# Patient Record
Sex: FEMALE | Race: WHITE | NOT HISPANIC OR LATINO | Employment: OTHER | URBAN - METROPOLITAN AREA
[De-identification: names, ages, dates, MRNs, and addresses within clinical notes are randomized per-mention and may not be internally consistent; named-entity substitution may affect disease eponyms.]

---

## 2017-02-06 ENCOUNTER — HOSPITAL ENCOUNTER (OUTPATIENT)
Dept: RADIOLOGY | Facility: MEDICAL CENTER | Age: 77
End: 2017-02-06
Attending: PHYSICIAN ASSISTANT
Payer: MEDICARE

## 2017-02-06 DIAGNOSIS — M41.85 OTHER FORM OF SCOLIOSIS OF THORACOLUMBAR SPINE: ICD-10-CM

## 2017-02-06 DIAGNOSIS — M41.9 SCOLIOSIS OF CERVICOTHORACIC SPINE, UNSPECIFIED SCOLIOSIS TYPE: ICD-10-CM

## 2017-02-06 PROCEDURE — 72081 X-RAY EXAM ENTIRE SPI 1 VW: CPT

## 2017-04-10 ENCOUNTER — HOSPITAL ENCOUNTER (EMERGENCY)
Facility: MEDICAL CENTER | Age: 77
End: 2017-04-10
Attending: EMERGENCY MEDICINE
Payer: MEDICARE

## 2017-04-10 ENCOUNTER — APPOINTMENT (OUTPATIENT)
Dept: RADIOLOGY | Facility: MEDICAL CENTER | Age: 77
End: 2017-04-10
Attending: EMERGENCY MEDICINE
Payer: MEDICARE

## 2017-04-10 VITALS
DIASTOLIC BLOOD PRESSURE: 56 MMHG | HEART RATE: 69 BPM | HEIGHT: 67 IN | SYSTOLIC BLOOD PRESSURE: 110 MMHG | RESPIRATION RATE: 16 BRPM | BODY MASS INDEX: 27 KG/M2 | WEIGHT: 172 LBS | OXYGEN SATURATION: 96 % | TEMPERATURE: 97.2 F

## 2017-04-10 DIAGNOSIS — M79.604 LEG PAIN, RIGHT: ICD-10-CM

## 2017-04-10 PROCEDURE — 96372 THER/PROPH/DIAG INJ SC/IM: CPT

## 2017-04-10 PROCEDURE — 99284 EMERGENCY DEPT VISIT MOD MDM: CPT

## 2017-04-10 PROCEDURE — 700111 HCHG RX REV CODE 636 W/ 250 OVERRIDE (IP): Performed by: EMERGENCY MEDICINE

## 2017-04-10 PROCEDURE — 73501 X-RAY EXAM HIP UNI 1 VIEW: CPT | Mod: RT

## 2017-04-10 RX ORDER — OXYCODONE HYDROCHLORIDE AND ACETAMINOPHEN 5; 325 MG/1; MG/1
1-2 TABLET ORAL EVERY 4 HOURS PRN
Status: ON HOLD | COMMUNITY
End: 2019-03-22

## 2017-04-10 RX ORDER — GABAPENTIN 100 MG/1
100 CAPSULE ORAL 3 TIMES DAILY
Status: ON HOLD | COMMUNITY
End: 2019-03-18

## 2017-04-10 RX ORDER — KETOROLAC TROMETHAMINE 30 MG/ML
30 INJECTION, SOLUTION INTRAMUSCULAR; INTRAVENOUS ONCE
Status: COMPLETED | OUTPATIENT
Start: 2017-04-10 | End: 2017-04-10

## 2017-04-10 RX ADMIN — HYDROMORPHONE HYDROCHLORIDE 1 MG: 1 INJECTION, SOLUTION INTRAMUSCULAR; INTRAVENOUS; SUBCUTANEOUS at 20:54

## 2017-04-10 RX ADMIN — KETOROLAC TROMETHAMINE 30 MG: 30 INJECTION, SOLUTION INTRAMUSCULAR; INTRAVENOUS at 20:54

## 2017-04-10 RX ADMIN — HYDROMORPHONE HYDROCHLORIDE 1 MG: 1 INJECTION, SOLUTION INTRAMUSCULAR; INTRAVENOUS; SUBCUTANEOUS at 21:52

## 2017-04-10 ASSESSMENT — PAIN SCALES - GENERAL: PAINLEVEL_OUTOF10: 9

## 2017-04-10 NOTE — ED AVS SNAPSHOT
4/10/2017          Lindsay Vargas  [address] [place] [address]    Dear Lindsay:    [facility] wants to ensure your discharge home is safe and you or your loved ones have had all your questions answered regarding your care after you leave the hospital.    You may receive a telephone call within two days of your discharge.  This call is to make certain you understand your discharge instructions as well as ensure we provided you with the best care possible during your stay with us.     The call will only last approximately 3-5 minutes and will be done by a nurse.    Once again, we want to ensure your discharge home is safe and that you have a clear understanding of any next steps in your care.  If you have any questions or concerns, please do not hesitate to contact us, we are here for you.  Thank you for choosing [facility] for your healthcare needs.    Sincerely,    Gilles Betancourt    [facility]

## 2017-04-10 NOTE — ED AVS SNAPSHOT
Home Care Instructions                                                                                                                Lindsay Vargas   MRN: [id number]    Department:  [facility], Emergency Dept   Date of Visit:  4/10/2017            [facility], Emergency Dept    [address]    [place] [address]    Phone:  [phone]      You were seen by     Franky Munroe M.D.      Your Diagnosis Was     Leg pain, right     M79.604       These are the medications you received during your hospitalization from 04/10/2017 1734 to 04/10/2017 2235     Date/Time Order Dose Route Action    04/10/2017 2054 ketorolac (TORADOL) injection 30 mg 30 mg Intramuscular Given    04/10/2017 2054 HYDROmorphone (DILAUDID) injection 1 mg 1 mg Intramuscular Given    04/10/2017 2152 HYDROmorphone (DILAUDID) injection 1 mg 1 mg Intravenous Given      Follow-up Information     1. Follow up with Lenny RENTERIA M.D..    Specialty:  Family Medicine    Contact information    [address] [address] [phone]          2. Follow up with [facility], Emergency Dept.    Specialty:  Emergency Medicine    Why:  If symptoms worsen    Contact information    [address] [address] [phone]      Medication Information     Review all of your home medications and newly ordered medications with your primary doctor and/or pharmacist as soon as possible. Follow medication instructions as directed by your doctor and/or pharmacist.     Please keep your complete medication list with you and share with your physician. Update the information when medications are discontinued, doses are changed, or new medications (including over-the-counter products) are added; and carry medication information at all times in the event of emergency situations.               Medication List      ASK your doctor about these medications         Instructions    Morning Afternoon Evening Bedtime    baclofen 10 MG Tabs   Commonly known as:  LIORESAL        Take 10 mg by mouth 3 times a day.   Dose:  10 mg                        duloxetine 60 MG Cpep delayed-release capsule   Commonly known as:  CYMBALTA        Take 60 mg by mouth every day.   Dose:  60 mg                        gabapentin 100 MG Caps   Commonly known as:  NEURONTIN        Take 100 mg by mouth 3 times a day.   Dose:  100 mg                        hydrochlorothiazide 25 MG Tabs   Commonly known as:  HYDRODIURIL        Take 25 mg by mouth every day.   Dose:  25 mg                        oxycodone-acetaminophen 5-325 MG Tabs   Commonly known as:  PERCOCET        Take 1-2 Tabs by mouth every four hours as needed.   Dose:  1-2 Tab                        potassium chloride 20 MEQ Pack   Commonly known as:  KLOR-CON        Take 20 mEq by mouth 2 times a day.   Dose:  20 mEq                        pramipexole 0.5 MG Tabs   Commonly known as:  MIRAPEX        Take 1 mg by mouth every bedtime.   Dose:  1 mg                        propranolol  MG Cp24 capsule   Commonly known as:  INDERAL LA        Take 160 mg by mouth.   Dose:  160 mg                        ropinirole 1 MG Tabs   Commonly known as:  REQUIP        Take 0.5 mg by mouth 2 Times a Day. 0.5 TO 1MG BID   Dose:  0.5 mg                                Procedures and tests performed during your visit     DX-HIP-UNILATERAL-WITH PELVIS-1 VIEW RIGHT        Discharge Instructions       Musculoskeletal Pain  Musculoskeletal pain is muscle and soha aches and pains. These pains can occur in any part of the body. Your caregiver may treat you without knowing the cause of the pain. They may treat you if blood or urine tests, X-rays, and other tests were normal.   CAUSES  There is often not a definite cause or reason for these pains. These pains may be caused by a type of germ (virus). The discomfort may also come from overuse. Overuse includes  working out too hard when your body is not fit. Yanira aches also come from weather changes. Bone is sensitive to atmospheric pressure changes.  HOME CARE INSTRUCTIONS   · Ask when your test results will be ready. Make sure you get your test results.  · Only take over-the-counter or prescription medicines for pain, discomfort, or fever as directed by your caregiver. If you were given medications for your condition, do not drive, operate machinery or power tools, or sign legal documents for 24 hours. Do not drink alcohol. Do not take sleeping pills or other medications that may interfere with treatment.  · Continue all activities unless the activities cause more pain. When the pain lessens, slowly resume normal activities. Gradually increase the intensity and duration of the activities or exercise.  · During periods of severe pain, bed rest may be helpful. Lay or sit in any position that is comfortable.  · Putting ice on the injured area.  ¨ Put ice in a bag.  ¨ Place a towel between your skin and the bag.  ¨ Leave the ice on for 15 to 20 minutes, 3 to 4 times a day.  · Follow up with your caregiver for continued problems and no reason can be found for the pain. If the pain becomes worse or does not go away, it may be necessary to repeat tests or do additional testing. Your caregiver may need to look further for a possible cause.  SEEK IMMEDIATE MEDICAL CARE IF:  · You have pain that is getting worse and is not relieved by medications.  · You develop chest pain that is associated with shortness or breath, sweating, feeling sick to your stomach (nauseous), or throw up (vomit).  · Your pain becomes localized to the abdomen.  · You develop any new symptoms that seem different or that concern you.  MAKE SURE YOU:   · Understand these instructions.  · Will watch your condition.  · Will get help right away if you are not doing well or get worse.     This information is not intended to replace advice given to you by your  health care provider. Make sure you discuss any questions you have with your health care provider.     Document Released: 12/18/2006 Document Revised: 03/11/2013 Document Reviewed: 08/22/2014  Elsevier Interactive Patient Education ©2016 Elsevier Inc.            Patient Information     Patient Information    Following emergency treatment: all patient requiring follow-up care must return either to a private physician or a clinic if your condition worsens before you are able to obtain further medical attention, please return to the emergency room.     Billing Information    At [facility], we work to make the billing process streamlined for our patients.  Our Representatives are here to answer any questions you may have regarding your hospital bill.  If you have insurance coverage and have supplied your insurance information to us, we will submit a claim to your insurer on your behalf.  Should you have any questions regarding your bill, we can be reached online or by phone as follows:  Online: You are able pay your bills online or live chat with our representatives about any billing questions you may have. We are here to help Monday - Friday from 8:00am to 7:30pm and 9:00am - 12:00pm on Saturdays.  Please visit [email or web address]  for more information.   Phone:  [phone] or [phone]    Please note that your emergency physician, surgeon, pathologist, radiologist, anesthesiologist, and other specialists are not employed by [facility] and will therefore bill separately for their services.  Please contact them directly for any questions concerning their bills at the numbers below:     Emergency Physician Services:  [phone]  [facility]:  [phone]  [facility]:  [phone]  [facility]:  [phone]    1. Your final bill may vary from the amount quoted upon discharge if all procedures are not complete at that time, or if  your doctor has additional procedures of which we are not aware. You will receive an additional bill if you return to the Emergency Department at [facility] for suture removal regardless of the facility of which the sutures were placed.     2. Please arrange for settlement of this account at the emergency registration.    3. All self-pay accounts are due in full at the time of treatment.  If you are unable to meet this obligation then payment is expected within 4-5 days.     4. If you have had radiology studies (CT, X-ray, Ultrasound, MRI), you have received a preliminary result during your emergency department visit. Please contact the radiology department [phone] to receive a copy of your final result. Please discuss the Final result with your primary physician or with the follow up physician provided.     Crisis Hotline:  [place] Crisis Hotline:  5-313-COKRFKA or 1-694.833.2267  [place] Crisis Hotline:    1-339.861.5189 or 286-281-8381         ED Discharge Follow Up Questions    1. In order to provide you with very good care, we would like to follow up with a phone call in the next few days.  May we have your permission to contact you?     YES /  NO    2. What is the best phone number to call you? (       )_____-__________    3. What is the best time to call you?      Morning  /  Afternoon  /  Evening                   Patient Signature:  ____________________________________________________________    Date:  ____________________________________________________________

## 2017-04-10 NOTE — ED AVS SNAPSHOT
appssavvy Access Code: Activation code not generated  Current appssavvy Status: Patient Declined    Your email address is not on file at [facility].  Email Addresses are required for you to sign up for appssavvy, please contact [phone] to verify your personal information and to provide your email address prior to attempting to register for appssavvy.    Lindsay Malik Sam  [address] [place] [address]    [employer]  A secure, online tool to manage your health information     [employer]’s appssavvy® is a secure, online tool that connects you to your personalized health information from the privacy of your home -- day or night - making it very easy for you to manage your healthcare. Once the activation process is completed, you can even access your medical information using the appssavvy alma, which is available for free in the Apple Alma store or Google Play store.     To learn more about appssavvy, visit [email or web address]    There are two levels of access available (as shown below):   My Chart Features  [facility] Primary Care Doctor [facility]  Specialists [facility]  Urgent  Care Non-[facility] Primary Care Doctor   Email your healthcare team securely and privately 24/7 X X X    Manage appointments: schedule your next appointment; view details of past/upcoming appointments X      Request prescription refills. X      View recent personal medical records, including lab and immunizations X X X X   View health record, including health history, allergies, medications X X X X   Read reports about your outpatient visits, procedures, consult and ER notes X X X X   See your discharge summary, which is a recap of your hospital and/or ER visit that includes your diagnosis, lab results, and care plan X X  X     How to register for [employer]:  Once your e-mail address has been verified, follow the following steps to sign up for [employer].     1. Go to  [email or web address]  2. Click on the Sign Up Now box, which takes you to the New  Member Sign Up page. You will need to provide the following information:  a. Enter your [employer] Access Code exactly as it appears at the top of this page. (You will not need to use this code after you’ve completed the sign-up process. If you do not sign up before the expiration date, you must request a new code.)   b. Enter your date of birth.   c. Enter your home email address.   d. Click Submit, and follow the next screen’s instructions.  3. Create a Mem[employer] System[employer] ID. This will be your [employer] login ID and cannot be changed, so think of one that is secure and easy to remember.  4. Create a [employer] password. You can change your password at any time.  5. Enter your Password Reset Question and Answer. This can be used at a later time if you forget your password.   6. Enter your e-mail address. This allows you to receive e-mail notifications when new information is available in [employer].  7. Click Sign Up. You can now view your health information.    For assistance activating your [employer] account, call [phone]

## 2017-04-11 NOTE — ED NOTES
Discharge instructions provided.  Pt verbalized the understanding of discharge instructions to follow up with PCP and to return to ER if condition worsens.  Pt ambulated out of ER without difficulty.  to drive home

## 2017-04-11 NOTE — DISCHARGE INSTRUCTIONS
Musculoskeletal Pain  Musculoskeletal pain is muscle and yanira aches and pains. These pains can occur in any part of the body. Your caregiver may treat you without knowing the cause of the pain. They may treat you if blood or urine tests, X-rays, and other tests were normal.   CAUSES  There is often not a definite cause or reason for these pains. These pains may be caused by a type of germ (virus). The discomfort may also come from overuse. Overuse includes working out too hard when your body is not fit. Yanira aches also come from weather changes. Bone is sensitive to atmospheric pressure changes.  HOME CARE INSTRUCTIONS   · Ask when your test results will be ready. Make sure you get your test results.  · Only take over-the-counter or prescription medicines for pain, discomfort, or fever as directed by your caregiver. If you were given medications for your condition, do not drive, operate machinery or power tools, or sign legal documents for 24 hours. Do not drink alcohol. Do not take sleeping pills or other medications that may interfere with treatment.  · Continue all activities unless the activities cause more pain. When the pain lessens, slowly resume normal activities. Gradually increase the intensity and duration of the activities or exercise.  · During periods of severe pain, bed rest may be helpful. Lay or sit in any position that is comfortable.  · Putting ice on the injured area.  ¨ Put ice in a bag.  ¨ Place a towel between your skin and the bag.  ¨ Leave the ice on for 15 to 20 minutes, 3 to 4 times a day.  · Follow up with your caregiver for continued problems and no reason can be found for the pain. If the pain becomes worse or does not go away, it may be necessary to repeat tests or do additional testing. Your caregiver may need to look further for a possible cause.  SEEK IMMEDIATE MEDICAL CARE IF:  · You have pain that is getting worse and is not relieved by medications.  · You develop chest pain  that is associated with shortness or breath, sweating, feeling sick to your stomach (nauseous), or throw up (vomit).  · Your pain becomes localized to the abdomen.  · You develop any new symptoms that seem different or that concern you.  MAKE SURE YOU:   · Understand these instructions.  · Will watch your condition.  · Will get help right away if you are not doing well or get worse.     This information is not intended to replace advice given to you by your health care provider. Make sure you discuss any questions you have with your health care provider.     Document Released: 12/18/2006 Document Revised: 03/11/2013 Document Reviewed: 08/22/2014  [employer] Interactive Patient Education ©2016 Elsevier Inc.

## 2017-04-11 NOTE — ED PROVIDER NOTES
"ED Provider Note    CHIEF COMPLAINT   Thigh pain  Calf pain.  Status post fall    HPI   Lindsay Vargas is a 76 y.o. female who presents a chief complaint of leg pain she points to her right thigh and I see mostly her right calf. It's been going on for 3 days there is no inciting event she did not break anything or fall down. She did fall a few weeks ago and hit the side of her head and her arm but she did not fall down recently. I radiates up and down she has a tingling sensation from her buttock down to both sides. No weakness. She has not fallen down and she is no urinary or bowel incontinence or retention.    Patient does have a long-standing history of lymphedema lungs any history of degenerative disease. The patient states that she has bad is in good days for the last 3 days have been very much the worst.    REVIEW OF SYSTEMS   Cardiovascular: No discoloration of her toes  Musculoskeletal:  See above. No back pain  Dermatologic: No lacerations or abrasions  Neurologic: No new numbness.    PAST MEDICAL HISTORY   Past Medical History   Diagnosis Date   • Migraine headache    • Lymphedema 2014   • Hypertension    • Anesthesia      confused/hard to wake up-15 years ago   • Arthritis    • Pain      back   • Hiatus hernia syndrome      surgically repaired   • Pneumonia      \"younger\"   • Cataract      Bilat IOL   • Chronic pain        SOCIAL HISTORY  Social History     Social History   • Marital Status:      Spouse Name: N/A   • Number of Children: N/A   • Years of Education: N/A     Social History Main Topics   • Smoking status: Never Smoker    • Smokeless tobacco: Never Used   • Alcohol Use: Yes      Comment: rarely   • Drug Use: No   • Sexual Activity: Not Asked     Other Topics Concern   • None     Social History Narrative       SURGICAL HISTORY  Past Surgical History   Procedure Laterality Date   • Hysterectomy laparoscopy     • Juani by laparoscopy     • Other abdominal surgery       LAP JUANI   • " Other abdominal surgery       APPENDECOMTY   • Other       HIATAL HERNIA REPAIR   • Other orthopedic surgery       LEFT KNEE SCOPE   • Cataract phaco with iol Left 2/4/2016     Procedure: CATARACT PHACO WITH IOL;  Surgeon: Rodrigo Smyth M.D.;  Location: [facility];  Service:    • Cataract phaco with iol Right 2/18/2016     Procedure: CATARACT PHACO WITH IOL;  Surgeon: Rodrigo Smyth M.D.;  Location: [facility];  Service:    • Pr inj lumbar/sacral,w/wo cntrst Right 9/29/2016     Procedure: INJ EPI NON NEUROLYTIC L/S - L5-S1;  Surgeon: Eduardo Mustafa M.D.;  Location: [facility];  Service: Pain Management   • Pr fluorscopic guidance spinal injection  9/29/2016     Procedure: FLUOROGUIDE FOR SPINAL INJ;  Surgeon: Eduardo Mustafa M.D.;  Location: [facility];  Service: Pain Management   • Pr inj lumbar/sacral,w/wo cntrst N/A 11/10/2016     Procedure: INJ EPI NON NEUROLYTIC L/S -MIDLINE L4-L5;  Surgeon: Eduardo Mustafa M.D.;  Location: [facility];  Service: Pain Management   • Pr fluorscopic guidance spinal injection  11/10/2016     Procedure: FLUOROGUIDE FOR SPINAL INJ;  Surgeon: Eduardo Mustafa M.D.;  Location: [facility];  Service: Pain Management       CURRENT MEDICATIONS   Home Medications     Reviewed by Ildefonso Carmen R.N. (Registered Nurse) on 04/10/17 at 1754  Med List Status: Complete    Medication Last Dose Status    baclofen (LIORESAL) 10 MG TABS 4/10/2017 Active    duloxetine (CYMBALTA) 60 MG Cap DR Particles delayed-release capsule 4/10/2017 Active    gabapentin (NEURONTIN) 100 MG Cap 4/10/2017 Active    hydrochlorothiazide (HYDRODIURIL) 25 MG TABS 4/10/2017 Active    oxycodone-acetaminophen (PERCOCET) 5-325 MG Tab 4/10/2017 Active    potassium chloride (KLOR-CON) 20 MEQ PACK 4/10/2017 Active    pramipexole (MIRAPEX) 0.5 MG Tab 4/10/2017 Active    propranolol CR (INDERAL LA) 160 MG CP24 capsule 4/10/2017  "Active    ropinirole (REQUIP) 1 MG TABS 4/10/2017 Active                ALLERGIES   No Known Allergies    PHYSICAL EXAM  VITAL SIGNS: /56 mmHg  Pulse 67  Temp(Src) 36.2 °C (97.2 °F)  Resp 16  Ht 1.702 m (5' 7\")  Wt 78.019 kg (172 lb)  BMI 26.93 kg/m2  SpO2 94%   Constitutional: Well developed, Well nourished, No acute distress, Non-toxic appearance.   Cardiovascular: Good pulses on the affected extremity. Good capillary refill.  Thorax & Lungs: No respiratory distress  Skin: No lacerations or abrasions  Musculoskeletal: Range her hip and knee without any difficulty or pain. She knows she has no lumbar tenderness. I can range both hips both knees both ankles or any deformity or pain. Patient does have some difficulty ambulating.  Neurologic: Good sensation to light touch on the distal affected extremity.    RADIOLOGY/PROCEDURES  DX-HIP-UNILATERAL-WITH PELVIS-1 VIEW RIGHT   Final Result      No radiographic evidence of acute traumatic injury.      Moderate left, mild right hip osteoarthritis            COURSE & MEDICAL DECISION MAKING  Pertinent Labs & Imaging studies reviewed. (See chart for details)  Very pleasant and talkative lady who appears to be uncomfortable secondary to pain in her leg unable to range her hip and palpates his middle tenderness on the greater trochanter but with good range of motion. At this point, call fractures consider but again with a history and with her symptoms very difficult for me to believe that the patient has significant fracture.    She received Dilaudid and Toradol with significant improvement. Upon repeat exam again she has good range of motion and no minimal to no tenderness over the trochanter.    This patient has a chronic condition has times where it improves in times that it doesn't aspect is an exacerbation of her chronic condition of asked her to call her pain management doctor and also rehab doctor pending these would benefit her tremendously.    I " encouraged her to continue follow-up with her doctor regarding MRI. In addition I did talk about possible small hairline fracture and need to return if the pain medicine is not improving. She'll continue with her oxycodone at home.    FINAL IMPRESSION  1. Leg pain  2.   3.      Electronically signed by: Franky Munroe, 4/10/2017 10:39 PM

## 2017-04-20 ENCOUNTER — HOSPITAL ENCOUNTER (OUTPATIENT)
Dept: RADIOLOGY | Facility: MEDICAL CENTER | Age: 77
End: 2017-04-20
Attending: PHYSICIAN ASSISTANT
Payer: MEDICARE

## 2017-04-20 VITALS
TEMPERATURE: 97.2 F | OXYGEN SATURATION: 96 % | HEIGHT: 68 IN | DIASTOLIC BLOOD PRESSURE: 58 MMHG | SYSTOLIC BLOOD PRESSURE: 113 MMHG | RESPIRATION RATE: 16 BRPM | BODY MASS INDEX: 26.37 KG/M2 | WEIGHT: 174 LBS | HEART RATE: 72 BPM

## 2017-04-20 DIAGNOSIS — M17.11 PRIMARY OSTEOARTHRITIS OF RIGHT KNEE: ICD-10-CM

## 2017-04-20 PROCEDURE — 73721 MRI JNT OF LWR EXTRE W/O DYE: CPT | Mod: RT

## 2017-04-20 PROCEDURE — 01922 ANES N-INVAS IMG/RADJ THER: CPT

## 2017-04-20 PROCEDURE — 700111 HCHG RX REV CODE 636 W/ 250 OVERRIDE (IP)

## 2017-04-20 NOTE — DISCHARGE INSTRUCTIONS
MRI ADULT DISCHARGE INSTRUCTIONS    You have been medicated today for your scan. Please follow the instructions below to ensure your safe recovery. If you have any questions or problems, feel free to call us at [phone] or [phone].     1.   Have someone stay with you to assist you as needed.    2.   Do not drive or operate any mechanical devices.    3.   Do not perform any activity that requires concentration. Make no major decisions over the next 24 hours.     4.   Be careful changing positions from laying down to sitting or standing, as you may become dizzy.     5.   Do not drink alcohol for 48 hours.    6.   There are no restrictions for eating your normal meals. Drink fluids.    7.   You may continue your usual medications for pain, tranquilizers, muscle relaxants or sedatives when awake.     8.   Tomorrow, you may continue your normal daily activities.     9.   Pressure dressing on 10 - 15 minutes. If swelling or bleeding occurs when removed, continue placing direct pressure on injection site for another 5 minutes, or until bleeding stops.     I have been informed of and understand the above discharge instructions.

## 2017-04-20 NOTE — IP AVS SNAPSHOT
" <p align=\"LEFT\"><IMG SRC=\"//[email or web address]$/[email or web address]\" alt=\"Image\" WIDTH=\"50%\" HEIGHT=\"200\" BORDER=\"\"></p>      `           Lindsay Vargas   MRN: [id number]    Department:  [facility] MRI [address]   Date of Visit:              `  Discharge Instructions       MRI ADULT DISCHARGE INSTRUCTIONS    You have been medicated today for your scan. Please follow the instructions below to ensure your safe recovery. If you have any questions or problems, feel free to call us at [phone] or [phone].     1.   Have someone stay with you to assist you as needed.    2.   Do not drive or operate any mechanical devices.    3.   Do not perform any activity that requires concentration. Make no major decisions over the next 24 hours.     4.   Be careful changing positions from laying down to sitting or standing, as you may become dizzy.     5.   Do not drink alcohol for 48 hours.    6.   There are no restrictions for eating your normal meals. Drink fluids.    7.   You may continue your usual medications for pain, tranquilizers, muscle relaxants or sedatives when awake.     8.   Tomorrow, you may continue your normal daily activities.     9.   Pressure dressing on 10 - 15 minutes. If swelling or bleeding occurs when removed, continue placing direct pressure on injection site for another 5 minutes, or until bleeding stops.     I have been informed of and understand the above discharge instructions.      `       Diet / Nutrition:    Follow any diet instructions given to you by your doctor or the dietician, including how much salt (sodium) you are allowed each day.    If you are overweight, talk to your doctor about a weight reduction plan.    Activity:    Remain physically active following your doctor's instructions about exercise and activity.    Rest often.     Any time you become even a little tired or short of breath, SIT DOWN and rest.    Worsening Symptoms:    Report any of the following signs and symptoms to the " doctor's office immediately:    *Pain of jaw, arm, or neck  *Chest pain not relieved by medication                               *Dizziness or loss of consciousness  *Difficulty breathing even when at rest   *More tired than usual                                       *Bleeding drainage or swelling of surgical site  *Swelling of feet, ankles, legs or stomach                 *Fever (>100ºF)  *Pink or blood tinged sputum  *Weight gain (3lbs/day or 5lbs /week)           *Shock from internal defibrillator (if applicable)  *Palpitations or irregular heartbeats                *Cool and/or numb extremities    Stroke Awareness    Common Risk Factors for Stroke include:    Age  Atrial Fibrillation  Carotid Artery Stenosis  Diabetes Mellitus  Excessive alcohol consumption  High blood pressure  Overweight   Physical inactivity  Smoking    Warning signs and symptoms of a stroke include:    *Sudden numbness or weakness of the face, arm or leg (especially on one side of the body).  *Sudden confusion, trouble speaking or understanding.  *Sudden trouble seeing in one or both eyes.  *Sudden trouble walking, dizziness, loss of balance or coordination.Sudden severe headache with no known cause.    It is very important to get treatment quickly when a stroke occurs. If you experience any of the above warning signs, call 911 immediately.                    `     Quit Smoking / Tobacco Use:    I understand the use of any tobacco products increases my chance of suffering from future heart disease or stroke and could cause other illnesses which may shorten my life. Quitting the use of tobacco products is the single most important thing I can do to improve my health. For further information on smoking / tobacco cessation call a Toll Free Quit Line at 1-522.247.1534 (*[employer] [place]) or 1-469.439.9242 (American [employer] Association) or you can access the web based program at www.lungusa.org.    [place] Tobacco Users Help Line:  (890)  878-8495       Toll Free: 4-713-135-4188  Quit Tobacco Program [facility] (204)844-0676    Crisis Hotline:    [place] Crisis Hotline:  3-460-DKIJFQD or 1-289.395.8795    [place] Crisis Hotline:    1-675.947.2977 or 251-439-7201    Discharge Survey:   Thank you for choosing [facility]. We hope we did everything we could to make your hospital stay a pleasant one. You may be receiving a phone survey and we would appreciate your time and participation in answering the questions. Your input is very valuable to us in our efforts to improve our service to our patients and their families.        My signature on this form indicates that:    1. I have reviewed and understand the above information.  2. My questions regarding this information have been answered to my satisfaction.  3. I have formulated a plan with my discharge nurse to obtain my prescribed medications for home.                   `           Patient or Caregiver Signature:  ____________________________________________________________    Date:  ____________________________________________________________       `

## 2017-06-08 ENCOUNTER — RX ONLY (OUTPATIENT)
Age: 77
Setting detail: RX ONLY
End: 2017-06-08

## 2017-06-28 ENCOUNTER — APPOINTMENT (OUTPATIENT)
Dept: RADIOLOGY | Facility: MEDICAL CENTER | Age: 77
End: 2017-06-28
Attending: EMERGENCY MEDICINE
Payer: MEDICARE

## 2017-06-28 ENCOUNTER — HOSPITAL ENCOUNTER (EMERGENCY)
Facility: MEDICAL CENTER | Age: 77
End: 2017-06-28
Attending: EMERGENCY MEDICINE
Payer: MEDICARE

## 2017-06-28 VITALS
HEART RATE: 70 BPM | SYSTOLIC BLOOD PRESSURE: 130 MMHG | WEIGHT: 174.38 LBS | HEIGHT: 68 IN | DIASTOLIC BLOOD PRESSURE: 60 MMHG | BODY MASS INDEX: 26.43 KG/M2 | RESPIRATION RATE: 16 BRPM | TEMPERATURE: 96.8 F
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
## 2017-06-28 DIAGNOSIS — H53.8 BLURRY VISION: ICD-10-CM

## 2017-06-28 DIAGNOSIS — S09.8XXA BLUNT HEAD TRAUMA, INITIAL ENCOUNTER: ICD-10-CM

## 2017-06-28 PROCEDURE — 99283 EMERGENCY DEPT VISIT LOW MDM: CPT

## 2017-06-28 PROCEDURE — 70450 CT HEAD/BRAIN W/O DYE: CPT

## 2017-06-28 ASSESSMENT — PAIN SCALES - WONG BAKER: WONGBAKER_NUMERICALRESPONSE: DOESN'T HURT AT ALL

## 2017-06-28 NOTE — ED AVS SNAPSHOT
6/28/2017    Lindsay Vargas  [address] [place] [address]    Dear Lindsay:    [facility] wants to ensure your discharge home is safe and you or your loved ones have had all of your questions answered regarding your care after you leave the hospital.    Below is a list of resources and contact information should you have any questions regarding your hospital stay, follow-up instructions, or active medical symptoms.    Questions or Concerns Regarding… Contact   Medical Questions Related to Your Discharge  (7 days a week, 8am-5pm) Contact a Nurse Care Coordinator   [phone]   Medical Questions Not Related to Your Discharge  (24 hours a day / 7 days a week)  Contact the Nurse Health Line   [phone]    Medications or Discharge Instructions Refer to your discharge packet   or contact your [facility] Primary Care Provider   [phone]   Follow-up Appointment(s) Schedule your appointment via Infraf[employer]   or contact Scheduling [phone]   Billing Review your statement via Infrafone  or contact Billing [phone]   Medical Records Review your records via Infrafone   or contact Medical Records [phone]     You may receive a telephone call within two days of discharge. This call is to make certain you understand your discharge instructions and have the opportunity to have any questions answered. You can also easily access your medical information, test results and upcoming appointments via the Infrafone free online health management tool. You can learn more and sign up at [email or web address]. For assistance setting up your Infrafone account, please call [phone].    Once again, we want to ensure your discharge home is safe and that you have a clear understanding of any next steps in your care. If you have any questions or concerns, please do not hesitate to contact us, we are here for you. Thank you for choosing [facility] for your healthcare needs.    Sincerely,    Your [facility] Healthcare Team

## 2017-06-28 NOTE — ED AVS SNAPSHOT
Home Care Instructions                                                                                                                Lindsay Vargas   MRN: [id number]    Department:  [facility], Emergency Dept   Date of Visit:  6/28/2017            [facility], Emergency Dept    [address]    [place] [address]    Phone:  [phone]      You were seen by     Aureliano Jaffe M.D.      Your Diagnosis Was     Fall, initial encounter     W19.XXXA       Follow-up Information     1. Follow up with Rodrigo Smyth M.D.. Call today.    Specialty:  Ophthalmology    Contact information    [address]  [place] [address]  [phone]          2. Follow up with [facility], Emergency Dept.    Specialty:  Emergency Medicine    Why:  If symptoms worsen    Contact information    [address] [place] [address] [phone]      Medication Information     Review all of your home medications and newly ordered medications with your primary doctor and/or pharmacist as soon as possible. Follow medication instructions as directed by your doctor and/or pharmacist.     Please keep your complete medication list with you and share with your physician. Update the information when medications are discontinued, doses are changed, or new medications (including over-the-counter products) are added; and carry medication information at all times in the event of emergency situations.               Medication List      ASK your doctor about these medications        Instructions    Morning Afternoon Evening Bedtime    baclofen 10 MG Tabs   Commonly known as:  LIORESAL        Take 10 mg by mouth 3 times a day.   Dose:  10 mg                        duloxetine 60 MG Cpep delayed-release capsule   Commonly known as:  CYMBALTA        Take 60 mg by mouth every day.   Dose:  60 mg                        gabapentin 100 MG Caps   Commonly known as:   NEURONTIN        Take 100 mg by mouth 3 times a day.   Dose:  100 mg                        hydrochlorothiazide 25 MG Tabs   Commonly known as:  HYDRODIURIL        Take 25 mg by mouth every day.   Dose:  25 mg                        oxycodone-acetaminophen 5-325 MG Tabs   Commonly known as:  PERCOCET        Take 1-2 Tabs by mouth every four hours as needed.   Dose:  1-2 Tab                        potassium chloride 20 MEQ Pack   Commonly known as:  KLOR-CON        Take 20 mEq by mouth 3 times a day.   Dose:  20 mEq                        pramipexole 0.5 MG Tabs   Commonly known as:  MIRAPEX        Take 0.5 mg by mouth every bedtime.   Dose:  0.5 mg                        propranolol  MG Cp24 capsule   Commonly known as:  INDERAL LA        Take 160 mg by mouth every day.   Dose:  160 mg                        ropinirole 1 MG Tabs   Commonly known as:  REQUIP        Take 0.5 mg by mouth 2 Times a Day. 0.5 TO 1MG BID   Dose:  0.5 mg                                Procedures and tests performed during your visit     CT-HEAD W/O        Discharge Instructions       Head Injury, Adult  You have a head injury. Headaches and throwing up (vomiting) are common after a head injury. It should be easy to wake up from sleeping. Sometimes you must stay in the hospital. Most problems happen within the first 24 hours. Side effects may occur up to 7-10 days after the injury.   WHAT ARE THE TYPES OF HEAD INJURIES?  Head injuries can be as minor as a bump. Some head injuries can be more severe. More severe head injuries include:  · A jarring injury to the brain (concussion).  · A bruise of the brain (contusion). This mean there is bleeding in the brain that can cause swelling.  · A cracked skull (skull fracture).  · Bleeding in the brain that collects, clots, and forms a bump (hematoma).  WHEN SHOULD I GET HELP RIGHT AWAY?   · You are confused or sleepy.  · You cannot be woken up.  · You feel sick to your stomach (nauseous) or  keep throwing up (vomiting).  · Your dizziness or unsteadiness is getting worse.  · You have very bad, lasting headaches that are not helped by medicine. Take medicines only as told by your doctor.  · You cannot use your arms or legs like normal.  · You cannot walk.  · You notice changes in the black spots in the center of the colored part of your eye (pupil).  · You have clear or bloody fluid coming from your nose or ears.  · You have trouble seeing.  During the next 24 hours after the injury, you must stay with someone who can watch you. This person should get help right away (call 911 in the [place] if you start to shake and are not able to control it (have seizures), you pass out, or you are unable to wake up.  HOW CAN I PREVENT A HEAD INJURY IN THE FUTURE?  · Wear seat belts.  · Wear a helmet while bike riding and playing sports like football.  · Stay away from dangerous activities around the house.  WHEN CAN I RETURN TO NORMAL ACTIVITIES AND ATHLETICS?  See your doctor before doing these activities. You should not do normal activities or play contact sports until 1 week after the following symptoms have stopped:  · Headache that does not go away.  · Dizziness.  · Poor attention.  · Confusion.  · Memory problems.  · Sickness to your stomach or throwing up.  · Tiredness.  · Fussiness.  · Bothered by bright lights or loud noises.  · Anxiousness or depression.  · Restless sleep.  MAKE SURE YOU:   · Understand these instructions.  · Will watch your condition.  · Will get help right away if you are not doing well or get worse.     This information is not intended to replace advice given to you by your health care provider. Make sure you discuss any questions you have with your health care provider.     Document Released: 11/30/2009 Document Revised: 01/08/2016 Document Reviewed: 08/25/2014  Elsevier Interactive Patient Education ©2016 Elsevier Inc.            Patient Information     Patient Information    Following  emergency treatment: all patient requiring follow-up care must return either to a private physician or a clinic if your condition worsens before you are able to obtain further medical attention, please return to the emergency room.     Billing Information    At [facility], we work to make the billing process streamlined for our patients.  Our Representatives are here to answer any questions you may have regarding your hospital bill.  If you have insurance coverage and have supplied your insurance information to us, we will submit a claim to your insurer on your behalf.  Should you have any questions regarding your bill, we can be reached online or by phone as follows:  Online: You are able pay your bills online or live chat with our representatives about any billing questions you may have. We are here to help Monday - Friday from 8:00am to 7:30pm and 9:00am - 12:00pm on Saturdays.  Please visit [email or web address]  for more information.   Phone:  [phone] or [phone]    Please note that your emergency physician, surgeon, pathologist, radiologist, anesthesiologist, and other specialists are not employed by [facility] and will therefore bill separately for their services.  Please contact them directly for any questions concerning their bills at the numbers below:     Emergency Physician Services:  [phone]  [facility]:  [phone]  [facility]:  [phone]  [facility]:  [phone]    1. Your final bill may vary from the amount quoted upon discharge if all procedures are not complete at that time, or if your doctor has additional procedures of which we are not aware. You will receive an additional bill if you return to the Emergency Department at [facility] for suture removal regardless of the facility of which the sutures were placed.     2. Please arrange for settlement of this account at the emergency  registration.    3. All self-pay accounts are due in full at the time of treatment.  If you are unable to meet this obligation then payment is expected within 4-5 days.     4. If you have had radiology studies (CT, X-ray, Ultrasound, MRI), you have received a preliminary result during your emergency department visit. Please contact the radiology department [phone] to receive a copy of your final result. Please discuss the Final result with your primary physician or with the follow up physician provided.     Crisis Hotline:  [place] Crisis Hotline:  2-189-KTEKINV or 1-891.172.6558  [place] Crisis Hotline:    1-237.304.2971 or 380-969-2629         ED Discharge Follow Up Questions    1. In order to provide you with very good care, we would like to follow up with a phone call in the next few days.  May we have your permission to contact you?     YES /  NO    2. What is the best phone number to call you? (       )_____-__________    3. What is the best time to call you?      Morning  /  Afternoon  /  Evening                   Patient Signature:  ____________________________________________________________    Date:  ____________________________________________________________

## 2017-06-28 NOTE — DISCHARGE INSTRUCTIONS
Head Injury, Adult  You have a head injury. Headaches and throwing up (vomiting) are common after a head injury. It should be easy to wake up from sleeping. Sometimes you must stay in the hospital. Most problems happen within the first 24 hours. Side effects may occur up to 7-10 days after the injury.   WHAT ARE THE TYPES OF HEAD INJURIES?  Head injuries can be as minor as a bump. Some head injuries can be more severe. More severe head injuries include:  · A jarring injury to the brain (concussion).  · A bruise of the brain (contusion). This mean there is bleeding in the brain that can cause swelling.  · A cracked skull (skull fracture).  · Bleeding in the brain that collects, clots, and forms a bump (hematoma).  WHEN SHOULD I GET HELP RIGHT AWAY?   · You are confused or sleepy.  · You cannot be woken up.  · You feel sick to your stomach (nauseous) or keep throwing up (vomiting).  · Your dizziness or unsteadiness is getting worse.  · You have very bad, lasting headaches that are not helped by medicine. Take medicines only as told by your doctor.  · You cannot use your arms or legs like normal.  · You cannot walk.  · You notice changes in the black spots in the center of the colored part of your eye (pupil).  · You have clear or bloody fluid coming from your nose or ears.  · You have trouble seeing.  During the next 24 hours after the injury, you must stay with someone who can watch you. This person should get help right away (call 911 in the [place] if you start to shake and are not able to control it (have seizures), you pass out, or you are unable to wake up.  HOW CAN I PREVENT A HEAD INJURY IN THE FUTURE?  · Wear seat belts.  · Wear a helmet while bike riding and playing sports like football.  · Stay away from dangerous activities around the house.  WHEN CAN I RETURN TO NORMAL ACTIVITIES AND ATHLETICS?  See your doctor before doing these activities. You should not do normal activities or play contact sports until 1  week after the following symptoms have stopped:  · Headache that does not go away.  · Dizziness.  · Poor attention.  · Confusion.  · Memory problems.  · Sickness to your stomach or throwing up.  · Tiredness.  · Fussiness.  · Bothered by bright lights or loud noises.  · Anxiousness or depression.  · Restless sleep.  MAKE SURE YOU:   · Understand these instructions.  · Will watch your condition.  · Will get help right away if you are not doing well or get worse.     This information is not intended to replace advice given to you by your health care provider. Make sure you discuss any questions you have with your health care provider.     Document Released: 11/30/2009 Document Revised: 01/08/2016 Document Reviewed: 08/25/2014  Else"[employer] Interactive Patient Education ©2016 Elsevier Inc.

## 2017-06-28 NOTE — ED NOTES
Pt amb to triage w spouse; pt c/o r/t t5000 GLF x4d ago. Pt states she tripped on her front-wheel walker onto her cupboard.pt c/o HA intermittentsly, and L eye diplopia

## 2017-06-28 NOTE — ED NOTES
"Med rec updated and complete  Allergies reviewed  Pts  states \"No antibiotics in the last 30 days\".      "

## 2017-06-28 NOTE — ED AVS SNAPSHOT
"[employer] Access Code: Activation code not generated  Current "[employer] Status: Patient Declined    Your email address is not on file at [facility].  Email Addresses are required for you to sign up for "[employer] please contact [phone] to verify your personal information and to provide your email address prior to attempting to register for "[employer].    Lindsay Malik Sam  [address] [place] [address]    [employer]  A secure, online tool to manage your health information     [facility]’s "[employer]® is a secure, online tool that connects you to your personalized health information from the privacy of your home -- day or night - making it very easy for you to manage your healthcare. Once the activation process is completed, you can even access your medical information using the "[employer] alma, which is available for free in the Apple Alma store or Google Play store.     To learn more about "[employer] visit [email or web address]/"[employer]    There are two levels of access available (as shown below):   My Chart Features  [facility] Primary Care Doctor [facility]  Specialists [facility]  Urgent  Care Non-[facility] Primary Care Doctor   Email your healthcare team securely and privately 24/7 X X X    Manage appointments: schedule your next appointment; view details of past/upcoming appointments X      Request prescription refills. X      View recent personal medical records, including lab and immunizations X X X X   View health record, including health history, allergies, medications X X X X   Read reports about your outpatient visits, procedures, consult and ER notes X X X X   See your discharge summary, which is a recap of your hospital and/or ER visit that includes your diagnosis, lab results, and care plan X X  X     How to register for [employer]:  Once your e-mail address has been verified, follow the following steps to sign up for [employer].     1. Go to  [email or web address]  2. Click on the Sign Up Now box, which takes you to the New  Member Sign Up page. You will need to provide the following information:  a. Enter your Mosaic Access Code exactly as it appears at the top of this page. (You will not need to use this code after you’ve completed the sign-up process. If you do not sign up before the expiration date, you must request a new code.)   b. Enter your date of birth.   c. Enter your home email address.   d. Click Submit, and follow the next screen’s instructions.  3. Create a Milyonit ID. This will be your Mosaic login ID and cannot be changed, so think of one that is secure and easy to remember.  4. Create a Mosaic password. You can change your password at any time.  5. Enter your Password Reset Question and Answer. This can be used at a later time if you forget your password.   6. Enter your e-mail address. This allows you to receive e-mail notifications when new information is available in Mosaic.  7. Click Sign Up. You can now view your health information.    For assistance activating your Mosaic account, call [phone]

## 2017-06-28 NOTE — ED PROVIDER NOTES
"ED Provider Note    CHIEF COMPLAINT  No chief complaint on file.      [facility]  Lindsay Vargas is a 76 y.o. female who presents to the emergency department with chief complaint of fall. The patient states that she falls frequently. She is currently in physical therapy to assist with her difficulties with ambulation. Last session fills proximal 4 days ago. She fell backwards and hit her head. She did not can knocked out. She's had some ongoing headache. She has some mild blurry vision. She has had some problems with blurry vision in the past also following falls. She has followed up with her ophthalmologist Dr. Smyth and reports to me that everything as \"checked out\". Today the patient went to the orthopedic surgeon. He heard of her history of fall and head trauma and the patient was therefore referred to the emergency department for further evaluation.    REVIEW OF SYSTEMS  See HPI for further details. All other systems are negative.     PAST MEDICAL HISTORY  Past Medical History   Diagnosis Date   • Migraine headache    • Lymphedema 2014   • Hypertension    • Anesthesia      confused/hard to wake up-15 years ago   • Arthritis    • Pain      back   • Hiatus hernia syndrome      surgically repaired   • Pneumonia      \"younger\"   • Cataract      Bilat IOL   • Chronic pain        FAMILY HISTORY  No family history on file.    SOCIAL HISTORY  Social History     Social History   • Marital Status:      Spouse Name: N/A   • Number of Children: N/A   • Years of Education: N/A     Social History Main Topics   • Smoking status: Never Smoker    • Smokeless tobacco: Never Used   • Alcohol Use: Yes      Comment: rarely   • Drug Use: No   • Sexual Activity: Not on file     Other Topics Concern   • Not on file     Social History Narrative       SURGICAL HISTORY  Past Surgical History   Procedure Laterality Date   • Hysterectomy laparoscopy     • Juani by laparoscopy     • Other abdominal surgery       LAP JUANI   • Other " abdominal surgery       APPENDECOMTY   • Other       HIATAL HERNIA REPAIR   • Other orthopedic surgery       LEFT KNEE SCOPE   • Cataract phaco with iol Left 2/4/2016     Procedure: CATARACT PHACO WITH IOL;  Surgeon: Rodrigo Smyth M.D.;  Location: [facility];  Service:    • Cataract phaco with iol Right 2/18/2016     Procedure: CATARACT PHACO WITH IOL;  Surgeon: Rodrigo Smyth M.D.;  Location: [facility];  Service:    • Pr inj lumbar/sacral,w/wo cntrst Right 9/29/2016     Procedure: INJ EPI NON NEUROLYTIC L/S - L5-S1;  Surgeon: Eduardo Mustafa M.D.;  Location: [facility];  Service: Pain Management   • Pr fluorscopic guidance spinal injection  9/29/2016     Procedure: FLUOROGUIDE FOR SPINAL INJ;  Surgeon: Eduardo Mustafa M.D.;  Location: [facility];  Service: Pain Management   • Pr inj lumbar/sacral,w/wo cntrst N/A 11/10/2016     Procedure: INJ EPI NON NEUROLYTIC L/S -MIDLINE L4-L5;  Surgeon: Eduardo Mustafa M.D.;  Location: [facility];  Service: Pain Management   • Pr fluorscopic guidance spinal injection  11/10/2016     Procedure: FLUOROGUIDE FOR SPINAL INJ;  Surgeon: Eduardo Mustafa M.D.;  Location: [facility];  Service: Pain Management       CURRENT MEDICATIONS  Home Medications     Reviewed by Roger Bell (Pharmacy Tech) on 06/28/17 at 0946  Med List Status: Complete    Medication Last Dose Status    baclofen (LIORESAL) 10 MG TABS 6/28/2017 Active    duloxetine (CYMBALTA) 60 MG Cap DR Particles delayed-release capsule 6/27/2017 Active    gabapentin (NEURONTIN) 100 MG Cap 6/28/2017 Active    hydrochlorothiazide (HYDRODIURIL) 25 MG TABS 6/28/2017 Active    oxycodone-acetaminophen (PERCOCET) 5-325 MG Tab 6/28/2017 Active    potassium chloride (KLOR-CON) 20 MEQ PACK 6/27/2017 Active    pramipexole (MIRAPEX) 0.5 MG Tab 6/27/2017 Active    propranolol CR (INDERAL LA) 160 MG CP24 capsule 6/27/2017 Active     "ropinirole (REQUIP) 1 MG TABS 6/27/2017 Active                ALLERGIES  No Known Allergies    PHYSICAL EXAM  VITAL SIGNS: /60 mmHg  Pulse 70  Temp(Src) 36 °C (96.8 °F)  Resp 16  Ht 1.727 m (5' 8\")  Wt 79.1 kg (174 lb 6.1 oz)  BMI 26.52 kg/m2  Constitutional:  Well developed, Well nourished, No acute distress, Non-toxic appearance.   HENT: Normocephalic, no external evidence of trauma, no cephalohematoma.   Eyes: PERRLA, EOMI, Conjunctiva normal, No discharge. Patient reports her visual acuity is at baseline with correction.  Neck: Normal range of motion, No tenderness, Supple, No stridor.   Lymphatic: No lymphadenopathy noted.   Cardiovascular: Normal heart rate, Normal rhythm, No murmurs, No rubs, No gallops.   Thorax & Lungs: Normal breath sounds, No respiratory distress, No wheezing, No chest tenderness.   Skin: Warm, Dry, No erythema, No rash.   Extremities: Intact distal pulses, No edema, No tenderness, No cyanosis, No clubbing.   Neurologic: Alert & oriented x 3, Normal motor function, Normal sensory function, No focal deficits noted.   Psychiatric: Affect normal, Judgment normal, Mood normal.     RADIOLOGY/PROCEDURES  CT-HEAD W/O   Final Result      1.  No evidence of acute intracranial process.      2.  Cerebral atrophy as well as periventricular chronic small vessel ischemic change.            COURSE & MEDICAL DECISION MAKING  Pertinent Labs & Imaging studies reviewed. (See chart for details)    The patient presents today after a mechanical ground-level fall. She struck her head. She did not lose consciousness. The patient is elderly and requires a head CT.    CT scan is negative for skull fracture or intracranial hemorrhage. Given the patient's visual complaints at contact the patient's ophthalmologist Dr. Love. He was in surgery but was able to relay information to the office. They will contact the patient for follow-up appointment. In the emergency department patient does not seem to have " any significant visual disturbance.    FINAL IMPRESSION  1. Fall, initial encounter    2. Blunt head trauma, initial encounter    3. Blurry vision            Electronically signed by: Aureliano Jaffe, 6/28/2017 12:18 PM

## 2017-08-24 ENCOUNTER — HOSPITAL ENCOUNTER (OUTPATIENT)
Dept: PAIN MANAGEMENT | Facility: REHABILITATION | Age: 77
End: 2017-08-24
Attending: PAIN MEDICINE
Payer: MEDICARE

## 2017-08-24 ENCOUNTER — HOSPITAL ENCOUNTER (OUTPATIENT)
Dept: RADIOLOGY | Facility: REHABILITATION | Age: 77
End: 2017-08-24
Attending: PAIN MEDICINE
Payer: MEDICARE

## 2017-08-24 VITALS
DIASTOLIC BLOOD PRESSURE: 73 MMHG | HEART RATE: 76 BPM | OXYGEN SATURATION: 96 % | RESPIRATION RATE: 16 BRPM | BODY MASS INDEX: 26.9 KG/M2 | TEMPERATURE: 97.6 F | SYSTOLIC BLOOD PRESSURE: 155 MMHG | HEIGHT: 68 IN | WEIGHT: 177.47 LBS

## 2017-08-24 PROCEDURE — 62323 NJX INTERLAMINAR LMBR/SAC: CPT

## 2017-08-24 PROCEDURE — 700117 HCHG RX CONTRAST REV CODE 255

## 2017-08-24 PROCEDURE — 700111 HCHG RX REV CODE 636 W/ 250 OVERRIDE (IP)

## 2017-08-24 RX ORDER — BETAMETHASONE SODIUM PHOSPHATE AND BETAMETHASONE ACETATE 3; 3 MG/ML; MG/ML
INJECTION, SUSPENSION INTRA-ARTICULAR; INTRALESIONAL; INTRAMUSCULAR; SOFT TISSUE
Status: COMPLETED
Start: 2017-08-24 | End: 2017-08-24

## 2017-08-24 RX ORDER — MIDAZOLAM HYDROCHLORIDE 1 MG/ML
INJECTION INTRAMUSCULAR; INTRAVENOUS
Status: COMPLETED
Start: 2017-08-24 | End: 2017-08-24

## 2017-08-24 RX ADMIN — IOHEXOL 5 ML: 240 INJECTION, SOLUTION INTRATHECAL; INTRAVASCULAR; INTRAVENOUS; ORAL at 08:15

## 2017-08-24 RX ADMIN — BETAMETHASONE SODIUM PHOSPHATE AND BETAMETHASONE ACETATE 6 MG: 3; 3 INJECTION, SUSPENSION INTRA-ARTICULAR; INTRALESIONAL; INTRAMUSCULAR at 08:15

## 2017-08-24 ASSESSMENT — PAIN SCALES - GENERAL
PAINLEVEL_OUTOF10: 6
PAINLEVEL_OUTOF10: 6

## 2017-08-24 NOTE — PROGRESS NOTES
Current medications reviewed with pt, see medications reconciliation form. Pt kei taking ASA or other blood thinners or anti-inflammatories.  Pt has a ride post-procedure( to drive).  Printed and verbal discharge instructions given to pt who verbalized understanding.

## 2017-12-02 ENCOUNTER — HOSPITAL ENCOUNTER (OUTPATIENT)
Dept: RADIOLOGY | Facility: MEDICAL CENTER | Age: 77
End: 2017-12-02
Attending: FAMILY MEDICINE
Payer: MEDICARE

## 2017-12-02 DIAGNOSIS — S06.9X9A HEAD INJURY, CLOSED, WITH LOC OF UNKNOWN DURATION (HCC): ICD-10-CM

## 2017-12-02 PROCEDURE — 70450 CT HEAD/BRAIN W/O DYE: CPT

## 2017-12-12 ENCOUNTER — HOSPITAL ENCOUNTER (OUTPATIENT)
Dept: RADIOLOGY | Facility: MEDICAL CENTER | Age: 77
End: 2017-12-12
Attending: FAMILY MEDICINE
Payer: MEDICARE

## 2017-12-12 DIAGNOSIS — M25.559 PAIN IN JOINT INVOLVING PELVIC REGION AND THIGH, UNSPECIFIED LATERALITY: ICD-10-CM

## 2017-12-12 PROCEDURE — 73502 X-RAY EXAM HIP UNI 2-3 VIEWS: CPT | Mod: LT

## 2018-01-04 ENCOUNTER — HOSPITAL ENCOUNTER (OUTPATIENT)
Dept: LAB | Facility: MEDICAL CENTER | Age: 78
End: 2018-01-04
Attending: FAMILY MEDICINE
Payer: MEDICARE

## 2018-01-04 LAB
ALBUMIN SERPL BCP-MCNC: 4.2 G/DL (ref 3.2–4.9)
ALBUMIN/GLOB SERPL: 1.4 G/DL
ALP SERPL-CCNC: 64 U/L (ref 30–99)
ALT SERPL-CCNC: 7 U/L (ref 2–50)
ANION GAP SERPL CALC-SCNC: 10 MMOL/L (ref 0–11.9)
AST SERPL-CCNC: 14 U/L (ref 12–45)
BASOPHILS # BLD AUTO: 0.5 % (ref 0–1.8)
BASOPHILS # BLD: 0.04 K/UL (ref 0–0.12)
BILIRUB SERPL-MCNC: 0.6 MG/DL (ref 0.1–1.5)
BUN SERPL-MCNC: 23 MG/DL (ref 8–22)
CALCIUM SERPL-MCNC: 9.4 MG/DL (ref 8.5–10.5)
CHLORIDE SERPL-SCNC: 105 MMOL/L (ref 96–112)
CHOLEST SERPL-MCNC: 197 MG/DL (ref 100–199)
CO2 SERPL-SCNC: 24 MMOL/L (ref 20–33)
CREAT SERPL-MCNC: 0.79 MG/DL (ref 0.5–1.4)
CRP SERPL HS-MCNC: 4.5 MG/L (ref 0–7.5)
EOSINOPHIL # BLD AUTO: 0.18 K/UL (ref 0–0.51)
EOSINOPHIL NFR BLD: 2.3 % (ref 0–6.9)
ERYTHROCYTE [DISTWIDTH] IN BLOOD BY AUTOMATED COUNT: 46.4 FL (ref 35.9–50)
FERRITIN SERPL-MCNC: 48 NG/ML (ref 10–291)
FOLATE SERPL-MCNC: 11.2 NG/ML
GFR SERPL CREATININE-BSD FRML MDRD: >60 ML/MIN/1.73 M 2
GLOBULIN SER CALC-MCNC: 3 G/DL (ref 1.9–3.5)
GLUCOSE SERPL-MCNC: 96 MG/DL (ref 65–99)
HCT VFR BLD AUTO: 37.6 % (ref 37–47)
HDLC SERPL-MCNC: 62 MG/DL
HGB BLD-MCNC: 12.4 G/DL (ref 12–16)
IMM GRANULOCYTES # BLD AUTO: 0.03 K/UL (ref 0–0.11)
IMM GRANULOCYTES NFR BLD AUTO: 0.4 % (ref 0–0.9)
LDLC SERPL CALC-MCNC: 109 MG/DL
LYMPHOCYTES # BLD AUTO: 1.98 K/UL (ref 1–4.8)
LYMPHOCYTES NFR BLD: 25.2 % (ref 22–41)
MCH RBC QN AUTO: 27.7 PG (ref 27–33)
MCHC RBC AUTO-ENTMCNC: 33 G/DL (ref 33.6–35)
MCV RBC AUTO: 83.9 FL (ref 81.4–97.8)
MONOCYTES # BLD AUTO: 0.46 K/UL (ref 0–0.85)
MONOCYTES NFR BLD AUTO: 5.9 % (ref 0–13.4)
NEUTROPHILS # BLD AUTO: 5.17 K/UL (ref 2–7.15)
NEUTROPHILS NFR BLD: 65.7 % (ref 44–72)
NRBC # BLD AUTO: 0 K/UL
NRBC BLD-RTO: 0 /100 WBC
PLATELET # BLD AUTO: 188 K/UL (ref 164–446)
PMV BLD AUTO: 10.4 FL (ref 9–12.9)
POTASSIUM SERPL-SCNC: 3.6 MMOL/L (ref 3.6–5.5)
PROT SERPL-MCNC: 7.2 G/DL (ref 6–8.2)
RBC # BLD AUTO: 4.48 M/UL (ref 4.2–5.4)
SODIUM SERPL-SCNC: 139 MMOL/L (ref 135–145)
TRIGL SERPL-MCNC: 128 MG/DL (ref 0–149)
VIT B12 SERPL-MCNC: 157 PG/ML (ref 211–911)
WBC # BLD AUTO: 7.9 K/UL (ref 4.8–10.8)

## 2018-01-04 PROCEDURE — 82607 VITAMIN B-12: CPT

## 2018-01-04 PROCEDURE — 80061 LIPID PANEL: CPT

## 2018-01-04 PROCEDURE — 80053 COMPREHEN METABOLIC PANEL: CPT

## 2018-01-04 PROCEDURE — 36415 COLL VENOUS BLD VENIPUNCTURE: CPT

## 2018-01-04 PROCEDURE — 82746 ASSAY OF FOLIC ACID SERUM: CPT

## 2018-01-04 PROCEDURE — 86141 C-REACTIVE PROTEIN HS: CPT

## 2018-01-04 PROCEDURE — 85025 COMPLETE CBC W/AUTO DIFF WBC: CPT

## 2018-01-04 PROCEDURE — 82728 ASSAY OF FERRITIN: CPT

## 2018-01-06 ENCOUNTER — HOSPITAL ENCOUNTER (OUTPATIENT)
Facility: MEDICAL CENTER | Age: 78
End: 2018-01-06
Attending: FAMILY MEDICINE
Payer: MEDICARE

## 2018-01-06 PROCEDURE — 82272 OCCULT BLD FECES 1-3 TESTS: CPT

## 2018-01-07 ENCOUNTER — HOSPITAL ENCOUNTER (OUTPATIENT)
Facility: MEDICAL CENTER | Age: 78
End: 2018-01-07
Attending: FAMILY MEDICINE
Payer: MEDICARE

## 2018-01-07 PROCEDURE — 82272 OCCULT BLD FECES 1-3 TESTS: CPT

## 2018-01-08 ENCOUNTER — HOSPITAL ENCOUNTER (OUTPATIENT)
Facility: MEDICAL CENTER | Age: 78
End: 2018-01-08
Attending: FAMILY MEDICINE
Payer: MEDICARE

## 2018-01-08 PROCEDURE — 82272 OCCULT BLD FECES 1-3 TESTS: CPT

## 2018-01-09 LAB
HEMOCCULT STL QL: NEGATIVE

## 2018-03-21 ENCOUNTER — HOSPITAL ENCOUNTER (OUTPATIENT)
Dept: LAB | Facility: MEDICAL CENTER | Age: 78
End: 2018-03-21
Attending: PAIN MEDICINE
Payer: MEDICARE

## 2018-03-21 ENCOUNTER — HOSPITAL ENCOUNTER (OUTPATIENT)
Dept: RADIOLOGY | Facility: MEDICAL CENTER | Age: 78
End: 2018-03-21
Attending: PAIN MEDICINE
Payer: MEDICARE

## 2018-03-21 ENCOUNTER — HOSPITAL ENCOUNTER (OUTPATIENT)
Dept: CARDIOLOGY | Facility: MEDICAL CENTER | Age: 78
End: 2018-03-21
Attending: PAIN MEDICINE
Payer: MEDICARE

## 2018-03-21 DIAGNOSIS — M50.30 DEGENERATION OF CERVICAL INTERVERTEBRAL DISC: ICD-10-CM

## 2018-03-21 LAB
ALBUMIN SERPL BCP-MCNC: 4.2 G/DL (ref 3.2–4.9)
ALBUMIN/GLOB SERPL: 1.4 G/DL
ALP SERPL-CCNC: 61 U/L (ref 30–99)
ALT SERPL-CCNC: 9 U/L (ref 2–50)
ANION GAP SERPL CALC-SCNC: 9 MMOL/L (ref 0–11.9)
AST SERPL-CCNC: 14 U/L (ref 12–45)
BASOPHILS # BLD AUTO: 0.9 % (ref 0–1.8)
BASOPHILS # BLD: 0.07 K/UL (ref 0–0.12)
BILIRUB SERPL-MCNC: 0.6 MG/DL (ref 0.1–1.5)
BUN SERPL-MCNC: 23 MG/DL (ref 8–22)
CALCIUM SERPL-MCNC: 9.6 MG/DL (ref 8.5–10.5)
CHLORIDE SERPL-SCNC: 102 MMOL/L (ref 96–112)
CO2 SERPL-SCNC: 26 MMOL/L (ref 20–33)
CREAT SERPL-MCNC: 0.82 MG/DL (ref 0.5–1.4)
EKG IMPRESSION: NORMAL
EOSINOPHIL # BLD AUTO: 0.18 K/UL (ref 0–0.51)
EOSINOPHIL NFR BLD: 2.3 % (ref 0–6.9)
ERYTHROCYTE [DISTWIDTH] IN BLOOD BY AUTOMATED COUNT: 47.8 FL (ref 35.9–50)
GLOBULIN SER CALC-MCNC: 2.9 G/DL (ref 1.9–3.5)
GLUCOSE SERPL-MCNC: 95 MG/DL (ref 65–99)
HCT VFR BLD AUTO: 39.3 % (ref 37–47)
HGB BLD-MCNC: 13 G/DL (ref 12–16)
IMM GRANULOCYTES # BLD AUTO: 0.02 K/UL (ref 0–0.11)
IMM GRANULOCYTES NFR BLD AUTO: 0.3 % (ref 0–0.9)
LYMPHOCYTES # BLD AUTO: 2.35 K/UL (ref 1–4.8)
LYMPHOCYTES NFR BLD: 30.6 % (ref 22–41)
MCH RBC QN AUTO: 28 PG (ref 27–33)
MCHC RBC AUTO-ENTMCNC: 33.1 G/DL (ref 33.6–35)
MCV RBC AUTO: 84.5 FL (ref 81.4–97.8)
MONOCYTES # BLD AUTO: 0.44 K/UL (ref 0–0.85)
MONOCYTES NFR BLD AUTO: 5.7 % (ref 0–13.4)
NEUTROPHILS # BLD AUTO: 4.63 K/UL (ref 2–7.15)
NEUTROPHILS NFR BLD: 60.2 % (ref 44–72)
NRBC # BLD AUTO: 0 K/UL
NRBC BLD-RTO: 0 /100 WBC
PLATELET # BLD AUTO: 192 K/UL (ref 164–446)
PMV BLD AUTO: 10.7 FL (ref 9–12.9)
POTASSIUM SERPL-SCNC: 4 MMOL/L (ref 3.6–5.5)
PROT SERPL-MCNC: 7.1 G/DL (ref 6–8.2)
RBC # BLD AUTO: 4.65 M/UL (ref 4.2–5.4)
SODIUM SERPL-SCNC: 137 MMOL/L (ref 135–145)
WBC # BLD AUTO: 7.7 K/UL (ref 4.8–10.8)

## 2018-03-21 PROCEDURE — 85025 COMPLETE CBC W/AUTO DIFF WBC: CPT

## 2018-03-21 PROCEDURE — 93005 ELECTROCARDIOGRAM TRACING: CPT | Performed by: PAIN MEDICINE

## 2018-03-21 PROCEDURE — 93010 ELECTROCARDIOGRAM REPORT: CPT | Performed by: INTERNAL MEDICINE

## 2018-03-21 PROCEDURE — 36415 COLL VENOUS BLD VENIPUNCTURE: CPT

## 2018-03-21 PROCEDURE — 72110 X-RAY EXAM L-2 SPINE 4/>VWS: CPT

## 2018-03-21 PROCEDURE — 80053 COMPREHEN METABOLIC PANEL: CPT

## 2018-03-26 ENCOUNTER — HOSPITAL ENCOUNTER (OUTPATIENT)
Dept: RADIOLOGY | Facility: MEDICAL CENTER | Age: 78
End: 2018-03-26
Attending: PAIN MEDICINE
Payer: MEDICARE

## 2018-03-26 VITALS
BODY MASS INDEX: 27.99 KG/M2 | HEART RATE: 68 BPM | RESPIRATION RATE: 18 BRPM | OXYGEN SATURATION: 96 % | SYSTOLIC BLOOD PRESSURE: 134 MMHG | DIASTOLIC BLOOD PRESSURE: 60 MMHG | HEIGHT: 65 IN | TEMPERATURE: 97.2 F | WEIGHT: 168 LBS

## 2018-03-26 DIAGNOSIS — M50.30 DEGENERATION OF CERVICAL INTERVERTEBRAL DISC: ICD-10-CM

## 2018-03-26 PROCEDURE — 70553 MRI BRAIN STEM W/O & W/DYE: CPT

## 2018-03-26 PROCEDURE — 01922 ANES N-INVAS IMG/RADJ THER: CPT

## 2018-03-26 PROCEDURE — A9579 GAD-BASE MR CONTRAST NOS,1ML: HCPCS | Performed by: PAIN MEDICINE

## 2018-03-26 PROCEDURE — 700111 HCHG RX REV CODE 636 W/ 250 OVERRIDE (IP)

## 2018-03-26 PROCEDURE — 700101 HCHG RX REV CODE 250

## 2018-03-26 PROCEDURE — 700117 HCHG RX CONTRAST REV CODE 255: Performed by: PAIN MEDICINE

## 2018-03-26 RX ADMIN — GADODIAMIDE 15 ML: 287 INJECTION INTRAVENOUS at 10:02

## 2018-03-26 ASSESSMENT — PAIN SCALES - GENERAL
PAINLEVEL_OUTOF10: 0

## 2018-03-26 NOTE — DISCHARGE INSTRUCTIONS
MRI ADULT DISCHARGE INSTRUCTIONS    You have been medicated today for your scan. Please follow the instructions below to ensure your safe recovery. If you have any questions or problems, feel free to call us at [phone] or [phone].     1.   Have someone stay with you to assist you as needed.    2.   Do not drive or operate any mechanical devices.    3.   Do not perform any activity that requires concentration. Make no major decisions over the next 24 hours.     4.   Be careful changing positions from laying down to sitting or standing, as you may become dizzy.     5.   Do not drink alcohol for 48 hours.    6.   There are no restrictions for eating your normal meals. Drink fluids.    7.   You may continue your usual medications for pain, tranquilizers, muscle relaxants or sedatives when awake.     8.   Tomorrow, you may continue your normal daily activities.     9.   Pressure dressing on 10 - 15 minutes. If swelling or bleeding occurs when removed, continue placing direct pressure on injection site for another 5 minutes, or until bleeding stops.     I have been informed of and understand the above discharge instructions.

## 2018-05-17 ENCOUNTER — HOSPITAL ENCOUNTER (OUTPATIENT)
Dept: RADIOLOGY | Facility: MEDICAL CENTER | Age: 78
End: 2018-05-17
Attending: FAMILY MEDICINE
Payer: MEDICARE

## 2018-05-17 DIAGNOSIS — M54.2 CERVICALGIA: ICD-10-CM

## 2018-05-17 PROCEDURE — 72040 X-RAY EXAM NECK SPINE 2-3 VW: CPT

## 2018-06-06 ENCOUNTER — HOSPITAL ENCOUNTER (OUTPATIENT)
Dept: RADIOLOGY | Facility: MEDICAL CENTER | Age: 78
End: 2018-06-06
Attending: FAMILY MEDICINE
Payer: MEDICARE

## 2018-06-06 DIAGNOSIS — R13.19 ESOPHAGEAL DYSPHAGIA: ICD-10-CM

## 2018-06-06 PROCEDURE — A9270 NON-COVERED ITEM OR SERVICE: HCPCS | Performed by: FAMILY MEDICINE

## 2018-06-06 PROCEDURE — 74247 DX-UPPER GI-AIR CONTRAST: CPT

## 2018-06-06 PROCEDURE — 700112 HCHG RX REV CODE 229: Performed by: FAMILY MEDICINE

## 2018-06-06 RX ADMIN — ANTACID/ANTIFLATULENT 1 PACKET: 380; 550; 10; 10 GRANULE, EFFERVESCENT ORAL at 09:00

## 2018-06-20 ENCOUNTER — HOSPITAL ENCOUNTER (OUTPATIENT)
Dept: RADIOLOGY | Facility: MEDICAL CENTER | Age: 78
End: 2018-06-20
Attending: FAMILY MEDICINE
Payer: MEDICARE

## 2018-06-20 DIAGNOSIS — R13.19 ESOPHAGEAL DYSPHAGIA: ICD-10-CM

## 2018-06-20 PROCEDURE — 74230 X-RAY XM SWLNG FUNCJ C+: CPT

## 2018-06-20 PROCEDURE — 92611 MOTION FLUOROSCOPY/SWALLOW: CPT

## 2018-06-20 NOTE — OP THERAPY EVALUATION
[facility]  Speech-Language Pathology Department  Modified Barium Swallow Study Summary    Patient: Lindsay Vargas     Date of Evaluation:  6/20/2018    Referring MD: Lenny Darby M.D.     Patient History/Reason for Referral:  Pt c/o severe heartburn which is constant.  Notes a particular choking or reflux episode while at a dinner party in which she appeared to reflux a large amount of material.  Pt notes she has been worried about going out to eat since.  She is on a regular diet.  Does have significant xerostomia.  Reports she does not take any meds for her GERD.      Procedure Results:  The examination was conducted with videofluoroscopy from a   Lateral and Anterior view.  The following textures were presented:   Applesauce, Diced Fruit, Cookie and Thin Liquid       The following observations were made:    Oral Phase Puree Thick Liquids Thin Liquids Mechanical Soft Solid   Anterior spillage        Residue in oral cavity after the swallow WFL N/A WFL  x   Decreased mastication        Loss of bolus control        Piecemeal deglutition    x    Slow oral transit time        Premature spillage into the valleculae        Premature spillage into the pyriform sinus           Other Observations:  Mild oral residue roof of mouth and palate.  Cues to not talk while chewing.          Pharyngeal Phase Puree Thick Liquids Thin Liquids Mechanical Soft Solid   Delayed triggering of the pharyngeal swallow  N/A  WFL     Absent initiation of swallow        Residue on tongue base x    x   Residue on posterior pharyngeal wall        Residue in valleculae x  x     Residue in pyriform sinus        Penetration        Aspiration          Other Observations:  Mild decreased BOT retraction resulting in BOT and vallecular residue.  Residue clears with double swallow or liquid wash.  Coughing noted on dry foods when airway is clear.  Difficulty initiating a swallow with dry foods.                               Esophageal Phase:  A-P view completed, no overt deficits in upper esophagus.                                  Impressions:  Mild oral-pharyngeal dysphagia characterized by oral residue of dry foods due to xerostomia, mild decreased base of tongue retraction resulting in base of tongue and vallecular residue (mild).  Able to eliminate residue with use of double swallow or liquid wash.          Recommendations: Diet:  Soft-moist foods       Liquid:  Thin                                        Medications:  As tolerated, may want to follow with food if    sticking in esophagus.         Compensatory Strategies:  1.  Small bites/sips  2.  No talking while chewing  3.  Double swallow  4.  Alternate bites/sips      Your patient may benefit from a referral for:  -GI due to c/o severe reflux      Medicare only:                    Thank you for the referral.    For further questions, please call [phone].        Mariah Mccrary MA, CCC-SLP   Speech-Language Pathologist

## 2018-11-06 ENCOUNTER — APPOINTMENT (OUTPATIENT)
Dept: RADIOLOGY | Facility: MEDICAL CENTER | Age: 78
End: 2018-11-06
Attending: FAMILY MEDICINE
Payer: MEDICARE

## 2018-11-09 ENCOUNTER — HOSPITAL ENCOUNTER (OUTPATIENT)
Dept: LAB | Facility: MEDICAL CENTER | Age: 78
End: 2018-11-09
Attending: FAMILY MEDICINE
Payer: MEDICARE

## 2018-11-09 LAB
ANION GAP SERPL CALC-SCNC: 10 MMOL/L (ref 0–11.9)
BUN SERPL-MCNC: 19 MG/DL (ref 8–22)
CALCIUM SERPL-MCNC: 9.9 MG/DL (ref 8.5–10.5)
CHLORIDE SERPL-SCNC: 102 MMOL/L (ref 96–112)
CO2 SERPL-SCNC: 27 MMOL/L (ref 20–33)
CREAT SERPL-MCNC: 0.86 MG/DL (ref 0.5–1.4)
GLUCOSE SERPL-MCNC: 112 MG/DL (ref 65–99)
POTASSIUM SERPL-SCNC: 2.8 MMOL/L (ref 3.6–5.5)
SODIUM SERPL-SCNC: 139 MMOL/L (ref 135–145)

## 2018-11-09 PROCEDURE — 36415 COLL VENOUS BLD VENIPUNCTURE: CPT

## 2018-11-09 PROCEDURE — 80048 BASIC METABOLIC PNL TOTAL CA: CPT

## 2018-11-13 ENCOUNTER — HOSPITAL ENCOUNTER (OUTPATIENT)
Dept: RADIOLOGY | Facility: MEDICAL CENTER | Age: 78
End: 2018-11-13
Attending: FAMILY MEDICINE
Payer: MEDICARE

## 2018-11-13 DIAGNOSIS — M54.2 CERVICALGIA: ICD-10-CM

## 2018-11-13 PROCEDURE — 72125 CT NECK SPINE W/O DYE: CPT

## 2018-12-26 ENCOUNTER — OFFICE VISIT (OUTPATIENT)
Dept: CARDIOLOGY | Facility: MEDICAL CENTER | Age: 78
End: 2018-12-26
Payer: MEDICARE

## 2018-12-26 VITALS
HEIGHT: 65 IN | BODY MASS INDEX: 26.99 KG/M2 | HEART RATE: 70 BPM | DIASTOLIC BLOOD PRESSURE: 70 MMHG | SYSTOLIC BLOOD PRESSURE: 128 MMHG | WEIGHT: 162 LBS | OXYGEN SATURATION: 96 %

## 2018-12-26 DIAGNOSIS — R60.0 LOCALIZED EDEMA: ICD-10-CM

## 2018-12-26 DIAGNOSIS — R00.2 PALPITATIONS: ICD-10-CM

## 2018-12-26 PROCEDURE — 99204 OFFICE O/P NEW MOD 45 MIN: CPT | Performed by: INTERNAL MEDICINE

## 2018-12-26 RX ORDER — OMEPRAZOLE 40 MG/1
CAPSULE, DELAYED RELEASE ORAL
Refills: 5 | Status: ON HOLD | COMMUNITY
Start: 2018-12-09 | End: 2019-04-11

## 2018-12-26 ASSESSMENT — ENCOUNTER SYMPTOMS
HEARTBURN: 1
FALLS: 1
DOUBLE VISION: 1
DEPRESSION: 1
NECK PAIN: 1
PALPITATIONS: 1
WEIGHT LOSS: 0
CONSTITUTIONAL NEGATIVE: 1
RESPIRATORY NEGATIVE: 1
SHORTNESS OF BREATH: 0
BRUISES/BLEEDS EASILY: 0
BACK PAIN: 1
NEUROLOGICAL NEGATIVE: 1

## 2018-12-26 NOTE — LETTER
"     [facility]   [address] [place] [address] [address]  [place] [address]  Phone: [phone]  Fax: [phone]              Lindsay aVrgas  1940    Encounter Date: 2018    Narendra Paredes M.D.          PROGRESS NOTE:  Chief Complaint   Patient presents with   • Tachycardia   • Chest Pain     When heart rate increases       Subjective:   Lindsay Vargas is a 78 y.o. female who is seen in consultation today at the request of HAYES Reinoso for evaluation of palpitations.  For about the past year the patient has had episodes of intermittent forceful and rapid heart beating.  These last anywhere from 10 minutes to 30 minutes and resolved with rest.  She generally has an episode at least once a week.  She was placed on metoprolol many many years ago by an internist (Dr. Mohamud Valencia) for unknown reasons.  She denies hypertension.   The patient denies any chest pain or exertional dyspnea.  She states she had edema about 2 years ago but this resolved with diuretic.  She is never had an echocardiogram.  Cardiac risk factor profile negative for diabetes, smoking, hypertension, or lipid abnormalities.    Family history notable for the fact that both parents had diabetes.  Mother  at age 87 and father  at 89.  No family history of premature coronary artery disease.  She has had multiple surgeries including cholecystectomy, eye hernia repair many years ago, bilateral cataracts, hysterectomy and basis bilateral salpingo-oophorectomy, and appendectomy at age 7.    Social history: .  She states she is physically active but uses a walker.      Past Medical History:   Diagnosis Date   • Anesthesia     confused/hard to wake up-15 years ago   • Arthritis    • Cataract     Bilat IOL   • Chronic pain    • Hiatus hernia syndrome     surgically repaired   • Hypertension    • Lymphedema    • Migraine headache    • Pain     back   • Pneumonia     \"younger\"     Past " Surgical History:   Procedure Laterality Date   • PB INJ LUMBAR/SACRAL,W/WO CNTRST N/A 11/10/2016    Procedure: INJ EPI NON NEUROLYTIC L/S -MIDLINE L4-L5;  Surgeon: Eduardo Mustafa M.D.;  Location: [facility];  Service: Pain Management   • PB FLUORSCOPIC GUIDANCE SPINAL INJECTION  11/10/2016    Procedure: FLUOROGUIDE FOR SPINAL INJ;  Surgeon: Eduardo Mustafa M.D.;  Location: [facility];  Service: Pain Management   • PB INJ LUMBAR/SACRAL,W/WO CNTRST Right 9/29/2016    Procedure: INJ EPI NON NEUROLYTIC L/S - L5-S1;  Surgeon: Eduardo Mustafa M.D.;  Location: [facility];  Service: Pain Management   • PB FLUORSCOPIC GUIDANCE SPINAL INJECTION  9/29/2016    Procedure: FLUOROGUIDE FOR SPINAL INJ;  Surgeon: Eduardo Mustafa M.D.;  Location: [facility];  Service: Pain Management   • CATARACT PHACO WITH IOL Right 2/18/2016    Procedure: CATARACT PHACO WITH IOL;  Surgeon: Rodrigo Smyth M.D.;  Location: [facility];  Service:    • CATARACT PHACO WITH IOL Left 2/4/2016    Procedure: CATARACT PHACO WITH IOL;  Surgeon: Rodrigo Smyth M.D.;  Location: [facility];  Service:    • JUANI BY LAPAROSCOPY     • HYSTERECTOMY LAPAROSCOPY     • OTHER      HIATAL HERNIA REPAIR   • OTHER ABDOMINAL SURGERY      LAP JUANI   • OTHER ABDOMINAL SURGERY      APPENDECOMTY   • OTHER ORTHOPEDIC SURGERY      LEFT KNEE SCOPE     No family history on file.  Social History     Social History   • Marital status:      Spouse name: N/A   • Number of children: N/A   • Years of education: N/A     Occupational History   • Not on file.     Social History Main Topics   • Smoking status: Never Smoker   • Smokeless tobacco: Never Used   • Alcohol use Yes      Comment: rarely   • Drug use: No   • Sexual activity: Not on file     Other Topics Concern   • Not on file     Social History Narrative   • No narrative on file     No Known Allergies  Outpatient Encounter  "Prescriptions as of 12/26/2018   Medication Sig Dispense Refill   • omeprazole (PRILOSEC) 40 MG delayed-release capsule TAKE 1 CAPSULE BY MOUTH ONCE A DAY 30 MINUTES BEFORE BREAKFAST MEAL  5   • duloxetine (CYMBALTA) 60 MG Cap DR Particles delayed-release capsule Take 60 mg by mouth every day.     • pramipexole (MIRAPEX) 0.5 MG Tab Take 0.5 mg by mouth every bedtime.     • hydrochlorothiazide (HYDRODIURIL) 25 MG TABS Take 25 mg by mouth every day.     • potassium chloride (KLOR-CON) 20 MEQ PACK Take 20 mEq by mouth 3 times a day.     • propranolol CR (INDERAL LA) 160 MG CP24 capsule Take 160 mg by mouth every day.     • oxycodone-acetaminophen (PERCOCET) 5-325 MG Tab Take 1-2 Tabs by mouth every four hours as needed.     • gabapentin (NEURONTIN) 100 MG Cap Take 100 mg by mouth 3 times a day.     • baclofen (LIORESAL) 10 MG TABS Take 10 mg by mouth 3 times a day.     • ropinirole (REQUIP) 1 MG TABS Take 0.5 mg by mouth 2 Times a Day. 0.5 TO 1MG BID       No facility-administered encounter medications on file as of 12/26/2018.      Review of Systems   Constitutional: Negative.  Negative for weight loss.   HENT: Positive for hearing loss.    Eyes: Positive for double vision.   Respiratory: Negative.  Negative for shortness of breath.    Cardiovascular: Positive for palpitations and leg swelling. Negative for chest pain.   Gastrointestinal: Positive for heartburn.   Musculoskeletal: Positive for back pain, falls, joint pain and neck pain.   Skin: Negative.    Neurological: Negative.    Endo/Heme/Allergies: Negative.  Does not bruise/bleed easily.   Psychiatric/Behavioral: Positive for depression.        Objective:   /70 (BP Location: Left arm, Patient Position: Sitting, BP Cuff Size: Adult)   Pulse 70   Ht 1.651 m (5' 5\")   Wt 73.5 kg (162 lb)   SpO2 96%   BMI 26.96 kg/m²      Physical Exam   Constitutional: She is oriented to person, place, and time. No distress.   Patient is using a walker   HENT:   Head: " Normocephalic and atraumatic.   Eyes: Pupils are equal, round, and reactive to light. No scleral icterus.   Neck: No JVD present.   Cardiovascular: Normal rate and regular rhythm.    No murmur heard.  Pulmonary/Chest: Breath sounds normal. No respiratory distress.   Abdominal: She exhibits no distension. There is no tenderness.   Musculoskeletal: She exhibits edema.   Trace edema   Neurological: She is alert and oriented to person, place, and time.   Skin: Skin is warm and dry.   Psychiatric: She has a normal mood and affect.       Assessment:     1. Palpitations  Holter Monitor / Event Recorder    EC-ECHOCARDIOGRAM COMPLETE W/O CONT   2. Localized edema  Holter Monitor / Event Recorder    EC-ECHOCARDIOGRAM COMPLETE W/O CONT       Medical Decision Making:  Today's Assessment / Status / Plan:   Palpitations: Patient has an interesting history of palpitations have been present on and off for about the past 1 year.  These occur suddenly and resolve with rest.  I suspect she is having episodes of PAF although SVT needs to be considered as well.  Interestingly she was started on metoprolol many years ago but forgot why.  An outpatient monitor with a  Zio patch will be performed.  There is no evidence of overt volume overload on exam.  Certainly sleep apnea is a consideration as well although  denies that she snores loudly.  Recommend an echo to evaluate her LV function and her right ventricular pressures.  For the moment, she will remain on metoprolol until the nature of her arrhythmia can be elucidated.  Return in about a month for reevaluation.      DYLAN ReinosoP.  [id number]  [place] [id number]  VIA Mail     Lenny RENTERIA M.D.  [address]  [place] [address]  VIA Facsimile: [phone]

## 2018-12-26 NOTE — PROGRESS NOTES
"Chief Complaint   Patient presents with   • Tachycardia   • Chest Pain     When heart rate increases       Subjective:   Lindsay Vargas is a 78 y.o. female who is seen in consultation today at the request of HAYES Reinoso for evaluation of palpitations.  For about the past year the patient has had episodes of intermittent forceful and rapid heart beating.  These last anywhere from 10 minutes to 30 minutes and resolved with rest.  She generally has an episode at least once a week.  She was placed on metoprolol many many years ago by an internist (Dr. Mohamud Valencia) for unknown reasons.  She denies hypertension.   The patient denies any chest pain or exertional dyspnea.  She states she had edema about 2 years ago but this resolved with diuretic.  She is never had an echocardiogram.  Cardiac risk factor profile negative for diabetes, smoking, hypertension, or lipid abnormalities.    Family history notable for the fact that both parents had diabetes.  Mother  at age 87 and father  at 89.  No family history of premature coronary artery disease.  She has had multiple surgeries including cholecystectomy, eye hernia repair many years ago, bilateral cataracts, hysterectomy and basis bilateral salpingo-oophorectomy, and appendectomy at age 7.    Social history: .  She states she is physically active but uses a walker.      Past Medical History:   Diagnosis Date   • Anesthesia     confused/hard to wake up-15 years ago   • Arthritis    • Cataract     Bilat IOL   • Chronic pain    • Hiatus hernia syndrome     surgically repaired   • Hypertension    • Lymphedema    • Migraine headache    • Pain     back   • Pneumonia     \"younger\"     Past Surgical History:   Procedure Laterality Date   • PB INJ LUMBAR/SACRAL,W/WO CNTRST N/A 11/10/2016    Procedure: INJ EPI NON NEUROLYTIC L/S -MIDLINE L4-L5;  Surgeon: Eduardo Mustafa M.D.;  Location: SURGERY SURGICAL [facility] ORS;  Service: Pain Management   • PB " FLUORSCOPIC GUIDANCE SPINAL INJECTION  11/10/2016    Procedure: FLUOROGUIDE FOR SPINAL INJ;  Surgeon: Eduardo Mustafa M.D.;  Location: SURGERY SURGICAL [facility] [place] ORS;  Service: Pain Management   • PB INJ LUMBAR/SACRAL,W/WO CNTRST Right 9/29/2016    Procedure: INJ EPI NON NEUROLYTIC L/S - L5-S1;  Surgeon: Eduardo Mustafa M.D.;  Location: SURGERY SURGICAL [facility] [place] OR[facility];  Service: Pain Management   • PB FLUORSCOPIC GUIDANCE SPINAL INJECTION  9/29/2016    Procedure: FLUOROGUIDE FOR SPINAL INJ;  Surgeon: Eduardo Mustafa M.D.;  Location: SURGERY [facility];  Service: Pain Management   • CATARACT PHACO WITH IOL Right 2/18/2016    Procedure: CATARACT PHACO WITH IOL;  Surgeon: Rodrigo Smyth M.D.;  Location: SURGERY [facility];  Service:    • CATARACT PHACO WITH IOL Left 2/4/2016    Procedure: CATARACT PHACO WITH IOL;  Surgeon: Rodrigo Smyth M.D.;  Location: SURGERY [facility];  Service:    • JUANI BY LAPAROSCOPY     • HYSTERECTOMY LAPAROSCOPY     • OTHER      HIATAL HERNIA REPAIR   • OTHER ABDOMINAL SURGERY      LAP JUANI   • OTHER ABDOMINAL SURGERY      APPENDECOMTY   • OTHER ORTHOPEDIC SURGERY      LEFT KNEE SCOPE     No family history on file.  Social History     Social History   • Marital status:      Spouse name: N/A   • Number of children: N/A   • Years of education: N/A     Occupational History   • Not on file.     Social History Main Topics   • Smoking status: Never Smoker   • Smokeless tobacco: Never Used   • Alcohol use Yes      Comment: rarely   • Drug use: No   • Sexual activity: Not on file     Other Topics Concern   • Not on file     Social History Narrative   • No narrative on file     No Known Allergies  Outpatient Encounter Prescriptions as of 12/26/2018   Medication Sig Dispense Refill   • omeprazole (PRILOSEC) 40 MG delayed-release capsule TAKE 1 CAPSULE BY MOUTH ONCE A DAY 30 MINUTES BEFORE BREAKFAST MEAL  5   • duloxetine (CYMBALTA) 60 MG Cap DR Particles delayed-release capsule  "Take 60 mg by mouth every day.     • pramipexole (MIRAPEX) 0.5 MG Tab Take 0.5 mg by mouth every bedtime.     • hydrochlorothiazide (HYDRODIURIL) 25 MG TABS Take 25 mg by mouth every day.     • potassium chloride (KLOR-CON) 20 MEQ PACK Take 20 mEq by mouth 3 times a day.     • propranolol CR (INDERAL LA) 160 MG CP24 capsule Take 160 mg by mouth every day.     • oxycodone-acetaminophen (PERCOCET) 5-325 MG Tab Take 1-2 Tabs by mouth every four hours as needed.     • gabapentin (NEURONTIN) 100 MG Cap Take 100 mg by mouth 3 times a day.     • baclofen (LIORESAL) 10 MG TABS Take 10 mg by mouth 3 times a day.     • ropinirole (REQUIP) 1 MG TABS Take 0.5 mg by mouth 2 Times a Day. 0.5 TO 1MG BID       No facility-administered encounter medications on file as of 12/26/2018.      Review of Systems   Constitutional: Negative.  Negative for weight loss.   HENT: Positive for hearing loss.    Eyes: Positive for double vision.   Respiratory: Negative.  Negative for shortness of breath.    Cardiovascular: Positive for palpitations and leg swelling. Negative for chest pain.   Gastrointestinal: Positive for heartburn.   Musculoskeletal: Positive for back pain, falls, joint pain and neck pain.   Skin: Negative.    Neurological: Negative.    Endo/Heme/Allergies: Negative.  Does not bruise/bleed easily.   Psychiatric/Behavioral: Positive for depression.        Objective:   /70 (BP Location: Left arm, Patient Position: Sitting, BP Cuff Size: Adult)   Pulse 70   Ht 1.651 m (5' 5\")   Wt 73.5 kg (162 lb)   SpO2 96%   BMI 26.96 kg/m²     Physical Exam   Constitutional: She is oriented to person, place, and time. No distress.   Patient is using a walker   HENT:   Head: Normocephalic and atraumatic.   Eyes: Pupils are equal, round, and reactive to light. No scleral icterus.   Neck: No JVD present.   Cardiovascular: Normal rate and regular rhythm.    No murmur heard.  Pulmonary/Chest: Breath sounds normal. No respiratory distress. "   Abdominal: She exhibits no distension. There is no tenderness.   Musculoskeletal: She exhibits edema.   Trace edema   Neurological: She is alert and oriented to person, place, and time.   Skin: Skin is warm and dry.   Psychiatric: She has a normal mood and affect.       Assessment:     1. Palpitations  Holter Monitor / Event Recorder    EC-ECHOCARDIOGRAM COMPLETE W/O CONT   2. Localized edema  Holter Monitor / Event Recorder    EC-ECHOCARDIOGRAM COMPLETE W/O CONT       Medical Decision Making:  Today's Assessment / Status / Plan:   Palpitations: Patient has an interesting history of palpitations have been present on and off for about the past 1 year.  These occur suddenly and resolve with rest.  I suspect she is having episodes of PAF although SVT needs to be considered as well.  Interestingly she was started on metoprolol many years ago but forgot why.  An outpatient monitor with a  Zio patch will be performed.  There is no evidence of overt volume overload on exam.  Certainly sleep apnea is a consideration as well although  denies that she snores loudly.  Recommend an echo to evaluate her LV function and her right ventricular pressures.  For the moment, she will remain on metoprolol until the nature of her arrhythmia can be elucidated.  Return in about a month for reevaluation.

## 2018-12-27 ENCOUNTER — HOSPITAL ENCOUNTER (OUTPATIENT)
Dept: CARDIOLOGY | Facility: MEDICAL CENTER | Age: 78
End: 2018-12-27
Attending: INTERNAL MEDICINE
Payer: MEDICARE

## 2018-12-27 DIAGNOSIS — R00.2 PALPITATIONS: ICD-10-CM

## 2018-12-27 DIAGNOSIS — R60.0 LOCALIZED EDEMA: ICD-10-CM

## 2018-12-27 PROCEDURE — 93306 TTE W/DOPPLER COMPLETE: CPT

## 2018-12-27 PROCEDURE — 93306 TTE W/DOPPLER COMPLETE: CPT | Mod: 26 | Performed by: INTERNAL MEDICINE

## 2018-12-28 LAB
LV EJECT FRACT  99904: 70
LV EJECT FRACT MOD 2C 99903: 66.43
LV EJECT FRACT MOD 4C 99902: 42.16
LV EJECT FRACT MOD BP 99901: 55.96

## 2019-01-17 ENCOUNTER — TELEPHONE (OUTPATIENT)
Dept: CARDIOLOGY | Facility: MEDICAL CENTER | Age: 79
End: 2019-01-17

## 2019-01-18 NOTE — TELEPHONE ENCOUNTER
----- Message from Narendra Paredes M.D. sent at 1/17/2019 12:38 PM PST -----  Echo looks good. Good LV function.

## 2019-02-19 ENCOUNTER — NON-PROVIDER VISIT (OUTPATIENT)
Dept: CARDIOLOGY | Facility: MEDICAL CENTER | Age: 79
End: 2019-02-19
Payer: MEDICARE

## 2019-02-19 ENCOUNTER — TELEPHONE (OUTPATIENT)
Dept: CARDIOLOGY | Facility: MEDICAL CENTER | Age: 79
End: 2019-02-19

## 2019-02-19 DIAGNOSIS — R00.2 PALPITATIONS: ICD-10-CM

## 2019-02-19 DIAGNOSIS — I47.29 NSVT (NONSUSTAINED VENTRICULAR TACHYCARDIA) (HCC): ICD-10-CM

## 2019-02-19 DIAGNOSIS — R60.0 LOCALIZED EDEMA: ICD-10-CM

## 2019-02-19 DIAGNOSIS — I49.1 PAC (PREMATURE ATRIAL CONTRACTION): ICD-10-CM

## 2019-02-19 DIAGNOSIS — I47.10 SVT (SUPRAVENTRICULAR TACHYCARDIA): ICD-10-CM

## 2019-03-13 ENCOUNTER — TELEPHONE (OUTPATIENT)
Dept: CARDIOLOGY | Facility: MEDICAL CENTER | Age: 79
End: 2019-03-13

## 2019-03-18 ENCOUNTER — APPOINTMENT (OUTPATIENT)
Dept: RADIOLOGY | Facility: MEDICAL CENTER | Age: 79
DRG: 640 | End: 2019-03-18
Attending: HOSPITALIST
Payer: MEDICARE

## 2019-03-18 ENCOUNTER — APPOINTMENT (OUTPATIENT)
Dept: CARDIOLOGY | Facility: MEDICAL CENTER | Age: 79
DRG: 640 | End: 2019-03-18
Attending: HOSPITALIST
Payer: MEDICARE

## 2019-03-18 ENCOUNTER — APPOINTMENT (OUTPATIENT)
Dept: RADIOLOGY | Facility: MEDICAL CENTER | Age: 79
DRG: 640 | End: 2019-03-18
Attending: EMERGENCY MEDICINE
Payer: MEDICARE

## 2019-03-18 ENCOUNTER — HOSPITAL ENCOUNTER (INPATIENT)
Facility: MEDICAL CENTER | Age: 79
LOS: 4 days | DRG: 640 | End: 2019-03-22
Attending: EMERGENCY MEDICINE | Admitting: HOSPITALIST
Payer: MEDICARE

## 2019-03-18 DIAGNOSIS — F41.9 ACUTE ANXIETY: ICD-10-CM

## 2019-03-18 DIAGNOSIS — G93.41 ACUTE METABOLIC ENCEPHALOPATHY: ICD-10-CM

## 2019-03-18 DIAGNOSIS — E87.6 ACUTE HYPOKALEMIA: ICD-10-CM

## 2019-03-18 DIAGNOSIS — R55 SYNCOPE, UNSPECIFIED SYNCOPE TYPE: ICD-10-CM

## 2019-03-18 DIAGNOSIS — R53.1 GENERALIZED WEAKNESS: ICD-10-CM

## 2019-03-18 DIAGNOSIS — I21.4 NSTEMI (NON-ST ELEVATED MYOCARDIAL INFARCTION) (HCC): ICD-10-CM

## 2019-03-18 DIAGNOSIS — R79.89 ELEVATED TROPONIN: ICD-10-CM

## 2019-03-18 DIAGNOSIS — E86.0 DEHYDRATION: ICD-10-CM

## 2019-03-18 PROBLEM — R41.82 ALTERED MENTAL STATUS: Status: ACTIVE | Noted: 2019-03-18

## 2019-03-18 PROBLEM — E87.1 HYPONATREMIA: Status: ACTIVE | Noted: 2019-03-18

## 2019-03-18 PROBLEM — R65.10 SIRS (SYSTEMIC INFLAMMATORY RESPONSE SYNDROME) (HCC): Status: ACTIVE | Noted: 2019-03-18

## 2019-03-18 LAB
ALBUMIN SERPL BCP-MCNC: 4.2 G/DL (ref 3.2–4.9)
ALBUMIN SERPL BCP-MCNC: 4.5 G/DL (ref 3.2–4.9)
ALBUMIN/GLOB SERPL: 1.6 G/DL
ALP SERPL-CCNC: 82 U/L (ref 30–99)
ALT SERPL-CCNC: 14 U/L (ref 2–50)
AMPHETAMINES UR QL SCN: NEGATIVE
ANION GAP SERPL CALC-SCNC: 15 MMOL/L (ref 0–11.9)
APPEARANCE UR: CLEAR
APTT PPP: 23.5 SEC (ref 24.7–36)
APTT PPP: 24.1 SEC (ref 24.7–36)
AST SERPL-CCNC: 40 U/L (ref 12–45)
BARBITURATES UR QL SCN: NEGATIVE
BASOPHILS # BLD AUTO: 0.2 % (ref 0–1.8)
BASOPHILS # BLD: 0.02 K/UL (ref 0–0.12)
BENZODIAZ UR QL SCN: POSITIVE
BILIRUB SERPL-MCNC: 1.6 MG/DL (ref 0.1–1.5)
BILIRUB UR QL STRIP.AUTO: NEGATIVE
BNP SERPL-MCNC: 95 PG/ML (ref 0–100)
BUN SERPL-MCNC: 17 MG/DL (ref 8–22)
BUN SERPL-MCNC: 19 MG/DL (ref 8–22)
CALCIUM SERPL-MCNC: 8.5 MG/DL (ref 8.4–10.2)
CALCIUM SERPL-MCNC: 9.3 MG/DL (ref 8.4–10.2)
CHLORIDE SERPL-SCNC: 96 MMOL/L (ref 96–112)
CHLORIDE SERPL-SCNC: 98 MMOL/L (ref 96–112)
CK SERPL-CCNC: 1457 U/L (ref 0–154)
CO2 SERPL-SCNC: 20 MMOL/L (ref 20–33)
CO2 SERPL-SCNC: 25 MMOL/L (ref 20–33)
COCAINE UR QL SCN: NEGATIVE
COLOR UR: YELLOW
CREAT SERPL-MCNC: 0.7 MG/DL (ref 0.5–1.4)
CREAT SERPL-MCNC: 0.77 MG/DL (ref 0.5–1.4)
EKG IMPRESSION: NORMAL
EKG IMPRESSION: NORMAL
EOSINOPHIL # BLD AUTO: 0.08 K/UL (ref 0–0.51)
EOSINOPHIL NFR BLD: 0.7 % (ref 0–6.9)
ERYTHROCYTE [DISTWIDTH] IN BLOOD BY AUTOMATED COUNT: 40.3 FL (ref 35.9–50)
ERYTHROCYTE [DISTWIDTH] IN BLOOD BY AUTOMATED COUNT: 42.3 FL (ref 35.9–50)
ETHANOL BLD-MCNC: 0.01 G/DL
FLUAV RNA SPEC QL NAA+PROBE: NEGATIVE
FLUBV RNA SPEC QL NAA+PROBE: NEGATIVE
GLOBULIN SER CALC-MCNC: 2.8 G/DL (ref 1.9–3.5)
GLUCOSE SERPL-MCNC: 119 MG/DL (ref 65–99)
GLUCOSE SERPL-MCNC: 138 MG/DL (ref 65–99)
GLUCOSE UR STRIP.AUTO-MCNC: NEGATIVE MG/DL
HCT VFR BLD AUTO: 33.8 % (ref 37–47)
HCT VFR BLD AUTO: 36.2 % (ref 37–47)
HGB BLD-MCNC: 11.8 G/DL (ref 12–16)
HGB BLD-MCNC: 12.9 G/DL (ref 12–16)
IMM GRANULOCYTES # BLD AUTO: 0.06 K/UL (ref 0–0.11)
IMM GRANULOCYTES NFR BLD AUTO: 0.5 % (ref 0–0.9)
INR PPP: 1.03 (ref 0.87–1.13)
INR PPP: 1.03 (ref 0.87–1.13)
KETONES UR STRIP.AUTO-MCNC: 15 MG/DL
LACTATE BLD-SCNC: 1.2 MMOL/L (ref 0.5–2)
LACTATE BLD-SCNC: 1.3 MMOL/L (ref 0.5–2)
LACTATE BLD-SCNC: 1.4 MMOL/L (ref 0.5–2)
LACTATE BLD-SCNC: 1.5 MMOL/L (ref 0.5–2)
LACTATE BLD-SCNC: 3.1 MMOL/L (ref 0.5–2)
LEUKOCYTE ESTERASE UR QL STRIP.AUTO: NEGATIVE
LIPASE SERPL-CCNC: 24 U/L (ref 7–58)
LV EJECT FRACT  99904: 70
LV EJECT FRACT MOD 2C 99903: 73.47
LV EJECT FRACT MOD 4C 99902: 65.31
LV EJECT FRACT MOD BP 99901: 69.32
LYMPHOCYTES # BLD AUTO: 1.13 K/UL (ref 1–4.8)
LYMPHOCYTES NFR BLD: 10.1 % (ref 22–41)
MAGNESIUM SERPL-MCNC: 1.2 MG/DL (ref 1.5–2.5)
MCH RBC QN AUTO: 27.9 PG (ref 27–33)
MCH RBC QN AUTO: 28 PG (ref 27–33)
MCHC RBC AUTO-ENTMCNC: 34.9 G/DL (ref 33.6–35)
MCHC RBC AUTO-ENTMCNC: 35.6 G/DL (ref 33.6–35)
MCV RBC AUTO: 78.4 FL (ref 81.4–97.8)
MCV RBC AUTO: 80.1 FL (ref 81.4–97.8)
MICRO URNS: ABNORMAL
MONOCYTES # BLD AUTO: 0.77 K/UL (ref 0–0.85)
MONOCYTES NFR BLD AUTO: 6.9 % (ref 0–13.4)
NEUTROPHILS # BLD AUTO: 9.08 K/UL (ref 2–7.15)
NEUTROPHILS NFR BLD: 81.6 % (ref 44–72)
NITRITE UR QL STRIP.AUTO: NEGATIVE
NRBC # BLD AUTO: 0 K/UL
NRBC BLD-RTO: 0 /100 WBC
OPIATES UR QL SCN: POSITIVE
PCP UR QL SCN: NEGATIVE
PH UR STRIP.AUTO: 6 [PH]
PHOSPHATE SERPL-MCNC: 3.2 MG/DL (ref 2.5–4.5)
PHOSPHATE SERPL-MCNC: 3.2 MG/DL (ref 2.5–4.5)
PLATELET # BLD AUTO: 120 K/UL (ref 164–446)
PLATELET # BLD AUTO: 147 K/UL (ref 164–446)
PMV BLD AUTO: 10.2 FL (ref 9–12.9)
PMV BLD AUTO: 10.8 FL (ref 9–12.9)
POTASSIUM SERPL-SCNC: 2.8 MMOL/L (ref 3.6–5.5)
POTASSIUM SERPL-SCNC: 3 MMOL/L (ref 3.6–5.5)
PROCALCITONIN SERPL-MCNC: 0.06 NG/ML
PROT SERPL-MCNC: 7.3 G/DL (ref 6–8.2)
PROT UR QL STRIP: NEGATIVE MG/DL
PROTHROMBIN TIME: 13.4 SEC (ref 12–14.6)
PROTHROMBIN TIME: 13.4 SEC (ref 12–14.6)
RBC # BLD AUTO: 4.22 M/UL (ref 4.2–5.4)
RBC # BLD AUTO: 4.62 M/UL (ref 4.2–5.4)
RBC UR QL AUTO: NEGATIVE
SODIUM SERPL-SCNC: 131 MMOL/L (ref 135–145)
SODIUM SERPL-SCNC: 131 MMOL/L (ref 135–145)
SP GR UR STRIP.AUTO: 1.02
THC UR QL SCN: NEGATIVE
TROPONIN I SERPL-MCNC: 0.18 NG/ML (ref 0–0.04)
TROPONIN I SERPL-MCNC: 0.27 NG/ML (ref 0–0.04)
TROPONIN I SERPL-MCNC: 0.35 NG/ML (ref 0–0.04)
TROPONIN I SERPL-MCNC: 0.38 NG/ML (ref 0–0.04)
TSH SERPL DL<=0.005 MIU/L-ACNC: 3.82 UIU/ML (ref 0.38–5.33)
WBC # BLD AUTO: 11.1 K/UL (ref 4.8–10.8)
WBC # BLD AUTO: 14.1 K/UL (ref 4.8–10.8)

## 2019-03-18 PROCEDURE — 84443 ASSAY THYROID STIM HORMONE: CPT

## 2019-03-18 PROCEDURE — 83880 ASSAY OF NATRIURETIC PEPTIDE: CPT

## 2019-03-18 PROCEDURE — 71275 CT ANGIOGRAPHY CHEST: CPT | Performed by: RADIOLOGY

## 2019-03-18 PROCEDURE — 72125 CT NECK SPINE W/O DYE: CPT | Performed by: RADIOLOGY

## 2019-03-18 PROCEDURE — 99233 SBSQ HOSP IP/OBS HIGH 50: CPT | Performed by: HOSPITALIST

## 2019-03-18 PROCEDURE — 80307 DRUG TEST PRSMV CHEM ANLYZR: CPT

## 2019-03-18 PROCEDURE — 96365 THER/PROPH/DIAG IV INF INIT: CPT

## 2019-03-18 PROCEDURE — 84145 PROCALCITONIN (PCT): CPT

## 2019-03-18 PROCEDURE — 700102 HCHG RX REV CODE 250 W/ 637 OVERRIDE(OP): Performed by: EMERGENCY MEDICINE

## 2019-03-18 PROCEDURE — 96375 TX/PRO/DX INJ NEW DRUG ADDON: CPT

## 2019-03-18 PROCEDURE — 83605 ASSAY OF LACTIC ACID: CPT | Mod: 91

## 2019-03-18 PROCEDURE — 80305 DRUG TEST PRSMV DIR OPT OBS: CPT

## 2019-03-18 PROCEDURE — 84100 ASSAY OF PHOSPHORUS: CPT

## 2019-03-18 PROCEDURE — 700102 HCHG RX REV CODE 250 W/ 637 OVERRIDE(OP): Performed by: HOSPITALIST

## 2019-03-18 PROCEDURE — 93306 TTE W/DOPPLER COMPLETE: CPT

## 2019-03-18 PROCEDURE — 99285 EMERGENCY DEPT VISIT HI MDM: CPT

## 2019-03-18 PROCEDURE — 70486 CT MAXILLOFACIAL W/O DYE: CPT | Performed by: RADIOLOGY

## 2019-03-18 PROCEDURE — 85730 THROMBOPLASTIN TIME PARTIAL: CPT | Mod: 91

## 2019-03-18 PROCEDURE — 72125 CT NECK SPINE W/O DYE: CPT

## 2019-03-18 PROCEDURE — 700105 HCHG RX REV CODE 258: Performed by: EMERGENCY MEDICINE

## 2019-03-18 PROCEDURE — 700111 HCHG RX REV CODE 636 W/ 250 OVERRIDE (IP): Performed by: HOSPITALIST

## 2019-03-18 PROCEDURE — 700101 HCHG RX REV CODE 250: Performed by: INTERNAL MEDICINE

## 2019-03-18 PROCEDURE — 96376 TX/PRO/DX INJ SAME DRUG ADON: CPT

## 2019-03-18 PROCEDURE — A9270 NON-COVERED ITEM OR SERVICE: HCPCS | Performed by: EMERGENCY MEDICINE

## 2019-03-18 PROCEDURE — 80069 RENAL FUNCTION PANEL: CPT

## 2019-03-18 PROCEDURE — 700117 HCHG RX CONTRAST REV CODE 255: Performed by: EMERGENCY MEDICINE

## 2019-03-18 PROCEDURE — 71045 X-RAY EXAM CHEST 1 VIEW: CPT | Performed by: RADIOLOGY

## 2019-03-18 PROCEDURE — 70486 CT MAXILLOFACIAL W/O DYE: CPT

## 2019-03-18 PROCEDURE — 84484 ASSAY OF TROPONIN QUANT: CPT | Mod: 91

## 2019-03-18 PROCEDURE — 304561 HCHG STAT O2

## 2019-03-18 PROCEDURE — 82550 ASSAY OF CK (CPK): CPT

## 2019-03-18 PROCEDURE — 71045 X-RAY EXAM CHEST 1 VIEW: CPT

## 2019-03-18 PROCEDURE — 81003 URINALYSIS AUTO W/O SCOPE: CPT

## 2019-03-18 PROCEDURE — 70450 CT HEAD/BRAIN W/O DYE: CPT

## 2019-03-18 PROCEDURE — 70551 MRI BRAIN STEM W/O DYE: CPT

## 2019-03-18 PROCEDURE — 71275 CT ANGIOGRAPHY CHEST: CPT

## 2019-03-18 PROCEDURE — 93005 ELECTROCARDIOGRAM TRACING: CPT | Performed by: EMERGENCY MEDICINE

## 2019-03-18 PROCEDURE — 93306 TTE W/DOPPLER COMPLETE: CPT | Mod: 26 | Performed by: INTERNAL MEDICINE

## 2019-03-18 PROCEDURE — 85025 COMPLETE CBC W/AUTO DIFF WBC: CPT

## 2019-03-18 PROCEDURE — A9270 NON-COVERED ITEM OR SERVICE: HCPCS | Performed by: HOSPITALIST

## 2019-03-18 PROCEDURE — 700105 HCHG RX REV CODE 258: Performed by: HOSPITALIST

## 2019-03-18 PROCEDURE — 87040 BLOOD CULTURE FOR BACTERIA: CPT | Mod: 91

## 2019-03-18 PROCEDURE — 83690 ASSAY OF LIPASE: CPT

## 2019-03-18 PROCEDURE — 770020 HCHG ROOM/CARE - TELE (206)

## 2019-03-18 PROCEDURE — 94760 N-INVAS EAR/PLS OXIMETRY 1: CPT

## 2019-03-18 PROCEDURE — 85610 PROTHROMBIN TIME: CPT

## 2019-03-18 PROCEDURE — 85027 COMPLETE CBC AUTOMATED: CPT

## 2019-03-18 PROCEDURE — 83735 ASSAY OF MAGNESIUM: CPT

## 2019-03-18 PROCEDURE — 700111 HCHG RX REV CODE 636 W/ 250 OVERRIDE (IP): Performed by: EMERGENCY MEDICINE

## 2019-03-18 PROCEDURE — 93010 ELECTROCARDIOGRAM REPORT: CPT | Performed by: INTERNAL MEDICINE

## 2019-03-18 PROCEDURE — 93880 EXTRACRANIAL BILAT STUDY: CPT

## 2019-03-18 PROCEDURE — 700111 HCHG RX REV CODE 636 W/ 250 OVERRIDE (IP): Performed by: INTERNAL MEDICINE

## 2019-03-18 PROCEDURE — 87502 INFLUENZA DNA AMP PROBE: CPT

## 2019-03-18 PROCEDURE — 80053 COMPREHEN METABOLIC PANEL: CPT

## 2019-03-18 RX ORDER — POTASSIUM CHLORIDE 7.45 MG/ML
10 INJECTION INTRAVENOUS ONCE
Status: COMPLETED | OUTPATIENT
Start: 2019-03-18 | End: 2019-03-18

## 2019-03-18 RX ORDER — NITROGLYCERIN 0.4 MG/1
0.4 TABLET SUBLINGUAL
Status: DISCONTINUED | OUTPATIENT
Start: 2019-03-18 | End: 2019-03-22 | Stop reason: HOSPADM

## 2019-03-18 RX ORDER — ONDANSETRON 2 MG/ML
4 INJECTION INTRAMUSCULAR; INTRAVENOUS ONCE
Status: COMPLETED | OUTPATIENT
Start: 2019-03-18 | End: 2019-03-18

## 2019-03-18 RX ORDER — MORPHINE SULFATE 4 MG/ML
4 INJECTION, SOLUTION INTRAMUSCULAR; INTRAVENOUS ONCE
Status: COMPLETED | OUTPATIENT
Start: 2019-03-18 | End: 2019-03-18

## 2019-03-18 RX ORDER — SODIUM CHLORIDE AND POTASSIUM CHLORIDE 300; 900 MG/100ML; MG/100ML
INJECTION, SOLUTION INTRAVENOUS CONTINUOUS
Status: DISCONTINUED | OUTPATIENT
Start: 2019-03-18 | End: 2019-03-19

## 2019-03-18 RX ORDER — DULOXETIN HYDROCHLORIDE 30 MG/1
60 CAPSULE, DELAYED RELEASE ORAL DAILY
Status: DISCONTINUED | OUTPATIENT
Start: 2019-03-18 | End: 2019-03-22 | Stop reason: HOSPADM

## 2019-03-18 RX ORDER — SODIUM CHLORIDE 9 MG/ML
1000 INJECTION, SOLUTION INTRAVENOUS
Status: DISCONTINUED | OUTPATIENT
Start: 2019-03-18 | End: 2019-03-19

## 2019-03-18 RX ORDER — POTASSIUM CHLORIDE 7.45 MG/ML
10 INJECTION INTRAVENOUS
Status: COMPLETED | OUTPATIENT
Start: 2019-03-18 | End: 2019-03-18

## 2019-03-18 RX ORDER — BISACODYL 10 MG
10 SUPPOSITORY, RECTAL RECTAL
Status: DISCONTINUED | OUTPATIENT
Start: 2019-03-18 | End: 2019-03-22 | Stop reason: HOSPADM

## 2019-03-18 RX ORDER — ONDANSETRON 2 MG/ML
4 INJECTION INTRAMUSCULAR; INTRAVENOUS EVERY 4 HOURS PRN
Status: DISCONTINUED | OUTPATIENT
Start: 2019-03-18 | End: 2019-03-22 | Stop reason: HOSPADM

## 2019-03-18 RX ORDER — OMEPRAZOLE 20 MG/1
20 CAPSULE, DELAYED RELEASE ORAL DAILY
Status: DISCONTINUED | OUTPATIENT
Start: 2019-03-18 | End: 2019-03-22 | Stop reason: HOSPADM

## 2019-03-18 RX ORDER — HYDRALAZINE HYDROCHLORIDE 20 MG/ML
10 INJECTION INTRAMUSCULAR; INTRAVENOUS EVERY 4 HOURS PRN
Status: DISCONTINUED | OUTPATIENT
Start: 2019-03-18 | End: 2019-03-22 | Stop reason: HOSPADM

## 2019-03-18 RX ORDER — HEPARIN SODIUM 5000 [USP'U]/ML
5000 INJECTION, SOLUTION INTRAVENOUS; SUBCUTANEOUS EVERY 8 HOURS
Status: DISCONTINUED | OUTPATIENT
Start: 2019-03-18 | End: 2019-03-22 | Stop reason: HOSPADM

## 2019-03-18 RX ORDER — BACLOFEN 10 MG/1
10 TABLET ORAL 3 TIMES DAILY
Status: DISCONTINUED | OUTPATIENT
Start: 2019-03-18 | End: 2019-03-18

## 2019-03-18 RX ORDER — MECLIZINE HYDROCHLORIDE 25 MG/1
25 TABLET ORAL ONCE
Status: COMPLETED | OUTPATIENT
Start: 2019-03-18 | End: 2019-03-18

## 2019-03-18 RX ORDER — POTASSIUM CHLORIDE 20 MEQ/1
20 TABLET, EXTENDED RELEASE ORAL 3 TIMES DAILY
Status: DISCONTINUED | OUTPATIENT
Start: 2019-03-18 | End: 2019-03-22 | Stop reason: HOSPADM

## 2019-03-18 RX ORDER — ACETAMINOPHEN 325 MG/1
650 TABLET ORAL EVERY 6 HOURS PRN
Status: DISCONTINUED | OUTPATIENT
Start: 2019-03-18 | End: 2019-03-22 | Stop reason: HOSPADM

## 2019-03-18 RX ORDER — SODIUM CHLORIDE 9 MG/ML
30 INJECTION, SOLUTION INTRAVENOUS
Status: DISCONTINUED | OUTPATIENT
Start: 2019-03-18 | End: 2019-03-19

## 2019-03-18 RX ORDER — GABAPENTIN 100 MG/1
100 CAPSULE ORAL 3 TIMES DAILY
Status: DISCONTINUED | OUTPATIENT
Start: 2019-03-18 | End: 2019-03-21

## 2019-03-18 RX ORDER — SODIUM CHLORIDE 9 MG/ML
500 INJECTION, SOLUTION INTRAVENOUS ONCE
Status: COMPLETED | OUTPATIENT
Start: 2019-03-18 | End: 2019-03-18

## 2019-03-18 RX ORDER — ASPIRIN 81 MG/1
324 TABLET, CHEWABLE ORAL ONCE
Status: COMPLETED | OUTPATIENT
Start: 2019-03-18 | End: 2019-03-18

## 2019-03-18 RX ORDER — LORAZEPAM 2 MG/ML
1 INJECTION INTRAMUSCULAR ONCE
Status: COMPLETED | OUTPATIENT
Start: 2019-03-18 | End: 2019-03-18

## 2019-03-18 RX ORDER — POLYETHYLENE GLYCOL 3350 17 G/17G
1 POWDER, FOR SOLUTION ORAL
Status: DISCONTINUED | OUTPATIENT
Start: 2019-03-18 | End: 2019-03-22 | Stop reason: HOSPADM

## 2019-03-18 RX ORDER — MAGNESIUM SULFATE HEPTAHYDRATE 40 MG/ML
2 INJECTION, SOLUTION INTRAVENOUS ONCE
Status: COMPLETED | OUTPATIENT
Start: 2019-03-18 | End: 2019-03-18

## 2019-03-18 RX ORDER — MORPHINE SULFATE 4 MG/ML
2 INJECTION, SOLUTION INTRAMUSCULAR; INTRAVENOUS
Status: DISCONTINUED | OUTPATIENT
Start: 2019-03-18 | End: 2019-03-19

## 2019-03-18 RX ORDER — BACLOFEN 10 MG/1
10 TABLET ORAL
Status: DISCONTINUED | OUTPATIENT
Start: 2019-03-18 | End: 2019-03-19

## 2019-03-18 RX ORDER — PRAMIPEXOLE DIHYDROCHLORIDE 0.5 MG/1
0.5 TABLET ORAL
Status: DISCONTINUED | OUTPATIENT
Start: 2019-03-18 | End: 2019-03-22 | Stop reason: HOSPADM

## 2019-03-18 RX ORDER — POTASSIUM CHLORIDE 20 MEQ/1
20 TABLET, EXTENDED RELEASE ORAL ONCE
Status: COMPLETED | OUTPATIENT
Start: 2019-03-18 | End: 2019-03-18

## 2019-03-18 RX ORDER — PROPRANOLOL HYDROCHLORIDE 80 MG/1
160 CAPSULE, EXTENDED RELEASE ORAL DAILY
Status: DISCONTINUED | OUTPATIENT
Start: 2019-03-18 | End: 2019-03-22 | Stop reason: HOSPADM

## 2019-03-18 RX ORDER — SODIUM CHLORIDE 9 MG/ML
INJECTION, SOLUTION INTRAVENOUS CONTINUOUS
Status: DISCONTINUED | OUTPATIENT
Start: 2019-03-18 | End: 2019-03-18

## 2019-03-18 RX ORDER — POTASSIUM CHLORIDE 1.5 G/1.58G
20 POWDER, FOR SOLUTION ORAL 3 TIMES DAILY
Status: DISCONTINUED | OUTPATIENT
Start: 2019-03-18 | End: 2019-03-18

## 2019-03-18 RX ORDER — AMOXICILLIN 250 MG
2 CAPSULE ORAL 2 TIMES DAILY
Status: DISCONTINUED | OUTPATIENT
Start: 2019-03-18 | End: 2019-03-22 | Stop reason: HOSPADM

## 2019-03-18 RX ADMIN — HEPARIN SODIUM 5000 UNITS: 5000 INJECTION, SOLUTION INTRAVENOUS; SUBCUTANEOUS at 08:36

## 2019-03-18 RX ADMIN — NITROGLYCERIN 0.4 MG: 0.4 TABLET SUBLINGUAL at 23:00

## 2019-03-18 RX ADMIN — VANCOMYCIN HYDROCHLORIDE 1300 MG: 10 INJECTION, POWDER, LYOPHILIZED, FOR SOLUTION INTRAVENOUS at 08:37

## 2019-03-18 RX ADMIN — POTASSIUM CHLORIDE 10 MEQ: 7.46 INJECTION, SOLUTION INTRAVENOUS at 15:26

## 2019-03-18 RX ADMIN — SODIUM CHLORIDE: 9 INJECTION, SOLUTION INTRAVENOUS at 08:37

## 2019-03-18 RX ADMIN — LORAZEPAM 1 MG: 2 INJECTION INTRAMUSCULAR; INTRAVENOUS at 05:35

## 2019-03-18 RX ADMIN — POTASSIUM CHLORIDE 10 MEQ: 7.46 INJECTION, SOLUTION INTRAVENOUS at 13:51

## 2019-03-18 RX ADMIN — POTASSIUM CHLORIDE 20 MEQ: 1500 TABLET, EXTENDED RELEASE ORAL at 06:48

## 2019-03-18 RX ADMIN — POTASSIUM CHLORIDE AND SODIUM CHLORIDE: 900; 300 INJECTION, SOLUTION INTRAVENOUS at 21:13

## 2019-03-18 RX ADMIN — POTASSIUM CHLORIDE 10 MEQ: 7.46 INJECTION, SOLUTION INTRAVENOUS at 10:25

## 2019-03-18 RX ADMIN — POTASSIUM CHLORIDE AND SODIUM CHLORIDE: 900; 300 INJECTION, SOLUTION INTRAVENOUS at 13:53

## 2019-03-18 RX ADMIN — NITROGLYCERIN 0.4 MG: 0.4 TABLET SUBLINGUAL at 22:45

## 2019-03-18 RX ADMIN — ONDANSETRON 4 MG: 2 INJECTION INTRAMUSCULAR; INTRAVENOUS at 22:35

## 2019-03-18 RX ADMIN — MORPHINE SULFATE 2 MG: 4 INJECTION INTRAVENOUS at 23:27

## 2019-03-18 RX ADMIN — IOHEXOL 59 ML: 350 INJECTION, SOLUTION INTRAVENOUS at 05:39

## 2019-03-18 RX ADMIN — HEPARIN SODIUM 5000 UNITS: 5000 INJECTION, SOLUTION INTRAVENOUS; SUBCUTANEOUS at 21:14

## 2019-03-18 RX ADMIN — ASPIRIN 81 MG CHEWABLE TABLET 324 MG: 81 TABLET CHEWABLE at 06:41

## 2019-03-18 RX ADMIN — MECLIZINE HYDROCHLORIDE 25 MG: 25 TABLET ORAL at 04:12

## 2019-03-18 RX ADMIN — POTASSIUM CHLORIDE 10 MEQ: 7.46 INJECTION, SOLUTION INTRAVENOUS at 05:41

## 2019-03-18 RX ADMIN — ONDANSETRON 4 MG: 2 INJECTION INTRAMUSCULAR; INTRAVENOUS at 04:12

## 2019-03-18 RX ADMIN — SODIUM CHLORIDE 500 ML: 9 INJECTION, SOLUTION INTRAVENOUS at 04:13

## 2019-03-18 RX ADMIN — LORAZEPAM 1 MG: 2 INJECTION INTRAMUSCULAR; INTRAVENOUS at 04:51

## 2019-03-18 RX ADMIN — MAGNESIUM SULFATE HEPTAHYDRATE 2 G: 40 INJECTION, SOLUTION INTRAVENOUS at 09:40

## 2019-03-18 RX ADMIN — HEPARIN SODIUM 5000 UNITS: 5000 INJECTION, SOLUTION INTRAVENOUS; SUBCUTANEOUS at 15:22

## 2019-03-18 RX ADMIN — MORPHINE SULFATE 4 MG: 4 INJECTION INTRAVENOUS at 04:32

## 2019-03-18 RX ADMIN — POTASSIUM CHLORIDE 10 MEQ: 7.46 INJECTION, SOLUTION INTRAVENOUS at 12:21

## 2019-03-18 ASSESSMENT — PATIENT HEALTH QUESTIONNAIRE - PHQ9
1. LITTLE INTEREST OR PLEASURE IN DOING THINGS: NOT AT ALL
2. FEELING DOWN, DEPRESSED, IRRITABLE, OR HOPELESS: NOT AT ALL
SUM OF ALL RESPONSES TO PHQ9 QUESTIONS 1 AND 2: 0
1. LITTLE INTEREST OR PLEASURE IN DOING THINGS: NOT AT ALL
2. FEELING DOWN, DEPRESSED, IRRITABLE, OR HOPELESS: NOT AT ALL
SUM OF ALL RESPONSES TO PHQ9 QUESTIONS 1 AND 2: 0

## 2019-03-18 ASSESSMENT — COGNITIVE AND FUNCTIONAL STATUS - GENERAL
EATING MEALS: A LITTLE
DRESSING REGULAR LOWER BODY CLOTHING: A LITTLE
CLIMB 3 TO 5 STEPS WITH RAILING: A LITTLE
SUGGESTED CMS G CODE MODIFIER DAILY ACTIVITY: CK
PERSONAL GROOMING: A LITTLE
HELP NEEDED FOR BATHING: A LITTLE
TOILETING: A LITTLE
DRESSING REGULAR UPPER BODY CLOTHING: A LITTLE
SUGGESTED CMS G CODE MODIFIER MOBILITY: CK
TURNING FROM BACK TO SIDE WHILE IN FLAT BAD: A LITTLE
MOBILITY SCORE: 18
WALKING IN HOSPITAL ROOM: A LITTLE
MOVING FROM LYING ON BACK TO SITTING ON SIDE OF FLAT BED: A LITTLE
STANDING UP FROM CHAIR USING ARMS: A LITTLE
DAILY ACTIVITIY SCORE: 18
MOVING TO AND FROM BED TO CHAIR: A LITTLE

## 2019-03-18 ASSESSMENT — LIFESTYLE VARIABLES
ALCOHOL_USE: NO
DO YOU DRINK ALCOHOL: NO
EVER_SMOKED: NEVER

## 2019-03-18 NOTE — ED PROVIDER NOTES
ED Provider Note    ED Provider Note    Scribed for Srinivasan Castellon MD by Srinivasan Castellon. 3/18/2019, 3:32 AM.    Primary care provider: Lenny RENTERIA M.D.  Means of arrival: EMS  History obtained from: Patient and   History limited by: Poor historian    CHIEF COMPLAINT  Chief Complaint   Patient presents with   • Dizziness     with falls x2 days; states she has vertigo   • Shortness of Breath     patient reports having dyspnea and cough over a month       HPI  Lindsay Vargas is a 78 y.o. female who presents to the Emergency Department for evaluation of multiple falls.  Patient describes what sounds like multiple syncopal episodes where his sensation of near syncope, unsteadiness, as well as a vertiginous sensation.  She relates this is been for the last 2 days, she relates that she fell today and was unable to stand after, noting she was on the ground for about an hour as she was crawling to the bed.  She notes generalized rather than focal weakness; though more so in the legs bilaterally.  She notes multiple head injuries over the last 2 days including her forehead, multiple episodes of nausea and vomiting for the last 3 weeks, as well as a productive cough for the same time period.  She notes pain after trauma to the right ear, chronic back pain, but no acute change.  She is very anxious and hyperventilating during the history.  Of note, she is status post appendectomy and cholecystectomy, she has a history of hypertension but is not anticoagulated.  She has had no chest pain, though she does endorse dyspnea with a productive cough, and she has had no acute peripheral edema but does relate cramping discomfort to plantar surface of both of her feet and the lower legs without known injury.      REVIEW OF SYSTEMS  Pertinent positives include syncope, dyspnea, productive cough, nausea, vomiting, anxiety, generalized weakness, lower extremity cramping, head/face/neck injury. Pertinent  "negatives include no fever, no chest pain, no anticoagulation.  All other systems reviewed and negative.    PAST MEDICAL HISTORY   has a past medical history of Anesthesia; Arthritis; Cataract; Chronic pain; Hiatus hernia syndrome; Hypertension; Lymphedema (2014); Migraine headache; Pain; and Pneumonia.    SURGICAL HISTORY   has a past surgical history that includes hysterectomy laparoscopy; chely by laparoscopy; other abdominal surgery; other abdominal surgery; other; other orthopedic surgery; cataract phaco with iol (Left, 2/4/2016); cataract phaco with iol (Right, 2/18/2016); inj lumbar/sacral,w/wo cntrst (Right, 9/29/2016); fluorscopic guidance spinal injection (9/29/2016); inj lumbar/sacral,w/wo cntrst (N/A, 11/10/2016); and fluorscopic guidance spinal injection (11/10/2016).    SOCIAL HISTORY  Social History   Substance Use Topics   • Smoking status: Never Smoker   • Smokeless tobacco: Never Used   • Alcohol use Yes      Comment: rarely      History   Drug Use No       FAMILY HISTORY  No family history on file.  Noncontributory given age    CURRENT MEDICATIONS  Home Medications    **Home medications have not yet been reviewed for this encounter**         ALLERGIES  No Known Allergies    PHYSICAL EXAM  VITAL SIGNS: /93   Pulse (!) 103   Temp 36.1 °C (97 °F) (Temporal)   Resp 18   Ht 1.702 m (5' 7\")   Wt 66 kg (145 lb 8.1 oz)   SpO2 98%   BMI 22.79 kg/m²     General: Alert, mild acute distress, quite anxious, appears uncomfortable  Skin: Warm, dry, normal for ethnicity  Head: Normocephalic, mild swelling with no point tenderness noted to the proximal third of the mandible on the right, no step-off, no malocclusion, no trismus, negative werner sign, negative raccoon eyes.  Neck: Trachea midline, no tenderness and no step-off to palpation of the midline of the cervical spine  Eye: PERRL, extraocular movements intact without nystagmus.  Normal conjunctiva  ENMT: Oral mucosa pink and dry, no pharyngeal " erythema or exudate  Cardiovascular: S1, S2, mildly tachycardic, otherwise regular rate and rhythm, No murmur, Normal peripheral perfusion; no peripheral edema.  Respiratory: Lungs CTA,/hyperventilating tachypnea, breath sounds are equal with no rales, no wheeze, no rhonchi  Gastrointestinal: Soft, nontender, non distended  Musculoskeletal: No swelling, no deformity.  No barry deformity nor point bony tenderness on exam of the bilateral feet/ankles/lower legs, 2+ DP and PT pulses, no skin ulcerations.  Neurological: Alert and oriented to person, place, time, and situation.  Cranial nerves II through XII are grossly intact, no pronator drift, upper and lower extremity strength and sensation are intact and symmetrical bilaterally  Lymphatics: No lymphadenopathy  Psychiatric: Cooperative, pressured speech and very anxious affect, mildly repetitive    DIAGNOSTIC STUDIES/PROCEDURES    LABS  Results for orders placed or performed during the hospital encounter of 03/18/19   CBC WITHOUT DIFFERENTIAL   Result Value Ref Range    WBC 14.1 (H) 4.8 - 10.8 K/uL    RBC 4.62 4.20 - 5.40 M/uL    Hemoglobin 12.9 12.0 - 16.0 g/dL    Hematocrit 36.2 (L) 37.0 - 47.0 %    MCV 78.4 (L) 81.4 - 97.8 fL    MCH 27.9 27.0 - 33.0 pg    MCHC 35.6 (H) 33.6 - 35.0 g/dL    RDW 40.3 35.9 - 50.0 fL    Platelet Count 147 (L) 164 - 446 K/uL    MPV 10.2 9.0 - 12.9 fL   PROTHROMBIN TIME   Result Value Ref Range    PT 13.4 12.0 - 14.6 sec    INR 1.03 0.87 - 1.13   APTT   Result Value Ref Range    APTT 23.5 (L) 24.7 - 36.0 sec   COMP METABOLIC PANEL   Result Value Ref Range    Sodium 131 (L) 135 - 145 mmol/L    Potassium 2.8 (L) 3.6 - 5.5 mmol/L    Chloride 96 96 - 112 mmol/L    Co2 20 20 - 33 mmol/L    Anion Gap 15.0 (H) 0.0 - 11.9    Glucose 138 (H) 65 - 99 mg/dL    Bun 19 8 - 22 mg/dL    Creatinine 0.77 0.50 - 1.40 mg/dL    Calcium 9.3 8.4 - 10.2 mg/dL    AST(SGOT) 40 12 - 45 U/L    ALT(SGPT) 14 2 - 50 U/L    Alkaline Phosphatase 82 30 - 99 U/L    Total  Bilirubin 1.6 (H) 0.1 - 1.5 mg/dL    Albumin 4.5 3.2 - 4.9 g/dL    Total Protein 7.3 6.0 - 8.2 g/dL    Globulin 2.8 1.9 - 3.5 g/dL    A-G Ratio 1.6 g/dL   DIAGNOSTIC ALCOHOL   Result Value Ref Range    Diagnostic Alcohol 0.01 (H) 0.00 g/dL   TROPONIN   Result Value Ref Range    Troponin I 0.18 (H) 0.00 - 0.04 ng/mL   Btype Natriuretic Peptide   Result Value Ref Range    B Natriuretic Peptide 95 0 - 100 pg/mL   Lipase   Result Value Ref Range    Lipase 24 7 - 58 U/L   LACTIC ACID   Result Value Ref Range    Lactic Acid 3.1 (H) 0.5 - 2.0 mmol/L   Urinalysis, culture if indicated   Result Value Ref Range    Color Yellow     Character Clear     Specific Gravity 1.020 <1.035    Ph 6.0 5.0 - 8.0    Glucose Negative Negative mg/dL    Ketones 15 (A) Negative mg/dL    Protein Negative Negative mg/dL    Bilirubin Negative Negative    Nitrite Negative Negative    Leukocyte Esterase Negative Negative    Occult Blood Negative Negative    Micro Urine Req see below    TSH (for screening thyroid dysfunction)   Result Value Ref Range    TSH 3.820 0.380 - 5.330 uIU/mL   Influenza By PCR, A/B   Result Value Ref Range    Influenza virus A RNA Negative Negative    Influenza virus B, PCR Negative Negative   UR DRUG SCREEN([facility] ONLY)   Result Value Ref Range    Phencyclidine -Pcp Negative Negative    Benzodiazepines Positive (A) Negative    Cocaine Metabolite Negative Negative    Amphetamines By Triage Negative Negative    Urine THC Negative Negative    Codeine-Morphine Positive (A) Negative    Barbiturates Negative Negative   ESTIMATED GFR   Result Value Ref Range    GFR If African American >60 >60 mL/min/1.73 m 2    GFR If Non African American >60 >60 mL/min/1.73 m 2   EKG   Result Value Ref Range    Report       [facility] Emergency Dept.    Test Date:  2019-03-18  Pt Name:    DWIGHT VILLA              Department: EDS  MRN:        [id number]                      Room:       -ROOM 1  Gender:     Female                        Technician: JULI  :        [id number]                   Requested By:ZEB HA  Order #:    [id number]                    Reading MD: ZEB HA MD    Measurements  Intervals                                Axis  Rate:       97                           P:          14  SC:         164                          QRS:        40  QRSD:       101                          T:          48  QT:         368  QTc:        468    Interpretive Statements  Sinus rhythm  LAE, consider biatrial enlargement  Low voltage, precordial leads  Artifact in lead(s) I,II,III,aVR,aVL,V1,V2,V3,V4,V5,V6 and baseline wander in  lead  (s) V1  Compared to ECG 2018 15:22:28  Low QRS voltage now present  Significant motion artifact throughout  Electron ically Signed On 3- 4:53:50 PDT by ZEB HA MD       All labs reviewed by me, concerning leukocytosis with elevated lactic acid.  Elevated troponin..    EKG  12 Lead EKG obtained at 0358 and interpreted by me to show:  Rhythm: Normal sinus rhythm   Rate: 97  Axis: Normal  Intervals: Normal  Q Waves: Normal  No diagnostic ST segment elevation  Impressive motion artifact  Clinical Impression: Normal EKG  Compared to: None immediately available    RADIOLOGY  CT-CTA CHEST PULMONARY ARTERY W/ RECONS   Final Result            1. No central or large pulmonary embolus. Evaluation is otherwise degraded by motion artifact.   2. Lungs are generally clear. No evidence of pneumonia or pulmonary edema.   3. Small-to-moderate hiatal hernia with mild fluid distention of the distal esophagus suggesting reflux.   4. Age-indeterminate, possibly subacute right anterolateral seventh and eighth rib fractures at the costochondral junction. Correlate with point tenderness.   5. Additional findings as detailed.            CT-MAXILLOFACIAL W/O PLUS RECONS   Final Result      No acute abnormality.      CT-CSPINE WITHOUT PLUS RECONS   Final Result         1.  No acute fracture or dislocation of cervical spine.   2. Degenerative/arthritic changes as detailed, similar in appearance to prior study.   3. Osteopenia.      CT-HEAD W/O   Final Result      1.  Chronic microvascular ischemic type changes.   2.  No acute intracranial abnormality.      DX-CHEST-PORTABLE (1 VIEW)   Final Result      No acute cardiopulmonary abnormality.        The radiologist's interpretation of all radiological studies have been reviewed by me.    COURSE & MEDICAL DECISION MAKING  Pertinent Labs & Imaging studies reviewed. (See chart for details)    3:32 AM - Patient seen and examined at bedside. Patient will be treated with IV fluid resuscitation, Zofran, meclizine. Ordered cardiac workup as well as CT imaging of the head face and neck to evaluate her symptoms. The differential diagnoses include but are not limited to: Dehydration, dysrhythmia, electrolyte abnormality, pneumonia, UTI, sepsis, pulmonary embolism, acute coronary syndrome    0400: There is lactic acidosis and leukocytosis, as such have ordered blood cultures, awaiting chest x-ray and urinalysis as well as other imaging at this time.    0421: Patient reassessed, she is still very anxious and uncomfortable, noting significant pain again to her lower legs and feet.  At this point, I have ordered an attenuated dose of morphine for her discomfort, I have updated her with concerning labs thus far.  Given leukocytosis and lactic acidosis have ordered blood cultures, awaiting imaging reports at this time.  Hypokalemia noted, have ordered both IV and oral repletion.    0443: Troponin is mildly elevated.  Given complaint of syncope and dyspnea I have ordered CT to evaluate for pulmonary embolism given EKG is nonischemic.  There is marketed artifact however on EKG, as such I have ordered a repeat to further evaluate.  Patient is currently at CT at this time.  Given troponin elevation I have ordered chewable aspirin.    0602: No significant  "trauma noted on imaging.  We have paged the hospitalist for admission.  I noted CBC without differential was mistakenly ordered, I have ordered CBC with differential at this point.  Urinalysis thankfully unremarkable, no evidence of UTI    0607: I have spoken with the hospitalist who concurs and accepts the patient to her service.  Thankfully CT is are unremarkable, no evidence of significant PE nor trauma.  Unclear etiology of the patient's syncopal episodes but given significant dehydration    Patient Vitals for the past 24 hrs:   BP Temp Temp src Pulse Resp SpO2 Height Weight   03/18/19 0600 - - - (!) 103 - 98 % - -   03/18/19 0530 - - - (!) 105 18 96 % - -   03/18/19 0400 - - - 100 (!) 22 98 % - -   03/18/19 0306 149/93 36.1 °C (97 °F) Temporal 98 (!) 24 99 % 1.702 m (5' 7\") 66 kg (145 lb 8.1 oz)       HYDRATION: Based on the patient's presentation of Acute Vomiting, Dehydration and Tachycardia the patient was given IV fluids. IV Hydration was used because oral hydration was not as rapid as required. Upon recheck following hydration, the patient was Feeling somewhat better, tachycardia improving.    Decision Making:  This is a 78 y.o. year old female who presents with multiple syncopal episodes as well as anxiety and dyspnea.  Patient also relates generalized weakness more so in her legs but has no sided neurologic deficit.  This to the extent that she is having difficulty walking noting she had a syncopal episode today, fell, and essentially had to crawl to the bed being \"down\" about 1 hour.  She has no chest pain but has had dyspnea and a productive cough.  She is tachycardic, very anxious.  Patient's EKG has significant artifact but no evidence of barry ST changes.  Troponin is however mildly elevated, 0.18 in the context of intact renal function.  As such, I have ordered aspirin for her here in the ED.  Additionally given her leukocytosis, lactic acidosis, tachycardia, there is a potential for septicemia and " blood cultures were drawn.  Chest x-ray is thankfully clear with no evidence of pneumonia.  There is clear criteria for admission, given no hypotension, no indication for ICU management.  Urine is also unremarkable.  Given no obvious infectious source, no indication for antibiotics as this would not be consistent with septicemia according to SIRS criteria.  Given however significant dehydration, multiple syncopal episodes, and elevated troponin patient will need to be admitted for further management and evaluation.    Patient admitted in improved but guarded condition under the care of Dr. Umanzor the hospitalist to medical telemetry.    FINAL IMPRESSION  1. Elevated troponin    2. Dehydration    3. Syncope, unspecified syncope type    4. Acute hypokalemia    5. Acute anxiety    6. Generalized weakness          Srinivasan JACOB (Scribe), am scribing for, and in the presence of, Sriniavsan Castellon MD.    Electronically signed by: Srinivasan Castellon (Scribe), 3/18/2019    ISrinivasan MD personally performed the services described in this documentation, as scribed by Srinivasan Castellon in my presence, and it is both accurate and complete    The note accurately reflects work and decisions made by me.  Srinivasan Castellon  3/18/2019  6:11 AM

## 2019-03-18 NOTE — ED TRIAGE NOTES
"Chief Complaint   Patient presents with   • Dizziness     with falls x2 days; states she has vertigo   • Shortness of Breath     patient reports having dyspnea and cough over a month     /93   Pulse 98   Temp 36.1 °C (97 °F) (Temporal)   Resp (!) 24   Ht 1.702 m (5' 7\")   Wt 66 kg (145 lb 8.1 oz)   SpO2 99%   BMI 22.79 kg/m²     ERP at bedside  "

## 2019-03-18 NOTE — ASSESSMENT & PLAN NOTE
-Patient with frequent fall with syncopal episode. Head CT negative for hemorrhage or ischemia  -Will do MRI of the brain, 2Decho, carotid US  -Not clear etiology given that patient was not able to provide hx.   -Cardiogenic vs secondary to dehydration

## 2019-03-18 NOTE — ED NOTES
Rounded on patient.  Awake and having difficulty following directions to chew and swallow Asprin.

## 2019-03-18 NOTE — PROGRESS NOTES
Telemetry Shift Summary    Rhythm ST/SR  HR Range   Ectopy Freq PVC  Measurements 0.12/0.06/0.34        Normal Values  Rhythm SR  HR Range    Measurements 0.12-0.20 / 0.06-0.10  / 0.30-0.52

## 2019-03-18 NOTE — ED NOTES
Rounded on patient.  Patient is extremely anxious, and has difficulty following directions.  Continues to fear falling down and very concerned with feet and legs causing her falls versus the vertigo.  Staff continues to encourage patient to take slower breaths through nose to prevent her from hyperventilating

## 2019-03-18 NOTE — PROGRESS NOTES
Med rec updated and complete  Allergies reviewed  Pt not able to answer my questions, called pts  (Abdulaziz) @ [phone] to verify all prescription medications.  Pts  reports no vitamins.   Pts  reports no antibiotics in the last 30 days.

## 2019-03-18 NOTE — ASSESSMENT & PLAN NOTE
-Metabolic encephalopathy likely from medications  Worse  Decrease but do not stop baclofen-  Stop neurontin  Opiates on hold

## 2019-03-18 NOTE — PROGRESS NOTES
Pt arrived to floor on [facility] and use sideboard to transport her to bed. Pt is Aox3, not to time. Pt is confused and not sure what brought on her condition but knows she is hasn't feel like herself in a couple of days. The MRI screening sheet was filled out by her .

## 2019-03-18 NOTE — ED NOTES
Report received from  antoine Bazan care at this time.  Pt calm at this time. Room assignment pending

## 2019-03-18 NOTE — ED NOTES
Administered PO K to patient.  Required a lot of coaching to get the patient to swallow the medication.

## 2019-03-18 NOTE — PROGRESS NOTES
Notified MD Myers about trop 0.38. EKG was ordered. Echo is in room currently and EKG will be done after.     No chest pain at this time.

## 2019-03-18 NOTE — H&P
Hospital Medicine History & Physical Note    Date of Service  3/18/2019    Primary Care Physician  Lenny RENTERIA M.D.    Consultants  none    Code Status  full    Chief Complaint  Frequent fall  weakness    History of Presenting Illness    Hx has been obtain from ED provider. Patient unable to provide hx due to AMS. No family member available at the moment of eval.    Subjective per ED:  Lindsay Vargas is a 78 y.o. female who presents to the Emergency Department for evaluation of multiple falls.  Patient describes what sounds like multiple syncopal episodes where his sensation of near syncope, unsteadiness, as well as a vertiginous sensation.  She relates this is been for the last 2 days, she relates that she fell today and was unable to stand after, noting she was on the ground for about an hour as she was crawling to the bed.  She notes generalized rather than focal weakness; though more so in the legs bilaterally.  She notes multiple head injuries over the last 2 days including her forehead, multiple episodes of nausea and vomiting for the last 3 weeks, as well as a productive cough for the same time period.  She notes pain after trauma to the right ear, chronic back pain, but no acute change.  She is very anxious and hyperventilating during the history.  Of note, she is status post appendectomy and cholecystectomy, she has a history of hypertension but is not anticoagulated.  She has had no chest pain, though she does endorse dyspnea with a productive cough, and she has had no acute peripheral edema but does relate cramping discomfort to plantar surface of both of her feet and the lower legs without known injury.  Review of Systems  Review of Systems   Unable to perform ROS: Mental status change       Past Medical History   has a past medical history of Anesthesia; Arthritis; Cataract; Chronic pain; Hiatus hernia syndrome; Hypertension; Lymphedema (2014); Migraine headache; Pain; and  Pneumonia.    Surgical History   has a past surgical history that includes hysterectomy laparoscopy; chely by laparoscopy; other abdominal surgery; other abdominal surgery; other; other orthopedic surgery; cataract phaco with iol (Left, 2/4/2016); cataract phaco with iol (Right, 2/18/2016); pr inj lumbar/sacral,w/wo cntrst (Right, 9/29/2016); pr fluorscopic guidance spinal injection (9/29/2016); pr inj lumbar/sacral,w/wo cntrst (N/A, 11/10/2016); and pr fluorscopic guidance spinal injection (11/10/2016).     Family History  family history is not on file.     Social History   reports that she has never smoked. She has never used smokeless tobacco. She reports that she drinks alcohol. She reports that she does not use drugs.    Allergies  No Known Allergies    Medications  Prior to Admission Medications   Prescriptions Last Dose Informant Patient Reported? Taking?   baclofen (LIORESAL) 10 MG TABS  Family Member Yes No   Sig: Take 10 mg by mouth 3 times a day.   duloxetine (CYMBALTA) 60 MG Cap DR Particles delayed-release capsule  Family Member Yes No   Sig: Take 60 mg by mouth every day.   gabapentin (NEURONTIN) 100 MG Cap  Family Member Yes No   Sig: Take 100 mg by mouth 3 times a day.   hydrochlorothiazide (HYDRODIURIL) 25 MG TABS  Family Member Yes No   Sig: Take 25 mg by mouth every day.   omeprazole (PRILOSEC) 40 MG delayed-release capsule   Yes No   Sig: TAKE 1 CAPSULE BY MOUTH ONCE A DAY 30 MINUTES BEFORE BREAKFAST MEAL   oxycodone-acetaminophen (PERCOCET) 5-325 MG Tab  Family Member Yes No   Sig: Take 1-2 Tabs by mouth every four hours as needed.   potassium chloride (KLOR-CON) 20 MEQ PACK  Family Member Yes No   Sig: Take 20 mEq by mouth 3 times a day.   pramipexole (MIRAPEX) 0.5 MG Tab  Family Member Yes No   Sig: Take 0.5 mg by mouth every bedtime.   propranolol CR (INDERAL LA) 160 MG CP24 capsule  Family Member Yes No   Sig: Take 160 mg by mouth every day.   ropinirole (REQUIP) 1 MG TABS  Family Member Yes  No   Sig: Take 0.5 mg by mouth 2 Times a Day. 0.5 TO 1MG BID      Facility-Administered Medications: None       Physical Exam  Temp:  [36.1 °C (97 °F)] 36.1 °C (97 °F)  Pulse:  [] 98  Resp:  [18-24] 18  BP: (149)/(93) 149/93  SpO2:  [96 %-99 %] 98 %    Physical Exam    Laboratory:  Recent Labs      03/18/19   0355  03/18/19   0628   WBC  14.1*  11.1*   RBC  4.62  4.22   HEMOGLOBIN  12.9  11.8*   HEMATOCRIT  36.2*  33.8*   MCV  78.4*  80.1*   MCH  27.9  28.0   MCHC  35.6*  34.9   RDW  40.3  42.3   PLATELETCT  147*  120*   MPV  10.2  10.8     Recent Labs      03/18/19   0355   SODIUM  131*   POTASSIUM  2.8*   CHLORIDE  96   CO2  20   GLUCOSE  138*   BUN  19   CREATININE  0.77   CALCIUM  9.3     Recent Labs      03/18/19   0355   ALTSGPT  14   ASTSGOT  40   ALKPHOSPHAT  82   TBILIRUBIN  1.6*   LIPASE  24   GLUCOSE  138*     Recent Labs      03/18/19   0355   APTT  23.5*   INR  1.03     Recent Labs      03/18/19   0355   BNPBTYPENAT  95         Recent Labs      03/18/19   0355   TROPONINI  0.18*       Urinalysis:    Recent Labs      03/18/19   0530   SPECGRAVITY  1.020   GLUCOSEUR  Negative   KETONES  15*   NITRITE  Negative   LEUKESTERAS  Negative        Imaging:  CT-CTA CHEST PULMONARY ARTERY W/ RECONS   Final Result            1. No central or large pulmonary embolus. Evaluation is otherwise degraded by motion artifact.   2. Lungs are generally clear. No evidence of pneumonia or pulmonary edema.   3. Small-to-moderate hiatal hernia with mild fluid distention of the distal esophagus suggesting reflux.   4. Age-indeterminate, possibly subacute right anterolateral seventh and eighth rib fractures at the costochondral junction. Correlate with point tenderness.   5. Additional findings as detailed.            CT-MAXILLOFACIAL W/O PLUS RECONS   Final Result      No acute abnormality.      CT-CSPINE WITHOUT PLUS RECONS   Final Result         1. No acute fracture or dislocation of cervical spine.   2.  Degenerative/arthritic changes as detailed, similar in appearance to prior study.   3. Osteopenia.      CT-HEAD W/O   Final Result      1.  Chronic microvascular ischemic type changes.   2.  No acute intracranial abnormality.      DX-CHEST-PORTABLE (1 VIEW)   Final Result      No acute cardiopulmonary abnormality.      MR-BRAIN-W/O    (Results Pending)         Assessment/Plan:  I anticipate this patient will require at least two midnights for appropriate medical management, necessitating inpatient admission.    Syncope   Assessment & Plan    -Patient with frequent fall with syncopal episode. Head CT negative for hemorrhage or ischemia  -Will do MRI of the brain, 2Decho, carotid US  -Not clear etiology given that patient was not able to provide hx.   -Cardiogenic vs secondary to dehydration     Elevated troponin   Assessment & Plan    -No chest pain upon arrival to ED.  -N EKG Changes  -Will trend trops for now  -Consider card eval if second set elevated     Altered mental status   Assessment & Plan    -Metabolic encephalopathy vs infection of unknown etiology  -Follow up BC  -Pending pro eliezer citonin  -Will cover with Cefepime and VAnc for now for possible AMS secondary to sepsis  -Pending MRI     SIRS (systemic inflammatory response syndrome) (HCC)   Assessment & Plan    -Unknown source of infection  -Will cover empirically with Vanc and cefepime  -Sespsis protocol     Dehydration   Assessment & Plan    -Will give 1l stat and continue eith 100ml/hr  -Hold HTZ  -Strict I/O's     Hyponatremia   Assessment & Plan    -Suspecting hypovolemic hyponatremia  -Urine lytes requested     Hypokalemia   Assessment & Plan    -Electrolyte replacement protocol  -Will give 20 meq IV  -Close monitor         VTE prophylaxis: hep q8

## 2019-03-18 NOTE — ASSESSMENT & PLAN NOTE
-Suspecting hypovolemic hyponatremia  IV fluids.   Off hctz  Worse today will trend and keep fluids for now.

## 2019-03-19 PROBLEM — I21.4 NSTEMI (NON-ST ELEVATED MYOCARDIAL INFARCTION) (HCC): Status: ACTIVE | Noted: 2019-03-18

## 2019-03-19 PROBLEM — G93.41 ACUTE METABOLIC ENCEPHALOPATHY: Status: ACTIVE | Noted: 2019-03-18

## 2019-03-19 LAB
ALBUMIN SERPL BCP-MCNC: 3.9 G/DL (ref 3.2–4.9)
ALBUMIN/GLOB SERPL: 1.5 G/DL
ALP SERPL-CCNC: 68 U/L (ref 30–99)
ALT SERPL-CCNC: 16 U/L (ref 2–50)
ANION GAP SERPL CALC-SCNC: 8 MMOL/L (ref 0–11.9)
AST SERPL-CCNC: 44 U/L (ref 12–45)
BASOPHILS # BLD AUTO: 0.3 % (ref 0–1.8)
BASOPHILS # BLD: 0.02 K/UL (ref 0–0.12)
BILIRUB SERPL-MCNC: 1 MG/DL (ref 0.1–1.5)
BUN SERPL-MCNC: 11 MG/DL (ref 8–22)
CALCIUM SERPL-MCNC: 8.3 MG/DL (ref 8.4–10.2)
CHLORIDE SERPL-SCNC: 103 MMOL/L (ref 96–112)
CO2 SERPL-SCNC: 22 MMOL/L (ref 20–33)
CREAT SERPL-MCNC: 0.61 MG/DL (ref 0.5–1.4)
EKG IMPRESSION: NORMAL
EOSINOPHIL # BLD AUTO: 0.05 K/UL (ref 0–0.51)
EOSINOPHIL NFR BLD: 0.7 % (ref 0–6.9)
ERYTHROCYTE [DISTWIDTH] IN BLOOD BY AUTOMATED COUNT: 45.4 FL (ref 35.9–50)
GLOBULIN SER CALC-MCNC: 2.6 G/DL (ref 1.9–3.5)
GLUCOSE SERPL-MCNC: 102 MG/DL (ref 65–99)
HCT VFR BLD AUTO: 35.8 % (ref 37–47)
HGB BLD-MCNC: 12.1 G/DL (ref 12–16)
IMM GRANULOCYTES # BLD AUTO: 0.02 K/UL (ref 0–0.11)
IMM GRANULOCYTES NFR BLD AUTO: 0.3 % (ref 0–0.9)
LYMPHOCYTES # BLD AUTO: 1.54 K/UL (ref 1–4.8)
LYMPHOCYTES NFR BLD: 22.4 % (ref 22–41)
MCH RBC QN AUTO: 27.8 PG (ref 27–33)
MCHC RBC AUTO-ENTMCNC: 33.8 G/DL (ref 33.6–35)
MCV RBC AUTO: 82.1 FL (ref 81.4–97.8)
MONOCYTES # BLD AUTO: 0.43 K/UL (ref 0–0.85)
MONOCYTES NFR BLD AUTO: 6.3 % (ref 0–13.4)
NEUTROPHILS # BLD AUTO: 4.81 K/UL (ref 2–7.15)
NEUTROPHILS NFR BLD: 70 % (ref 44–72)
NRBC # BLD AUTO: 0 K/UL
NRBC BLD-RTO: 0 /100 WBC
PLATELET # BLD AUTO: 117 K/UL (ref 164–446)
PMV BLD AUTO: 10.5 FL (ref 9–12.9)
POTASSIUM SERPL-SCNC: 3.8 MMOL/L (ref 3.6–5.5)
PROT SERPL-MCNC: 6.5 G/DL (ref 6–8.2)
RBC # BLD AUTO: 4.36 M/UL (ref 4.2–5.4)
SODIUM SERPL-SCNC: 133 MMOL/L (ref 135–145)
TROPONIN I SERPL-MCNC: 0.43 NG/ML (ref 0–0.04)
TROPONIN I SERPL-MCNC: 0.44 NG/ML (ref 0–0.04)
WBC # BLD AUTO: 6.9 K/UL (ref 4.8–10.8)

## 2019-03-19 PROCEDURE — A9270 NON-COVERED ITEM OR SERVICE: HCPCS | Performed by: HOSPITALIST

## 2019-03-19 PROCEDURE — 97162 PT EVAL MOD COMPLEX 30 MIN: CPT

## 2019-03-19 PROCEDURE — 700111 HCHG RX REV CODE 636 W/ 250 OVERRIDE (IP): Performed by: HOSPITALIST

## 2019-03-19 PROCEDURE — 85025 COMPLETE CBC W/AUTO DIFF WBC: CPT

## 2019-03-19 PROCEDURE — A9270 NON-COVERED ITEM OR SERVICE: HCPCS | Performed by: INTERNAL MEDICINE

## 2019-03-19 PROCEDURE — 92610 EVALUATE SWALLOWING FUNCTION: CPT

## 2019-03-19 PROCEDURE — 700102 HCHG RX REV CODE 250 W/ 637 OVERRIDE(OP): Performed by: HOSPITALIST

## 2019-03-19 PROCEDURE — 84484 ASSAY OF TROPONIN QUANT: CPT

## 2019-03-19 PROCEDURE — 80053 COMPREHEN METABOLIC PANEL: CPT

## 2019-03-19 PROCEDURE — 700101 HCHG RX REV CODE 250: Performed by: INTERNAL MEDICINE

## 2019-03-19 PROCEDURE — 700102 HCHG RX REV CODE 250 W/ 637 OVERRIDE(OP): Performed by: INTERNAL MEDICINE

## 2019-03-19 PROCEDURE — 770020 HCHG ROOM/CARE - TELE (206)

## 2019-03-19 PROCEDURE — 99232 SBSQ HOSP IP/OBS MODERATE 35: CPT | Performed by: HOSPITALIST

## 2019-03-19 PROCEDURE — 93010 ELECTROCARDIOGRAM REPORT: CPT | Performed by: INTERNAL MEDICINE

## 2019-03-19 RX ORDER — BACLOFEN 10 MG/1
10 TABLET ORAL 3 TIMES DAILY
Status: DISCONTINUED | OUTPATIENT
Start: 2019-03-19 | End: 2019-03-21

## 2019-03-19 RX ORDER — MAGNESIUM SULFATE HEPTAHYDRATE 40 MG/ML
4 INJECTION, SOLUTION INTRAVENOUS ONCE
Status: COMPLETED | OUTPATIENT
Start: 2019-03-19 | End: 2019-03-19

## 2019-03-19 RX ORDER — ATORVASTATIN CALCIUM 40 MG/1
80 TABLET, FILM COATED ORAL EVERY EVENING
Status: DISCONTINUED | OUTPATIENT
Start: 2019-03-19 | End: 2019-03-22 | Stop reason: HOSPADM

## 2019-03-19 RX ADMIN — GABAPENTIN 100 MG: 100 CAPSULE ORAL at 22:22

## 2019-03-19 RX ADMIN — MORPHINE SULFATE 2 MG: 4 INJECTION INTRAVENOUS at 02:32

## 2019-03-19 RX ADMIN — GABAPENTIN 100 MG: 100 CAPSULE ORAL at 15:02

## 2019-03-19 RX ADMIN — POTASSIUM CHLORIDE AND SODIUM CHLORIDE: 900; 300 INJECTION, SOLUTION INTRAVENOUS at 14:19

## 2019-03-19 RX ADMIN — HEPARIN SODIUM 5000 UNITS: 5000 INJECTION, SOLUTION INTRAVENOUS; SUBCUTANEOUS at 05:20

## 2019-03-19 RX ADMIN — MORPHINE SULFATE 2 MG: 4 INJECTION INTRAVENOUS at 05:20

## 2019-03-19 RX ADMIN — HEPARIN SODIUM 5000 UNITS: 5000 INJECTION, SOLUTION INTRAVENOUS; SUBCUTANEOUS at 15:03

## 2019-03-19 RX ADMIN — MAGNESIUM SULFATE IN WATER 4 G: 40 INJECTION, SOLUTION INTRAVENOUS at 10:52

## 2019-03-19 RX ADMIN — POTASSIUM CHLORIDE AND SODIUM CHLORIDE: 900; 300 INJECTION, SOLUTION INTRAVENOUS at 05:32

## 2019-03-19 RX ADMIN — BACLOFEN 10 MG: 10 TABLET ORAL at 17:53

## 2019-03-19 RX ADMIN — ASPIRIN 81 MG: 81 TABLET, COATED ORAL at 15:02

## 2019-03-19 RX ADMIN — POTASSIUM CHLORIDE 20 MEQ: 1500 TABLET, EXTENDED RELEASE ORAL at 17:53

## 2019-03-19 RX ADMIN — PRAMIPEXOLE DIHYDROCHLORIDE 0.5 MG: 0.5 TABLET ORAL at 22:22

## 2019-03-19 RX ADMIN — STANDARDIZED SENNA CONCENTRATE AND DOCUSATE SODIUM 2 TABLET: 8.6; 5 TABLET, FILM COATED ORAL at 17:53

## 2019-03-19 RX ADMIN — ATORVASTATIN CALCIUM 80 MG: 40 TABLET, FILM COATED ORAL at 17:53

## 2019-03-19 RX ADMIN — HEPARIN SODIUM 5000 UNITS: 5000 INJECTION, SOLUTION INTRAVENOUS; SUBCUTANEOUS at 22:22

## 2019-03-19 ASSESSMENT — GAIT ASSESSMENTS
ASSISTIVE DEVICE: FRONT WHEEL WALKER
DISTANCE (FEET): 2
DEVIATION: SHUFFLED GAIT
GAIT LEVEL OF ASSIST: CONTACT GUARD ASSIST

## 2019-03-19 NOTE — CARE PLAN
Problem: Safety  Goal: Will remain free from injury  Outcome: PROGRESSING AS EXPECTED  Remind patient to use call light and provide assistance. Bed in low position, bed locked, and appropriate alarms set. Patient wearing non-slip socks. Call light and personal belongings are within reach.     Problem: Knowledge Deficit  Goal: Knowledge of disease process/condition, treatment plan, diagnostic tests, and medications will improve  Outcome: PROGRESSING AS EXPECTED  Remind patient to use call light and provide assistance. Bed in low position, bed locked, and appropriate alarms set. Patient wearing non-slip socks. Call light and personal belongings are within reach.

## 2019-03-19 NOTE — PROGRESS NOTES
· 2 RN skin check complete with DEVANG Garcia.  · Devices in place none.  · Skin assessed under devices none.  · Confirmed pressure ulcers found on none.  · New potential pressure ulcers noted on none. Wound consult placed and wound reported.      Patient's feet are dry, both legs/ankle are dry, redness, and scabs. Large bruise on right side of mid back area. Abdomen scar on mid lower abdomen.

## 2019-03-19 NOTE — PROGRESS NOTES
Completed assessment and administered scheduled medications. Bed in low position, locked, and appropriate alarms set. Personal belongings and call light are within reach. Patient has no additional needs at this time.

## 2019-03-19 NOTE — PROGRESS NOTES
Received bedside report from DEVANG Banegas. Plan of care discussed. Safety precautions in place. Call light and personal belongings within reach. Patient is resting in bed comfortably with eyes closed.

## 2019-03-19 NOTE — DIETARY
Nutrition Services: Consult for cardiac diet education noted. Pt initially was NPO. Diet order placed today. RD attempted to visit patient to provide education. Pt was sleeping soundly. RD will follow up tomorrow for education.

## 2019-03-19 NOTE — PROGRESS NOTES
Gave bedside report to DEVANG Griffith. Plan of care discussed. Safety precautions in place. Personal belongings and call light are with in reach. Patient has no additional needs at this time.

## 2019-03-19 NOTE — THERAPY
"Speech Language Therapy Clinical Swallow Evaluation completed.    Functional Status: Pt was seen at bedside for CSE. Upon arrival to Pt's room, Pt was found reclined in bed asleep. Pt awoke easily with verbal cues. Pt was alert and oriented to self, place and month; however, disoriented to year \"\" and required multiple repetitions to produce month (first stating  then  anniversary.) Pt denied baseline dysphagia, recent hx of PNA and/or odynophagia. However, later during session Pt reported difficulties swallowing occasionally due to mucous/phlegm. Oral mech revealed reduced dentition (no top teeth, poor quality lowers), reduced labial strength, and reduced laryngeal elevation. Pt reported pain during circumlaryngeal palpation, with no numeric value. Pt was agreeable to sit upright for PO trials today.     Pt was administered ice chips, thins (tsps x3, single sips x3), NTL (single sips x5), and puree (small bites x4). Pt declined further solid PO trials due to feeling full. Pt demonstrated intermittent delayed swallow initiation per palpation; however, no overt clinical s/sx of aspiration/penetration (i.e. coughing, choking, voice quality) was appreciated. However, Pt is at elevated risk for aspiration secondary to AMS. Given Pt's decline of further PO trials, unable to test Pt's safety with advanced solid textures. With strict adherence to safe swallow precautions, Pt is recommended for PO diet of Dysphagia 1 solids, Nectar-thick liquids and Pills floated in puree. (Note: Suspect Pt will tolerate upgraded diet as she is able/willing to participate further and mental status improves.) Pt verbalized understanding. RN updated. SLP following. Thank you for the consult.    Recommendations - Diet: Dysphagia I, Nectar Thick Liquid                          Strategies: Monitor during meals, Assistance needed for meal tray set-up and Head of Bed at 90 Degrees                          Medication Administration: " "Float Whole with Puree    Plan of Care: Will benefit from Speech Therapy 3 times per week  Post-Acute Therapy: Recommend inpatient transitional care services for continued speech therapy services.      See \"Rehab Therapy-Acute\" Patient Summary Report for complete documentation.   "

## 2019-03-19 NOTE — PROGRESS NOTES
Telemetry Shift Summary    Rhythm SR/ST  HR Range 90s-100s  Ectopy r-oPVC  Measurements 0.18/0.08/0.38        Normal Values  Rhythm SR  HR Range    Measurements 0.12-0.20 / 0.06-0.10  / 0.30-0.52

## 2019-03-19 NOTE — PROGRESS NOTES
Hospital Medicine Daily Progress Note    Date of Service  3/19/2019    Chief Complaint  78 y.o. female admitted 3/18/2019 with frequent falls, possible syncope and weakness.     Hospital Course    She was admitted with confusion suspected to be secondary to polypharmacy, dehydration.       Interval Problem Update  Intermittently tearful and confused. Mostly orientated. She is unaware of meds she may have taken. I stopped her fluids and reviewed her imaging as described below. We discussed going to rehab/snf and she is amenable to this. I reviewed her medications and restarted her baclofen as this had been suddenly stopped on admission.           Echocardiography Laboratory  Normal left ventricular systolic function.  Left ventricular ejection fraction is visually estimated to be 70%.  Right heart pressures are consistent with mild pulmonary hypertension.  No significant valve abnormalities.     cxr  No acute cardiopulmonary abnormality.    MRI brain:  1. Age-related cerebral atrophy.  2. Mild to moderate periventricular white matter changes consistent with chronic microvascular ischemic gliosis.    Carotid oppler:   FINDINGS   Right carotid.    Flow velocities and Doppler waveforms are normal throughout the carotid    system without evidence of plaque.    Antegrade right vertebral flow.    Left carotid.    Flow velocities and Doppler waveforms are normal throughout the carotid    system without evidence of plaque.    Antegrade left vertebral flow.         Consultants/Specialty  none    Code Status  FULL    Disposition  SNF    Review of Systems  Review of Systems   Constitutional: Negative for chills and fever.   HENT: Negative for sore throat.    Eyes: Negative for blurred vision and pain.   Respiratory: Negative for cough and shortness of breath.    Cardiovascular: Negative for chest pain and palpitations.   Gastrointestinal: Negative for abdominal pain, nausea and vomiting.   Genitourinary: Negative for dysuria  and urgency.   Musculoskeletal: Negative for back pain and neck pain.   Skin: Negative for itching and rash.   Neurological: Negative for dizziness, tingling and headaches.   Psychiatric/Behavioral: Negative for depression. The patient is nervous/anxious. The patient does not have insomnia.    All other systems reviewed and are negative.       Physical Exam  Temp:  [36.2 °C (97.2 °F)-36.8 °C (98.3 °F)] 36.3 °C (97.3 °F)  Pulse:  [] 97  Resp:  [18-20] 18  BP: (132-165)/(50-97) 143/79  SpO2:  [90 %-99 %] 98 %    Physical Exam   Constitutional: She appears well-developed and well-nourished. No distress.   Patient seen and examined  Discussed plan with RN   HENT:   Right Ear: External ear normal.   Left Ear: External ear normal.   Nose: Nose normal.   Eyes: Conjunctivae are normal. Right eye exhibits no discharge. Left eye exhibits no discharge.   Neck: No JVD present.   Cardiovascular: Regular rhythm and normal heart sounds.    No murmur heard.  Cap refill 2sec  Pulses 2+ throughout     Pulmonary/Chest: Effort normal and breath sounds normal. No stridor. No respiratory distress. She has no wheezes. She has no rales.   Abdominal: Soft. Bowel sounds are normal. She exhibits no distension. There is no tenderness.   Musculoskeletal: She exhibits no edema or tenderness.   Neurological: She is alert.   Confused at times. Tearful.    Skin: Skin is warm and dry. She is not diaphoretic. No erythema.   Normal skin  Color.    Psychiatric: She has a normal mood and affect. Her behavior is normal.   Nursing note and vitals reviewed.      Fluids    Intake/Output Summary (Last 24 hours) at 03/19/19 0914  Last data filed at 03/19/19 0740   Gross per 24 hour   Intake             1500 ml   Output                0 ml   Net             1500 ml       Laboratory  Recent Labs      03/18/19   0355  03/18/19   0628  03/19/19   0306   WBC  14.1*  11.1*  6.9   RBC  4.62  4.22  4.36   HEMOGLOBIN  12.9  11.8*  12.1   HEMATOCRIT  36.2*  33.8*   35.8*   MCV  78.4*  80.1*  82.1   MCH  27.9  28.0  27.8   MCHC  35.6*  34.9  33.8   RDW  40.3  42.3  45.4   PLATELETCT  147*  120*  117*   MPV  10.2  10.8  10.5     Recent Labs      03/18/19   0355  03/18/19   1244  03/19/19   0306   SODIUM  131*  131*  133*   POTASSIUM  2.8*  3.0*  3.8   CHLORIDE  96  98  103   CO2  20  25  22   GLUCOSE  138*  119*  102*   BUN  19  17  11   CREATININE  0.77  0.70  0.61   CALCIUM  9.3  8.5  8.3*     Recent Labs      03/18/19   0355  03/18/19   0832   APTT  23.5*  24.1*   INR  1.03  1.03     Recent Labs      03/18/19   0355   BNPBTYPENAT  95           Imaging  EC-ECHOCARDIOGRAM COMPLETE W/O CONT   Final Result      US-CAROTID DOPPLER BILAT   Final Result      MR-BRAIN-W/O   Final Result         1. Age-related cerebral atrophy.      2. Mild to moderate periventricular white matter changes consistent with chronic microvascular ischemic gliosis.      CT-CTA CHEST PULMONARY ARTERY W/ RECONS   Final Result            1. No central or large pulmonary embolus. Evaluation is otherwise degraded by motion artifact.   2. Lungs are generally clear. No evidence of pneumonia or pulmonary edema.   3. Small-to-moderate hiatal hernia with mild fluid distention of the distal esophagus suggesting reflux.   4. Age-indeterminate, possibly subacute right anterolateral seventh and eighth rib fractures at the costochondral junction. Correlate with point tenderness.   5. Additional findings as detailed.            CT-MAXILLOFACIAL W/O PLUS RECONS   Final Result      No acute abnormality.      CT-CSPINE WITHOUT PLUS RECONS   Final Result         1. No acute fracture or dislocation of cervical spine.   2. Degenerative/arthritic changes as detailed, similar in appearance to prior study.   3. Osteopenia.      CT-HEAD W/O   Final Result      1.  Chronic microvascular ischemic type changes.   2.  No acute intracranial abnormality.      DX-CHEST-PORTABLE (1 VIEW)   Final Result      No acute cardiopulmonary  "abnormality.           Assessment/Plan  Syncope   Assessment & Plan    -Patient with frequent fall with syncopal episode. Head CT negative for hemorrhage or ischemia  -Will do MRI of the brain, 2Decho, carotid US  -Not clear etiology given that patient was not able to provide hx.   -Cardiogenic vs secondary to dehydration     NSTEMI (non-ST elevated myocardial infarction) ([facility])   Assessment & Plan    \"chest pain\" over night does not appear cardiac  This is consistent with a type 2 nstemi from demand ischemia related to dehydration.   Trend troponin.   Continue asa  Add statin  No ekg changes.   Check stress test.      Acute metabolic encephalopathy   Assessment & Plan    -Metabolic encephalopathy likely from medications  Restart baclofen- stopping this can lead to acute psychosis.   Opiates on hold  Consider decrease in neurontin       SIRS (systemic inflammatory response syndrome) ([facility])   Assessment & Plan    No evidence for infection.   Suspect from dehydration.      Dehydration   Assessment & Plan    -Hold HTZ  IV fluids     Hyponatremia   Assessment & Plan    -Suspecting hypovolemic hyponatremia  IV fluids.   Off hctz     Hypokalemia   Assessment & Plan    Trend and replace.           VTE prophylaxis: heparin      "

## 2019-03-19 NOTE — PROGRESS NOTES
2215 Patient is complaining of chest/epigasrtic pain 4/10. Patient is anxious and requesting that  is called. Oxygen applied and STAT EKG ordered.     2233 Dr. Hayes notified of chest pain and NPO status and Troponin 0.35. Dr. Hayes to input orders for Nirto, morphine, and Troponin.     2245 Patient given Nitro with some improved pain. Patient then requested to use commode. Patient given additional dose of nitro.     2327 Patient complaining of generalized pain along with chest pain. IV morphine given. Patient states improved pain. Education provided throughout the entire event. Patient has no additional needs.    Will notify Dr. Hayes if Troponin is increased.

## 2019-03-19 NOTE — THERAPY
"Physical Therapy Evaluation completed.   Bed Mobility:  Supine to Sit: Stand by Assist  Transfers: Sit to Stand: Contact Guard Assist  Gait: Level Of Assist: Contact Guard Assist with Front-Wheel Walker   X 2 steps    Plan of Care: Will benefit from Physical Therapy 3 times per week  Discharge Recommendations: Equipment: No Equipment Needed. Post-acute therapy Discharge to a transitional care facility for continued skilled therapy services.  78 year old female admitted with syncope and dehydration,Pt lives at home with  and is Idependent ambulating with a fww.Acute PT needed to improve transfers and ambulation so may be able to go home  See \"Rehab Therapy-Acute\" Patient Summary Report for complete documentation.     "

## 2019-03-20 ENCOUNTER — APPOINTMENT (OUTPATIENT)
Dept: RADIOLOGY | Facility: MEDICAL CENTER | Age: 79
DRG: 640 | End: 2019-03-20
Attending: HOSPITALIST
Payer: MEDICARE

## 2019-03-20 LAB
ALBUMIN SERPL BCP-MCNC: 4 G/DL (ref 3.2–4.9)
ALBUMIN/GLOB SERPL: 1.5 G/DL
ALP SERPL-CCNC: 64 U/L (ref 30–99)
ALT SERPL-CCNC: 16 U/L (ref 2–50)
ANION GAP SERPL CALC-SCNC: 11 MMOL/L (ref 0–11.9)
ANISOCYTOSIS BLD QL SMEAR: ABNORMAL
APPEARANCE UR: CLEAR
AST SERPL-CCNC: 28 U/L (ref 12–45)
BACTERIA #/AREA URNS HPF: ABNORMAL /HPF
BASOPHILS # BLD AUTO: 1 % (ref 0–1.8)
BASOPHILS # BLD: 0.07 K/UL (ref 0–0.12)
BILIRUB SERPL-MCNC: 1.2 MG/DL (ref 0.1–1.5)
BILIRUB UR QL STRIP.AUTO: NEGATIVE
BUN SERPL-MCNC: 8 MG/DL (ref 8–22)
CALCIUM SERPL-MCNC: 8.7 MG/DL (ref 8.4–10.2)
CHLORIDE SERPL-SCNC: 97 MMOL/L (ref 96–112)
CO2 SERPL-SCNC: 22 MMOL/L (ref 20–33)
COLOR UR: YELLOW
CREAT SERPL-MCNC: 0.55 MG/DL (ref 0.5–1.4)
EKG IMPRESSION: NORMAL
EOSINOPHIL # BLD AUTO: 0.07 K/UL (ref 0–0.51)
EOSINOPHIL NFR BLD: 1 % (ref 0–6.9)
EPI CELLS #/AREA URNS HPF: ABNORMAL /HPF
ERYTHROCYTE [DISTWIDTH] IN BLOOD BY AUTOMATED COUNT: 45.3 FL (ref 35.9–50)
GLOBULIN SER CALC-MCNC: 2.6 G/DL (ref 1.9–3.5)
GLUCOSE SERPL-MCNC: 107 MG/DL (ref 65–99)
GLUCOSE UR STRIP.AUTO-MCNC: NEGATIVE MG/DL
HCT VFR BLD AUTO: 35.4 % (ref 37–47)
HGB BLD-MCNC: 12 G/DL (ref 12–16)
HYALINE CASTS #/AREA URNS LPF: ABNORMAL /LPF
KETONES UR STRIP.AUTO-MCNC: 15 MG/DL
LEUKOCYTE ESTERASE UR QL STRIP.AUTO: ABNORMAL
LYMPHOCYTES # BLD AUTO: 1.82 K/UL (ref 1–4.8)
LYMPHOCYTES NFR BLD: 26 % (ref 22–41)
MAGNESIUM SERPL-MCNC: 2 MG/DL (ref 1.5–2.5)
MANUAL DIFF BLD: NORMAL
MCH RBC QN AUTO: 28 PG (ref 27–33)
MCHC RBC AUTO-ENTMCNC: 33.9 G/DL (ref 33.6–35)
MCV RBC AUTO: 82.5 FL (ref 81.4–97.8)
MICRO URNS: ABNORMAL
MONOCYTES # BLD AUTO: 0.56 K/UL (ref 0–0.85)
MONOCYTES NFR BLD AUTO: 8 % (ref 0–13.4)
MUCOUS THREADS #/AREA URNS HPF: ABNORMAL /HPF
NEUTROPHILS # BLD AUTO: 4.48 K/UL (ref 2–7.15)
NEUTROPHILS NFR BLD: 64 % (ref 44–72)
NITRITE UR QL STRIP.AUTO: NEGATIVE
NRBC # BLD AUTO: 0 K/UL
NRBC BLD-RTO: 0 /100 WBC
PH UR STRIP.AUTO: 5.5 [PH]
PLATELET # BLD AUTO: 128 K/UL (ref 164–446)
PLATELET BLD QL SMEAR: NORMAL
PMV BLD AUTO: 10.7 FL (ref 9–12.9)
POTASSIUM SERPL-SCNC: 3.6 MMOL/L (ref 3.6–5.5)
PROT SERPL-MCNC: 6.6 G/DL (ref 6–8.2)
PROT UR QL STRIP: NEGATIVE MG/DL
RBC # BLD AUTO: 4.29 M/UL (ref 4.2–5.4)
RBC # URNS HPF: ABNORMAL /HPF
RBC BLD AUTO: PRESENT
RBC UR QL AUTO: ABNORMAL
SODIUM SERPL-SCNC: 130 MMOL/L (ref 135–145)
SP GR UR STRIP.AUTO: 1.01
TROPONIN I SERPL-MCNC: 0.13 NG/ML (ref 0–0.04)
WBC # BLD AUTO: 7 K/UL (ref 4.8–10.8)
WBC #/AREA URNS HPF: ABNORMAL /HPF

## 2019-03-20 PROCEDURE — 78452 HT MUSCLE IMAGE SPECT MULT: CPT | Mod: 26 | Performed by: INTERNAL MEDICINE

## 2019-03-20 PROCEDURE — 700102 HCHG RX REV CODE 250 W/ 637 OVERRIDE(OP): Performed by: INTERNAL MEDICINE

## 2019-03-20 PROCEDURE — 700102 HCHG RX REV CODE 250 W/ 637 OVERRIDE(OP): Performed by: HOSPITALIST

## 2019-03-20 PROCEDURE — A9270 NON-COVERED ITEM OR SERVICE: HCPCS | Performed by: INTERNAL MEDICINE

## 2019-03-20 PROCEDURE — A9270 NON-COVERED ITEM OR SERVICE: HCPCS | Performed by: HOSPITALIST

## 2019-03-20 PROCEDURE — 99232 SBSQ HOSP IP/OBS MODERATE 35: CPT | Performed by: HOSPITALIST

## 2019-03-20 PROCEDURE — 80053 COMPREHEN METABOLIC PANEL: CPT

## 2019-03-20 PROCEDURE — 85007 BL SMEAR W/DIFF WBC COUNT: CPT

## 2019-03-20 PROCEDURE — 85027 COMPLETE CBC AUTOMATED: CPT

## 2019-03-20 PROCEDURE — 92526 ORAL FUNCTION THERAPY: CPT

## 2019-03-20 PROCEDURE — 93018 CV STRESS TEST I&R ONLY: CPT | Performed by: INTERNAL MEDICINE

## 2019-03-20 PROCEDURE — 93005 ELECTROCARDIOGRAM TRACING: CPT | Performed by: HOSPITALIST

## 2019-03-20 PROCEDURE — 83735 ASSAY OF MAGNESIUM: CPT

## 2019-03-20 PROCEDURE — 81001 URINALYSIS AUTO W/SCOPE: CPT

## 2019-03-20 PROCEDURE — A9502 TC99M TETROFOSMIN: HCPCS

## 2019-03-20 PROCEDURE — 93010 ELECTROCARDIOGRAM REPORT: CPT | Performed by: INTERNAL MEDICINE

## 2019-03-20 PROCEDURE — 700111 HCHG RX REV CODE 636 W/ 250 OVERRIDE (IP): Performed by: HOSPITALIST

## 2019-03-20 PROCEDURE — 700111 HCHG RX REV CODE 636 W/ 250 OVERRIDE (IP)

## 2019-03-20 PROCEDURE — 84484 ASSAY OF TROPONIN QUANT: CPT

## 2019-03-20 PROCEDURE — 770020 HCHG ROOM/CARE - TELE (206)

## 2019-03-20 RX ORDER — REGADENOSON 0.08 MG/ML
INJECTION, SOLUTION INTRAVENOUS
Status: COMPLETED
Start: 2019-03-20 | End: 2019-03-20

## 2019-03-20 RX ADMIN — HEPARIN SODIUM 5000 UNITS: 5000 INJECTION, SOLUTION INTRAVENOUS; SUBCUTANEOUS at 22:39

## 2019-03-20 RX ADMIN — HEPARIN SODIUM 5000 UNITS: 5000 INJECTION, SOLUTION INTRAVENOUS; SUBCUTANEOUS at 05:03

## 2019-03-20 RX ADMIN — POTASSIUM CHLORIDE 20 MEQ: 1500 TABLET, EXTENDED RELEASE ORAL at 13:27

## 2019-03-20 RX ADMIN — DULOXETINE 60 MG: 30 CAPSULE, DELAYED RELEASE ORAL at 05:03

## 2019-03-20 RX ADMIN — POTASSIUM CHLORIDE 20 MEQ: 1500 TABLET, EXTENDED RELEASE ORAL at 17:46

## 2019-03-20 RX ADMIN — ASPIRIN 81 MG: 81 TABLET, COATED ORAL at 05:03

## 2019-03-20 RX ADMIN — BACLOFEN 10 MG: 10 TABLET ORAL at 05:03

## 2019-03-20 RX ADMIN — GABAPENTIN 100 MG: 100 CAPSULE ORAL at 16:53

## 2019-03-20 RX ADMIN — HEPARIN SODIUM 5000 UNITS: 5000 INJECTION, SOLUTION INTRAVENOUS; SUBCUTANEOUS at 16:52

## 2019-03-20 RX ADMIN — GABAPENTIN 100 MG: 100 CAPSULE ORAL at 09:36

## 2019-03-20 RX ADMIN — BACLOFEN 10 MG: 10 TABLET ORAL at 13:27

## 2019-03-20 RX ADMIN — OMEPRAZOLE 20 MG: 20 CAPSULE, DELAYED RELEASE ORAL at 05:03

## 2019-03-20 RX ADMIN — CARBAMIDE PEROXIDE 6.5% OTIC SOLN 6 DROP: 6.5 SOLUTION at 18:29

## 2019-03-20 RX ADMIN — POTASSIUM CHLORIDE 20 MEQ: 1500 TABLET, EXTENDED RELEASE ORAL at 05:03

## 2019-03-20 RX ADMIN — PRAMIPEXOLE DIHYDROCHLORIDE 0.5 MG: 0.5 TABLET ORAL at 20:18

## 2019-03-20 RX ADMIN — ATORVASTATIN CALCIUM 80 MG: 40 TABLET, FILM COATED ORAL at 17:46

## 2019-03-20 RX ADMIN — REGADENOSON 0.4 MG: 0.08 INJECTION, SOLUTION INTRAVENOUS at 08:26

## 2019-03-20 RX ADMIN — ACETAMINOPHEN 650 MG: 325 TABLET, FILM COATED ORAL at 05:03

## 2019-03-20 RX ADMIN — BACLOFEN 10 MG: 10 TABLET ORAL at 20:18

## 2019-03-20 RX ADMIN — PROPRANOLOL HYDROCHLORIDE 160 MG: 80 CAPSULE, EXTENDED RELEASE ORAL at 09:36

## 2019-03-20 ASSESSMENT — ENCOUNTER SYMPTOMS
NERVOUS/ANXIOUS: 1
ABDOMINAL PAIN: 0
PALPITATIONS: 0
CHILLS: 0
BACK PAIN: 0
HEADACHES: 0
INSOMNIA: 0
EYE PAIN: 0
COUGH: 0
NAUSEA: 0
WEAKNESS: 1
DIZZINESS: 0
FEVER: 0
VOMITING: 0
BLURRED VISION: 0
DEPRESSION: 0
NECK PAIN: 0
SHORTNESS OF BREATH: 0
TINGLING: 0
SORE THROAT: 0

## 2019-03-20 NOTE — DISCHARGE PLANNING
Anticipated Discharge Disposition: Home when medically stable.     Action: Reviewed chart, discussed pt's case with IHD pablo Whitney who has met with pt and will follow pt when d/c. Pt lives locally in [place] with her  Abdulaziz Vargas #[phone]. Pt has a FWW at home she uses most of the time. Pt uses [facility], has [employer] insurance and  does the driving.     Discussed pt's case in MDT rounds, pt may need post acute placement (Rehab vs SNF). Per records, pt's  was recently at [facility]. PT/OT/SLP has been ordered.     Barriers to Discharge: N/A.     Plan: As above, await PT/OT/SLP evals and d/c recommendations. Possible rehab vs SNF.       Care Transition Team Assessment    Information Source  Orientation : Disoriented to Time  Information Given By: Other (Comments) (chart review, ARLETTE pablo Whitney)  Informant's Name: chart review, IHD pablo Whitney  Who is responsible for making decisions for patient? : Patient         Elopement Risk  Legal Hold: No  Ambulatory or Self Mobile in Wheelchair: No-Not an Elopement Risk  Disoriented: No  Psychiatric Symptoms: None  History of Wandering: No  Elopement this Admit: No  Vocalizing Wanting to Leave: No  Displays Behaviors, Body Language Wanting to Leave: No-Not at Risk for Elopement  Elopement Risk: Not at Risk for Elopement    Interdisciplinary Discharge Planning  Does Admitting Nurse Feel This Could be a Complex Discharge?: No  Primary Care Physician: Rogelio  Lives with - Patient's Self Care Capacity: Spouse  Patient or legal guardian wants to designate a caregiver (see row info): No  Support Systems: Friends / Neighbors, Family Member(s), Children  Housing / Facility: [address]  Do You Take your Prescribed Medications Regularly: Yes  Able to Return to Previous ADL's: Yes  Mobility Issues: Yes  Prior Services: None  Patient Expects to be Discharged to:: Home  Assistance Needed: Yes  Durable Medical Equipment: Walker    Discharge Preparedness  What  is your plan after discharge?: Home with help  What are your discharge supports?: Spouse  Prior Functional Level: Ambulatory, Independent with Activities of Daily Living, Independent with Medication Management, Needs Assist with Activities of Daily Living  Difficulity with ADLs: None (uses walker at home)  Difficulity with IADLs: Driving  Difficulity with IADL Comments:  does the driving    Functional Assesment  Prior Functional Level: Ambulatory, Independent with Activities of Daily Living, Independent with Medication Management, Needs Assist with Activities of Daily Living    Finances  Financial Barriers to Discharge: No  Prescription Coverage: Yes ([facility])    Vision / Hearing Impairment  Vision Impairment : No  Right Eye Vision: Wears Glasses  Left Eye Vision: Wears Glasses  Hearing Impairment : No              Domestic Abuse  Have you ever been the victim of abuse or violence?: No  Physical Abuse or Sexual Abuse: No  Verbal Abuse or Emotional Abuse: No  Possible Abuse Reported to:: Not Applicable         Discharge Risks or Barriers  Discharge risks or barriers?: Complex medical needs  Patient risk factors: Complex medical needs    Anticipated Discharge Information  Anticipated discharge disposition: Home  Discharge Address: [address] [place] [address] [place] [address]  Discharge Contact Phone Number: [phone]

## 2019-03-20 NOTE — DISCHARGE PLANNING
PMR order received from Dr. YVETTE Keane.  SCP is shown for her medical provider.  Presented with dizziness/SOB.  NSTEMI/Acute Metabolic Encephalopathy.  Would welcome an OT eval once appropriate.  This referral will not be forwarded to Physiatry @ this time.  TCC remains monitoring.  I do appreciate the referral.

## 2019-03-20 NOTE — PROGRESS NOTES
Patient returned from stress test at 0905. Tolerated well. Meal offered. Patient is A/O x4, slightly lethargic.

## 2019-03-20 NOTE — PROGRESS NOTES
Hospital Medicine Daily Progress Note    Date of Service  3/20/2019    Chief Complaint  78 y.o. female admitted 3/18/2019 with frequent falls, possible syncope and weakness.     Hospital Course    She was admitted with confusion suspected to be secondary to polypharmacy, dehydration.       Interval Problem Update  3/19- Intermittently tearful and confused. Mostly orientated. She is unaware of meds she may have taken. I stopped her fluids and reviewed her imaging as described below. We discussed going to rehab/snf and she is amenable to this. I reviewed her medications and restarted her baclofen as this had been suddenly stopped on admission.   3/20- went to stress test. I reviewed this and it is normal. Her family is here and we all discussed Speech recommending rehab.         Echocardiography Laboratory  Normal left ventricular systolic function.  Left ventricular ejection fraction is visually estimated to be 70%.  Right heart pressures are consistent with mild pulmonary hypertension.  No significant valve abnormalities.     cxr  No acute cardiopulmonary abnormality.    MRI brain:  1. Age-related cerebral atrophy.  2. Mild to moderate periventricular white matter changes consistent with chronic microvascular ischemic gliosis.    Carotid oppler:   FINDINGS   Right carotid.    Flow velocities and Doppler waveforms are normal throughout the carotid    system without evidence of plaque.    Antegrade right vertebral flow.    Left carotid.    Flow velocities and Doppler waveforms are normal throughout the carotid    system without evidence of plaque.    Antegrade left vertebral flow.         Consultants/Specialty  none    Code Status  FULL    Disposition  SNF- cleared to go.     Review of Systems  Review of Systems   Constitutional: Positive for malaise/fatigue. Negative for chills and fever.   HENT: Negative for sore throat.    Eyes: Negative for blurred vision and pain.   Respiratory: Negative for cough and shortness  of breath.    Cardiovascular: Negative for chest pain and palpitations.   Gastrointestinal: Negative for abdominal pain, nausea and vomiting.   Genitourinary: Negative for dysuria and urgency.   Musculoskeletal: Negative for back pain and neck pain.   Skin: Negative for itching and rash.   Neurological: Positive for weakness. Negative for dizziness, tingling and headaches.   Psychiatric/Behavioral: Negative for depression. The patient does not have insomnia.    All other systems reviewed and are negative.       Physical Exam  Temp:  [36.6 °C (97.8 °F)-37.1 °C (98.8 °F)] 36.6 °C (97.8 °F)  Pulse:  [90-99] 90  Resp:  [18] 18  BP: (145-171)/(55-79) 166/74  SpO2:  [90 %-98 %] 98 %    Physical Exam   Constitutional: She is oriented to person, place, and time. She appears well-developed and well-nourished. No distress.   Patient seen and examined  Discussed plan with RN   HENT:   Right Ear: External ear normal.   Left Ear: External ear normal.   Nose: Nose normal.   Eyes: Conjunctivae are normal. Right eye exhibits no discharge. Left eye exhibits no discharge.   Neck: No JVD present.   Cardiovascular: Regular rhythm and normal heart sounds.    No murmur heard.  Cap refill 2sec  Pulses 2+ throughout     Pulmonary/Chest: Effort normal and breath sounds normal. No stridor. No respiratory distress. She has no wheezes. She has no rales.   Abdominal: Soft. Bowel sounds are normal. She exhibits no distension. There is no tenderness.   Musculoskeletal: She exhibits no edema or tenderness.   Neurological: She is alert and oriented to person, place, and time.   Skin: Skin is warm and dry. She is not diaphoretic. No erythema. There is pallor.   Normal skin  Color.    Psychiatric: She has a normal mood and affect. Her behavior is normal.   Nursing note and vitals reviewed.      Fluids    Intake/Output Summary (Last 24 hours) at 03/20/19 0916  Last data filed at 03/20/19 0420   Gross per 24 hour   Intake          1370.42 ml   Output               300 ml   Net          1070.42 ml       Laboratory  Recent Labs      03/18/19   0628  03/19/19   0306  03/20/19   0439   WBC  11.1*  6.9  7.0   RBC  4.22  4.36  4.29   HEMOGLOBIN  11.8*  12.1  12.0   HEMATOCRIT  33.8*  35.8*  35.4*   MCV  80.1*  82.1  82.5   MCH  28.0  27.8  28.0   MCHC  34.9  33.8  33.9   RDW  42.3  45.4  45.3   PLATELETCT  120*  117*  128*   MPV  10.8  10.5  10.7     Recent Labs      03/18/19   1244  03/19/19   0306  03/20/19   0439   SODIUM  131*  133*  130*   POTASSIUM  3.0*  3.8  3.6   CHLORIDE  98  103  97   CO2  25  22  22   GLUCOSE  119*  102*  107*   BUN  17  11  8   CREATININE  0.70  0.61  0.55   CALCIUM  8.5  8.3*  8.7     Recent Labs      03/18/19   0355  03/18/19   0832   APTT  23.5*  24.1*   INR  1.03  1.03     Recent Labs      03/18/19   0355   BNPBTYPENAT  95           Imaging  EC-ECHOCARDIOGRAM COMPLETE W/O CONT   Final Result      US-CAROTID DOPPLER BILAT   Final Result      MR-BRAIN-W/O   Final Result         1. Age-related cerebral atrophy.      2. Mild to moderate periventricular white matter changes consistent with chronic microvascular ischemic gliosis.      CT-CTA CHEST PULMONARY ARTERY W/ RECONS   Final Result            1. No central or large pulmonary embolus. Evaluation is otherwise degraded by motion artifact.   2. Lungs are generally clear. No evidence of pneumonia or pulmonary edema.   3. Small-to-moderate hiatal hernia with mild fluid distention of the distal esophagus suggesting reflux.   4. Age-indeterminate, possibly subacute right anterolateral seventh and eighth rib fractures at the costochondral junction. Correlate with point tenderness.   5. Additional findings as detailed.            CT-MAXILLOFACIAL W/O PLUS RECONS   Final Result      No acute abnormality.      CT-CSPINE WITHOUT PLUS RECONS   Final Result         1. No acute fracture or dislocation of cervical spine.   2. Degenerative/arthritic changes as detailed, similar in appearance to prior  "study.   3. Osteopenia.      CT-HEAD W/O   Final Result      1.  Chronic microvascular ischemic type changes.   2.  No acute intracranial abnormality.      DX-CHEST-PORTABLE (1 VIEW)   Final Result      No acute cardiopulmonary abnormality.      NM-CARDIAC STRESS TEST    (Results Pending)        Assessment/Plan  Syncope   Assessment & Plan    -Patient with frequent fall with syncopal episode. Head CT negative for hemorrhage or ischemia  -Will do MRI of the brain, 2Decho, carotid US  -Not clear etiology given that patient was not able to provide hx.   -Cardiogenic vs secondary to dehydration     NSTEMI (non-ST elevated myocardial infarction) ([facility])   Assessment & Plan    \"chest pain\" over night does not appear cardiac  This is consistent with a type 2 nstemi from demand ischemia related to dehydration.   Trend troponin.   Continue asa  Add statin  No ekg changes.   Check stress test.      Acute metabolic encephalopathy   Assessment & Plan    -Metabolic encephalopathy likely from medications  Restart baclofen- stopping this can lead to acute psychosis.   Opiates on hold  Consider decrease in neurontin       SIRS (systemic inflammatory response syndrome) ([facility])   Assessment & Plan    No evidence for infection.   Suspect from dehydration.      Dehydration   Assessment & Plan    -Hold HTZ  IV fluids     Hyponatremia   Assessment & Plan    -Suspecting hypovolemic hyponatremia  IV fluids.   Off hctz     Hypokalemia   Assessment & Plan    Trend and replace.           VTE prophylaxis: heparin      "

## 2019-03-20 NOTE — PROGRESS NOTES
Patient presents to [facility] suite for pharmacological cardiac stress test with MPI. Nursing goals identified: knowledge deficit, potential for anxiety r/t stress test, potential for compromised cardiac output. Care plan includes educating patient, reassurance and access to ACLS cart/team. Allergies, history, labs and ECG reviewed. No caffeine and NPO confirmed. Resting images attained. Patient states she has pain in right shoulder due to injury and pain in her back due to degenerative disease. Patient prepped for pharmacological cardiac stress study. Lexiscan infused over 12 seconds. Patient reported these symptoms: SOB, heartburn. Caffeinated beverage provided. Symptoms resolved. Patient c/o generalized pain during procedure.

## 2019-03-20 NOTE — PROGRESS NOTES
Tele strip at 1858 shows ST w/ HR of 110     IL 0.16  QRS 0.08  QT 0.36     Telemetry Summary    Rhythm NSR, Asymptomatic ST  Rate 80 - low 100s  Ectopy  Frequent PVCs, Rare PACs, per CCT Anai    Telemetry monitoring strips faxed placed in patient's chart.

## 2019-03-20 NOTE — CARE PLAN
Problem: Safety  Goal: Will remain free from falls  Outcome: PROGRESSING AS EXPECTED  Reinforced fall risk precautions. Bed alarm on, Non-skid socks on feet, call light in reach. 1 person assist to the BSC with walker.      Problem: Bowel/Gastric:  Goal: Normal bowel function is maintained or improved  Outcome: PROGRESSING AS EXPECTED  BM x2 today    Problem: Pain Management  Goal: Pain level will decrease to patient's comfort goal  Outcome: PROGRESSING AS EXPECTED  No c/o pain today.     Problem: Psychosocial Needs:  Goal: Level of anxiety will decrease  Outcome: PROGRESSING AS EXPECTED  A/O x4, calm, cooperative.

## 2019-03-20 NOTE — THERAPY
"Speech Language Therapy dysphagia treatment completed.     Functional Status: Pt was seen at bedside for f/u dysphagia tx session. Per RN, Pt has been dissatisfied with current diet of D1/NTL; however, has been tolerating it without swallowing difficulties. Upon arrival to Pt's room, Pt was found conversing with her niece and her . Pt was verbose and required multiple repetitions to stay on task and chew/swallow prior to talking with family. Pt was eager for potential diet upgrade and agreeable to all therapy objectives.     Pt was administered PO trials of thin liquids (single sips, serial sips), soft/mixed solids (fruit cup) and dry/regular solids (ruddy crackers.) Pt demonstrated no subtle nor overt clinical s/sx of aspiration/penetration with single and/or serial sips of thins via cup sip. Pt consumed peaches and pears independently and had no coughing, choking and/or other clinical s/sx of aspiration/penetration; however, had difficulties masticating pineapples and ruddy crackers secondary to reduced dentition (no top teeth, some bottom teeth.) Pt's  reported that he attempted to bring her dentures to the hospital; however, could not find them. Given continued adherence to safe swallow precautions, Pt is recommended for PO diet upgrade to Dysphagia 2 solids, Thin liquids and Pills per tolerance/preference. Pt verbalized understanding. RN updated and aware. SLP following.    Recommendations: Given continued adherence to safe swallow precautions, Pt is recommended for PO diet upgrade to Dysphagia 2 solids, Thin liquids and Pills per tolerance/preference     Plan of Care: Will benefit from Speech Therapy 3 times per week  Post-Acute Therapy: Recommend outpatient or home health transitional care services for continued speech therapy services    See \"Rehab Therapy-Acute\" Patient Summary Report for complete documentation.     "

## 2019-03-20 NOTE — DISCHARGE PLANNING
Anticipated Discharge Disposition: Potential Rehab vs SNF    Action: Discussed Rehab consult, MD in agreement and placed physiatry consult. PT/SLP has evaluated, awaiting OT eval. LSW called rehab liaison and updated on order for rehab consult.     Barriers to Discharge: N/A.     Plan: As above, await rehab consult.

## 2019-03-20 NOTE — PROGRESS NOTES
Bedside report received from DEVANG Zacarias. Patient sitting up in bed. Denies pain/discomfort.  Plan of care discussed and questions answered. No needs verbalized at this time.  Falls precautions in place and bed alarm on. Call light within reach. Spouse at bedside.

## 2019-03-20 NOTE — DIETARY
"Nutrition services: Day 2 of admit.  Lindsay Vargas is a 78 y.o. female with admitting DX of dehydration. Pt with NSTEMI, acute metabolic encephalopathy, SIRS, hyponatremia and hypokalemia.  Consult received for poor PO PTA and education on heart healthy diet      Assessment:  Height: 170.2 cm (5' 7\")  Weight: 66 kg (145 lb 8.1 oz)  Body mass index is 22.79 kg/m².  Diet/Intake: Regular, dysphagia 1 with NTL/No recorded PO intake    Evaluation:   1. Pt with order for NPO until 3/19 at 1208. She was also NPO last night into this morning for a stress test.    2. Upon RD visit, pt reports being very tired after her stress test. She does not want to receive education on a heart healthy diet at this time. She agreed to the handouts and will have her  read them to her later. She agreed to RD visit tomorrow to follow up on diet education.   3. Pt reports that at one time she was able to walk 3 miles a day. She was looking forward to walking around the block with her  prior to this recent illness. Recent illness is responsible for diminished PO intake. Pt's BMI indicates she is at a healthy weight for her age.       Malnutrition Risk: Pt does not appear to be malnourished.      Recommendations/Plan:  1. Provide diet as ordered   2. Encourage intake of >50%  3. Document intake of all meals  as % taken in ADL's to provide interdisciplinary communication across all shifts.   4. Monitor weight.  5. Nutrition rep will continue to see patient for ongoing meal and snack preferences.   6. RD will follow up for diet education tomorrow afternoon.   7. RD will monitor.             "

## 2019-03-20 NOTE — CARE PLAN
Problem: Safety  Goal: Will remain free from injury  Outcome: PROGRESSING AS EXPECTED  No falls this shift. OOB to BSC with 2PA. Falls precautions in place: bed in lowest and locked position, side rails   raised x 2, room near nurses station, call light within reach, bed alarm on, nonslip socks on, purposeful hourly rounding.       Problem: Psychosocial Needs:  Goal: Level of anxiety will decrease  Outcome: PROGRESSING AS EXPECTED  Patient able to rest calm and comfortably in between care, however when getting OOB to BSC, becomes very anxious and tearful.   Emotional support provided as needed. Allowed time for patient to verbalize feelings.    Problem: Hemodynamic Status  Goal: Stable Vital Signs and Fluid Balance  Outcome: PROGRESSING AS EXPECTED  Hemodynamically stable overnight. HR NSR to low ST. Denies any chest pain, pressure or palpitations. Troponin continues to   trend up with next draw this AM. Has been NPO since midnight for chemical stress test this AM.      3/18/2019 20:20 3/19/2019 03:06 3/19/2019 06:12   Troponin I 0.27 (H) 0.43 (H) 0.44 (H)

## 2019-03-20 NOTE — PROGRESS NOTES
Report received from DEVANG Rain. Patient resting comfortably in bed. Declines any needs at this time. Call light in reach. Bed alarm on.

## 2019-03-21 ENCOUNTER — HOSPITAL ENCOUNTER (INPATIENT)
Facility: REHABILITATION | Age: 79
End: 2019-03-21
Attending: PHYSICAL MEDICINE & REHABILITATION | Admitting: PHYSICAL MEDICINE & REHABILITATION
Payer: MEDICARE

## 2019-03-21 LAB
ANION GAP SERPL CALC-SCNC: 12 MMOL/L (ref 0–11.9)
BUN SERPL-MCNC: 12 MG/DL (ref 8–22)
CALCIUM SERPL-MCNC: 8.9 MG/DL (ref 8.4–10.2)
CHLORIDE SERPL-SCNC: 97 MMOL/L (ref 96–112)
CO2 SERPL-SCNC: 19 MMOL/L (ref 20–33)
CREAT SERPL-MCNC: 0.6 MG/DL (ref 0.5–1.4)
GLUCOSE SERPL-MCNC: 113 MG/DL (ref 65–99)
POTASSIUM SERPL-SCNC: 3.6 MMOL/L (ref 3.6–5.5)
SODIUM SERPL-SCNC: 128 MMOL/L (ref 135–145)

## 2019-03-21 PROCEDURE — 700102 HCHG RX REV CODE 250 W/ 637 OVERRIDE(OP): Performed by: HOSPITALIST

## 2019-03-21 PROCEDURE — A9270 NON-COVERED ITEM OR SERVICE: HCPCS | Performed by: HOSPITALIST

## 2019-03-21 PROCEDURE — 770020 HCHG ROOM/CARE - TELE (206)

## 2019-03-21 PROCEDURE — 700105 HCHG RX REV CODE 258: Performed by: HOSPITALIST

## 2019-03-21 PROCEDURE — 97166 OT EVAL MOD COMPLEX 45 MIN: CPT

## 2019-03-21 PROCEDURE — 80048 BASIC METABOLIC PNL TOTAL CA: CPT

## 2019-03-21 PROCEDURE — 700102 HCHG RX REV CODE 250 W/ 637 OVERRIDE(OP): Performed by: INTERNAL MEDICINE

## 2019-03-21 PROCEDURE — 99232 SBSQ HOSP IP/OBS MODERATE 35: CPT | Performed by: HOSPITALIST

## 2019-03-21 PROCEDURE — A9270 NON-COVERED ITEM OR SERVICE: HCPCS | Performed by: INTERNAL MEDICINE

## 2019-03-21 PROCEDURE — 700111 HCHG RX REV CODE 636 W/ 250 OVERRIDE (IP): Performed by: HOSPITALIST

## 2019-03-21 RX ORDER — SODIUM CHLORIDE 9 MG/ML
INJECTION, SOLUTION INTRAVENOUS CONTINUOUS
Status: DISCONTINUED | OUTPATIENT
Start: 2019-03-21 | End: 2019-03-22 | Stop reason: HOSPADM

## 2019-03-21 RX ORDER — BACLOFEN 10 MG/1
5 TABLET ORAL 3 TIMES DAILY
Status: DISCONTINUED | OUTPATIENT
Start: 2019-03-21 | End: 2019-03-21

## 2019-03-21 RX ADMIN — HEPARIN SODIUM 5000 UNITS: 5000 INJECTION, SOLUTION INTRAVENOUS; SUBCUTANEOUS at 15:37

## 2019-03-21 RX ADMIN — HEPARIN SODIUM 5000 UNITS: 5000 INJECTION, SOLUTION INTRAVENOUS; SUBCUTANEOUS at 07:27

## 2019-03-21 RX ADMIN — PRAMIPEXOLE DIHYDROCHLORIDE 0.5 MG: 0.5 TABLET ORAL at 21:27

## 2019-03-21 RX ADMIN — PROPRANOLOL HYDROCHLORIDE 160 MG: 80 CAPSULE, EXTENDED RELEASE ORAL at 07:29

## 2019-03-21 RX ADMIN — DULOXETINE 60 MG: 30 CAPSULE, DELAYED RELEASE ORAL at 07:27

## 2019-03-21 RX ADMIN — SODIUM CHLORIDE: 9 INJECTION, SOLUTION INTRAVENOUS at 14:00

## 2019-03-21 RX ADMIN — CARBAMIDE PEROXIDE 6.5% OTIC SOLN 6 DROP: 6.5 SOLUTION at 19:26

## 2019-03-21 RX ADMIN — POTASSIUM CHLORIDE 20 MEQ: 1500 TABLET, EXTENDED RELEASE ORAL at 07:27

## 2019-03-21 RX ADMIN — HEPARIN SODIUM 5000 UNITS: 5000 INJECTION, SOLUTION INTRAVENOUS; SUBCUTANEOUS at 21:27

## 2019-03-21 RX ADMIN — GABAPENTIN 100 MG: 100 CAPSULE ORAL at 07:27

## 2019-03-21 RX ADMIN — OMEPRAZOLE 20 MG: 20 CAPSULE, DELAYED RELEASE ORAL at 07:27

## 2019-03-21 RX ADMIN — ASPIRIN 81 MG: 81 TABLET, COATED ORAL at 07:27

## 2019-03-21 RX ADMIN — POTASSIUM CHLORIDE 20 MEQ: 1500 TABLET, EXTENDED RELEASE ORAL at 17:42

## 2019-03-21 RX ADMIN — BACLOFEN 10 MG: 10 TABLET ORAL at 07:27

## 2019-03-21 RX ADMIN — ATORVASTATIN CALCIUM 80 MG: 40 TABLET, FILM COATED ORAL at 17:42

## 2019-03-21 RX ADMIN — ONDANSETRON 4 MG: 2 INJECTION INTRAMUSCULAR; INTRAVENOUS at 11:58

## 2019-03-21 RX ADMIN — ACETAMINOPHEN 650 MG: 325 TABLET, FILM COATED ORAL at 21:31

## 2019-03-21 RX ADMIN — CARBAMIDE PEROXIDE 6.5% OTIC SOLN 6 DROP: 6.5 SOLUTION at 07:28

## 2019-03-21 ASSESSMENT — COGNITIVE AND FUNCTIONAL STATUS - GENERAL
TOILETING: A LITTLE
HELP NEEDED FOR BATHING: A LITTLE
DRESSING REGULAR LOWER BODY CLOTHING: A LITTLE
SUGGESTED CMS G CODE MODIFIER DAILY ACTIVITY: CK
PERSONAL GROOMING: A LITTLE
EATING MEALS: A LITTLE
DRESSING REGULAR UPPER BODY CLOTHING: A LITTLE
DAILY ACTIVITIY SCORE: 18

## 2019-03-21 ASSESSMENT — ENCOUNTER SYMPTOMS
PALPITATIONS: 0
DIZZINESS: 0
NAUSEA: 0
WEAKNESS: 1
SHORTNESS OF BREATH: 0
DEPRESSION: 0
SORE THROAT: 0
BACK PAIN: 0
ABDOMINAL PAIN: 0
INSOMNIA: 0
EYE PAIN: 0
COUGH: 0
BLURRED VISION: 0
NECK PAIN: 0
TINGLING: 0

## 2019-03-21 ASSESSMENT — ACTIVITIES OF DAILY LIVING (ADL): TOILETING: INDEPENDENT

## 2019-03-21 NOTE — THERAPY
"Occupational Therapy Evaluation completed.   Functional Status:  Pt is an 79 y/o female, admit after a fall. Pt lives with her  , who is limited in his ability to care for Pt. PLOF- I with basic self care, required assist with showers, medication, transportation, and IADLS. Pt presents with lethargy, confusion, disorientation, decreased activity tolerance and generalized strength. All of these factors are limiting Pt's I with ADLS and functional mobility, as well as safety. Pt requires Min A for ADLS and functional transfers with the use of a FWW. Pt is not safe to go home, will benefit from further services.   Plan of Care: Will benefit from Occupational Therapy 3 times per week  Discharge Recommendations:  Equipment: Will Continue to Assess for Equipment Needs. Post-acute therapy Discharge to a transitional care facility for continued skilled therapy services.    See \"Rehab Therapy-Acute\" Patient Summary Report for complete documentation.    "

## 2019-03-21 NOTE — PROGRESS NOTES
Bedside report received from DEVANG Presley. Care of patient assumed.  Patient currently resting in bed, eyes closed, respirations even and unlabored.  Falls precautions in place and bed alarm on. Call light within reach.

## 2019-03-21 NOTE — DISCHARGE PLANNING
Dr. Vidal recommendations: Once medically stable patient most likely more appropriate for skilled nursing level of rehabilitation vs home with home health.  ANN Garcia [id number] and Dr. YVETTE Keane are aware.

## 2019-03-21 NOTE — CARE PLAN
Problem: Safety  Goal: Will remain free from falls  Outcome: PROGRESSING AS EXPECTED  Reinforced fall risk precautions. Bed alarm on, Non-skid socks on feet, call light in reach.  1 person assist to the BSC with walker.      Problem: Pain Management  Goal: Pain level will decrease to patient's comfort goal  Outcome: PROGRESSING AS EXPECTED  No pain reported    Problem: Mobility  Goal: Risk for activity intolerance will decrease  Outcome: PROGRESSING AS EXPECTED  Able to ambulate in room with one assist and hand held or walker. Increase lethargy today.

## 2019-03-21 NOTE — PROGRESS NOTES
Tele strip at 2001 shows SR w/ HR of 78    KS 0.18  QRS 0.08  QT 0.38        Telemetry Summary    Rhythm NSR  Rate 60s - 80s  Ectopy Occasional PVCs and PACs, per CCT Valentina       Telemetry monitoring strips placed in patient's chart

## 2019-03-21 NOTE — PROGRESS NOTES
Patient much more confused and lethargic this AM. Falling asleep while on BSC. Appears uncomfortable with occasional groans while laying in bed. Difficulty staying awake during conversation. MD notified.

## 2019-03-21 NOTE — PROGRESS NOTES
Telemetry Shift Summary    Rhythm SR  HR Range 60s-80s  Ectopy Freq PVCs, Occ PACs  Measurements .18/.08/.38        Normal Values  Rhythm SR  HR Range    Measurements 0.12-0.20 / 0.06-0.10  / 0.30-0.52

## 2019-03-21 NOTE — PROGRESS NOTES
Hospital Medicine Daily Progress Note    Date of Service  3/21/2019    Chief Complaint  78 y.o. female admitted 3/18/2019 with frequent falls, possible syncope and weakness.     Hospital Course    She was admitted with confusion suspected to be secondary to polypharmacy, dehydration.       Interval Problem Update  3/19- Intermittently tearful and confused. Mostly orientated. She is unaware of meds she may have taken. I stopped her fluids and reviewed her imaging as described below. We discussed going to rehab/snf and she is amenable to this. I reviewed her medications and restarted her baclofen as this had been suddenly stopped on admission.   3/20- went to stress test. I reviewed this and it is normal. Her family is here and we all discussed Speech recommending rehab.   3/21- more confused and lethargic this AM. Falling asleep while on BSC  I stopped her neurontin and decreased her baclofen  Hypertensive. Later in the day RN queried  and learned that she was NOT taking baclofec. She called the pharmacy and confirmed this. I stopped it.         Echocardiography Laboratory  Normal left ventricular systolic function.  Left ventricular ejection fraction is visually estimated to be 70%.  Right heart pressures are consistent with mild pulmonary hypertension.  No significant valve abnormalities.     cxr  No acute cardiopulmonary abnormality.    MRI brain:  1. Age-related cerebral atrophy.  2. Mild to moderate periventricular white matter changes consistent with chronic microvascular ischemic gliosis.    Carotid oppler:   FINDINGS   Right carotid.    Flow velocities and Doppler waveforms are normal throughout the carotid    system without evidence of plaque.    Antegrade right vertebral flow.    Left carotid.    Flow velocities and Doppler waveforms are normal throughout the carotid    system without evidence of plaque.    Antegrade left vertebral flow.         Consultants/Specialty  none    Code  Status  FULL    Disposition  SNF- cleared to go.     Review of Systems  Review of Systems   Constitutional: Positive for malaise/fatigue.   HENT: Negative for sore throat.    Eyes: Negative for blurred vision and pain.   Respiratory: Negative for cough and shortness of breath.    Cardiovascular: Negative for chest pain and palpitations.   Gastrointestinal: Negative for abdominal pain and nausea.   Genitourinary: Negative for dysuria and urgency.   Musculoskeletal: Negative for back pain and neck pain.   Skin: Negative for itching and rash.   Neurological: Positive for weakness. Negative for dizziness and tingling.   Psychiatric/Behavioral: Negative for depression. The patient does not have insomnia.    All other systems reviewed and are negative.       Physical Exam  Temp:  [36.3 °C (97.4 °F)-36.8 °C (98.3 °F)] 36.3 °C (97.4 °F)  Pulse:  [71-82] 72  Resp:  [18] 18  BP: (132-185)/(59-87) 185/63  SpO2:  [95 %-99 %] 96 %    Physical Exam   Constitutional: She appears well-developed and well-nourished. She appears lethargic. No distress.   Patient seen and examined  Discussed plan with RN   JOVITAT:   Right Ear: External ear normal.   Left Ear: External ear normal.   Mouth/Throat: Oropharynx is clear and moist.   Eyes: Conjunctivae are normal. Right eye exhibits no discharge. Left eye exhibits no discharge.   Neck: No JVD present.   Cardiovascular: Regular rhythm and normal heart sounds.    No murmur heard.       Pulmonary/Chest: Effort normal and breath sounds normal. No stridor. No respiratory distress. She has no rales.   Abdominal: Soft. Bowel sounds are normal. She exhibits no distension. There is no tenderness.   Musculoskeletal: She exhibits no edema or tenderness.   Neurological: She appears lethargic.   Skin: Skin is warm and dry. She is not diaphoretic. No erythema. There is pallor.   Normal skin  Color.    Psychiatric: She has a normal mood and affect. Her behavior is normal.   Nursing note and vitals  reviewed.      Fluids    Intake/Output Summary (Last 24 hours) at 03/21/19 0947  Last data filed at 03/21/19 0720   Gross per 24 hour   Intake               60 ml   Output              300 ml   Net             -240 ml       Laboratory  Recent Labs      03/19/19   0306  03/20/19   0439   WBC  6.9  7.0   RBC  4.36  4.29   HEMOGLOBIN  12.1  12.0   HEMATOCRIT  35.8*  35.4*   MCV  82.1  82.5   MCH  27.8  28.0   MCHC  33.8  33.9   RDW  45.4  45.3   PLATELETCT  117*  128*   MPV  10.5  10.7     Recent Labs      03/18/19   1244  03/19/19   0306  03/20/19   0439   SODIUM  131*  133*  130*   POTASSIUM  3.0*  3.8  3.6   CHLORIDE  98  103  97   CO2  25  22  22   GLUCOSE  119*  102*  107*   BUN  17  11  8   CREATININE  0.70  0.61  0.55   CALCIUM  8.5  8.3*  8.7                   Imaging  NM-CARDIAC STRESS TEST   Final Result      EC-ECHOCARDIOGRAM COMPLETE W/O CONT   Final Result      US-CAROTID DOPPLER BILAT   Final Result      MR-BRAIN-W/O   Final Result         1. Age-related cerebral atrophy.      2. Mild to moderate periventricular white matter changes consistent with chronic microvascular ischemic gliosis.      CT-CTA CHEST PULMONARY ARTERY W/ RECONS   Final Result            1. No central or large pulmonary embolus. Evaluation is otherwise degraded by motion artifact.   2. Lungs are generally clear. No evidence of pneumonia or pulmonary edema.   3. Small-to-moderate hiatal hernia with mild fluid distention of the distal esophagus suggesting reflux.   4. Age-indeterminate, possibly subacute right anterolateral seventh and eighth rib fractures at the costochondral junction. Correlate with point tenderness.   5. Additional findings as detailed.            CT-MAXILLOFACIAL W/O PLUS RECONS   Final Result      No acute abnormality.      CT-CSPINE WITHOUT PLUS RECONS   Final Result         1. No acute fracture or dislocation of cervical spine.   2. Degenerative/arthritic changes as detailed, similar in appearance to prior study.    3. Osteopenia.      CT-HEAD W/O   Final Result      1.  Chronic microvascular ischemic type changes.   2.  No acute intracranial abnormality.      DX-CHEST-PORTABLE (1 VIEW)   Final Result      No acute cardiopulmonary abnormality.           Assessment/Plan  Syncope   Assessment & Plan    -Patient with frequent fall with syncopal episode. Head CT negative for hemorrhage or ischemia  -Will do MRI of the brain, 2Decho, carotid US  -Not clear etiology given that patient was not able to provide hx.   -Cardiogenic vs secondary to dehydration     NSTEMI (non-ST elevated myocardial infarction) ([facility])   Assessment & Plan    Resolving type 2 MI from drhydration   asa  statin  Negative stress test.      Acute metabolic encephalopathy   Assessment & Plan    -Metabolic encephalopathy likely from medications  Worse  Decrease but do not stop baclofen-  Stop neurontin  Opiates on hold         SIRS (systemic inflammatory response syndrome) ([facility])   Assessment & Plan    No evidence for infection.   Suspect from dehydration.      Dehydration   Assessment & Plan    -Hold HTZ  IV fluids     Hyponatremia   Assessment & Plan    -Suspecting hypovolemic hyponatremia  IV fluids.   Off hctz  Worse today will trend and keep fluids for now.      Hypokalemia   Assessment & Plan    Trend and replace.           VTE prophylaxis: heparin

## 2019-03-21 NOTE — CONSULTS
"Medical chart review completed.     Patient is a 78 y.o. year-old female with a past medical history significant for HTN, depression, and chronic lumbargo admitted to [facility] on 3/18/2019  3:01 AM with AMS and frequent falls. Per report patient has had multiple recent falls with near syncopal symptoms and unsteadiness.  During the falls patient has had multiple head strike. In ED CT and MRI were negative. Patient found to be hyponatremic and hypokalemic, thought to be secondary to multiple recent bouts of nausea with emesis.  On work-up the patient was thought to have both dehydration and polypharmacy. Patient developed chest pain with elevated troponins which was thought to be due to demand ischemia in setting of dehydration.  Cardiac stress on 3/20/19 showed no evidence of MI.  Per primary they have restarted her home Baclofen as concern for withdrawals, unclear indication but history of chronic lumbar pain.  Patient's gabapentin has been discontinued. Per report patient more lethargic this AM and falling asleep on BSC.  Continues to have hyponatremia with most recent Na at 128.      Patient previously independent with FWW living in a 1 story home with spouse. Patient last evaluated by PT on 3/19/19 and was CGA for mobility with FWW.  Per nursing note this AM patient able to ambulate with hand hold assist to the BSC. Patient last evaluated by SLP on 3/20/19 and was upgraded to Dysphagia 2 with thin liquids most likely due to lack of dentition.      PMH:  Past Medical History:   Diagnosis Date   • Anesthesia     confused/hard to wake up-15 years ago   • Arthritis    • Cataract     Bilat IOL   • Chronic pain    • Hiatus hernia syndrome     surgically repaired   • Hypertension    • Lymphedema 2014   • Migraine headache    • Pain     back   • Pneumonia     \"younger\"       PSH:  Past Surgical History:   Procedure Laterality Date   • PB INJ LUMBAR/SACRAL,W/WO CNTRST N/A 11/10/2016    Procedure: INJ EPI NON " NEUROLYTIC L/S -MIDLINE L4-L5;  Surgeon: Eduardo Mustafa M.D.;  Location: SURGERY [facility] ORS;  Service: Pain Management   • PB FLUORSCOPIC GUIDANCE SPINAL INJECTION  11/10/2016    Procedure: FLUOROGUIDE FOR SPINAL INJ;  Surgeon: Eduardo Mustafa M.D.;  Location: [facility] ORS;  Service: Pain Management   • PB INJ LUMBAR/SACRAL,W/WO CNTRST Right 9/29/2016    Procedure: INJ EPI NON NEUROLYTIC L/S - L5-S1;  Surgeon: Eduardo Mustafa M.D.;  Location: SURGERY [facility];  Service: Pain Management   • PB FLUORSCOPIC GUIDANCE SPINAL INJECTION  9/29/2016    Procedure: FLUOROGUIDE FOR SPINAL INJ;  Surgeon: Eduardo Mustafa M.D.;  Location: [facility];  Service: Pain Management   • CATARACT PHACO WITH IOL Right 2/18/2016    Procedure: CATARACT PHACO WITH IOL;  Surgeon: Rodrigo Smyth M.D.;  Location: [facility];  Service:    • CATARACT PHACO WITH IOL Left 2/4/2016    Procedure: CATARACT PHACO WITH IOL;  Surgeon: Rodrigo Smyth M.D.;  Location: [facility];  Service:    • JUANI BY LAPAROSCOPY     • HYSTERECTOMY LAPAROSCOPY     • OTHER      HIATAL HERNIA REPAIR   • OTHER ABDOMINAL SURGERY      LAP JUANI   • OTHER ABDOMINAL SURGERY      APPENDECOMTY   • OTHER ORTHOPEDIC SURGERY      LEFT KNEE SCOPE       FAMILY HISTORY:  History reviewed. No pertinent family history.    MEDICATIONS:  Current Facility-Administered Medications   Medication Dose   • baclofen (LIORESAL) tablet 5 mg  5 mg   • carbamide peroxide (DEBROX) 6.5 % otic solution 6 Drop  6 Drop   • atorvastatin (LIPITOR) tablet 80 mg  80 mg   • DULoxetine (CYMBALTA) capsule 60 mg  60 mg   • omeprazole (PRILOSEC) capsule 20 mg  20 mg   • propranolol CR (INDERAL LA) capsule 160 mg  160 mg   • pramipexole (MIRAPEX) tablet 0.5 mg  0.5 mg   • senna-docusate (PERICOLACE or SENOKOT S) 8.6-50 MG per tablet 2 Tab  2 Tab    And   • polyethylene glycol/lytes (MIRALAX) PACKET 1 Packet  1 Packet    And   • magnesium  hydroxide (MILK OF MAGNESIA) suspension 30 mL  30 mL    And   • bisacodyl (DULCOLAX) suppository 10 mg  10 mg   • heparin injection 5,000 Units  5,000 Units   • acetaminophen (TYLENOL) tablet 650 mg  650 mg   • hydrALAZINE (APRESOLINE) injection 10 mg  10 mg   • ondansetron (ZOFRAN) syringe/vial injection 4 mg  4 mg   • aspirin EC (ECOTRIN) tablet 81 mg  81 mg   • potassium chloride SA (Kdur) tablet 20 mEq  20 mEq   • nitroglycerin (NITROSTAT) tablet 0.4 mg  0.4 mg       ALLERGIES:  Patient has no known allergies.    PSYCHOSOCIAL HISTORY:  Living Site:  Home  Living With:  spouse  Caregiver's availability:  Not Applicable  Number of stairs:  0  Substance use history:  Denies      The patient presents  with cognitive deficits and functional deficits in mobility/self-cares/swallowing, and Minimal  de-conditioning. Pre-morbidly, this patient lived in a single level home with None steps to enter,with spouse  The patient was evaluated by acute care Physical Therapy and Speech Language Pathology; currently requiring contact-guard assistance for mobility and to be determined assistance for ADLs, also with ongoing cognitive and swallowing deficits. The patient's current diet is Dysphagia II with Thin liquids.     Patient with AMS 2/2 dehydration and polypharmacy. Per nursing report patient more confused and lethargic this AM.  Patient recently restarted on Baclofen, at this time can discontinue Baclofen if concern for polypharmacy.  Withdrawal symptoms from discontinuation of Baclofen typically seen with > 30 mg daily and more often closer to maximum dose of 120 mg total daily. Patient not assessed by OT but good progression with ambulation/mobility. Once medically stable patient most likely more appropriate for skilled nursing level of rehabilitation vs home with home health.     Thank you for allowing us to participate in this patient's care. Please call with any questions regarding this recommendation.    Jersey Prieto  KOREY Vidla.

## 2019-03-21 NOTE — PROGRESS NOTES
Telemetry Shift Summary    Rhythm SR  HR Range 60s-90s  Ectopy Rare PACs, Occ PVCs  Measurements .18/.08/.36        Normal Values  Rhythm SR  HR Range    Measurements 0.12-0.20 / 0.06-0.10  / 0.30-0.52

## 2019-03-21 NOTE — PROGRESS NOTES
Report received from DEVANG Rain. Patient sitting on side of bed. Requesting to use BSC. Assisted patient with this and back to bed. Patient much more lethargic and confused this AM. Call light in reach. Bed alarm on.

## 2019-03-21 NOTE — CARE PLAN
Problem: Safety  Goal: Will remain free from injury  Outcome: PROGRESSING AS EXPECTED  No new injuries or falls at this time. Patient continues to occasionally exit bed without notifying nursing. Bed alarm remains activated.     Problem: Infection  Goal: Will remain free from infection  Outcome: PROGRESSING AS EXPECTED  No new signs or symptoms of infection at this time.     Problem: Pain Management  Goal: Pain level will decrease to patient's comfort goal  Outcome: PROGRESSING AS EXPECTED  Patient denies pain.

## 2019-03-22 VITALS
SYSTOLIC BLOOD PRESSURE: 145 MMHG | TEMPERATURE: 97.7 F | BODY MASS INDEX: 23.36 KG/M2 | OXYGEN SATURATION: 96 % | DIASTOLIC BLOOD PRESSURE: 55 MMHG | WEIGHT: 148.81 LBS | HEIGHT: 67 IN | HEART RATE: 70 BPM | RESPIRATION RATE: 17 BRPM

## 2019-03-22 LAB
ALBUMIN SERPL BCP-MCNC: 3.6 G/DL (ref 3.2–4.9)
ALBUMIN/GLOB SERPL: 1.4 G/DL
ALP SERPL-CCNC: 72 U/L (ref 30–99)
ALT SERPL-CCNC: 16 U/L (ref 2–50)
ANION GAP SERPL CALC-SCNC: 13 MMOL/L (ref 0–11.9)
AST SERPL-CCNC: 23 U/L (ref 12–45)
BASOPHILS # BLD AUTO: 0.4 % (ref 0–1.8)
BASOPHILS # BLD: 0.03 K/UL (ref 0–0.12)
BILIRUB SERPL-MCNC: 1.3 MG/DL (ref 0.1–1.5)
BUN SERPL-MCNC: 14 MG/DL (ref 8–22)
CALCIUM SERPL-MCNC: 8.9 MG/DL (ref 8.4–10.2)
CHLORIDE SERPL-SCNC: 99 MMOL/L (ref 96–112)
CO2 SERPL-SCNC: 20 MMOL/L (ref 20–33)
CREAT SERPL-MCNC: 0.69 MG/DL (ref 0.5–1.4)
EOSINOPHIL # BLD AUTO: 0.12 K/UL (ref 0–0.51)
EOSINOPHIL NFR BLD: 1.5 % (ref 0–6.9)
ERYTHROCYTE [DISTWIDTH] IN BLOOD BY AUTOMATED COUNT: 43.5 FL (ref 35.9–50)
GLOBULIN SER CALC-MCNC: 2.6 G/DL (ref 1.9–3.5)
GLUCOSE SERPL-MCNC: 107 MG/DL (ref 65–99)
HCT VFR BLD AUTO: 36.8 % (ref 37–47)
HGB BLD-MCNC: 12.8 G/DL (ref 12–16)
IMM GRANULOCYTES # BLD AUTO: 0.04 K/UL (ref 0–0.11)
IMM GRANULOCYTES NFR BLD AUTO: 0.5 % (ref 0–0.9)
LYMPHOCYTES # BLD AUTO: 1.68 K/UL (ref 1–4.8)
LYMPHOCYTES NFR BLD: 21.1 % (ref 22–41)
MCH RBC QN AUTO: 28.3 PG (ref 27–33)
MCHC RBC AUTO-ENTMCNC: 34.8 G/DL (ref 33.6–35)
MCV RBC AUTO: 81.4 FL (ref 81.4–97.8)
MONOCYTES # BLD AUTO: 0.56 K/UL (ref 0–0.85)
MONOCYTES NFR BLD AUTO: 7 % (ref 0–13.4)
NEUTROPHILS # BLD AUTO: 5.55 K/UL (ref 2–7.15)
NEUTROPHILS NFR BLD: 69.5 % (ref 44–72)
NRBC # BLD AUTO: 0 K/UL
NRBC BLD-RTO: 0 /100 WBC
PLATELET # BLD AUTO: 168 K/UL (ref 164–446)
PMV BLD AUTO: 10.7 FL (ref 9–12.9)
POTASSIUM SERPL-SCNC: 3.6 MMOL/L (ref 3.6–5.5)
PROT SERPL-MCNC: 6.2 G/DL (ref 6–8.2)
RBC # BLD AUTO: 4.52 M/UL (ref 4.2–5.4)
SODIUM SERPL-SCNC: 132 MMOL/L (ref 135–145)
WBC # BLD AUTO: 8 K/UL (ref 4.8–10.8)

## 2019-03-22 PROCEDURE — 700111 HCHG RX REV CODE 636 W/ 250 OVERRIDE (IP): Performed by: HOSPITALIST

## 2019-03-22 PROCEDURE — A9270 NON-COVERED ITEM OR SERVICE: HCPCS | Performed by: HOSPITALIST

## 2019-03-22 PROCEDURE — 80053 COMPREHEN METABOLIC PANEL: CPT

## 2019-03-22 PROCEDURE — 85025 COMPLETE CBC W/AUTO DIFF WBC: CPT

## 2019-03-22 PROCEDURE — 700102 HCHG RX REV CODE 250 W/ 637 OVERRIDE(OP): Performed by: INTERNAL MEDICINE

## 2019-03-22 PROCEDURE — A9270 NON-COVERED ITEM OR SERVICE: HCPCS | Performed by: INTERNAL MEDICINE

## 2019-03-22 PROCEDURE — 99239 HOSP IP/OBS DSCHRG MGMT >30: CPT | Performed by: HOSPITALIST

## 2019-03-22 PROCEDURE — 700102 HCHG RX REV CODE 250 W/ 637 OVERRIDE(OP): Performed by: HOSPITALIST

## 2019-03-22 RX ADMIN — POTASSIUM CHLORIDE 20 MEQ: 1500 TABLET, EXTENDED RELEASE ORAL at 05:41

## 2019-03-22 RX ADMIN — ACETAMINOPHEN 650 MG: 325 TABLET, FILM COATED ORAL at 05:40

## 2019-03-22 RX ADMIN — ASPIRIN 81 MG: 81 TABLET, COATED ORAL at 05:41

## 2019-03-22 RX ADMIN — HEPARIN SODIUM 5000 UNITS: 5000 INJECTION, SOLUTION INTRAVENOUS; SUBCUTANEOUS at 13:12

## 2019-03-22 RX ADMIN — POTASSIUM CHLORIDE 20 MEQ: 1500 TABLET, EXTENDED RELEASE ORAL at 13:12

## 2019-03-22 RX ADMIN — OMEPRAZOLE 20 MG: 20 CAPSULE, DELAYED RELEASE ORAL at 05:41

## 2019-03-22 RX ADMIN — DULOXETINE 60 MG: 30 CAPSULE, DELAYED RELEASE ORAL at 05:40

## 2019-03-22 RX ADMIN — CARBAMIDE PEROXIDE 6.5% OTIC SOLN 6 DROP: 6.5 SOLUTION at 05:50

## 2019-03-22 RX ADMIN — PROPRANOLOL HYDROCHLORIDE 160 MG: 80 CAPSULE, EXTENDED RELEASE ORAL at 05:50

## 2019-03-22 NOTE — DISCHARGE PLANNING
Received Choice form at 9736  Agency/Facility Name: [facility]  Referral sent per Choice form @ [id number]

## 2019-03-22 NOTE — CARE PLAN
Problem: Communication  Goal: The ability to communicate needs accurately and effectively will improve  Outcome: PROGRESSING AS EXPECTED  The ability to communicate needs accurately and effectively will improve    Problem: Knowledge Deficit  Goal: Knowledge of disease process/condition, treatment plan, diagnostic tests, and medications will improve  Outcome: PROGRESSING AS EXPECTED  Encourage patient and family to ask questions and be involved in plan of care. Provide education on treatment plan, diagnostic testing, and medications; have patient and family verbalize understanding.

## 2019-03-22 NOTE — PROGRESS NOTES
Bedside report received from DEVANG Garcia. Patient resting comfortably in bed.   Disoriented x 2. Plan of care discussed, questions answered and needs addressed.  Falls precautions in place and bed alarm on. Call light within reach.

## 2019-03-22 NOTE — PROGRESS NOTES
"Patient restless in bed, complaining of pain. Assisted OOB to chair with 1 PA and FWW.  Alert and oriented to place and person (although disoriented to birth year). Very tangential with her responses.   Unable to comprehend using 0-10 pain rating scale. States back pain \"Just aches and hurts\".  PRN acetaminophen given. Patient reoriented. Currently calm and cooperative up in chair.   "

## 2019-03-22 NOTE — DISCHARGE PLANNING
Anticipated Discharge Disposition: SNF    Action: Per RN Gaurav, pt is confused.  SW spoke to pt's spouse, Abdulaziz regarding SNF choice.  Abdulaziz would like referrals sent out to [facility] and [facility].  ANN faxed choice to [facility].     Barriers to Discharge: Acceptance and medical clearance    Plan: F/U on referrals.

## 2019-03-22 NOTE — DIETARY
Nutrition Services: Pt with poor PO intake with refusal x 2 noted and another meal at 25-50%. Per documentation, pt more lethargic and confused recently. Upon RD visit, pt told RD that she read part of the handout provided on the heart healthy diet. She was going to finish reading the rest later. RD discussed poor PO intake with pt. She said that the food has not tasted good. She thinks this may be related to a medication she is taking. She agreed to addition of Boost Plus supplement with all meals for additional kcals, protein and nutrition. RD will continue to monitor.

## 2019-03-22 NOTE — THERAPY
SPEECH THERAPY CONTACT NOTE:    Attempted to see Pt at bedside for therapeutic feeding session with lunch tray of D2/Thins; however, Pt reported feeling nauseous and declined to participate. Pt requested to keep apple juice and chicken noodle soup; however, requested to 'throw away' everything else. Lunch tray was removed from bedside. RN notified. SLP will attempt to see Pt for f/u dysphagia tx session at a later time as able/appropriate. Thank you.

## 2019-03-22 NOTE — DISCHARGE PLANNING
Anticipated Discharge Disposition: [facility]     Action: Pt and spouse are agreeable for pt to transfer to [facility].  Pt will be picked up at 1600.     PASRR [id number]    Barriers to Discharge: None    Plan: As stated above.

## 2019-03-22 NOTE — PROGRESS NOTES
Tele strip at 1903 shows SR w/ HR of 71    MS 0.18  QRS 0.08  QT 0.40       Telemetry Summary    Rhythm NSR  Rate 70s  Ectopy Rare PVCs, PACs, and couplets, per CCT [place]       Telemetry monitoring strips placed in patient's chart.

## 2019-03-22 NOTE — PROGRESS NOTES
Discussed discharge instructions. Tele and IV D/C. Belongings gathered and given to pt upon leaving the unit.[facility].  is at bedside.

## 2019-03-22 NOTE — DISCHARGE SUMMARY
Discharge Summary    CHIEF COMPLAINT ON ADMISSION  Chief Complaint   Patient presents with   • Dizziness     with falls x2 days; states she has vertigo   • Shortness of Breath     patient reports having dyspnea and cough over a month       Reason for Admission  near syncope     Admission Date  3/18/2019    CODE STATUS  Full Code    HPI & HOSPITAL COURSE  This is a 78 y.o. female here with confusion.      She was admitted with confusion suspected to be secondary to polypharmacy, dehydration.  She had been on and off baclofen and neurontin in the past but had not been taking either of these consistently. These were stopped as was her hydrochlorothiazide. An MRi was negative for any acute cerebral process. She improved markedly through her hospital course and will be transferred to a SNF for ongoing rehab and therapy. If her blood pressure rises, Norvasc may want to be considered.     Therefore, she is discharged in good and stable condition to home with close outpatient follow-up.    The patient met 2-midnight criteria for an inpatient stay at the time of discharge.    Discharge Date  none    FOLLOW UP ITEMS POST DISCHARGE  none    DISCHARGE DIAGNOSES  Active Problems:    Hypokalemia POA: Unknown    Hyponatremia POA: Unknown    Dehydration POA: Unknown    SIRS (systemic inflammatory response syndrome) (HCC) POA: Unknown    Acute metabolic encephalopathy POA: Unknown    NSTEMI (non-ST elevated myocardial infarction) (HCC) POA: Unknown    Syncope POA: Unknown  Resolved Problems:    * No resolved hospital problems. *      FOLLOW UP  Future Appointments  Date Time Provider Department Center   3/27/2019 9:00 AM Narendra Paredes M.D. [facility] None     No follow-up provider specified.    MEDICATIONS ON DISCHARGE     Medication List      CONTINUE taking these medications      Instructions   DULoxetine 60 MG Cpep delayed-release capsule  Commonly known as:  CYMBALTA   Take 60 mg by mouth every day.  Dose:  60 mg     omeprazole 40  MG delayed-release capsule  Commonly known as:  PRILOSEC   TAKE 1 CAPSULE BY MOUTH ONCE A DAY 30 MINUTES BEFORE BREAKFAST MEAL     pramipexole 0.5 MG Tabs  Commonly known as:  MIRAPEX   Take 0.5 mg by mouth every bedtime.  Dose:  0.5 mg     propranolol  MG Cp24 capsule  Commonly known as:  INDERAL LA   Take 160 mg by mouth every day.  Dose:  160 mg        STOP taking these medications    baclofen 10 MG Tabs  Commonly known as:  LIORESAL     hydroCHLOROthiazide 25 MG Tabs  Commonly known as:  HYDRODIURIL     oxyCODONE-acetaminophen 5-325 MG Tabs  Commonly known as:  PERCOCET     potassium chloride 20 MEQ Pack  Commonly known as:  KLOR-CON            Allergies  No Known Allergies    DIET  Orders Placed This Encounter   Procedures   • Diet Order Regular     Standing Status:   Standing     Number of Occurrences:   1     Order Specific Question:   Diet:     Answer:   Regular [1]     Order Specific Question:   Texture/Fiber modifications:     Answer:   Dysphagia 2(Pureed/Chopped)specify fluid consistency(question 6) [2]     Order Specific Question:   Consistency/Fluid modifications:     Answer:   Thin Liquids [3]       ACTIVITY  As tolerated.  Weight bearing as tolerated    CONSULTATIONS  none    PROCEDURES  none    LABORATORY  Lab Results   Component Value Date    SODIUM 132 (L) 03/22/2019    POTASSIUM 3.6 03/22/2019    CHLORIDE 99 03/22/2019    CO2 20 03/22/2019    GLUCOSE 107 (H) 03/22/2019    BUN 14 03/22/2019    CREATININE 0.69 03/22/2019    CREATININE 0.9 12/29/2008        Lab Results   Component Value Date    WBC 8.0 03/22/2019    HEMOGLOBIN 12.8 03/22/2019    HEMATOCRIT 36.8 (L) 03/22/2019    PLATELETCT 168 03/22/2019        Total time of the discharge process exceeds 36 minutes.

## 2019-03-22 NOTE — CARE PLAN
Problem: Safety  Goal: Will remain free from falls  Outcome: PROGRESSING AS EXPECTED  No falls this shift. Ambulated with 1PA, gait steady. Refusing FWW but allowing hand held assist. Falls precautions in place:   bed in lowest and locked position, side rails raised x 2, room near nurses station, call light within reach, bed/chair alarms on,   nonslip socks on, purposeful hourly rounding.       Problem: Psychosocial Needs:  Goal: Level of anxiety will decrease  Outcome: PROGRESSING AS EXPECTED  Less restless and anxious when compared with previous night shift, however patient remains confused. Oriented to self and place.   Very tangential in conversation with occasional nonsensical speech. Remains impulsive at times, but reports related to feeling uncomfortable   in hospital bed. Assisted patient to cardiac chair with chair alarm and patient able to rest comfortably in between care for remainder of shift.    Problem: Hemodynamic Status  Goal: Stable Vital Signs and Fluid Balance  Outcome: PROGRESSING AS EXPECTED  Hemodynamically stable overnight. Denies any chest pain/pressure or palpitations. HR NSR. SpO2 WNL on RA. Afebrile.

## 2019-03-22 NOTE — DISCHARGE INSTRUCTIONS
Discharge Instructions    Discharged to other by medical transportation with escort. Discharged via wheelchair, hospital escort: Yes.  Special equipment needed: None    Be sure to schedule a follow-up appointment with your primary care doctor or any specialists as instructed.     Discharge Plan:   Diet Plan: Discussed  Activity Level: Discussed  Confirmed Follow up Appointment: Appointment Scheduled  Confirmed Symptoms Management: Discussed  Medication Reconciliation Updated: Yes  Influenza Vaccine Indication: Not indicated: Previously immunized this influenza season and > 8 years of age    I understand that a diet low in cholesterol, fat, and sodium is recommended for good health. Unless I have been given specific instructions below for another diet, I accept this instruction as my diet prescription.   Other diet: Regular Diet Dysphagia II with thin liquids    Special Instructions: None    · Is patient discharged on Warfarin / Coumadin?   No     Depression / Suicide Risk    As you are discharged from this [facility] facility, it is important to learn how to keep safe from harming yourself.    Recognize the warning signs:  · Abrupt changes in personality, positive or negative- including increase in energy   · Giving away possessions  · Change in eating patterns- significant weight changes-  positive or negative  · Change in sleeping patterns- unable to sleep or sleeping all the time   · Unwillingness or inability to communicate  · Depression  · Unusual sadness, discouragement and loneliness  · Talk of wanting to die  · Neglect of personal appearance   · Rebelliousness- reckless behavior  · Withdrawal from people/activities they love  · Confusion- inability to concentrate     If you or a loved one observes any of these behaviors or has concerns about self-harm, here's what you can do:  · Talk about it- your feelings and reasons for harming yourself  · Remove any means that you might use to hurt yourself (examples:  pills, rope, extension cords, firearm)  · Get professional help from the community (Mental Health, Substance Abuse, psychological counseling)  · Do not be alone:Call your Safe Contact- someone whom you trust who will be there for you.  · Call your local CRISIS HOTLINE 762-5325 or 790-680-8981  · Call your local Children's Mobile Crisis Response Team [place] (966) 020-7934 or www.Meedor  · Call the toll free National Suicide Prevention Hotlines   · National Suicide Prevention Lifeline 806-976-YOMW (7406)  · [employer] Hope Line Network 800-SUICIDE (658-1206)    Nausea, Adult  Introduction  Feeling sick to your stomach (nausea) means that your stomach is upset or you feel like you have to throw up (vomit). Feeling sick to your stomach is usually not serious, but it may be an early sign of a more serious medical problem. As you feel sicker to your stomach, it can lead to throwing up (vomiting). If you throw up, or if you are not able to drink enough fluids, there is a risk of dehydration. Dehydration can make you feel tired and thirsty, have a dry mouth, and pee (urinate) less often. Older adults and people who have other diseases or a weak defense (immune) system have a higher risk of dehydration.  The main goal of treating this condition is to:  Limit how often you feel sick to your stomach.  Prevent throwing up and dehydration.  Follow these instructions at home:  Follow instructions from your doctor about how to care for yourself at home.  Eating and drinking  Follow these recommendations as told by your doctor:  Take an oral rehydration solution (ORS). This is a drink that is sold at pharmacies and stores.  Drink clear fluids in small amounts as you are able, such as:  Water.  Ice chips.  Fruit juice that has water added (diluted fruit juice).  Low-calorie sports drinks.  Eat bland, easy to digest foods in small amounts as you are able, such as:  Bananas.  Applesauce.  Rice.  Lean  meats.  Toast.  Crackers.  Avoid drinking fluids that contain a lot of sugar or caffeine.  Avoid alcohol.  Avoid spicy or fatty foods.  General instructions  Drink enough fluid to keep your pee (urine) clear or pale yellow.  Wash your hands often. If you cannot use soap and water, use hand .  Make sure that all people in your household wash their hands well and often.  Rest at home while you get better.  Take over-the-counter and prescription medicines only as told by your doctor.  Breathe slowly and deeply when you feel sick to your stomach.  Watch your condition for any changes.  Keep all follow-up visits as told by your doctor. This is important.  Contact a doctor if:  You have a headache.  You have new symptoms.  You feel sicker to your stomach.  You have a fever.  You feel light-headed or dizzy.  You throw up.  You are not able to keep fluids down.  Get help right away if:  You have pain in your chest, neck, arm, or jaw.  You feel very weak or you pass out (faint).  You have throw up that is bright red or looks like coffee grounds.  You have bloody or black poop (stools), or poop that looks like tar.  You have a very bad headache, a stiff neck, or both.  You have very bad pain, cramping, or bloating in your belly.  You have a rash.  You have trouble breathing or you are breathing very quickly.  Your heart is beating very quickly.  Your skin feels cold and clammy.  You feel confused.  You have pain while peeing.  You have signs of dehydration, such as:  Dark pee, or very little or no pee.  Cracked lips.  Dry mouth.  Sunken eyes.  Sleepiness.  Weakness.  These symptoms may be an emergency. Do not wait to see if the symptoms will go away. Get medical help right away. Call your local emergency services (911 in the [place] Do not drive yourself to the hospital.   This information is not intended to replace advice given to you by your health care provider. Make sure you discuss any questions you have with  your health care provider.  Document Released: 12/06/2012 Document Revised: 05/25/2017 Document Reviewed: 08/23/2016  © 2017 Elsevier    Dehydration, Adult  Dehydration is when there is not enough fluid or water in your body. This happens when you lose more fluids than you take in. Dehydration can range from mild to very bad. It should be treated right away to keep it from getting very bad.  Symptoms of mild dehydration may include:  · Thirst.  · Dry lips.  · Slightly dry mouth.  · Dry, warm skin.  · Dizziness.  Symptoms of moderate dehydration may include:  · Very dry mouth.  · Muscle cramps.  · Dark pee (urine). Pee may be the color of tea.  · Your body making less pee.  · Your eyes making fewer tears.  · Heartbeat that is uneven or faster than normal (palpitations).  · Headache.  · Light-headedness, especially when you stand up from sitting.  · Fainting (syncope).  Symptoms of very bad dehydration may include:  · Changes in skin, such as:  ¨ Cold and clammy skin.  ¨ Blotchy (mottled) or pale skin.  ¨ Skin that does not quickly return to normal after being lightly pinched and let go (poor skin turgor).  · Changes in body fluids, such as:  ¨ Feeling very thirsty.  ¨ Your eyes making fewer tears.  ¨ Not sweating when body temperature is high, such as in hot weather.  ¨ Your body making very little pee.  · Changes in vital signs, such as:  ¨ Weak pulse.  ¨ Pulse that is more than 100 beats a minute when you are sitting still.  ¨ Fast breathing.  ¨ Low blood pressure.  · Other changes, such as:  ¨ Sunken eyes.  ¨ Cold hands and feet.  ¨ Confusion.  ¨ Lack of energy (lethargy).  ¨ Trouble waking up from sleep.  ¨ Short-term weight loss.  ¨ Unconsciousness.  Follow these instructions at home:  · If told by your doctor, drink an ORS:  ¨ Make an ORS by using instructions on the package.  ¨ Start by drinking small amounts, about ½ cup (120 mL) every 5-10 minutes.  ¨ Slowly drink more until you have had the amount that your  doctor said to have.  · Drink enough clear fluid to keep your pee clear or pale yellow. If you were told to drink an ORS, finish the ORS first, then start slowly drinking clear fluids. Drink fluids such as:  ¨ Water. Do not drink only water by itself. Doing that can make the salt (sodium) level in your body get too low (hyponatremia).  ¨ Ice chips.  ¨ Fruit juice that you have added water to (diluted).  ¨ Low-calorie sports drinks.  · Avoid:  ¨ Alcohol.  ¨ Drinks that have a lot of sugar. These include high-calorie sports drinks, fruit juice that does not have water added, and soda.  ¨ Caffeine.  ¨ Foods that are greasy or have a lot of fat or sugar.  · Take over-the-counter and prescription medicines only as told by your doctor.  · Do not take salt tablets. Doing that can make the salt level in your body get too high (hypernatremia).  · Eat foods that have minerals (electrolytes). Examples include bananas, oranges, potatoes, tomatoes, and spinach.  · Keep all follow-up visits as told by your doctor. This is important.  Contact a doctor if:  · You have belly (abdominal) pain that:  ¨ Gets worse.  ¨ Stays in one area (localizes).  · You have a rash.  · You have a stiff neck.  · You get angry or annoyed more easily than normal (irritability).  · You are more sleepy than normal.  · You have a harder time waking up than normal.  · You feel:  ¨ Weak.  ¨ Dizzy.  ¨ Very thirsty.  · You have peed (urinated) only a small amount of very dark pee during 6-8 hours.  Get help right away if:  · You have symptoms of very bad dehydration.  · You cannot drink fluids without throwing up (vomiting).  · Your symptoms get worse with treatment.  · You have a fever.  · You have a very bad headache.  · You are throwing up or having watery poop (diarrhea) and it:  ¨ Gets worse.  ¨ Does not go away.  · You have blood or something green (bile) in your throw-up.  · You have blood in your poop (stool). This may cause poop to look black and  german.  · You have not peed in 6-8 hours.  · You pass out (faint).  · Your heart rate when you are sitting still is more than 100 beats a minute.  · You have trouble breathing.  This information is not intended to replace advice given to you by your health care provider. Make sure you discuss any questions you have with your health care provider.  Document Released: 10/14/2010 Document Revised: 07/07/2017 Document Reviewed: 02/10/2017  Elsevier Interactive Patient Education © 2017 Elsevier Inc[employer]

## 2019-03-23 LAB
BACTERIA BLD CULT: NORMAL
BACTERIA BLD CULT: NORMAL
SIGNIFICANT IND 70042: NORMAL
SIGNIFICANT IND 70042: NORMAL
SITE SITE: NORMAL
SITE SITE: NORMAL
SOURCE SOURCE: NORMAL
SOURCE SOURCE: NORMAL

## 2019-03-25 ENCOUNTER — TELEPHONE (OUTPATIENT)
Dept: CARDIOLOGY | Facility: MEDICAL CENTER | Age: 79
End: 2019-03-25

## 2019-03-25 ENCOUNTER — HOSPITAL ENCOUNTER (EMERGENCY)
Facility: MEDICAL CENTER | Age: 79
End: 2019-03-25
Payer: MEDICARE

## 2019-03-25 ENCOUNTER — PATIENT OUTREACH (OUTPATIENT)
Dept: HEALTH INFORMATION MANAGEMENT | Facility: OTHER | Age: 79
End: 2019-03-25

## 2019-03-25 VITALS
RESPIRATION RATE: 18 BRPM | SYSTOLIC BLOOD PRESSURE: 130 MMHG | HEART RATE: 80 BPM | DIASTOLIC BLOOD PRESSURE: 61 MMHG | HEIGHT: 68 IN | OXYGEN SATURATION: 96 % | BODY MASS INDEX: 22.96 KG/M2 | TEMPERATURE: 97.7 F

## 2019-03-25 PROCEDURE — 0298T PR EXT ECG > 48HR TO 21 DAY REVIEW AND INTERPRETATN: CPT | Performed by: INTERNAL MEDICINE

## 2019-03-25 PROCEDURE — 302449 STATCHG TRIAGE ONLY (STATISTIC)

## 2019-03-25 PROCEDURE — 0296T PR EXT ECG > 48HR TO 21 DAY RCRD W/CONECT INTL RCRD: CPT | Performed by: INTERNAL MEDICINE

## 2019-03-25 ASSESSMENT — PAIN SCALES - WONG BAKER: WONGBAKER_NUMERICALRESPONSE: HURTS EVEN MORE

## 2019-03-25 NOTE — TELEPHONE ENCOUNTER
----- Message from Narendra Paredes M.D. sent at 3/25/2019 12:46 PM PDT -----  Brief episodes of symptomatic SVT on ZO Patch.  Does this patient have an  Appointment?

## 2019-03-25 NOTE — ED TRIAGE NOTES
"Pt BIB   Pt \"left\" care facility yesterday  \"wanting to go home\"  However pt has been having multilpe falls and is unable to care for self  Per  they attempted to go back [facility] to be re-admitted however that could not take her back   C/o pain to elbows and legs and back     "

## 2019-03-26 ENCOUNTER — APPOINTMENT (OUTPATIENT)
Dept: RADIOLOGY | Facility: MEDICAL CENTER | Age: 79
End: 2019-03-26
Attending: EMERGENCY MEDICINE
Payer: MEDICARE

## 2019-03-26 ENCOUNTER — HOSPITAL ENCOUNTER (OUTPATIENT)
Facility: MEDICAL CENTER | Age: 79
End: 2019-03-29
Attending: EMERGENCY MEDICINE | Admitting: HOSPITALIST
Payer: MEDICARE

## 2019-03-26 DIAGNOSIS — S30.1XXA TRAUMATIC ECCHYMOSIS OF FLANK, INITIAL ENCOUNTER: ICD-10-CM

## 2019-03-26 DIAGNOSIS — R51.9 NONINTRACTABLE HEADACHE, UNSPECIFIED CHRONICITY PATTERN, UNSPECIFIED HEADACHE TYPE: ICD-10-CM

## 2019-03-26 DIAGNOSIS — R53.1 WEAKNESS: ICD-10-CM

## 2019-03-26 DIAGNOSIS — E86.0 DEHYDRATION: ICD-10-CM

## 2019-03-26 PROBLEM — K21.9 GERD (GASTROESOPHAGEAL REFLUX DISEASE): Status: ACTIVE | Noted: 2019-03-26
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
## 2019-03-26 PROBLEM — F39 MOOD DISORDER (HCC): Status: ACTIVE | Noted: 2019-03-26

## 2019-03-26 PROBLEM — R29.6 FALLS: Status: ACTIVE | Noted: 2019-03-26

## 2019-03-26 LAB
ALBUMIN SERPL BCP-MCNC: 4.1 G/DL (ref 3.2–4.9)
ALBUMIN/GLOB SERPL: 1.6 G/DL
ALP SERPL-CCNC: 78 U/L (ref 30–99)
ALT SERPL-CCNC: 20 U/L (ref 2–50)
ANION GAP SERPL CALC-SCNC: 13 MMOL/L (ref 0–11.9)
AST SERPL-CCNC: 25 U/L (ref 12–45)
BASOPHILS # BLD AUTO: 0.8 % (ref 0–1.8)
BASOPHILS # BLD: 0.08 K/UL (ref 0–0.12)
BILIRUB SERPL-MCNC: 0.9 MG/DL (ref 0.1–1.5)
BUN SERPL-MCNC: 14 MG/DL (ref 8–22)
CALCIUM SERPL-MCNC: 9.5 MG/DL (ref 8.4–10.2)
CHLORIDE SERPL-SCNC: 102 MMOL/L (ref 96–112)
CO2 SERPL-SCNC: 20 MMOL/L (ref 20–33)
CREAT SERPL-MCNC: 0.8 MG/DL (ref 0.5–1.4)
EOSINOPHIL # BLD AUTO: 0.09 K/UL (ref 0–0.51)
EOSINOPHIL NFR BLD: 0.9 % (ref 0–6.9)
ERYTHROCYTE [DISTWIDTH] IN BLOOD BY AUTOMATED COUNT: 46.5 FL (ref 35.9–50)
ETHANOL BLD-MCNC: 0 G/DL
GLOBULIN SER CALC-MCNC: 2.5 G/DL (ref 1.9–3.5)
GLUCOSE SERPL-MCNC: 107 MG/DL (ref 65–99)
HCT VFR BLD AUTO: 43.7 % (ref 37–47)
HGB BLD-MCNC: 14.6 G/DL (ref 12–16)
IMM GRANULOCYTES # BLD AUTO: 0.05 K/UL (ref 0–0.11)
IMM GRANULOCYTES NFR BLD AUTO: 0.5 % (ref 0–0.9)
LYMPHOCYTES # BLD AUTO: 2.78 K/UL (ref 1–4.8)
LYMPHOCYTES NFR BLD: 26.5 % (ref 22–41)
MCH RBC QN AUTO: 28.2 PG (ref 27–33)
MCHC RBC AUTO-ENTMCNC: 33.4 G/DL (ref 33.6–35)
MCV RBC AUTO: 84.4 FL (ref 81.4–97.8)
MONOCYTES # BLD AUTO: 0.85 K/UL (ref 0–0.85)
MONOCYTES NFR BLD AUTO: 8.1 % (ref 0–13.4)
NEUTROPHILS # BLD AUTO: 6.66 K/UL (ref 2–7.15)
NEUTROPHILS NFR BLD: 63.2 % (ref 44–72)
NRBC # BLD AUTO: 0 K/UL
NRBC BLD-RTO: 0 /100 WBC
PLATELET # BLD AUTO: 230 K/UL (ref 164–446)
PMV BLD AUTO: 10.2 FL (ref 9–12.9)
POTASSIUM SERPL-SCNC: 3.3 MMOL/L (ref 3.6–5.5)
PROT SERPL-MCNC: 6.6 G/DL (ref 6–8.2)
RBC # BLD AUTO: 5.18 M/UL (ref 4.2–5.4)
SODIUM SERPL-SCNC: 135 MMOL/L (ref 135–145)
WBC # BLD AUTO: 10.5 K/UL (ref 4.8–10.8)

## 2019-03-26 PROCEDURE — 700102 HCHG RX REV CODE 250 W/ 637 OVERRIDE(OP): Performed by: HOSPITALIST

## 2019-03-26 PROCEDURE — G0378 HOSPITAL OBSERVATION PER HR: HCPCS

## 2019-03-26 PROCEDURE — 99285 EMERGENCY DEPT VISIT HI MDM: CPT

## 2019-03-26 PROCEDURE — 80053 COMPREHEN METABOLIC PANEL: CPT

## 2019-03-26 PROCEDURE — A9270 NON-COVERED ITEM OR SERVICE: HCPCS | Performed by: HOSPITALIST

## 2019-03-26 PROCEDURE — 71045 X-RAY EXAM CHEST 1 VIEW: CPT

## 2019-03-26 PROCEDURE — 99226 PR SUBSEQUENT OBSERVATION CARE,LEVEL III: CPT | Performed by: HOSPITALIST

## 2019-03-26 PROCEDURE — 700105 HCHG RX REV CODE 258: Performed by: EMERGENCY MEDICINE

## 2019-03-26 PROCEDURE — 74177 CT ABD & PELVIS W/CONTRAST: CPT

## 2019-03-26 PROCEDURE — 73560 X-RAY EXAM OF KNEE 1 OR 2: CPT | Mod: RT

## 2019-03-26 PROCEDURE — 85025 COMPLETE CBC W/AUTO DIFF WBC: CPT

## 2019-03-26 PROCEDURE — 70450 CT HEAD/BRAIN W/O DYE: CPT

## 2019-03-26 PROCEDURE — 80307 DRUG TEST PRSMV CHEM ANLYZR: CPT

## 2019-03-26 PROCEDURE — 700117 HCHG RX CONTRAST REV CODE 255: Performed by: EMERGENCY MEDICINE

## 2019-03-26 RX ORDER — PROPRANOLOL HYDROCHLORIDE 160 MG/1
160 CAPSULE, EXTENDED RELEASE ORAL DAILY
Status: DISCONTINUED | OUTPATIENT
Start: 2019-03-27 | End: 2019-03-27

## 2019-03-26 RX ORDER — ACETAMINOPHEN 325 MG/1
650 TABLET ORAL EVERY 6 HOURS PRN
Status: DISCONTINUED | OUTPATIENT
Start: 2019-03-26 | End: 2019-03-29 | Stop reason: HOSPADM

## 2019-03-26 RX ORDER — OMEPRAZOLE 20 MG/1
40 CAPSULE, DELAYED RELEASE ORAL DAILY
Status: DISCONTINUED | OUTPATIENT
Start: 2019-03-27 | End: 2019-03-29 | Stop reason: HOSPADM

## 2019-03-26 RX ORDER — POTASSIUM CHLORIDE 20 MEQ/1
20 TABLET, EXTENDED RELEASE ORAL DAILY
Status: ON HOLD | COMMUNITY
End: 2019-04-11

## 2019-03-26 RX ORDER — DULOXETIN HYDROCHLORIDE 30 MG/1
60 CAPSULE, DELAYED RELEASE ORAL DAILY
Status: DISCONTINUED | OUTPATIENT
Start: 2019-03-27 | End: 2019-03-29 | Stop reason: HOSPADM

## 2019-03-26 RX ORDER — ACETAMINOPHEN 500 MG
1000 TABLET ORAL EVERY 6 HOURS PRN
Status: ON HOLD | COMMUNITY
End: 2019-04-11

## 2019-03-26 RX ORDER — CALCIUM CARBONATE 500 MG/1
1000 TABLET, CHEWABLE ORAL ONCE
Status: COMPLETED | OUTPATIENT
Start: 2019-03-26 | End: 2019-03-27

## 2019-03-26 RX ORDER — POTASSIUM CHLORIDE 20 MEQ/1
40 TABLET, EXTENDED RELEASE ORAL ONCE
Status: COMPLETED | OUTPATIENT
Start: 2019-03-26 | End: 2019-03-26

## 2019-03-26 RX ORDER — BISACODYL 10 MG
10 SUPPOSITORY, RECTAL RECTAL
Status: DISCONTINUED | OUTPATIENT
Start: 2019-03-26 | End: 2019-03-29 | Stop reason: HOSPADM

## 2019-03-26 RX ORDER — SODIUM CHLORIDE 9 MG/ML
1000 INJECTION, SOLUTION INTRAVENOUS ONCE
Status: COMPLETED | OUTPATIENT
Start: 2019-03-26 | End: 2019-03-26

## 2019-03-26 RX ORDER — AMOXICILLIN 250 MG
2 CAPSULE ORAL 2 TIMES DAILY
Status: DISCONTINUED | OUTPATIENT
Start: 2019-03-26 | End: 2019-03-29 | Stop reason: HOSPADM

## 2019-03-26 RX ORDER — PRAMIPEXOLE DIHYDROCHLORIDE 0.5 MG/1
0.5 TABLET ORAL
Status: DISCONTINUED | OUTPATIENT
Start: 2019-03-26 | End: 2019-03-29 | Stop reason: HOSPADM

## 2019-03-26 RX ORDER — POLYETHYLENE GLYCOL 3350 17 G/17G
1 POWDER, FOR SOLUTION ORAL
Status: DISCONTINUED | OUTPATIENT
Start: 2019-03-26 | End: 2019-03-29 | Stop reason: HOSPADM

## 2019-03-26 RX ADMIN — ACETAMINOPHEN 650 MG: 325 TABLET, FILM COATED ORAL at 20:50

## 2019-03-26 RX ADMIN — IOHEXOL 100 ML: 350 INJECTION, SOLUTION INTRAVENOUS at 18:20

## 2019-03-26 RX ADMIN — SODIUM CHLORIDE 1000 ML: 9 INJECTION, SOLUTION INTRAVENOUS at 17:55

## 2019-03-26 RX ADMIN — PRAMIPEXOLE DIHYDROCHLORIDE 0.5 MG: 0.5 TABLET ORAL at 20:50

## 2019-03-26 RX ADMIN — POTASSIUM CHLORIDE 40 MEQ: 1500 TABLET, EXTENDED RELEASE ORAL at 20:50

## 2019-03-26 ASSESSMENT — COGNITIVE AND FUNCTIONAL STATUS - GENERAL
DAILY ACTIVITIY SCORE: 17
SUGGESTED CMS G CODE MODIFIER DAILY ACTIVITY: CK
MOVING TO AND FROM BED TO CHAIR: A LOT
MOVING FROM LYING ON BACK TO SITTING ON SIDE OF FLAT BED: UNABLE
STANDING UP FROM CHAIR USING ARMS: A LOT
TURNING FROM BACK TO SIDE WHILE IN FLAT BAD: A LITTLE
SUGGESTED CMS G CODE MODIFIER MOBILITY: CL
HELP NEEDED FOR BATHING: A LOT
CLIMB 3 TO 5 STEPS WITH RAILING: A LOT
DRESSING REGULAR LOWER BODY CLOTHING: A LOT
MOBILITY SCORE: 12
WALKING IN HOSPITAL ROOM: A LOT
TOILETING: A LOT
DRESSING REGULAR UPPER BODY CLOTHING: A LITTLE

## 2019-03-26 ASSESSMENT — PAIN SCALES - WONG BAKER: WONGBAKER_NUMERICALRESPONSE: DOESN'T HURT AT ALL

## 2019-03-26 ASSESSMENT — LIFESTYLE VARIABLES: ALCOHOL_USE: NO

## 2019-03-26 NOTE — ED TRIAGE NOTES
Chief Complaint   Patient presents with   • Weakness     20-30 days, pt unable to support own weight standing past 2 days   • Dizziness     denies falls   Pt came to ED yesterday for same complaint, left before being seen.

## 2019-03-27 LAB
ALBUMIN SERPL BCP-MCNC: 3.6 G/DL (ref 3.2–4.9)
ALBUMIN/GLOB SERPL: 1.4 G/DL
ALP SERPL-CCNC: 70 U/L (ref 30–99)
ALT SERPL-CCNC: 15 U/L (ref 2–50)
ANION GAP SERPL CALC-SCNC: 10 MMOL/L (ref 0–11.9)
AST SERPL-CCNC: 16 U/L (ref 12–45)
BASOPHILS # BLD AUTO: 0.6 % (ref 0–1.8)
BASOPHILS # BLD: 0.04 K/UL (ref 0–0.12)
BILIRUB SERPL-MCNC: 1.1 MG/DL (ref 0.1–1.5)
BUN SERPL-MCNC: 9 MG/DL (ref 8–22)
CALCIUM SERPL-MCNC: 9 MG/DL (ref 8.4–10.2)
CHLORIDE SERPL-SCNC: 100 MMOL/L (ref 96–112)
CO2 SERPL-SCNC: 23 MMOL/L (ref 20–33)
CREAT SERPL-MCNC: 0.67 MG/DL (ref 0.5–1.4)
EOSINOPHIL # BLD AUTO: 0.13 K/UL (ref 0–0.51)
EOSINOPHIL NFR BLD: 1.8 % (ref 0–6.9)
ERYTHROCYTE [DISTWIDTH] IN BLOOD BY AUTOMATED COUNT: 44.9 FL (ref 35.9–50)
GLOBULIN SER CALC-MCNC: 2.6 G/DL (ref 1.9–3.5)
GLUCOSE SERPL-MCNC: 110 MG/DL (ref 65–99)
HCT VFR BLD AUTO: 38 % (ref 37–47)
HGB BLD-MCNC: 12.8 G/DL (ref 12–16)
IMM GRANULOCYTES # BLD AUTO: 0.03 K/UL (ref 0–0.11)
IMM GRANULOCYTES NFR BLD AUTO: 0.4 % (ref 0–0.9)
LYMPHOCYTES # BLD AUTO: 2.4 K/UL (ref 1–4.8)
LYMPHOCYTES NFR BLD: 33.7 % (ref 22–41)
MCH RBC QN AUTO: 27.6 PG (ref 27–33)
MCHC RBC AUTO-ENTMCNC: 33.7 G/DL (ref 33.6–35)
MCV RBC AUTO: 81.9 FL (ref 81.4–97.8)
MONOCYTES # BLD AUTO: 0.67 K/UL (ref 0–0.85)
MONOCYTES NFR BLD AUTO: 9.4 % (ref 0–13.4)
NEUTROPHILS # BLD AUTO: 3.86 K/UL (ref 2–7.15)
NEUTROPHILS NFR BLD: 54.1 % (ref 44–72)
NRBC # BLD AUTO: 0 K/UL
NRBC BLD-RTO: 0 /100 WBC
PLATELET # BLD AUTO: 201 K/UL (ref 164–446)
PMV BLD AUTO: 10.2 FL (ref 9–12.9)
POTASSIUM SERPL-SCNC: 3.2 MMOL/L (ref 3.6–5.5)
PROT SERPL-MCNC: 6.2 G/DL (ref 6–8.2)
RBC # BLD AUTO: 4.64 M/UL (ref 4.2–5.4)
SODIUM SERPL-SCNC: 133 MMOL/L (ref 135–145)
WBC # BLD AUTO: 7.1 K/UL (ref 4.8–10.8)

## 2019-03-27 PROCEDURE — 99225 PR SUBSEQUENT OBSERVATION CARE,LEVEL II: CPT | Performed by: HOSPITALIST

## 2019-03-27 PROCEDURE — 85025 COMPLETE CBC W/AUTO DIFF WBC: CPT

## 2019-03-27 PROCEDURE — 700102 HCHG RX REV CODE 250 W/ 637 OVERRIDE(OP): Performed by: HOSPITALIST

## 2019-03-27 PROCEDURE — A9270 NON-COVERED ITEM OR SERVICE: HCPCS | Performed by: HOSPITALIST

## 2019-03-27 PROCEDURE — 700102 HCHG RX REV CODE 250 W/ 637 OVERRIDE(OP): Performed by: FAMILY MEDICINE

## 2019-03-27 PROCEDURE — G0378 HOSPITAL OBSERVATION PER HR: HCPCS

## 2019-03-27 PROCEDURE — 97165 OT EVAL LOW COMPLEX 30 MIN: CPT

## 2019-03-27 PROCEDURE — 97161 PT EVAL LOW COMPLEX 20 MIN: CPT

## 2019-03-27 PROCEDURE — A9270 NON-COVERED ITEM OR SERVICE: HCPCS | Performed by: FAMILY MEDICINE

## 2019-03-27 PROCEDURE — 97535 SELF CARE MNGMENT TRAINING: CPT

## 2019-03-27 PROCEDURE — 36415 COLL VENOUS BLD VENIPUNCTURE: CPT

## 2019-03-27 PROCEDURE — 80053 COMPREHEN METABOLIC PANEL: CPT

## 2019-03-27 RX ORDER — PROPRANOLOL HYDROCHLORIDE 80 MG/1
160 CAPSULE, EXTENDED RELEASE ORAL
Status: DISCONTINUED | OUTPATIENT
Start: 2019-03-27 | End: 2019-03-29 | Stop reason: HOSPADM

## 2019-03-27 RX ORDER — ONDANSETRON 2 MG/ML
4 INJECTION INTRAMUSCULAR; INTRAVENOUS EVERY 4 HOURS PRN
Status: DISCONTINUED | OUTPATIENT
Start: 2019-03-27 | End: 2019-03-29 | Stop reason: HOSPADM

## 2019-03-27 RX ORDER — POTASSIUM CHLORIDE 20 MEQ/1
40 TABLET, EXTENDED RELEASE ORAL ONCE
Status: COMPLETED | OUTPATIENT
Start: 2019-03-27 | End: 2019-03-27

## 2019-03-27 RX ADMIN — PRAMIPEXOLE DIHYDROCHLORIDE 0.5 MG: 0.5 TABLET ORAL at 20:31

## 2019-03-27 RX ADMIN — POTASSIUM CHLORIDE 40 MEQ: 1500 TABLET, EXTENDED RELEASE ORAL at 12:26

## 2019-03-27 RX ADMIN — OMEPRAZOLE 40 MG: 20 CAPSULE, DELAYED RELEASE ORAL at 05:48

## 2019-03-27 RX ADMIN — STANDARDIZED SENNA CONCENTRATE AND DOCUSATE SODIUM 2 TABLET: 8.6; 5 TABLET, FILM COATED ORAL at 05:48

## 2019-03-27 RX ADMIN — CALCIUM CARBONATE (ANTACID) CHEW TAB 500 MG 1000 MG: 500 CHEW TAB at 03:01

## 2019-03-27 RX ADMIN — DULOXETINE HYDROCHLORIDE 60 MG: 30 CAPSULE, DELAYED RELEASE ORAL at 05:47

## 2019-03-27 ASSESSMENT — ENCOUNTER SYMPTOMS
HEARTBURN: 0
NAUSEA: 0
TINGLING: 0
STRIDOR: 0
BLURRED VISION: 0
SHORTNESS OF BREATH: 0
VOMITING: 0
BLOOD IN STOOL: 0
ORTHOPNEA: 0
BACK PAIN: 1
SPEECH CHANGE: 0
SORE THROAT: 0
HEMOPTYSIS: 0
PND: 0
COUGH: 0
DEPRESSION: 0
CONSTIPATION: 0
MYALGIAS: 1
FEVER: 0
CHILLS: 0
PALPITATIONS: 0
DIZZINESS: 0
SPUTUM PRODUCTION: 0
DOUBLE VISION: 0
MEMORY LOSS: 0
EYE PAIN: 0
FALLS: 1
SENSORY CHANGE: 0
HEADACHES: 0
TREMORS: 0
NERVOUS/ANXIOUS: 0
PHOTOPHOBIA: 0
WEAKNESS: 1
CLAUDICATION: 0
NECK PAIN: 0

## 2019-03-27 ASSESSMENT — ACTIVITIES OF DAILY LIVING (ADL): TOILETING: REQUIRES ASSIST

## 2019-03-27 ASSESSMENT — GAIT ASSESSMENTS
DISTANCE (FEET): 40
DEVIATION: SHUFFLED GAIT;BRADYKINETIC
ASSISTIVE DEVICE: FRONT WHEEL WALKER
GAIT LEVEL OF ASSIST: MINIMAL ASSIST

## 2019-03-27 ASSESSMENT — PATIENT HEALTH QUESTIONNAIRE - PHQ9
1. LITTLE INTEREST OR PLEASURE IN DOING THINGS: NOT AT ALL
SUM OF ALL RESPONSES TO PHQ9 QUESTIONS 1 AND 2: 0
2. FEELING DOWN, DEPRESSED, IRRITABLE, OR HOPELESS: NOT AT ALL

## 2019-03-27 NOTE — PROGRESS NOTES
2 RN skin assessment complete, skin not WDL. Small scabs to L elbow, R forearm, L knee, toes of R foot. Toes of bilateral feet heavily callused. Mottled appearance to bilateral breasts. Dusky appearance to bilateral shins R>L. Mild redness and small scab to anterior aspect of head. Significant bruising to bilateral rib areas, pt reports this is from falls. Scattered bruises to all extremities which pt also reports are from falls.

## 2019-03-27 NOTE — PROGRESS NOTES
2203- Pt had 1 small episode of emesis. Reporting 'indigestion'. Pt also disclosed that she has difficulty swallowing certain foods. Will notify hospitalist.     2207- Hospitalist aware and orders received.

## 2019-03-27 NOTE — ASSESSMENT & PLAN NOTE
Potassium supplementation ordered  Expect increase of 0.1 mEq/L per each 10 mEq given  Will recheck after supplementation  Lab Results   Component Value Date/Time    POTASSIUM 3.3 (L) 03/26/2019 02:50 PM     Recheck bmp in the am for changes

## 2019-03-27 NOTE — ED PROVIDER NOTES
"CHIEF COMPLAINT  Chief Complaint   Patient presents with   • Weakness     20-30 days, pt unable to support own weight standing past 2 days   • Dizziness     denies falls       HPI  Lindsay Vargas is a 78 y.o. female who presents with weakness.  The patient's been suffering from weakness for probably 30 days.  She was admitted in the hospital seen by Dr. Chacon.  She ended up going to a care facility.  She did not feel they were taking care of her and left 2 days ago.  She has had falls at home since then on both days.  She injured her right flank her right knee.  She also has a headache.  She is unable to walk unable to get up now.  She has mild shortness of breath has felt feverish feels cold.  She has no chest pain.  She has some right knee pain and some aching in the extremities.  No diarrhea or vomiting in the all other systems are negative    REVIEW OF SYSTEMS  See HPI for further details    PAST MEDICAL HISTORY  Past Medical History:   Diagnosis Date   • Anesthesia     confused/hard to wake up-15 years ago   • Arthritis    • Cataract     Bilat IOL   • Chronic pain    • Hiatus hernia syndrome     surgically repaired   • Hypertension    • Lymphedema 2014   • Migraine headache    • Pain     back   • Pneumonia     \"younger\"       FAMILY HISTORY  History reviewed. No pertinent family history.    SOCIAL HISTORY  Social History     Social History   • Marital status:      Spouse name: N/A   • Number of children: N/A   • Years of education: N/A     Social History Main Topics   • Smoking status: Never Smoker   • Smokeless tobacco: Never Used   • Alcohol use No      Comment: rarely   • Drug use: No   • Sexual activity: Not on file     Other Topics Concern   • Not on file     Social History Narrative   • No narrative on file       SURGICAL HISTORY  Past Surgical History:   Procedure Laterality Date   • PB INJ LUMBAR/SACRAL,W/WO CNTRST N/A 11/10/2016    Procedure: INJ EPI NON NEUROLYTIC L/S -MIDLINE L4-L5;  " Surgeon: Eduardo Mustafa M.D.;  Location: SURGERY [facility];  Service: Pain Management   • PB FLUORSCOPIC GUIDANCE SPINAL INJECTION  11/10/2016    Procedure: FLUOROGUIDE FOR SPINAL INJ;  Surgeon: Eduardo Mustafa M.D.;  Location: [facility];  Service: Pain Management   • PB INJ LUMBAR/SACRAL,W/WO CNTRST Right 9/29/2016    Procedure: INJ EPI NON NEUROLYTIC L/S - L5-S1;  Surgeon: Eduardo Mustafa M.D.;  Location: [facility];  Service: Pain Management   • PB FLUORSCOPIC GUIDANCE SPINAL INJECTION  9/29/2016    Procedure: FLUOROGUIDE FOR SPINAL INJ;  Surgeon: Eduardo Mustafa M.D.;  Location: [facility];  Service: Pain Management   • CATARACT PHACO WITH IOL Right 2/18/2016    Procedure: CATARACT PHACO WITH IOL;  Surgeon: Rodrigo Smyth M.D.;  Location: [facility];  Service:    • CATARACT PHACO WITH IOL Left 2/4/2016    Procedure: CATARACT PHACO WITH IOL;  Surgeon: Rodrigo Smyth M.D.;  Location: [facility];  Service:    • JUANI BY LAPAROSCOPY     • HYSTERECTOMY LAPAROSCOPY     • OTHER      HIATAL HERNIA REPAIR   • OTHER ABDOMINAL SURGERY      LAP JUANI   • OTHER ABDOMINAL SURGERY      APPENDECOMTY   • OTHER ORTHOPEDIC SURGERY      LEFT KNEE SCOPE       CURRENT MEDICATIONS  Home Medications     Reviewed by Roger Bentley (Pharmacy Tech) on 03/26/19 at 1702  Med List Status: Complete   Medication Last Dose Status   acetaminophen (TYLENOL) 500 MG Tab > 1 week Active   duloxetine (CYMBALTA) 60 MG Cap DR Particles delayed-release capsule > 2 days Active   omeprazole (PRILOSEC) 40 MG delayed-release capsule > 2 days Active   potassium chloride SA (KDUR) 20 MEQ Tab CR > 2 days Active   pramipexole (MIRAPEX) 0.5 MG Tab > 2 night Active   propranolol CR (INDERAL LA) 160 MG CP24 capsule > 2 days Active                ALLERGIES  No Known Allergies    PHYSICAL EXAM  VITAL SIGNS: /59   Pulse 89   Temp 36.1 °C (97 °F) (Temporal)   Resp  "18   Ht 1.702 m (5' 7\")   Wt 70.1 kg (154 lb 8.7 oz)   SpO2 97%   BMI 24.20 kg/m²     Constitutional: Patient is alert and oriented x3 in mild distress   HENT: Dry mucous membranes  Eyes:   No conjunctivitis or icterus  Neck: Trach is midline cervical spine nontender  Lymphatic: No anterior cervical lymphadenopathy  Cardiovascular: Normal heart rate    Thorax & Lungs: Clear to auscultation  Back:    Abdomen: Left flank ecchymosis 2 days old abdomen is nontender  Neurologic: Normal motor sensation  Extremities: She has a small infected abrasion laceration over the right knee the knee is stable  Psychiatric: Affect normal, Judgment normal, Mood normal.     Results for orders placed or performed during the hospital encounter of 03/26/19   CBC WITH DIFFERENTIAL   Result Value Ref Range    WBC 10.5 4.8 - 10.8 K/uL    RBC 5.18 4.20 - 5.40 M/uL    Hemoglobin 14.6 12.0 - 16.0 g/dL    Hematocrit 43.7 37.0 - 47.0 %    MCV 84.4 81.4 - 97.8 fL    MCH 28.2 27.0 - 33.0 pg    MCHC 33.4 (L) 33.6 - 35.0 g/dL    RDW 46.5 35.9 - 50.0 fL    Platelet Count 230 164 - 446 K/uL    MPV 10.2 9.0 - 12.9 fL    Neutrophils-Polys 63.20 44.00 - 72.00 %    Lymphocytes 26.50 22.00 - 41.00 %    Monocytes 8.10 0.00 - 13.40 %    Eosinophils 0.90 0.00 - 6.90 %    Basophils 0.80 0.00 - 1.80 %    Immature Granulocytes 0.50 0.00 - 0.90 %    Nucleated RBC 0.00 /100 WBC    Neutrophils (Absolute) 6.66 2.00 - 7.15 K/uL    Lymphs (Absolute) 2.78 1.00 - 4.80 K/uL    Monos (Absolute) 0.85 0.00 - 0.85 K/uL    Eos (Absolute) 0.09 0.00 - 0.51 K/uL    Baso (Absolute) 0.08 0.00 - 0.12 K/uL    Immature Granulocytes (abs) 0.05 0.00 - 0.11 K/uL    NRBC (Absolute) 0.00 K/uL   COMP METABOLIC PANEL   Result Value Ref Range    Sodium 135 135 - 145 mmol/L    Potassium 3.3 (L) 3.6 - 5.5 mmol/L    Chloride 102 96 - 112 mmol/L    Co2 20 20 - 33 mmol/L    Anion Gap 13.0 (H) 0.0 - 11.9    Glucose 107 (H) 65 - 99 mg/dL    Bun 14 8 - 22 mg/dL    Creatinine 0.80 0.50 - 1.40 " mg/dL    Calcium 9.5 8.4 - 10.2 mg/dL    AST(SGOT) 25 12 - 45 U/L    ALT(SGPT) 20 2 - 50 U/L    Alkaline Phosphatase 78 30 - 99 U/L    Total Bilirubin 0.9 0.1 - 1.5 mg/dL    Albumin 4.1 3.2 - 4.9 g/dL    Total Protein 6.6 6.0 - 8.2 g/dL    Globulin 2.5 1.9 - 3.5 g/dL    A-G Ratio 1.6 g/dL   DIAGNOSTIC ALCOHOL   Result Value Ref Range    Diagnostic Alcohol 0.00 0.00 g/dL   ESTIMATED GFR   Result Value Ref Range    GFR If African American >60 >60 mL/min/1.73 m 2    GFR If Non African American >60 >60 mL/min/1.73 m 2      CT-ABDOMEN-PELVIS WITH   Final Result      1.  No acute abnormality or solid organ injury is appreciated on CT of abdomen and pelvis.      2.  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      3.  Diverticulosis again noted.         CT-HEAD W/O   Final Result         NO ACUTE ABNORMALITIES ARE NOTED ON CT SCAN OF THE HEAD.      Decreased attenuation in the cerebral white matter likely indicates microvascular ischemic disease.      DX-KNEE 2- RIGHT   Final Result      No acute fracture. Degenerative changes.      DX-CHEST-PORTABLE (1 VIEW)   Final Result      No evidence of acute cardiopulmonary process.              COURSE & MEDICAL DECISION MAKING  Pertinent Labs & Imaging studies reviewed. (See chart for details)  Patient has weakness falls and is unable to care for herself she has obvious signs of trauma CTs are being obtained.    Patient's labs unremarkable her CTs do not show any traumatic injury.    The patient is unable to care for self is falling she will be admitted for further evaluation    FINAL IMPRESSION  1.   1. Weakness    2. Traumatic ecchymosis of flank, initial encounter    3. Nonintractable headache, unspecified chronicity pattern, unspecified headache type    4. Dehydration        2.   3.         Electronically signed by: Justo Clemons, 3/26/2019 5:19 PM

## 2019-03-27 NOTE — H&P
"  Hospital Medicine History & Physical Note    Date of Service  3/26/2019    Primary Care Physician  Lenny RENTERIA M.D.    Consultants  None    Code Status  Full Code    Chief Complaint  Chief Complaint   Patient presents with   • Weakness     20-30 days, pt unable to support own weight standing past 2 days   • Dizziness     denies falls       History of Presenting Illness  Worobey is a very pleasant 78 y.o. female with a past medical history of HTN,GERD, Depression,  presented to the emergency room on 3/26/2019 for evaluation of falls and weakness over the past 2 days. Patient was just discharged from [facility] 3 days ago.  Apparently patient has been unable to get around her house had several falls today, did not sustain any head trauma, denied any loss of consciousness.  As result of her fall she did injure her right side of her flank area on her right knee.  Pain currently only in her back, she feels spasm-like sensations, 6 out of 10 in severity, exacerbated by certain movement.  Patient also complained of having mild shortness of breath but denies have any chest pain, fevers chills or nausea vomiting.  Patient and patient's  requested that she be referred to [facility] rehab as a  had a great success over there.    Review of Systems  ROS    Past Medical History  Past Medical History:   Diagnosis Date   • Lymphedema 2014   • Anesthesia     confused/hard to wake up-15 years ago   • Arthritis    • Cataract     Bilat IOL   • Chronic pain    • Hiatus hernia syndrome     surgically repaired   • Hypertension    • Migraine headache    • Pain     back   • Pneumonia     \"younger\"       Surgical History   has a past surgical history that includes hysterectomy laparoscopy; chely by laparoscopy; other abdominal surgery; other abdominal surgery; other; other orthopedic surgery; cataract phaco with iol (Left, 2/4/2016); cataract phaco with iol (Right, 2/18/2016); pr inj lumbar/sacral,w/wo cntrst (Right, " 9/29/2016); pr fluorscopic guidance spinal injection (9/29/2016); pr inj lumbar/sacral,w/wo cntrst (N/A, 11/10/2016); and pr fluorscopic guidance spinal injection (11/10/2016).    Family History  Denies having significant family history.    Social History   reports that she has never smoked. She has never used smokeless tobacco. She reports that she does not drink alcohol or use drugs.    Allergies  No Known Allergies    Medications  Prior to Admission medications    Medication Sig Start Date End Date Taking? Authorizing Provider   potassium chloride SA (KDUR) 20 MEQ Tab CR Take 20 mEq by mouth every day.   Yes Physician Outpatient   acetaminophen (TYLENOL) 500 MG Tab Take 1,000 mg by mouth every 6 hours as needed for Mild Pain.   Yes Physician Outpatient   omeprazole (PRILOSEC) 40 MG delayed-release capsule TAKE 1 CAPSULE BY MOUTH ONCE A DAY 30 MINUTES BEFORE BREAKFAST MEAL 12/9/18   Physician Outpatient   duloxetine (CYMBALTA) 60 MG Cap DR Particles delayed-release capsule Take 60 mg by mouth every day.    Physician Outpatient   pramipexole (MIRAPEX) 0.5 MG Tab Take 0.5 mg by mouth every bedtime.    Physician Outpatient   propranolol CR (INDERAL LA) 160 MG CP24 capsule Take 160 mg by mouth every day.    Physician Outpatient       Physical Exam  Temp:  [36 °C (96.8 °F)-36.1 °C (97 °F)] 36.1 °C (97 °F)  Pulse:  [73-89] 73  Resp:  [14-20] 18  BP: (134)/(48-59) 134/59  SpO2:  [94 %-100 %] 100 %  Physical Exam    Laboratory:  Recent Labs      03/26/19   1450   WBC  10.5   RBC  5.18   HEMOGLOBIN  14.6   HEMATOCRIT  43.7   MCV  84.4   MCH  28.2   MCHC  33.4*   RDW  46.5   PLATELETCT  230   MPV  10.2     Recent Labs      03/26/19   1450   SODIUM  135   POTASSIUM  3.3*   CHLORIDE  102   CO2  20   GLUCOSE  107*   BUN  14   CREATININE  0.80   CALCIUM  9.5     Recent Labs      03/26/19   1450   ALTSGPT  20   ASTSGOT  25   ALKPHOSPHAT  78   TBILIRUBIN  0.9   GLUCOSE  107*               Urinalysis:           Imaging:  CT-ABDOMEN-PELVIS WITH   Final Result      1.  No acute abnormality or solid organ injury is appreciated on CT of abdomen and pelvis.      2.  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      3.  Diverticulosis again noted.         CT-HEAD W/O   Final Result         NO ACUTE ABNORMALITIES ARE NOTED ON CT SCAN OF THE HEAD.      Decreased attenuation in the cerebral white matter likely indicates microvascular ischemic disease.      DX-KNEE 2- RIGHT   Final Result      No acute fracture. Degenerative changes.      DX-CHEST-PORTABLE (1 VIEW)   Final Result      No evidence of acute cardiopulmonary process.          Assessment/Plan:  I anticipate this patient is appropriate for observation status at this time.    * Falls   Assessment & Plan    PT/OT ordered  Rehab consult placed      Mood disorder (HCC)   Assessment & Plan    Resume home dose of cymbalta      GERD (gastroesophageal reflux disease)   Assessment & Plan    Resume home dose of omeprazole     Hypokalemia- (present on admission)   Assessment & Plan    Potassium supplementation ordered  Expect increase of 0.1 mEq/L per each 10 mEq given  Will recheck after supplementation  Lab Results   Component Value Date/Time    POTASSIUM 3.3 (L) 03/26/2019 02:50 PM     Recheck bmp in the am for changes            VTE prophylaxis: Prophylaxis: SCDs

## 2019-03-27 NOTE — THERAPY
"Occupational Therapy Evaluation completed.   Functional Status: Admitted following fall off toilet; weakness. A&Ox2. [place]. Mild confusion noted. Performs sup to sit with SBA. ADL transfer with NIMA GagnonW. Toileting at [facility] with ModA for hygiene. Unsteady when up; tolerates ~4\" standing/walking. Limited R shld AROM; old RTC injury per pt. Seated grooming with SBA. Tolerates UIC post session.     Plan of Care: Will benefit from Occupational Therapy 3 times per week  Discharge Recommendations:  Equipment: Will Continue to Assess for Equipment Needs.  Discharge to a transitional care facility for continued skilled therapy services. Pt had been home only for a few days after being at [facility] x2 days. Fall risk.   See \"Rehab Therapy-Acute\" Patient Summary Report for complete documentation.    "

## 2019-03-27 NOTE — PROGRESS NOTES
[facility] Medicine Daily Progress Note    Date of Service  3/27/2019    Chief Complaint  78 y.o. female admitted 3/26/2019 for ground level fall and weakness     Hospital Course    Sam mcwilliams a very pleasant 78 y.o. female with a past medical history of HTN,GERD, Depression,  presented to the emergency room on 3/26/2019 for evaluation of falls and weakness over the past 2 days. Patient was just discharged from [facility] 3 days ago.  Apparently patient has been unable to get around her house had several falls today, did not sustain any head trauma, denied any loss of consciousness.  As result of her fall she did injure her right side of her flank area on her right knee.  Pain currently only in her back, she feels spasm-like sensations, 6 out of 10 in severity, exacerbated by certain movement.  Patient also complained of having mild shortness of breath but denies have any chest pain, fevers chills or nausea vomiting.         Interval Problem Update  No acute issues overnight, patient still having myalgias and back pain, 4/10 in severity, worse when she moves. Otherwise in good spirits. Patient denies fevers/chills, chest pain, shortness of breath or nausea/vommiting.   Awaiting for rehab consultation.  Pain control  PT/OT    Consultants/Specialty  Physiatry    Code Status  Full Code    Disposition  TBD    Review of Systems  Review of Systems   Constitutional: Positive for malaise/fatigue. Negative for chills and fever.   HENT: Negative for congestion, hearing loss, sore throat and tinnitus.    Eyes: Negative for blurred vision, double vision, photophobia and pain.   Respiratory: Negative for cough, hemoptysis, sputum production, shortness of breath and stridor.    Cardiovascular: Negative for chest pain, palpitations, orthopnea, claudication and PND.   Gastrointestinal: Negative for blood in stool, constipation, heartburn, melena, nausea and vomiting.   Genitourinary: Negative for dysuria, frequency and urgency.    Musculoskeletal: Positive for back pain, falls, joint pain and myalgias. Negative for neck pain.   Neurological: Positive for weakness. Negative for dizziness, tingling, tremors, sensory change, speech change and headaches.   Psychiatric/Behavioral: Negative for depression, memory loss and suicidal ideas. The patient is not nervous/anxious.         Physical Exam  Temp:  [36.1 °C (97 °F)-37.1 °C (98.8 °F)] 36.6 °C (97.9 °F)  Pulse:  [73-94] 83  Resp:  [14-20] 18  BP: (133-157)/(57-84) 133/60  SpO2:  [94 %-100 %] 95 %    Physical Exam   Constitutional: She is oriented to person, place, and time. She appears well-developed and well-nourished. No distress.   HENT:   Head: Normocephalic and atraumatic.   Mouth/Throat: No oropharyngeal exudate.   Eyes: Pupils are equal, round, and reactive to light. Conjunctivae are normal. Right eye exhibits no discharge. No scleral icterus.   Neck: Neck supple. No JVD present. No thyromegaly present.   Cardiovascular: Normal rate and intact distal pulses.    No murmur heard.  Pulmonary/Chest: Effort normal and breath sounds normal. No stridor. No respiratory distress. She has no wheezes. She has no rales.   Abdominal: Soft. Bowel sounds are normal. She exhibits no distension. There is no tenderness. There is no rebound.   Musculoskeletal: Normal range of motion. She exhibits no edema.   Neurological: She is alert and oriented to person, place, and time. No cranial nerve deficit.   Skin: Skin is warm. She is not diaphoretic. No erythema.   Psychiatric: She has a normal mood and affect. Her behavior is normal. Thought content normal.   Nursing note and vitals reviewed.      Fluids    Intake/Output Summary (Last 24 hours) at 03/27/19 1331  Last data filed at 03/27/19 1254   Gross per 24 hour   Intake             1460 ml   Output              600 ml   Net              860 ml       Laboratory  Recent Labs      03/26/19   1450  03/27/19   0459   WBC  10.5  7.1   RBC  5.18  4.64   HEMOGLOBIN   14.6  12.8   HEMATOCRIT  43.7  38.0   MCV  84.4  81.9   MCH  28.2  27.6   MCHC  33.4*  33.7   RDW  46.5  44.9   PLATELETCT  230  201   MPV  10.2  10.2     Recent Labs      03/26/19   1450  03/27/19   0459   SODIUM  135  133*   POTASSIUM  3.3*  3.2*   CHLORIDE  102  100   CO2  20  23   GLUCOSE  107*  110*   BUN  14  9   CREATININE  0.80  0.67   CALCIUM  9.5  9.0                   Imaging  CT-ABDOMEN-PELVIS WITH   Final Result      1.  No acute abnormality or solid organ injury is appreciated on CT of abdomen and pelvis.      2.  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      3.  Diverticulosis again noted.         CT-HEAD W/O   Final Result         NO ACUTE ABNORMALITIES ARE NOTED ON CT SCAN OF THE HEAD.      Decreased attenuation in the cerebral white matter likely indicates microvascular ischemic disease.      DX-KNEE 2- RIGHT   Final Result      No acute fracture. Degenerative changes.      DX-CHEST-PORTABLE (1 VIEW)   Final Result      No evidence of acute cardiopulmonary process.           Assessment/Plan  * Falls   Assessment & Plan    Pending rehab consulted.  PT/OT recommended placement to SNF, but  wants rehab.  Fall precautions in place      Mood disorder (HCC)   Assessment & Plan    Resume home dose of cymbalta      GERD (gastroesophageal reflux disease)   Assessment & Plan    Resume home dose of omeprazole     Hypokalemia- (present on admission)   Assessment & Plan    Potassium supplementation ordered  Expect increase of 0.1 mEq/L per each 10 mEq given  40meq KDUR ordered   Will recheck after supplementation  Lab Results   Component Value Date/Time    POTASSIUM 3.2 (L) 03/27/2019 04:59 AM     Recheck bmp in the am for changes          Hyponatremia (Not present on admission)  encourage PO intake   Recheck bmp in the am    VTE prophylaxis: SCDs

## 2019-03-27 NOTE — ED NOTES
Med rec updated and complete  Allergies reviewed  Interviewed pt with  at bedside with permission from pt.  Pt reports that she left [facility] 3 days ago, she was only there for 5 days.  Pt reports no antibiotics in the last 30 days.

## 2019-03-27 NOTE — DISCHARGE PLANNING
PMR order received from Dr. Fournier.  SCP is shown for her medical provider.  Presented with weakness and dizziness.  Dr. Fontaine has reviewed this case and is agreeable with an [facility] admission.  Written consult to follow.  Submitted to insurance with Meredith [phone].   I do appreciate the referral.

## 2019-03-27 NOTE — THERAPY
"Physical Therapy Evaluation completed.   Bed Mobility:  Supine to Sit: Contact Guard Assist  Transfers: Sit to Stand: Minimal Assist  Gait: Level Of Assist: Minimal Assist with Front-Wheel Walker x 40 ft, leans to the left, usnteady      Plan of Care: Will benefit from Physical Therapy 3 times per week  Discharge Recommendations: Equipment: Will Continue to Assess for Equipment Needs. Post-acute therapy Discharge to a transitional care facility for continued skilled therapy services.    Pt is a 77 yo female s/p fall at home presenting with weakness and deconditoning. Pt is not at PLOF and therefore would benefit from continued acute PT, recommend f/u PT at transitional care facility prior to DC home. Pts spouse is unable to care for pt at current level of function.     See \"Rehab Therapy-Acute\" Patient Summary Report for complete documentation.     "

## 2019-03-27 NOTE — PROGRESS NOTES
"Pt arrived to room on [facility]. A[place] (5 feet). Pt then taken to commode and back to bed. Pt reports she takes pills \"cut up in something, but I can take some whole.\" Pt requested tylenol and attempted to give whole per pt request, but ended up requiring this to be crushed. Given with pudding. Pt is alert and oriented, but very hard of hearing. Unable to answer questions appropriately d/t inability to hear questions. Will attempt admission profile once  present. Pt denies further needs at this time.   "

## 2019-03-27 NOTE — CARE PLAN
Problem: Communication  Goal: The ability to communicate needs accurately and effectively will improve  Outcome: PROGRESSING AS EXPECTED  Calls appropriately, makes needs known    Problem: Safety  Goal: Will remain free from injury  Outcome: PROGRESSING AS EXPECTED  Remains free from new injury this admit    Goal: Will remain free from falls  Outcome: PROGRESSING AS EXPECTED  Remains free from falls this admit

## 2019-03-28 PROCEDURE — G0378 HOSPITAL OBSERVATION PER HR: HCPCS

## 2019-03-28 PROCEDURE — 99225 PR SUBSEQUENT OBSERVATION CARE,LEVEL II: CPT | Performed by: HOSPITALIST

## 2019-03-28 PROCEDURE — 700102 HCHG RX REV CODE 250 W/ 637 OVERRIDE(OP): Performed by: HOSPITALIST

## 2019-03-28 PROCEDURE — A9270 NON-COVERED ITEM OR SERVICE: HCPCS | Performed by: HOSPITALIST

## 2019-03-28 RX ADMIN — OMEPRAZOLE 40 MG: 20 CAPSULE, DELAYED RELEASE ORAL at 05:38

## 2019-03-28 RX ADMIN — PRAMIPEXOLE DIHYDROCHLORIDE 0.5 MG: 0.5 TABLET ORAL at 22:07

## 2019-03-28 RX ADMIN — DULOXETINE HYDROCHLORIDE 60 MG: 30 CAPSULE, DELAYED RELEASE ORAL at 05:38

## 2019-03-28 RX ADMIN — PROPRANOLOL HYDROCHLORIDE 160 MG: 80 CAPSULE, EXTENDED RELEASE ORAL at 05:39

## 2019-03-28 ASSESSMENT — ENCOUNTER SYMPTOMS
SENSORY CHANGE: 0
HEADACHES: 0
CLAUDICATION: 0
DOUBLE VISION: 0
PND: 0
ORTHOPNEA: 0
SPUTUM PRODUCTION: 0
STRIDOR: 0
TINGLING: 0
EYE PAIN: 0
BLOOD IN STOOL: 0
TREMORS: 0
PALPITATIONS: 0
HEARTBURN: 0
SPEECH CHANGE: 0
PHOTOPHOBIA: 0
WEAKNESS: 0
BLURRED VISION: 0
DIZZINESS: 0
NECK PAIN: 0
CHILLS: 0
SHORTNESS OF BREATH: 0
MEMORY LOSS: 0
FALLS: 1
CONSTIPATION: 0
DEPRESSION: 0
VOMITING: 0
MYALGIAS: 1
HEMOPTYSIS: 0
NAUSEA: 0
FEVER: 0
SORE THROAT: 0
NERVOUS/ANXIOUS: 0
COUGH: 0
BACK PAIN: 1

## 2019-03-28 ASSESSMENT — PATIENT HEALTH QUESTIONNAIRE - PHQ9
2. FEELING DOWN, DEPRESSED, IRRITABLE, OR HOPELESS: NOT AT ALL
SUM OF ALL RESPONSES TO PHQ9 QUESTIONS 1 AND 2: 0
1. LITTLE INTEREST OR PLEASURE IN DOING THINGS: NOT AT ALL

## 2019-03-28 NOTE — PREADMISSION SCREENING NOTE
"  Pre-Admission Screening Form    Patient Information:   Name: Lindsay Vargas     MRN: [id number]       : 1940      Age: 78 y.o.   Gender: female      Race: White [7]       Marital Status:  [2]  Family Contact: Abdulaziz Vargas Michelle        Relationship: Spouse [17]  Daughter [2]  Home Phone: [phone]           Cell Phone: [phone]    Advanced Directives: None  Code Status:  FULL  Current Attending Provider: Lidia Fournier M.D.  Referring Physician: Dr. Fournier   Physiatrist Consult: Dr. Fontaine    Referral Date: 19  Primary Payor Source:  [employer]  Secondary Payor Source:      Medical Information:   Date of Admission to Acute Care Setting:3/26/2019  Room Number: 2221/01  Rehabilitation Diagnosis: 16 Debility (Non Cardiac, Non Pulmonary)  Immunization History   Administered Date(s) Administered   • Influenza (IM) Preservative Free 2013, 11/10/2014   • Influenza Seasonal Injectable 2010   • Influenza Vaccine Adult HD 2015, 2016, 11/15/2017, 10/11/2018   • Influenza Vaccine Pediatric Split 10/12/2009   • Pneumococcal Conjugate Vaccine (Prevnar/PCV-13) 11/15/2017   • Pneumococcal polysaccharide vaccine (PPSV-23) 2011     No Known Allergies  Past Medical History:   Diagnosis Date   • Anesthesia     confused/hard to wake up-15 years ago   • Arthritis    • Cataract     Bilat IOL   • Chronic pain    • Hiatus hernia syndrome     surgically repaired   • Hypertension    • Lymphedema    • Migraine headache    • Pain     back   • Pneumonia     \"younger\"     Past Surgical History:   Procedure Laterality Date   • PB INJ LUMBAR/SACRAL,W/WO CNTRST N/A 11/10/2016    Procedure: INJ EPI NON NEUROLYTIC L/S -MIDLINE L4-L5;  Surgeon: Eduardo Mustafa M.D.;  Location: [facility];  Service: Pain Management   • PB FLUORSCOPIC GUIDANCE SPINAL INJECTION  11/10/2016    Procedure: FLUOROGUIDE FOR SPINAL INJ;  Surgeon: Eduardo WRIGHT" CANDICE Mustafa;  Location: [facility];  Service: Pain Management   • PB INJ LUMBAR/SACRAL,W/WO CNTRST Right 9/29/2016    Procedure: INJ EPI NON NEUROLYTIC L/S - L5-S1;  Surgeon: Eduardo Mustafa M.D.;  Location: [facility];  Service: Pain Management   • PB FLUORSCOPIC GUIDANCE SPINAL INJECTION  9/29/2016    Procedure: FLUOROGUIDE FOR SPINAL INJ;  Surgeon: Eduardo Mustafa M.D.;  Location: [facility];  Service: Pain Management   • CATARACT PHACO WITH IOL Right 2/18/2016    Procedure: CATARACT PHACO WITH IOL;  Surgeon: Rodrigo mSyth M.D.;  Location: [facility];  Service:    • CATARACT PHACO WITH IOL Left 2/4/2016    Procedure: CATARACT PHACO WITH IOL;  Surgeon: Rodrigo Smyth M.D.;  Location: [facility];  Service:    • JUANI BY LAPAROSCOPY     • HYSTERECTOMY LAPAROSCOPY     • OTHER      HIATAL HERNIA REPAIR   • OTHER ABDOMINAL SURGERY      LAP JUANI   • OTHER ABDOMINAL SURGERY      APPENDECOMTY   • OTHER ORTHOPEDIC SURGERY      LEFT KNEE SCOPE       History Leading to Admission, Conditions that Caused the Need for Rehab (CMS):     Lidia Fournier M.D. Physician Signed Hospital Medicine  H&P Date of Service: 3/26/2019  7:32 PM         []Hide copied text  []Bharti for attribution information                          [facility] Medicine History & Physical Note     Date of Service  3/26/2019     Primary Care Physician  Lenny RENTERIA M.D.     Consultants  None     Code Status  Full Code     Chief Complaint       Chief Complaint   Patient presents with   • Weakness       20-30 days, pt unable to support own weight standing past 2 days   • Dizziness       denies falls         History of Presenting Illness  Worobey is a very pleasant 78 y.o. female with a past medical history of HTN,GERD, Depression,  presented to the emergency room on 3/26/2019 for evaluation of falls and weakness over the past 2 days. Patient was just discharged from [facility] 3  "days ago.  Apparently patient has been unable to get around her house had several falls today, did not sustain any head trauma, denied any loss of consciousness.  As result of her fall she did injure her right side of her flank area on her right knee.  Pain currently only in her back, she feels spasm-like sensations, 6 out of 10 in severity, exacerbated by certain movement.  Patient also complained of having mild shortness of breath but denies have any chest pain, fevers chills or nausea vomiting.  Patient and patient's  [facility]  had a great success over there.     Review of Systems  ROS     Past Medical History       Past Medical History:   Diagnosis Date   • Lymphedema 2014   • Anesthesia       confused/hard to wake up-15 years ago   • Arthritis     • Cataract       Bilat IOL   • Chronic pain     • Hiatus hernia syndrome       surgically repaired   • Hypertension     • Migraine headache     • Pain       back   • Pneumonia       \"younger\"         Surgical History   has a past surgical history that includes hysterectomy laparoscopy; chely by laparoscopy; other abdominal surgery; other abdominal surgery; other; other orthopedic surgery; cataract phaco with iol (Left, 2/4/2016); cataract phaco with iol (Right, 2/18/2016); pr inj lumbar/sacral,w/wo cntrst (Right, 9/29/2016); pr fluorscopic guidance spinal injection (9/29/2016); pr inj lumbar/sacral,w/wo cntrst (N/A, 11/10/2016); and pr fluorscopic guidance spinal injection (11/10/2016).     Family History  Denies having significant family history.     Social History   reports that she has never smoked. She has never used smokeless tobacco. She reports that she does not drink alcohol or use drugs.     Allergies  No Known Allergies     Medications          Prior to Admission medications    Medication Sig Start Date End Date Taking? Authorizing Provider   potassium chloride SA (KDUR) 20 MEQ Tab CR Take 20 mEq by " mouth every day.     Yes Physician Outpatient   acetaminophen (TYLENOL) 500 MG Tab Take 1,000 mg by mouth every 6 hours as needed for Mild Pain.     Yes Physician Outpatient   omeprazole (PRILOSEC) 40 MG delayed-release capsule TAKE 1 CAPSULE BY MOUTH ONCE A DAY 30 MINUTES BEFORE BREAKFAST MEAL 12/9/18     Physician Outpatient   duloxetine (CYMBALTA) 60 MG Cap DR Particles delayed-release capsule Take 60 mg by mouth every day.       Physician Outpatient   pramipexole (MIRAPEX) 0.5 MG Tab Take 0.5 mg by mouth every bedtime.       Physician Outpatient   propranolol CR (INDERAL LA) 160 MG CP24 capsule Take 160 mg by mouth every day.       Physician Outpatient         Physical Exam  Temp:  [36 °C (96.8 °F)-36.1 °C (97 °F)] 36.1 °C (97 °F)  Pulse:  [73-89] 73  Resp:  [14-20] 18  BP: (134)/(48-59) 134/59  SpO2:  [94 %-100 %] 100 %  Physical Exam     Laboratory:      Recent Labs      03/26/19   1450   WBC  10.5   RBC  5.18   HEMOGLOBIN  14.6   HEMATOCRIT  43.7   MCV  84.4   MCH  28.2   MCHC  33.4*   RDW  46.5   PLATELETCT  230   MPV  10.2          Recent Labs      03/26/19   1450   SODIUM  135   POTASSIUM  3.3*   CHLORIDE  102   CO2  20   GLUCOSE  107*   BUN  14   CREATININE  0.80   CALCIUM  9.5          Recent Labs      03/26/19   1450   ALTSGPT  20   ASTSGOT  25   ALKPHOSPHAT  78   TBILIRUBIN  0.9   GLUCOSE  107*                 Urinalysis:           Imaging:  CT-ABDOMEN-PELVIS WITH   Final Result       1.  No acute abnormality or solid organ injury is appreciated on CT of abdomen and pelvis.       2.  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       3.  Diverticulosis again noted.           CT-HEAD W/O   Final Result           NO ACUTE ABNORMALITIES ARE NOTED ON CT SCAN OF THE HEAD.       Decreased attenuation in the cerebral white matter likely indicates microvascular ischemic disease.       DX-KNEE 2- RIGHT   Final Result       No acute fracture. Degenerative changes.       DX-CHEST-PORTABLE (1  VIEW)   Final Result       No evidence of acute cardiopulmonary process.             Assessment/Plan:  I anticipate this patient is appropriate for observation status at this time.         * Falls   Assessment & Plan     PT/OT ordered  Rehab consult placed       Mood disorder (HCC)   Assessment & Plan     Resume home dose of cymbalta       GERD (gastroesophageal reflux disease)   Assessment & Plan     Resume home dose of omeprazole      Hypokalemia- (present on admission)   Assessment & Plan     Potassium supplementation ordered  Expect increase of 0.1 mEq/L per each 10 mEq given  Will recheck after supplementation          Lab Results   Component Value Date/Time     POTASSIUM 3.3 (L) 03/26/2019 02:50 PM      Recheck bmp in the am for changes               VTE prophylaxis: Prophylaxis: May Fontaine M.D. Physician Signed Physical Medicine & Rehab  Consults Date of Service: 3/27/2019  5:34 PM   Consult Orders:   IP CONSULT FOR PHYSIATRY [[id number]] ordered by Lidia Fournier M.D. at 03/27/19 1451         []Hide copied text  []Hover for attribution information  Medical chart review completed.      Patient is a 78 y.o. female  with a past medical history of hypertension, GERD, depression, admitted to [facility] on 3/26/2019, with complaints of weakness and falls. She had a recent admission for altered mental status , dizziness, was thought to be secondary to dehydration, hyponatremia, and polypharmacy. She was discharged to SNF for rehab, but only stayed there for 3 days as she was not satisfied with the care there. She then returned to the ED with complaints of continued weakness and falls, with trauma to her right knee and left flank. She had negative Head CT, x-rays negative for fracture. She is having issues with back pain and spasms, with a history of being on baclofen prior, which has been discontinued due to her recent confusion and polypharmacy.     Patient with multiple  co-morbidities(including but not limited to hypokalemia, GERD, history of depression, back pain); with cognitive deficits and functional deficits in mobility/self-cares, and Severe de-conditioning.      Pre-morbidly, this patient lived in a single level home with One steps to enter, with spouse. The patient was evaluated by acute care Physical Therapy and Occupational Therapy; currently requiring minimal assistance for mobility and minimal assistance for ADLs, also with ongoing cognitive deficits.      6 clicks score 12 mobility, 17 ADLs     The patient is a very good candidate for an acute inpatient rehabilitation program with a coordinated program of care at an intensity and frequency not available at a lower level of care.      She was not successful at SNF or at home, is at risk to fall and cause greater injuries. She would benefit from intensive therapies and for further evaluation of her back pain/spasms.     Note: if the patient continues to progress while waiting for medical clearance, and no longer requires 2 out of 3 therapy services (PT/OT/SLP), then the patient would no longer meet the criteria for acute inpatient rehabilitation.     This recommendation is substantiated by the patient's current medical condition with intervention and assessment of medical issues requiring an acute level of care for patient's safety and maximum outcome. A coordinated program of care will be provided by an interdisciplinary team including physical therapy, occupational therapy, speech language pathology, physiatry, rehab nursing and rehab psychology. Rehab goals include improved cognition, mobility, self-care management, strength and conditioning/endurance, pain management, bowel and bladder management, mood and affect, and safety with independent home management including caregiver training. Estimated length of stay is approximately 7-10 days. Rehab potential: Excellent. Disposition: to pre-morbid independent living  setting with supportive care of patient's spouse. We will continue to follow with you in anticipation of discharge to acute inpatient rehabilitation when medically stable to do so at the discretion of her attending physician.      Thank you for allowing us to participate in her care.     Marleen Fontaine M.D.  Physical Medicine and Rehabilitation                      Lidia Fournier M.D. Physician Signed Hospital Medicine  Progress Notes Date of Service: 3/27/2019  1:31 PM         []Hide copied text  Hospital Medicine Daily Progress Note     Date of Service  3/27/2019     Chief Complaint  78 y.o. female admitted 3/26/2019 for ground level fall and weakness      Hospital Course    Sma is a very pleasant 78 y.o. female with a past medical history of HTN,GERD, Depression,  presented to the emergency room on 3/26/2019 for evaluation of falls and weakness over the past 2 days. Patient was just discharged from [facility] days ago.  Apparently patient has been unable to get around her house had several falls today, did not sustain any head trauma, denied any loss of consciousness.  As result of her fall she did injure her right side of her flank area on her right knee.  Pain currently only in her back, she feels spasm-like sensations, 6 out of 10 in severity, exacerbated by certain movement.  Patient also complained of having mild shortness of breath but denies have any chest pain, fevers chills or nausea vomiting.          Interval Problem Update  No acute issues overnight, patient still having myalgias and back pain, 4/10 in severity, worse when she moves. Otherwise in good spirits. Patient denies fevers/chills, chest pain, shortness of breath or nausea/vommiting.   Awaiting for rehab consultation.  Pain control  PT/OT     Consultants/Specialty  Physiatry     Code Status  Full Code     Disposition  TBD     Review of Systems  Review of Systems   Constitutional: Positive for malaise/fatigue. Negative for chills  and fever.   HENT: Negative for congestion, hearing loss, sore throat and tinnitus.    Eyes: Negative for blurred vision, double vision, photophobia and pain.   Respiratory: Negative for cough, hemoptysis, sputum production, shortness of breath and stridor.    Cardiovascular: Negative for chest pain, palpitations, orthopnea, claudication and PND.   Gastrointestinal: Negative for blood in stool, constipation, heartburn, melena, nausea and vomiting.   Genitourinary: Negative for dysuria, frequency and urgency.   Musculoskeletal: Positive for back pain, falls, joint pain and myalgias. Negative for neck pain.   Neurological: Positive for weakness. Negative for dizziness, tingling, tremors, sensory change, speech change and headaches.   Psychiatric/Behavioral: Negative for depression, memory loss and suicidal ideas. The patient is not nervous/anxious.          Physical Exam  Temp:  [36.1 °C (97 °F)-37.1 °C (98.8 °F)] 36.6 °C (97.9 °F)  Pulse:  [73-94] 83  Resp:  [14-20] 18  BP: (133-157)/(57-84) 133/60  SpO2:  [94 %-100 %] 95 %     Physical Exam   Constitutional: She is oriented to person, place, and time. She appears well-developed and well-nourished. No distress.   HENT:   Head: Normocephalic and atraumatic.   Mouth/Throat: No oropharyngeal exudate.   Eyes: Pupils are equal, round, and reactive to light. Conjunctivae are normal. Right eye exhibits no discharge. No scleral icterus.   Neck: Neck supple. No JVD present. No thyromegaly present.   Cardiovascular: Normal rate and intact distal pulses.    No murmur heard.  Pulmonary/Chest: Effort normal and breath sounds normal. No stridor. No respiratory distress. She has no wheezes. She has no rales.   Abdominal: Soft. Bowel sounds are normal. She exhibits no distension. There is no tenderness. There is no rebound.   Musculoskeletal: Normal range of motion. She exhibits no edema.   Neurological: She is alert and oriented to person, place, and time. No cranial nerve deficit.    Skin: Skin is warm. She is not diaphoretic. No erythema.   Psychiatric: She has a normal mood and affect. Her behavior is normal. Thought content normal.   Nursing note and vitals reviewed.        Fluids     Intake/Output Summary (Last 24 hours) at 03/27/19 1331  Last data filed at 03/27/19 1254    Gross per 24 hour   Intake             1460 ml   Output              600 ml   Net              860 ml         Laboratory       Recent Labs      03/26/19   1450  03/27/19   0459   WBC  10.5  7.1   RBC  5.18  4.64   HEMOGLOBIN  14.6  12.8   HEMATOCRIT  43.7  38.0   MCV  84.4  81.9   MCH  28.2  27.6   MCHC  33.4*  33.7   RDW  46.5  44.9   PLATELETCT  230  201   MPV  10.2  10.2           Recent Labs      03/26/19   1450  03/27/19   0459   SODIUM  135  133*   POTASSIUM  3.3*  3.2*   CHLORIDE  102  100   CO2  20  23   GLUCOSE  107*  110*   BUN  14  9   CREATININE  0.80  0.67   CALCIUM  9.5  9.0                     Imaging  CT-ABDOMEN-PELVIS WITH   Final Result       1.  No acute abnormality or solid organ injury is appreciated on CT of abdomen and pelvis.       2.  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       3.  Diverticulosis again noted.           CT-HEAD W/O   Final Result           NO ACUTE ABNORMALITIES ARE NOTED ON CT SCAN OF THE HEAD.       Decreased attenuation in the cerebral white matter likely indicates microvascular ischemic disease.       DX-KNEE 2- RIGHT   Final Result       No acute fracture. Degenerative changes.       DX-CHEST-PORTABLE (1 VIEW)   Final Result       No evidence of acute cardiopulmonary process.             Assessment/Plan      * Falls   Assessment & Plan     Pending rehab consulted.  PT/OT recommended placement to SNF, but  wants rehab.  Fall precautions in place       Mood disorder (HCC)   Assessment & Plan     Resume home dose of cymbalta       GERD (gastroesophageal reflux disease)   Assessment & Plan     Resume home dose of omeprazole      Hypokalemia-  "(present on admission)   Assessment & Plan     Potassium supplementation ordered  Expect increase of 0.1 mEq/L per each 10 mEq given  40meq KDUR ordered   Will recheck after supplementation          Lab Results   Component Value Date/Time     POTASSIUM 3.2 (L) 03/27/2019 04:59 AM      Recheck bmp in the am for changes            Hyponatremia (Not present on admission)  encourage PO intake   Recheck bmp in the am     VTE prophylaxis: SCDs              Co-morbidities: See PMH  Potential Risk - Complications: Aphasia, Cognitive Impairment, Contractures, Deep Vein Thrombosis, Dysphagia, Incontinence, Malnutrition, Pain, Perceptual Impairment, Pneumonia, Pressure Ulcer and Urinary Tract Infection  Level of Risk: High    Ongoing Medical Management Needed (Medical/Nursing Needs):   Patient Active Problem List    Diagnosis Date Noted   • Falls 03/26/2019     Priority: High   • GERD (gastroesophageal reflux disease) 03/26/2019   • Mood disorder ([facility]) 03/26/2019   • Hypokalemia 03/18/2019   • Hyponatremia 03/18/2019   • Dehydration 03/18/2019   • SIRS (systemic inflammatory response syndrome) ([facility]) 03/18/2019   • Acute metabolic encephalopathy 03/18/2019   • NSTEMI (non-ST elevated myocardial infarction) (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 02/18/2016   • Combined form of senile cataract of left eye 02/04/2016     A & O with periods of confusion    Current Vital Signs:   Temperature: 36.7 °C (98 °F) Pulse: 65 Respiration: 18 Blood Pressure : 136/57  Weight: 70.1 kg (154 lb 8.7 oz) Height: 170.2 cm (5' 7\")  Pulse Oximetry: 98 % O2 (LPM): 0      Completed Laboratory Reports:  Recent Labs      03/26/19   1450  03/27/19   0459   WBC  10.5  7.1   HEMOGLOBIN  14.6  12.8   HEMATOCRIT  43.7  38.0   PLATELETCT  230  201   SODIUM  135  133*   POTASSIUM  3.3*  3.2*   BUN  14  9   CREATININE  " 0.80  0.67   ALBUMIN  4.1  3.6   GLUCOSE  107*  110*     Additional Labs: Not Applicable    Prior Living Situation:   Housing / Facility: 1 Story House  Steps Into Home: 1  Steps In Home: 0  Lives with - Patient's Self Care Capacity: Spouse  Equipment Owned: Front-Wheel Walker, Single Point Cane, Wheelchair, Tub / Shower Seat, Grab Bar(s) By Toilet, Hand Held Shower    Prior Level of Function / Living Situation:   Physical Therapy: Prior Services: Continuous (24 Hour) Care Giving Family  Housing / Facility: 1 Story House  Steps Into Home: 1  Steps In Home: 0  Bathroom Set up: Walk In Shower, Bathtub / Shower Combination, Shower Chair  Equipment Owned: Front-Wheel Walker, Single Point Cane, Wheelchair, Tub / Shower Seat, Grab Bar(s) By Toilet, Hand Held Shower  Lives with - Patient's Self Care Capacity: Spouse  Bed Mobility: Required Assist  Transfer Status: Required Assist  Ambulation: Required Assist  Assistive Devices Used: Front-Wheel Walker  Current Level of Function:   Level Of Assist: Minimal Assist  Assistive Device: Front Wheel Walker  Distance (Feet): 40  Deviation: Shuffled Gait, Bradykinetic (leans to the left)  Supine to Sit: Stand by Assist  Scooting: Stand by Assist  Sit to Stand: Minimal Assist  Bed, Chair, Wheelchair Transfer: Minimal Assist  Toilet Transfers: Minimal Assist (bsc)  Transfer Method: Stand Pivot  Sitting in Chair: >30  Sitting Edge of Bed: 5  Standin  Occupational Therapy:   Self Feeding: Independent  Grooming / Hygiene: Independent  Bathing: Requires Assist  Dressing: Requires Assist  Toileting: Requires Assist  Medication Management: Requires Assist  Laundry: Requires Assist  Kitchen Mobility: Requires Assist  Finances: Requires Assist  Home Management: Requires Assist  Shopping: Requires Assist  Prior Level Of Mobility: Supervision With Device in Home  Driving / Transportation: Relatives / Others Provide Transportation  Prior Services: Continuous (24 Hour) Care Giving  Family  Housing / Facility: 1 Story House  Occupation (Pre-Hospital Vocational): Not Employed  Leisure Interests: Unable To Determine At This Time  Current Level of Function:   Eating: Independent  Bathing: Minimal Assist (UB sponge bath)  Upper Body Dressing: Minimal Assist  Lower Body Dressing: Not Tested  Toileting: Moderate Assist  Speech Language Pathology:      Rehabilitation Prognosis/Potential: Good  Estimated Length of Stay: 7-10 days    Nursing:   Orientation : Oriented x 4  Incontinent    Scope/Intensity of Services Recommended:  Physical Therapy: 1.5 hr / day  5 days / week. Therapeutic Interventions Required: Maximize Endurance, Mobility, Strength and Safety  Occupational Therapy: 1.5 hr / day 5 days / week. Therapeutic Interventions Required: Maximize Self Care, ADLs, IADLs and Energy Conservation  Speech & Language Pathology: Consult cognition 5 days / week. Therapeutic Interventions Required: Maximize Cognition and Safety  Rehabilitation Nursin/7. Therapeutic Interventions Required: Monitor Pain, Skin, Vital Signs, Intake and Output, Labs, Safety, Aspiration Risk and Family Training  Rehabilitation Physician: 3 - 5 days / week. Therapeutic Interventions Required: Medical Management    Rehabilitation Goals and Plan (Expected frequency & duration of treatment in the IRF):   Return to the Community, Modified Independent Level of Care and Family Able to Provide  Assistance  Anticipated Date of Rehabilitation Admission: 19  Patient/Family oriented IRF level of care/facility/plan: Yes  Patient/Family willing to participate in IRF care/facility/plan: Yes  Patient able to tolerate IRF level of care proposed: Yes  Patient has potential to benefit IRF level of care proposed: Yes  Comments: Not Applicable    Special Needs or Precautions - Medical Necessity:  Safety Concerns/Precautions:  Fall Risk / High Risk for Falls, Balance, Cognition and Bed / Chair Alarm  Pain Management  IV Site:  Peripheral  Current Medications:    Current Facility-Administered Medications Ordered in Epic   Medication Dose Route Frequency Provider Last Rate Last Dose   • propranolol CR (INDERAL LA) capsule 160 mg  160 mg Oral Q DAY Lidia Fournier M.D.   160 mg at 03/28/19 0539   • ondansetron (ZOFRAN) syringe/vial injection 4 mg  4 mg Intravenous Q4HRS PRN Lidia Fournier M.D.       • DULoxetine (CYMBALTA) capsule 60 mg  60 mg Oral DAILY Lidia Fournier M.D.   60 mg at 03/28/19 0538   • omeprazole (PRILOSEC) capsule 40 mg  40 mg Oral DAILY Lidia Fournier M.D.   40 mg at 03/28/19 0538   • pramipexole (MIRAPEX) tablet 0.5 mg  0.5 mg Oral QHS Lidia Fournier M.D.   0.5 mg at 03/27/19 2031   • senna-docusate (PERICOLACE or SENOKOT S) 8.6-50 MG per tablet 2 Tab  2 Tab Oral BID Lidia Fournier M.D.   2 Tab at 03/27/19 0548    And   • polyethylene glycol/lytes (MIRALAX) PACKET 1 Packet  1 Packet Oral QDAY PRN Lidia Fournier M.D.        And   • magnesium hydroxide (MILK OF MAGNESIA) suspension 30 mL  30 mL Oral QDAY PRN Lidia Fournier M.D.        And   • bisacodyl (DULCOLAX) suppository 10 mg  10 mg Rectal QDAY PRN Lidia Fournier M.D.       • acetaminophen (TYLENOL) tablet 650 mg  650 mg Oral Q6HRS PRN Lidia Fournier M.D.   650 mg at 03/26/19 2050     No current [facility]-ordered outpatient prescriptions on file.     Diet:   DIET ORDERS (Through next 24h)    Start     Ordered    03/26/19 1903  Diet Order Regular  ALL MEALS     Question:  Diet:  Answer:  Regular    03/26/19 1903          Anticipated Discharge Destination / Patient/Family Goal:  Destination: Home with Assistance Support System: Spouse  Anticipated home health services: OT and PT  Previously used HH service/ provider: Not Applicable  Anticipated DME Needs: Walker, Wheelchair and [employer]  Outpatient Services: OT and PT  Alternative resources to address additional identified needs:     Pre-Screen Completed: 3/28/2019 3:35 PM Lowell HERNANDEZ  PAUL Amador.

## 2019-03-28 NOTE — CONSULTS
Medical chart review completed.     Patient is a 78 y.o. female  with a past medical history of hypertension, GERD, depression, admitted to [facility] on 3/26/2019, with complaints of weakness and falls. She had a recent admission for altered mental status , dizziness, was thought to be secondary to dehydration, hyponatremia, and polypharmacy. She was discharged to SNF for rehab, but only stayed there for 3 days as she was not satisfied with the care there. She then returned to the ED with complaints of continued weakness and falls, with trauma to her right knee and left flank. She had negative Head CT, x-rays negative for fracture. She is having issues with back pain and spasms, with a history of being on baclofen prior, which has been discontinued due to her recent confusion and polypharmacy.    Patient with multiple co-morbidities(including but not limited to hypokalemia, GERD, history of depression, back pain); with cognitive deficits and functional deficits in mobility/self-cares, and Severe de-conditioning.     Pre-morbidly, this patient lived in a single level home with One steps to enter, with spouse. The patient was evaluated by acute care Physical Therapy and Occupational Therapy; currently requiring minimal assistance for mobility and minimal assistance for ADLs, also with ongoing cognitive deficits.     6 clicks score 12 mobility, 17 ADLs    The patient is a very good candidate for an acute inpatient rehabilitation program with a coordinated program of care at an intensity and frequency not available at a lower level of care.     She was not successful at SNF or at home, is at risk to fall and cause greater injuries. She would benefit from intensive therapies and for further evaluation of her back pain/spasms.    Note: if the patient continues to progress while waiting for medical clearance, and no longer requires 2 out of 3 therapy services (PT/OT/SLP), then the patient would no longer meet  the criteria for acute inpatient rehabilitation.    This recommendation is substantiated by the patient's current medical condition with intervention and assessment of medical issues requiring an acute level of care for patient's safety and maximum outcome. A coordinated program of care will be provided by an interdisciplinary team including physical therapy, occupational therapy, speech language pathology, physiatry, rehab nursing and rehab psychology. Rehab goals include improved cognition, mobility, self-care management, strength and conditioning/endurance, pain management, bowel and bladder management, mood and affect, and safety with independent home management including caregiver training. Estimated length of stay is approximately 7-10 days. Rehab potential: Excellent. Disposition: to pre-morbid independent living setting with supportive care of patient's spouse. We will continue to follow with you in anticipation of discharge to acute inpatient rehabilitation when medically stable to do so at the discretion of her attending physician.     Thank you for allowing us to participate in her care.    Marleen Fontaine M.D.  Physical Medicine and Rehabilitation

## 2019-03-28 NOTE — PROGRESS NOTES
Pt sitting up in bed. Assessment completed. POC discussed. Pt is A&Ox4. Pt denies pain and SOB at this time. No dizziness. Pt has some nausea. No s/s of distress. Fall precautions in place.

## 2019-03-29 ENCOUNTER — HOSPITAL ENCOUNTER (INPATIENT)
Facility: REHABILITATION | Age: 79
LOS: 13 days | DRG: 948 | End: 2019-04-11
Attending: PHYSICAL MEDICINE & REHABILITATION | Admitting: PHYSICAL MEDICINE & REHABILITATION
Payer: MEDICARE

## 2019-03-29 VITALS
RESPIRATION RATE: 18 BRPM | HEART RATE: 68 BPM | BODY MASS INDEX: 24.26 KG/M2 | SYSTOLIC BLOOD PRESSURE: 143 MMHG | WEIGHT: 154.54 LBS | TEMPERATURE: 97.8 F | HEIGHT: 67 IN | DIASTOLIC BLOOD PRESSURE: 66 MMHG | OXYGEN SATURATION: 99 %

## 2019-03-29 PROBLEM — M54.9 CHRONIC BACK PAIN: Status: ACTIVE | Noted: 2019-03-29

## 2019-03-29 PROBLEM — G25.81 RESTLESS LEGS SYNDROME: Status: ACTIVE | Noted: 2019-03-29

## 2019-03-29 PROBLEM — G89.29 CHRONIC BACK PAIN: Status: ACTIVE | Noted: 2019-03-29

## 2019-03-29 PROBLEM — R53.81 DEBILITY: Status: ACTIVE | Noted: 2019-03-29

## 2019-03-29 PROCEDURE — 700102 HCHG RX REV CODE 250 W/ 637 OVERRIDE(OP): Performed by: HOSPITALIST

## 2019-03-29 PROCEDURE — A9270 NON-COVERED ITEM OR SERVICE: HCPCS | Performed by: PHYSICAL MEDICINE & REHABILITATION

## 2019-03-29 PROCEDURE — 700111 HCHG RX REV CODE 636 W/ 250 OVERRIDE (IP): Performed by: PHYSICAL MEDICINE & REHABILITATION

## 2019-03-29 PROCEDURE — 700102 HCHG RX REV CODE 250 W/ 637 OVERRIDE(OP): Performed by: PHYSICAL MEDICINE & REHABILITATION

## 2019-03-29 PROCEDURE — 99217 PR OBSERVATION CARE DISCHARGE: CPT | Performed by: HOSPITALIST

## 2019-03-29 PROCEDURE — 99223 1ST HOSP IP/OBS HIGH 75: CPT | Mod: AI | Performed by: PHYSICAL MEDICINE & REHABILITATION

## 2019-03-29 PROCEDURE — G0378 HOSPITAL OBSERVATION PER HR: HCPCS

## 2019-03-29 PROCEDURE — 770010 HCHG ROOM/CARE - REHAB SEMI PRIVAT*

## 2019-03-29 PROCEDURE — A9270 NON-COVERED ITEM OR SERVICE: HCPCS | Performed by: HOSPITALIST

## 2019-03-29 PROCEDURE — 94760 N-INVAS EAR/PLS OXIMETRY 1: CPT

## 2019-03-29 RX ORDER — ECHINACEA PURPUREA EXTRACT 125 MG
2 TABLET ORAL PRN
Status: CANCELLED | OUTPATIENT
Start: 2019-03-29

## 2019-03-29 RX ORDER — POLYVINYL ALCOHOL 14 MG/ML
1 SOLUTION/ DROPS OPHTHALMIC PRN
Status: CANCELLED | OUTPATIENT
Start: 2019-03-29

## 2019-03-29 RX ORDER — POLYVINYL ALCOHOL 14 MG/ML
1 SOLUTION/ DROPS OPHTHALMIC PRN
Status: DISCONTINUED | OUTPATIENT
Start: 2019-03-29 | End: 2019-04-11 | Stop reason: HOSPADM

## 2019-03-29 RX ORDER — TRAZODONE HYDROCHLORIDE 50 MG/1
25 TABLET ORAL
Status: CANCELLED | OUTPATIENT
Start: 2019-03-29

## 2019-03-29 RX ORDER — LANOLIN ALCOHOL/MO/W.PET/CERES
3 CREAM (GRAM) TOPICAL NIGHTLY PRN
Status: DISCONTINUED | OUTPATIENT
Start: 2019-03-29 | End: 2019-04-11 | Stop reason: HOSPADM

## 2019-03-29 RX ORDER — TRAZODONE HYDROCHLORIDE 50 MG/1
25 TABLET ORAL
Status: DISCONTINUED | OUTPATIENT
Start: 2019-03-29 | End: 2019-04-11 | Stop reason: HOSPADM

## 2019-03-29 RX ORDER — PROPRANOLOL HYDROCHLORIDE 80 MG/1
160 CAPSULE, EXTENDED RELEASE ORAL
Status: DISCONTINUED | OUTPATIENT
Start: 2019-03-30 | End: 2019-04-04

## 2019-03-29 RX ORDER — ONDANSETRON 4 MG/1
4 TABLET, ORALLY DISINTEGRATING ORAL 4 TIMES DAILY PRN
Status: CANCELLED | OUTPATIENT
Start: 2019-03-29

## 2019-03-29 RX ORDER — LANOLIN ALCOHOL/MO/W.PET/CERES
3 CREAM (GRAM) TOPICAL NIGHTLY PRN
Status: CANCELLED | OUTPATIENT
Start: 2019-03-29

## 2019-03-29 RX ORDER — ACETAMINOPHEN 325 MG/1
650 TABLET ORAL EVERY 6 HOURS PRN
Status: CANCELLED | OUTPATIENT
Start: 2019-03-29

## 2019-03-29 RX ORDER — BISACODYL 10 MG
10 SUPPOSITORY, RECTAL RECTAL
Status: DISCONTINUED | OUTPATIENT
Start: 2019-03-29 | End: 2019-04-11 | Stop reason: HOSPADM

## 2019-03-29 RX ORDER — AMOXICILLIN 250 MG
2 CAPSULE ORAL 2 TIMES DAILY
Status: DISCONTINUED | OUTPATIENT
Start: 2019-03-29 | End: 2019-04-11 | Stop reason: HOSPADM

## 2019-03-29 RX ORDER — ONDANSETRON 4 MG/1
4 TABLET, ORALLY DISINTEGRATING ORAL 4 TIMES DAILY PRN
Status: DISCONTINUED | OUTPATIENT
Start: 2019-03-29 | End: 2019-04-11 | Stop reason: HOSPADM

## 2019-03-29 RX ORDER — PROPRANOLOL HYDROCHLORIDE 160 MG/1
160 CAPSULE, EXTENDED RELEASE ORAL
Status: CANCELLED | OUTPATIENT
Start: 2019-03-30

## 2019-03-29 RX ORDER — ONDANSETRON 2 MG/ML
4 INJECTION INTRAMUSCULAR; INTRAVENOUS 4 TIMES DAILY PRN
Status: DISCONTINUED | OUTPATIENT
Start: 2019-03-29 | End: 2019-04-11 | Stop reason: HOSPADM

## 2019-03-29 RX ORDER — DULOXETIN HYDROCHLORIDE 30 MG/1
60 CAPSULE, DELAYED RELEASE ORAL DAILY
Status: CANCELLED | OUTPATIENT
Start: 2019-03-30

## 2019-03-29 RX ORDER — AMOXICILLIN 250 MG
2 CAPSULE ORAL 2 TIMES DAILY
Status: CANCELLED | OUTPATIENT
Start: 2019-03-29

## 2019-03-29 RX ORDER — OMEPRAZOLE 20 MG/1
40 CAPSULE, DELAYED RELEASE ORAL DAILY
Status: CANCELLED | OUTPATIENT
Start: 2019-03-30

## 2019-03-29 RX ORDER — BISACODYL 10 MG
10 SUPPOSITORY, RECTAL RECTAL
Status: CANCELLED | OUTPATIENT
Start: 2019-03-29

## 2019-03-29 RX ORDER — PRAMIPEXOLE DIHYDROCHLORIDE 0.5 MG/1
0.5 TABLET ORAL
Status: CANCELLED | OUTPATIENT
Start: 2019-03-29

## 2019-03-29 RX ORDER — ALUMINA, MAGNESIA, AND SIMETHICONE 2400; 2400; 240 MG/30ML; MG/30ML; MG/30ML
20 SUSPENSION ORAL
Status: DISCONTINUED | OUTPATIENT
Start: 2019-03-29 | End: 2019-04-11 | Stop reason: HOSPADM

## 2019-03-29 RX ORDER — ALUMINA, MAGNESIA, AND SIMETHICONE 2400; 2400; 240 MG/30ML; MG/30ML; MG/30ML
20 SUSPENSION ORAL
Status: CANCELLED | OUTPATIENT
Start: 2019-03-29

## 2019-03-29 RX ORDER — ACETAMINOPHEN 325 MG/1
650 TABLET ORAL EVERY 6 HOURS PRN
Status: DISCONTINUED | OUTPATIENT
Start: 2019-03-29 | End: 2019-04-11 | Stop reason: HOSPADM

## 2019-03-29 RX ORDER — POLYETHYLENE GLYCOL 3350 17 G/17G
1 POWDER, FOR SOLUTION ORAL
Status: DISCONTINUED | OUTPATIENT
Start: 2019-03-29 | End: 2019-04-11 | Stop reason: HOSPADM

## 2019-03-29 RX ORDER — ONDANSETRON 2 MG/ML
4 INJECTION INTRAMUSCULAR; INTRAVENOUS 4 TIMES DAILY PRN
Status: CANCELLED | OUTPATIENT
Start: 2019-03-29

## 2019-03-29 RX ORDER — DULOXETIN HYDROCHLORIDE 30 MG/1
60 CAPSULE, DELAYED RELEASE ORAL DAILY
Status: DISCONTINUED | OUTPATIENT
Start: 2019-03-30 | End: 2019-04-11 | Stop reason: HOSPADM

## 2019-03-29 RX ORDER — OMEPRAZOLE 20 MG/1
40 CAPSULE, DELAYED RELEASE ORAL DAILY
Status: DISCONTINUED | OUTPATIENT
Start: 2019-03-30 | End: 2019-04-11 | Stop reason: HOSPADM

## 2019-03-29 RX ORDER — PRAMIPEXOLE DIHYDROCHLORIDE 0.25 MG/1
0.5 TABLET ORAL
Status: DISCONTINUED | OUTPATIENT
Start: 2019-03-29 | End: 2019-04-11 | Stop reason: HOSPADM

## 2019-03-29 RX ORDER — POLYETHYLENE GLYCOL 3350 17 G/17G
1 POWDER, FOR SOLUTION ORAL
Status: CANCELLED | OUTPATIENT
Start: 2019-03-29

## 2019-03-29 RX ORDER — ECHINACEA PURPUREA EXTRACT 125 MG
2 TABLET ORAL PRN
Status: DISCONTINUED | OUTPATIENT
Start: 2019-03-29 | End: 2019-04-11 | Stop reason: HOSPADM

## 2019-03-29 RX ADMIN — ENOXAPARIN SODIUM 40 MG: 100 INJECTION SUBCUTANEOUS at 20:05

## 2019-03-29 RX ADMIN — PROPRANOLOL HYDROCHLORIDE 160 MG: 80 CAPSULE, EXTENDED RELEASE ORAL at 04:36

## 2019-03-29 RX ADMIN — SENNOSIDES AND DOCUSATE SODIUM 2 TABLET: 8.6; 5 TABLET ORAL at 20:05

## 2019-03-29 RX ADMIN — DULOXETINE HYDROCHLORIDE 60 MG: 30 CAPSULE, DELAYED RELEASE ORAL at 04:36

## 2019-03-29 RX ADMIN — PRAMIPEXOLE DIHYDROCHLORIDE 0.5 MG: 0.25 TABLET ORAL at 20:05

## 2019-03-29 RX ADMIN — OMEPRAZOLE 40 MG: 20 CAPSULE, DELAYED RELEASE ORAL at 04:36

## 2019-03-29 ASSESSMENT — LIFESTYLE VARIABLES
ALCOHOL_USE: NO
EVER_SMOKED: NEVER

## 2019-03-29 NOTE — PROGRESS NOTES
[facility] Medicine Daily Progress Note    Date of Service  3/28/2019    Chief Complaint  78 y.o. female admitted 3/26/2019 for ground level fall and weakness     Hospital Course    Sam mcwilliams a very pleasant 78 y.o. female with a past medical history of HTN,GERD, Depression,  presented to the emergency room on 3/26/2019 for evaluation of falls and weakness over the past 2 days. [facility] was just [facility].  Apparently patient has been unable to get around her house had several falls today, did not sustain any head trauma, denied any loss of consciousness.  As result of her fall she did injure her right side of her flank area on her right knee.  Pain currently only in her back, she feels spasm-like sensations, 6 out of 10 in severity, exacerbated by certain movement.  Patient also complained of having mild shortness of breath but denies have any chest pain, fevers chills or nausea vomiting.         Interval Problem Update  No acute issues overnight, patient still having myalgias and back pain, 4/10 in severity, worse when she moves. Otherwise in good spirits. Patient denies fevers/chills, chest pain, shortness of breath or nausea/vommiting.   Awaiting for rehab consultation.  Pain control  PT/OT    3/28  No acute issue overnight, patient doing well, overall her myalgias in her limbs improving.  Great news was that rehab has agreed to take her tomorrow 10 AM.  We will continue with PT in house, and control    Consultants/Specialty  Physiatry    Code Status  Full Code    Disposition  TBD    Review of Systems  Review of Systems   Constitutional: Positive for malaise/fatigue. Negative for chills and fever.   HENT: Negative for congestion, hearing loss, sore throat and tinnitus.    Eyes: Negative for blurred vision, double vision, photophobia and pain.   Respiratory: Negative for cough, hemoptysis, sputum production, shortness of breath and stridor.    Cardiovascular: Negative for chest pain,  palpitations, orthopnea, claudication and PND.   Gastrointestinal: Negative for blood in stool, constipation, heartburn, melena, nausea and vomiting.   Genitourinary: Negative for dysuria, frequency and urgency.   Musculoskeletal: Positive for back pain, falls and myalgias. Negative for joint pain and neck pain.   Neurological: Negative for dizziness, tingling, tremors, sensory change, speech change, weakness and headaches.   Psychiatric/Behavioral: Negative for depression, memory loss and suicidal ideas. The patient is not nervous/anxious.         Physical Exam  Temp:  [36.2 °C (97.1 °F)-36.7 °C (98 °F)] 36.7 °C (98 °F)  Pulse:  [65-80] 65  Resp:  [18] 18  BP: (136-144)/(55-61) 136/57  SpO2:  [98 %-100 %] 98 %    Physical Exam   Constitutional: She is oriented to person, place, and time. She appears well-developed and well-nourished. No distress.   HENT:   Head: Normocephalic and atraumatic.   Mouth/Throat: No oropharyngeal exudate.   Eyes: Pupils are equal, round, and reactive to light. Conjunctivae are normal. Right eye exhibits no discharge. No scleral icterus.   Neck: Neck supple. No JVD present. No thyromegaly present.   Cardiovascular: Normal rate and intact distal pulses.    No murmur heard.  Pulmonary/Chest: Effort normal and breath sounds normal. No stridor. No respiratory distress. She has no wheezes. She has no rales.   Abdominal: Soft. Bowel sounds are normal. She exhibits no distension. There is no tenderness. There is no rebound.   Musculoskeletal: Normal range of motion. She exhibits no edema or tenderness.   Neurological: She is alert and oriented to person, place, and time. No cranial nerve deficit. Coordination normal.   Skin: Skin is warm. She is not diaphoretic. No erythema.   Psychiatric: She has a normal mood and affect. Her behavior is normal. Thought content normal.   Nursing note and vitals reviewed.      Fluids    Intake/Output Summary (Last 24 hours) at 03/28/19 8393  Last data filed at  03/28/19 0100   Gross per 24 hour   Intake              480 ml   Output              300 ml   Net              180 ml       Laboratory  Recent Labs      03/26/19   1450  03/27/19   0459   WBC  10.5  7.1   RBC  5.18  4.64   HEMOGLOBIN  14.6  12.8   HEMATOCRIT  43.7  38.0   MCV  84.4  81.9   MCH  28.2  27.6   MCHC  33.4*  33.7   RDW  46.5  44.9   PLATELETCT  230  201   MPV  10.2  10.2     Recent Labs      03/26/19   1450  03/27/19   0459   SODIUM  135  133*   POTASSIUM  3.3*  3.2*   CHLORIDE  102  100   CO2  20  23   GLUCOSE  107*  110*   BUN  14  9   CREATININE  0.80  0.67   CALCIUM  9.5  9.0                   Imaging  CT-ABDOMEN-PELVIS WITH   Final Result      1.  No acute abnormality or solid organ injury is appreciated on CT of abdomen and pelvis.      2.  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      3.  Diverticulosis again noted.         CT-HEAD W/O   Final Result         NO ACUTE ABNORMALITIES ARE NOTED ON CT SCAN OF THE HEAD.      Decreased attenuation in the cerebral white matter likely indicates microvascular ischemic disease.      DX-KNEE 2- RIGHT   Final Result      No acute fracture. Degenerative changes.      DX-CHEST-PORTABLE (1 VIEW)   Final Result      No evidence of acute cardiopulmonary process.           Assessment/Plan  * Falls   Assessment & Plan    Accepted to rehab, pending transport in the am.     Mood disorder (HCC)   Assessment & Plan    Resume home dose of cymbalta      GERD (gastroesophageal reflux disease)   Assessment & Plan    Resume home dose of omeprazole     Hypokalemia- (present on admission)   Assessment & Plan    Potassium supplementation provided  CTM     Hyponatremia (Not present on admission)  Continue with PO intake. Hydration,   Recheck bmp in the am    VTE prophylaxis: SCDs

## 2019-03-29 NOTE — DISCHARGE INSTRUCTIONS
Discharge Instructions    Discharged to home by car with relative. Discharged via wheelchair, hospital escort: Yes.  Special equipment needed: Not Applicable    Be sure to schedule a follow-up appointment with your primary care doctor or any specialists as instructed.     Discharge Plan:   Diet Plan: Discussed  Activity Level: Discussed  Confirmed Follow up Appointment: Patient to Call and Schedule Appointment  Confirmed Symptoms Management: Discussed  Medication Reconciliation Updated: Yes  Influenza Vaccine Indication: Not indicated: Previously immunized this influenza season and > 8 years of age    I understand that a diet low in cholesterol, fat, and sodium is recommended for good health. Unless I have been given specific instructions below for another diet, I accept this instruction as my diet prescription.   Other diet: Regular    Special Instructions: None    · Is patient discharged on Warfarin / Coumadin?   No     Depression / Suicide Risk    As you are discharged from this [facility] facility, it is important to learn how to keep safe from harming yourself.    Recognize the warning signs:  · Abrupt changes in personality, positive or negative- including increase in energy   · Giving away possessions  · Change in eating patterns- significant weight changes-  positive or negative  · Change in sleeping patterns- unable to sleep or sleeping all the time   · Unwillingness or inability to communicate  · Depression  · Unusual sadness, discouragement and loneliness  · Talk of wanting to die  · Neglect of personal appearance   · Rebelliousness- reckless behavior  · Withdrawal from people/activities they love  · Confusion- inability to concentrate     If you or a loved one observes any of these behaviors or has concerns about self-harm, here's what you can do:  · Talk about it- your feelings and reasons for harming yourself  · Remove any means that you might use to hurt yourself (examples: pills, rope, extension  cords, firearm)  · Get professional help from the community (Mental Health, Substance Abuse, psychological counseling)  · Do not be alone:Call your Safe Contact- someone whom you trust who will be there for you.  · Call your local CRISIS HOTLINE 861-6194 or 188-650-5407  · Call your local Children's Mobile Crisis Response Team [place] (486) 829-1424 or www.Spredfast  · Call the toll free National Suicide Prevention Hotlines   · National Suicide Prevention Lifeline 636-696-JTNX (5306)  · National Hope Line Network 800-SUICIDE (421-9956)

## 2019-03-29 NOTE — FLOWSHEET NOTE
03/29/19 1525   Type of Assessment   Assessment Yes   Patient History   Pulmonary Diagnosis None   Surgical Procedures None   Home O2 No   Home Treatments/Frequency No   COPD Risk Screening   Do you have a history of COPD? No   Smoking History   Have you ever smoked Never   Level Of Consciousness   Level of Consciousness Alert   Respiratory WDL   Respiratory (WDL) X   Chest Exam   Respiration 16   Pulse 60   Oximetry   #Pulse Oximetry (Single Determination) Yes   Oxygen   Home O2 Use Prior To Admission? No   Pulse Oximetry 97 %   O2 Daily Delivery Respiratory  Room Air with O2 Available

## 2019-03-29 NOTE — PROGRESS NOTES
Patient admitted to facility at 1030 via wheelchair; accompanied by hospital transport.  Patient assisted to room and positioned in bed for comfort and safety; call light within reach.  Patient assisted with stowing belongings and oriented to room and facility.  Admission assessment performed and documented in computer.  Admission paperwork completed; signed copies placed in chart. 2 RN skin check by this RN and Alona ENRIQUEZ RN. Will continue to monitor.

## 2019-03-29 NOTE — CARE PLAN
Problem: Safety  Goal: Will remain free from injury  Pt encouraged to use call light when in need of assistance, call light and personal belongings within reach.

## 2019-03-29 NOTE — CARE PLAN
Problem: Communication  Goal: The ability to communicate needs accurately and effectively will improve    Intervention: Reorient patient to environment as needed  Discussed POC, pt communicates questions and poc well. Pt and family understand poc.      Problem: Pain Management  Goal: Pain level will decrease to patient's comfort goal  Pt states pain management plan working for them, pain controlled

## 2019-03-29 NOTE — H&P
REHABILITATION HISTORY AND PHYSICAL/POST ADMISSION EVALUATION    3/29/2019  2:47 PM  Lindsay Vargas  [id number]  Admission  3/29/2019 10:23 AM  [facility] Code/Reason for admission: 16 Debility (Non Cardiac, Non Pulmonary)   Etiologic diagnosis/problem: Debility  Chief Complaint: back pain    HPI:  Patient is a 78 y.o. female  with a past medical history of hypertension, GERD, depression, admitted to [facility] [place] on 3/26/2019, with complaints of weakness and falls. She had a recent admission for altered mental status , dizziness, was thought to be secondary to dehydration, hyponatremia, and polypharmacy. She was discharged to SNF for rehab, but only stayed there for 3 days as she was not satisfied with the care there. She then returned to the ED with complaints of continued weakness and falls, with trauma to her right knee and left flank. She had negative Head CT, x-rays negative for fracture. She is having issues with back pain and spasms, with a history of being on baclofen prior, which has been discontinued due to her recent confusion and polypharmacy.    Patient current reports mild back pain, midline. Per review of records, had a MRI in 2016 that showed chronic L4 compression fracture. She also reports numbness in her feet, for many years. No history of diabetes or thyroid disease. She is very hard of hearing, no aides. Wears reading glasses. Denies any issues with her bowels or bladder.    Patient was evaluated by Rehab Medicine physician and Physical Therapy and Occupational Therapy and determined to be appropriate for acute inpatient rehab and was transferred to [facility] on 3/29/2019.     With this acute therapeutic intervention, this patient hopes to improve her functional status, and return to independent living with the supportive care of spouse.    REVIEW OF SYSTEMS:     A complete review of systems was performed and was negative in detail with the exception of items mentioned  "elsewhere in this document.    PMH:  Past Medical History:   Diagnosis Date   • Anesthesia     confused/hard to wake up-15 years ago   • Arthritis    • Cataract     Bilat IOL   • Chronic pain    • Hiatus hernia syndrome     surgically repaired   • Hypertension    • Lymphedema 2014   • Migraine headache    • Pain     back   • Pneumonia     \"younger\"       PSH:  Past Surgical History:   Procedure Laterality Date   • PB INJ LUMBAR/SACRAL,W/WO CNTRST N/A 11/10/2016    Procedure: INJ EPI NON NEUROLYTIC L/S -MIDLINE L4-L5;  Surgeon: Eduardo Mustafa M.D.;  Location: [facility];  Service: Pain Management   • PB FLUORSCOPIC GUIDANCE SPINAL INJECTION  11/10/2016    Procedure: FLUOROGUIDE FOR SPINAL INJ;  Surgeon: Eduardo Mustafa M.D.;  Location: [facility];  Service: Pain Management   • PB INJ LUMBAR/SACRAL,W/WO CNTRST Right 9/29/2016    Procedure: INJ EPI NON NEUROLYTIC L/S - L5-S1;  Surgeon: Eduardo Mustafa M.D.;  Location: [facility];  Service: Pain Management   • PB FLUORSCOPIC GUIDANCE SPINAL INJECTION  9/29/2016    Procedure: FLUOROGUIDE FOR SPINAL INJ;  Surgeon: Eduardo Mustafa M.D.;  Location: [facility];  Service: Pain Management   • CATARACT PHACO WITH IOL Right 2/18/2016    Procedure: CATARACT PHACO WITH IOL;  Surgeon: Rodrigo Smyth M.D.;  Location: [facility];  Service:    • CATARACT PHACO WITH IOL Left 2/4/2016    Procedure: CATARACT PHACO WITH IOL;  Surgeon: Rodrigo Smyth M.D.;  Location: [facility];  Service:    • JUANI BY LAPAROSCOPY     • HYSTERECTOMY LAPAROSCOPY     • OTHER      HIATAL HERNIA REPAIR   • OTHER ABDOMINAL SURGERY      LAP JUANI   • OTHER ABDOMINAL SURGERY      APPENDECOMTY   • OTHER ORTHOPEDIC SURGERY      LEFT KNEE SCOPE       Family History   Problem Relation Age of Onset   • Heart Disease Mother    • Heart Disease Father         MEDICATIONS:  Current Facility-Administered Medications   Medication " Dose   • artificial tears 1.4 % ophthalmic solution 1 Drop  1 Drop   • benzocaine-menthol (CEPACOL) lozenge 1 Lozenge  1 Lozenge   • enoxaparin (LOVENOX) inj 40 mg  40 mg   • mag hydrox-al hydrox-simeth (MAALOX PLUS ES or MYLANTA DS) suspension 20 mL  20 mL   • ondansetron (ZOFRAN ODT) dispertab 4 mg  4 mg    Or   • ondansetron (ZOFRAN) syringe/vial injection 4 mg  4 mg   • sodium chloride (OCEAN) 0.65 % nasal spray 2 Spray  2 Spray   • traZODone (DESYREL) tablet 25 mg  25 mg   • Pharmacy Consult Request ...Pain Management Review 1 Each  1 Each   • Respiratory Care per Protocol     • melatonin tablet 3 mg  3 mg   • senna-docusate (PERICOLACE or SENOKOT S) 8.6-50 MG per tablet 2 Tab  2 Tab    And   • polyethylene glycol/lytes (MIRALAX) PACKET 1 Packet  1 Packet    And   • magnesium hydroxide (MILK OF MAGNESIA) suspension 30 mL  30 mL    And   • bisacodyl (DULCOLAX) suppository 10 mg  10 mg   • acetaminophen (TYLENOL) tablet 650 mg  650 mg   • [START ON 3/30/2019] DULoxetine (CYMBALTA) capsule 60 mg  60 mg   • [START ON 3/30/2019] omeprazole (PRILOSEC) capsule 40 mg  40 mg   • pramipexole (MIRAPEX) tablet 0.5 mg  0.5 mg   • [START ON 3/30/2019] propranolol CR (INDERAL LA) capsule 160 mg  160 mg       ALLERGIES:  Patient has no known allergies.    PSYCHOSOCIAL HISTORY:  Pre-mobidly, the patient lived in a single level home with one step to enter in [place] with spouse. She has been  for 58 years. Their only daughter  6.5 years ago. She used to work as a hairdressor. Denies alcohol, tobacco, or drugs.     LEVEL OF FUNCTION PRIOR TO DISABILTY:  Modified independent, used assistive devices for ambulation, did not drive,  did medication and finance management    LEVEL OF FUNCTION PRIOR TO ADMISSION to [facility]:    The patient was evaluated by acute care Physical Therapy and Occupational Therapy; currently requiring minimal assistance for mobility and minimal assistance for ADLs, also  "with ongoing cognitive deficits.      6 clicks score 12 mobility, 17 ADLs     CURRENT LEVEL OF FUNCTION:   Same as level of function prior to admission to [facility]    PHYSICAL EXAM:     VITAL SIGNS:   height is 1.702 m (5' 7\") and weight is 68.3 kg (150 lb 9.2 oz). Her oral temperature is 36.6 °C (97.8 °F). Her blood pressure is 121/81 and her pulse is 63. Her respiration is 18.     GENERAL: No apparent distress  HEENT: Normocephalic/atraumatic and EOMI, dry mucous membranes  CARDIAC: Regular rate and rhythm, normal S1, S2, no murmurs, no peripheral edema  LUNGS: Clear to auscultation, normal respiratory effort, on room air   ABDOMINAL: bowel sounds present, soft, nontender and nondistended, bruising on abdomen  EXTREMITIES: no spasticity, no edema or no calf tenderness bilaterally  MSK: abrasion on right knee    NEURO:    Mental status: alert  Speech: fluent, no aphasia or dysarthria    CRANIAL NERVES:  8: very had of hearing    Motor:   Shoulder flexors:  Right -  5/5, Left -  5/5  Elbow flexors:  Right -  5/5, Left -  5/5  Elbow extensors:  Right -  5/5, Left -  5/5  Symmetrical   Hip flexors:  Right -  5/5, Left -  5/5  Knee ext:  Right -  5/5, Left -  5/5  Dorsiflexors:  Right -  5/5, Left -  5/5  EHL:  Right -  5/5, Left -  5/5  Plantar flexors:  Right -  5/5, Left -  5/5       Sensory:   decreased to light touch in bilateral feet  decreased to vibration at bilateral great toes    DTRs: 1+ in bilateral biceps, triceps, brachioradialis,0 in bilateral patellar and achilles tendons  No clonus at bilateral ankles  Negative babinski b/l  Negative Torres b/l     RADIOLOGY:          Results for orders placed during the hospital encounter of 03/26/18   MR-BRAIN-WITH & W/O    Impression 1.  No evidence of acute territorial infarct, intracranial hemorrhage or mass lesion.  2.  Moderate diffuse cerebral substance loss.  3.  Moderate to advanced microangiopathic ischemic change versus " demyelination or gliosis.        Results for orders placed during the hospital encounter of 03/18/19   MR-BRAIN-W/O    Impression 1. Age-related cerebral atrophy.    2. Mild to moderate periventricular white matter changes consistent with chronic microvascular ischemic gliosis.                                Results for orders placed during the hospital encounter of 03/26/19   CT-ABDOMEN-PELVIS WITH    Impression 1.  No acute abnormality or solid organ injury is appreciated on CT of abdomen and pelvis.    2.  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    3.  Diverticulosis again noted.                   LABS:  Recent Labs      03/26/19   1450  03/27/19   0459   SODIUM  135  133*   POTASSIUM  3.3*  3.2*   CHLORIDE  102  100   CO2  20  23   GLUCOSE  107*  110*   BUN  14  9   CREATININE  0.80  0.67   CALCIUM  9.5  9.0     Recent Labs      03/26/19   1450  03/27/19   0459   WBC  10.5  7.1   RBC  5.18  4.64   HEMOGLOBIN  14.6  12.8   HEMATOCRIT  43.7  38.0   MCV  84.4  81.9   MCH  28.2  27.6   MCHC  33.4*  33.7   RDW  46.5  44.9   PLATELETCT  230  201   MPV  10.2  10.2         PRIMARY REHAB DIAGNOSIS:    This patient is a 78 y.o. female admitted for acute inpatient rehabilitation with Debility.      IMPAIRMENTS:   Cognitive  ADLs/IADLs  Mobility    SECONDARY DIAGNOSIS/MEDICAL CO-MORBIDITIES AFFECTING FUNCTION:    Chronic back pain  Frequent falls  Peripheral neuropathy  Hypertension  History of depression  GERD  Restless legs syndrome      RELEVANT CHANGES SINCE PREADMISSION EVALUATION:    Status unchanged    The patient's rehabilitation potential is Very Good  The patient's medical prognosis is good    PLAN:   Discussion and Recommendations, discussed with the patient and/or family:   1. The patient requires an acute inpatient rehabilitation program with a coordinated program of care at an intensity and frequency not available at a lower level of care. This recommendation is substantiated by the  patient's medical physicians who recommend that the patient's intervention and assessment of medical issues needs to be done at an acute level of care for patient's safety and maximum outcome.     2. A coordinated program of care will be supplied by an interdisciplinary team of physical therapy, occupational therapy, rehab physician, rehab nursing, and, if needed, speech therapy and rehab psychology. Rehab team presents a patient-specific rehabilitation and education program concentrating on prevention of future problems related to accessibility, mobility, skin, bowel, bladder, sexuality, and psychosocial and medical/surgical problems.     3. Need for Rehabilitation Physician: The rehab physician will be evaluating the patient on a multi-weekly basis to help coordinate the program of care. The rehab physician communicates between medical physicians, therapists, and nurses to maximize the patient's potential outcome. Specific areas in which the rehab physician will be providing daily assessment include the following:   A. Assessing the patient's heart rate and blood pressure response (vitals monitoring) to activity and making adjustments in medications or conservative measures as needed.   B. The rehab physician will be assessing the frequency at which the program can be increased to allow the patient to reach optimal functional outcome.   C. The rehab physician will also provide assessments in daily skin care, especially in light of patient's impairments in mobility.   D. The rehab physician will provide special expertise in understanding how to work with functional impairment and recommend appropriate interventions, compensatory techniques, and education that will facilitate the patient's outcome.     4. Rehab R.N.   The rehab RN will be working with patient to carry over in room mobility and activities of daily living when the patient is not in 3 hours of skilled therapy. Rehab nursing will be working in  conjunction with rehab physician to address all the medical issues above and continue to assess laboratory work and discuss abnormalities with the treating physicians, assess vitals, and response to activity, and discuss and report abnormalities with the rehab physician. Rehab RN will also continue daily skin care, supervise bladder/bowel program, instruct in medication administration, and ensure patient safety.     5. Therapies to treat at intensity and frequency of (may change after completion of evaluation by all therapeutic disciplines):       PT:  Physical therapy to address mobility, transfer, gait training and evaluation for adaptive equipment needs 1hour/day at least 5 days/week for the duration of the ELOS (see below)       OT:  Occupational therapy to address ADLs, self-care, home management training, functional mobility/transfers and assistive device evaluation, and community re-integration 1hour/day at least 5 days/week for the duration of the ELOS (see below).        ST/Dysphagia:  Speech therapy to address speech, language, and cognitive deficits as well as swallowing difficulties with retraining/dysphagia management and community re-integration with comprehension, expression, cognitive training 1hour/day at least 5 days/week for the duration of the ELOS (see below).     6. Medical management / Rehabilitation Issues/Adverse Potential affecting function as part of rehabilitation plan.    Chronic back pain  PRN tylenol  Previously on baclofen which caused altered mental status    Frequent falls  Therapy evaluation for LRD    Peripheral neuropathy  Continue Cymbalta    Hypertension  Continue propranolol    History of depression  Continue Cymbalta    GERD  Continue Prilosec    Restless legs syndrome  Continue Requip    I performed a complete drug regimen review and did not identify any potential clinically significant medication issues.    The patient's CODE STATUS was confirmed as FULL CODE on admission,  with the patient and/or family at bedside.    REHABILITATION ISSUES/ADVERSE POTENTIAL:  1.  Debility. Patient demonstrates functional deficits in strength, balance, coordination, and ADL's. Patient is admitted to [facility] for comprehensive rehabilitation therapy as described below.   Rehabilitation nursing monitors bowel and bladder control, educates on medication administration, co-morbidities and monitors patient safety.    2.  Neurostimulants: None at this time but continue to assess daily for need to initiate should status change.    3.  DVT prophylaxis:  Patient is on Lovenox for anticoagulation upon transfer. Encourage OOB. Monitor daily for signs and symptoms of DVT including but not limited to swelling and pain to prevent the development of DVT that may interfere with therapies.    4.  Pain: No issues with pain currently / Controlled with as needed oral analgesics.    5.  Nutrition/Dysphagia: Dietician monitors nutrient intake, recommend supplements prn and provide nutrition education to pt/family to promote optimal nutrition for wound healing/recovery.     6.  Bladder/bowel:  Start bowel and bladder program as described below, to prevent constipation, urinary retention (which may lead to UTI), and urinary incontinence (which will impact upon pt's functional independence).   - TV Q3h while awake with post void bladder scans, I&O cath for PVRs >400  - up to commode after meal     7.  Skin/dermal ulcer prophylaxis: Monitor for new skin conditions with q.2 h. turns as required to prevent the development of skin breakdown.     8.  Cognition/Behavior:  Psychologist Dr. Mac provides adjustment counseling to illness and psychosocial barriers that may be potential barriers to rehabilitation.     10. Respiratory therapy: RT performs O2 management prn, breathing retraining, pulmonary hygiene and bronchospasm management prn to optimize participation in therapies.    Pt was seen today for 70  min, and entire time spent in face-to-face contact was >50% in counseling and coordination of care as detailed in A/P above.        GOALS/EXPECTED LEVEL OF FUNCTION BASED ON CURRENT MEDICAL AND FUNCTIONAL STATUS (may change based on patient's medical status and rate of impairment recovery):  Transfers:   Modified Independent  Mobility/Gait:   Modified Independent  ADL's:   Modified Independent  Cognition:  Supervision    DISPOSITION: Discharge to pre-morbid independent living setting with the supportive care of patient's spouse.      ELOS: 7-10 days    Marleen Fontaine M.D.  Physical Medicine and Rehabilitation

## 2019-03-30 LAB
25(OH)D3 SERPL-MCNC: 17 NG/ML (ref 30–100)
ALBUMIN SERPL BCP-MCNC: 4 G/DL (ref 3.2–4.9)
ALBUMIN/GLOB SERPL: 2 G/DL
ALP SERPL-CCNC: 63 U/L (ref 30–99)
ALT SERPL-CCNC: 9 U/L (ref 2–50)
ANION GAP SERPL CALC-SCNC: 12 MMOL/L (ref 0–11.9)
AST SERPL-CCNC: 12 U/L (ref 12–45)
BASOPHILS # BLD AUTO: 0.7 % (ref 0–1.8)
BASOPHILS # BLD: 0.06 K/UL (ref 0–0.12)
BILIRUB SERPL-MCNC: 0.6 MG/DL (ref 0.1–1.5)
BUN SERPL-MCNC: 12 MG/DL (ref 8–22)
CALCIUM SERPL-MCNC: 8.8 MG/DL (ref 8.5–10.5)
CHLORIDE SERPL-SCNC: 102 MMOL/L (ref 96–112)
CO2 SERPL-SCNC: 22 MMOL/L (ref 20–33)
CREAT SERPL-MCNC: 0.6 MG/DL (ref 0.5–1.4)
EOSINOPHIL # BLD AUTO: 0.19 K/UL (ref 0–0.51)
EOSINOPHIL NFR BLD: 2.3 % (ref 0–6.9)
ERYTHROCYTE [DISTWIDTH] IN BLOOD BY AUTOMATED COUNT: 45.8 FL (ref 35.9–50)
GLOBULIN SER CALC-MCNC: 2 G/DL (ref 1.9–3.5)
GLUCOSE SERPL-MCNC: 104 MG/DL (ref 65–99)
HCT VFR BLD AUTO: 39.4 % (ref 37–47)
HGB BLD-MCNC: 13.1 G/DL (ref 12–16)
IMM GRANULOCYTES # BLD AUTO: 0.04 K/UL (ref 0–0.11)
IMM GRANULOCYTES NFR BLD AUTO: 0.5 % (ref 0–0.9)
LYMPHOCYTES # BLD AUTO: 2.32 K/UL (ref 1–4.8)
LYMPHOCYTES NFR BLD: 28.5 % (ref 22–41)
MAGNESIUM SERPL-MCNC: 1.5 MG/DL (ref 1.5–2.5)
MCH RBC QN AUTO: 28.1 PG (ref 27–33)
MCHC RBC AUTO-ENTMCNC: 33.2 G/DL (ref 33.6–35)
MCV RBC AUTO: 84.4 FL (ref 81.4–97.8)
MONOCYTES # BLD AUTO: 0.57 K/UL (ref 0–0.85)
MONOCYTES NFR BLD AUTO: 7 % (ref 0–13.4)
NEUTROPHILS # BLD AUTO: 4.97 K/UL (ref 2–7.15)
NEUTROPHILS NFR BLD: 61 % (ref 44–72)
NRBC # BLD AUTO: 0 K/UL
NRBC BLD-RTO: 0 /100 WBC
PLATELET # BLD AUTO: 178 K/UL (ref 164–446)
PMV BLD AUTO: 10.6 FL (ref 9–12.9)
POTASSIUM SERPL-SCNC: 3.2 MMOL/L (ref 3.6–5.5)
PROT SERPL-MCNC: 6 G/DL (ref 6–8.2)
RBC # BLD AUTO: 4.67 M/UL (ref 4.2–5.4)
SODIUM SERPL-SCNC: 136 MMOL/L (ref 135–145)
WBC # BLD AUTO: 8.2 K/UL (ref 4.8–10.8)

## 2019-03-30 PROCEDURE — A9270 NON-COVERED ITEM OR SERVICE: HCPCS | Performed by: PHYSICAL MEDICINE & REHABILITATION

## 2019-03-30 PROCEDURE — 770010 HCHG ROOM/CARE - REHAB SEMI PRIVAT*

## 2019-03-30 PROCEDURE — 97162 PT EVAL MOD COMPLEX 30 MIN: CPT

## 2019-03-30 PROCEDURE — 36415 COLL VENOUS BLD VENIPUNCTURE: CPT

## 2019-03-30 PROCEDURE — 700102 HCHG RX REV CODE 250 W/ 637 OVERRIDE(OP)

## 2019-03-30 PROCEDURE — 97166 OT EVAL MOD COMPLEX 45 MIN: CPT

## 2019-03-30 PROCEDURE — 700111 HCHG RX REV CODE 636 W/ 250 OVERRIDE (IP): Performed by: PHYSICAL MEDICINE & REHABILITATION

## 2019-03-30 PROCEDURE — 80053 COMPREHEN METABOLIC PANEL: CPT

## 2019-03-30 PROCEDURE — 97535 SELF CARE MNGMENT TRAINING: CPT

## 2019-03-30 PROCEDURE — 83735 ASSAY OF MAGNESIUM: CPT

## 2019-03-30 PROCEDURE — 97530 THERAPEUTIC ACTIVITIES: CPT

## 2019-03-30 PROCEDURE — A9270 NON-COVERED ITEM OR SERVICE: HCPCS

## 2019-03-30 PROCEDURE — 700102 HCHG RX REV CODE 250 W/ 637 OVERRIDE(OP): Performed by: PHYSICAL MEDICINE & REHABILITATION

## 2019-03-30 PROCEDURE — 92523 SPEECH SOUND LANG COMPREHEN: CPT

## 2019-03-30 PROCEDURE — 85025 COMPLETE CBC W/AUTO DIFF WBC: CPT

## 2019-03-30 PROCEDURE — 82306 VITAMIN D 25 HYDROXY: CPT

## 2019-03-30 RX ORDER — POTASSIUM CHLORIDE 20 MEQ/1
20 TABLET, EXTENDED RELEASE ORAL ONCE
Status: COMPLETED | OUTPATIENT
Start: 2019-03-30 | End: 2019-03-30

## 2019-03-30 RX ORDER — POTASSIUM CHLORIDE 20 MEQ/1
TABLET, EXTENDED RELEASE ORAL
Status: COMPLETED
Start: 2019-03-30 | End: 2019-03-30

## 2019-03-30 RX ORDER — POTASSIUM CHLORIDE 20 MEQ/1
20 TABLET, EXTENDED RELEASE ORAL EVERY EVENING
Status: DISCONTINUED | OUTPATIENT
Start: 2019-03-30 | End: 2019-04-02

## 2019-03-30 RX ADMIN — POTASSIUM CHLORIDE 20 MEQ: 1500 TABLET, EXTENDED RELEASE ORAL at 09:08

## 2019-03-30 RX ADMIN — PRAMIPEXOLE DIHYDROCHLORIDE 0.5 MG: 0.25 TABLET ORAL at 20:56

## 2019-03-30 RX ADMIN — ENOXAPARIN SODIUM 40 MG: 100 INJECTION SUBCUTANEOUS at 20:56

## 2019-03-30 RX ADMIN — DULOXETINE 60 MG: 30 CAPSULE, DELAYED RELEASE ORAL at 09:07

## 2019-03-30 RX ADMIN — VITAMIN D, TAB 1000IU (100/BT) 1000 UNITS: 25 TAB at 09:07

## 2019-03-30 RX ADMIN — POTASSIUM CHLORIDE 20 MEQ: 1500 TABLET, EXTENDED RELEASE ORAL at 20:56

## 2019-03-30 RX ADMIN — PROPRANOLOL HYDROCHLORIDE 160 MG: 80 CAPSULE, EXTENDED RELEASE ORAL at 05:30

## 2019-03-30 RX ADMIN — OMEPRAZOLE 40 MG: 20 CAPSULE, DELAYED RELEASE ORAL at 09:07

## 2019-03-30 ASSESSMENT — MONTREAL COGNITIVE ASSESSMENT (MOCA)
7. [VIGILENCE] TAP WHEN HEARING DESIGNATED LETTER: 0
WHAT IS THE TOTAL SCORE (OUT OF 30): 11
4. NAME EACH OF THE THREE ANIMALS SHOWN: 3
6. READ LIST OF DIGITS [FORWARD/BACKWARD]: 1
VISUOSPATIAL/EXECUTIVE SUBSCORE: 0
LEVEL OF SEVERITY: MODERATE COGNITIVE IMPAIRMENT
12. MEMORY INDEX SCORE: 0
WHAT LEVEL OF EDUCATION WAS ATTAINED: HIGH SCHOOL EDUCATION
9. REPEAT EACH SENTENCE: 0
8. SERIAL SUBTRACTION OF 7S: 0
ORIENTATION SUBSCORE: 5
11. FOR EACH PAIR OF WORDS, WHAT CATEGORY DO THEY BELONG TO (OUT OF 2): 2
10. [FLUENCY] NAME WORDS STARTING WITH DESIGNATED LETTER: 0

## 2019-03-30 ASSESSMENT — BRIEF INTERVIEW FOR MENTAL STATUS (BIMS)
ASKED TO RECALL SOCK: NO, COULD NOT RECALL
WHAT YEAR IS IT: CORRECT
WHAT DAY OF THE WEEK IS IT: CORRECT
ASKED TO RECALL BED: NO, COULD NOT RECALL
ASKED TO RECALL BLUE: YES, NO CUE REQUIRED
BIMS SUMMARY SCORE: 8
INITIAL REPETITION OF BED BLUE SOCK - FIRST ATTEMPT: 1
WHAT MONTH IS IT: MISSED BY 6 DAYS TO 1 MONTH

## 2019-03-30 ASSESSMENT — ACTIVITIES OF DAILY LIVING (ADL): TOILETING: INDEPENDENT

## 2019-03-30 NOTE — THERAPY
Physical Therapy   Initial Evaluation     Patient Name: Lindsay Vargas  Age:  78 y.o., Sex:  female  Medical Record #: [id number]  Today's Date: 3/30/2019     Subjective    Pt in bed, very talkative and agreeable to PT      Objective     03/30/19 1231   Prior Living Situation   Prior Services None   Housing / Facility 1 Story House   Steps Into Home 1   Rail None   Equipment Owned Front-Wheel Walker;Wheelchair   Lives with - Patient's Self Care Capacity Spouse   Comments Above information according to pt report; questionable historian   Prior Level of Functional Mobility   Bed Mobility Independent   Transfer Status Independent   Ambulation Independent   Assistive Devices Used Front-Wheel Walker   Wheelchair Independent   Stairs Independent   IRF-ALVA:  Prior Functioning: Everyday Activities   Self Care Independent   Indoor Mobility (Ambulation) Independent   Stairs Independent   Functional Cognition Independent   Prior Device Use Walker   Passive ROM Lower Body   Passive ROM Lower Body WDL   Active ROM Lower Body    Active ROM Lower Body  WDL   Strength Lower Body   Lower Body Strength  WDL   Balance Assessment   Sitting Balance (Static) Fair +   Sitting Balance (Dynamic) Fair   Standing Balance (Static) Fair -   Standing Balance (Dynamic) Poor +   Weight Shift Sitting Fair   Weight Shift Standing Poor   Bed Mobility    Supine to Sit Stand by Assist   Sit to Supine Stand by Assist   Sit to Stand Minimal Assist   Scooting Stand by Assist   Rolling Supervised   IRF-ALVA:  Roll Left and Right   Assistance Needed Supervision   CARE Score 4   Discharge Goal:  Assistance Needed Independent   Discharge Goal:  Score 6   IRF-ALVA:  Sit to Lying   Assistance Needed Supervision   CARE Score 4   Discharge Goal:  Assistance Needed Independent   Discharge Goal:  Score 6   IRF-ALVA:  Lying to Sitting on Side of Bed   Assistance Needed Supervision   CARE Score 4   Discharge Goal:  Assistance Needed Independent   Discharge Goal:   Score 6   IRF-ALVA:  Sit to Stand   Assistance Needed Physical assistance   Physical Assistance Level 25%-49%   CARE Score 3   Discharge Goal:  Assistance Needed Independent   Discahrge Goal:  Score 6   IRF-ALVA:  Chair/Bed-to-Chair Transfer   Assistance Needed Physical assistance   Physical Assistance Level 25%-49%   CARE Score 3   Discharge Goal:  Assistance Needed Independent   Discharge Goal:  Score 6   IRF-ALVA:  Toilet Transfer   Assistance Needed Physical assistance;Adaptive equipment   Physical Assistance Level 25%-49%   CARE Score 3   Discharge Goal:  Assistance Needed Independent;Adaptive equipment   Discahrge Goal:  Score 6   IRF-ALVA:  Car Transfer   Reason if not Attempted Safety concerns   CARE Score 88   Discharge Goal:  Assistance Needed Supervision;Adaptive equipment   Discharge Goal:  Score 4   IRF ALVA:  Walking   Does the Patient Walk? Yes   IRF ALVA:  Walk 10 Feet   Assistance Needed Physical assistance;Adaptive equipment   Physical Assistance Level 25%-49%   CARE Score 3   Discharge Goal:  Assistance Needed Supervision;Adaptive equipment   Discharge Goal:  Score 4   IRF-ALVA:  Walk 50 Feet with Two Turns   Reason if not Attempted Safety concerns   CARE Score 88   Discharge Goal:  Assistance Needed Supervision;Adaptive equipment   Discharge Goal:  Score 4   IRF-ALVA:  Walk 150 Feet   Reason if not Attempted Safety concerns   CARE Score 88   Discharge Goal:  Assistance Needed Supervision;Adaptive equipment   Discharge Goal:  Score 4   IRF ALVA:  Walking 10 Feet on Uneven Surfaces   Reason if not Attempted Safety concerns   CARE Score 88   Discharge Goal:  Assistance Needed Incidental touching;Adaptive equipment   Discharge Goal:  Score 4   IRF ALVA:  1 Step (Curb)   Reason if not Attempted Safety concerns   CARE Score 88   Discharge Goal:  Assistance Needed Adaptive equipment;Supervision   Discharge Goal:  Score 4   IRF-ALVA:  4 Steps   Reason if not Attempted Safety concerns   CARE Score 88   Discharge  Goal:  Assistance Needed Supervision;Adaptive equipment   Discharge Goal:  Score 4   IRF ALVA:  12 Steps   Reason if not Attempted Safety concerns   CARE Score 88   Discharge Goal:  Assistance Needed Physical assistance   Discharge Goal:  Physical Assistance Level Total assistance   Discharge Goal:  Score 1   IRF ALVA:  Picking Up Object   Reason if not Attempted Safety concerns   CARE Score 88   Discharge Goal:  Assistance Needed Incidental touching;Adaptive equipment   Discharge Goal:  Score 4   IRF-ALVA:  Wheel 50 Feet with Two Turns   Indicate the Type of Wheelchair or Scooter Used Manual   Assistance Needed Supervision   CARE Score 4   Discharge Goal:  Assistance Needed Independent   Discharge Goal:  Score 6   IRF-ALVA:  Wheel 150 Feet   Indicate the Type of Wheelchair or Scooter Used Manual   Assistance Needed Supervision   CARE Score 4   Discharge Goal:  Assistance Needed Independent   Discharge Goal:  Score 6   Precautions   Precautions Fall Risk   Comments [place], poor attention    Interdisciplinary Plan of Care Collaboration   IDT Collaboration with  Occupational Therapist   Patient Position at End of Therapy Seated;Call Light within Reach;Tray Table within Reach   Collaboration Comments OT regarding CLOF    Benefit   Therapy Benefit Patient Would Benefit from Inpatient Rehabilitation Physical Therapy to Maximize Functional [place] with ADLs, IADLs and Mobility.   PT Total Time Spent   PT Individual Total Time Spent (Mins) 60   PT Charge Group   PT Therapeutic Activities 1   PT Evaluation PT Evaluation Mod       FIM Bed/Chair/Wheelchair Transfers Score: 4 - Minimal Assistance  Bed/Chair/Wheelchair Transfers Description:  Increased time, Set-up of equipment, Verbal cueing (Bed <> WC, min A stand pivot with use of bed rail )    FIM Walking Score:  2 - Max Assistance  Walking Description:  Extra time, Walker, Verbal cueing, Requires incidental assist (25 ft with FWW, min A for balance and management of  "FWW)    FIM Wheelchair Score:  5 - Standby Prompting/Supervision or Set-up  Wheelchair Description:  Extra time, Supervision for safety, Verbal cueing (150 ft using UEs, excessive time to complete, SBA)    FIM Stairs Score:  0 - Not tested,unsafe activity  Stairs Description:       Assessment  Patient is 78 y.o. female with a diagnosis of General debility after multiple falls at home, chronic L4 compression fx, dehydration, polypharmacy, and hyponatremia.  Additional factors influencing patient status / progress (ie: cognitive factors, co-morbidities, social support, etc): modified independent PLOF. Pt presents with generalized weakness and poor posture during ambulation. Pt also has poor memory and task attention, limiting full participation in physical therapy rehab. Pt will benefit from skilled inpatient PT to facilitate return home with supportive .       Plan  Recommend Physical Therapy 30-60 minutes per day 5-7 days per week for 2-3 weeks for the following treatments:  PT Group Therapy, PT E Stim Attended, PT Gait Training, PT Therapeutic Exercises, PT TENS Application, PT Neuro Re-Ed/Balance, PT Aquatic Therapy and PT Therapeutic Activity.    Goals:  Long term and short term goals have been discussed with patient and they are in agreement.         Physical Therapy Problems           Problem: Mobility     Dates: Start: 03/30/19       Goal: STG-Within one week, patient will ambulate household distance     Dates: Start: 03/30/19       Description: 1) Individualized goal:  100 ft with FWW, min A   2) Interventions:  PT Group Therapy, PT E Stim Attended, PT Gait Training, PT Therapeutic Exercises, PT TENS Application, PT Neuro Re-Ed/Balance, PT Aquatic Therapy, PT Therapeutic Activity and PT Manual Therapy             Goal: STG-Within one week, patient will ambulate up/down a curb     Dates: Start: 03/30/19       Description: 1) Individualized goal:  4\" curb/ step with FWW and min A  2) Interventions: PT " Group Therapy, PT E Stim Attended, PT Gait Training, PT Therapeutic Exercises, PT TENS Application, PT Neuro Re-Ed/Balance, PT Aquatic Therapy, PT Therapeutic Activity and PT Manual Therapy               Problem: Mobility Transfers     Dates: Start: 03/30/19       Goal: STG-Within one week, patient will sit to stand     Dates: Start: 03/30/19       Description: 1) Individualized goal:  SBA with FWW  2) Interventions: PT Group Therapy, PT E Stim Attended, PT Gait Training, PT Therapeutic Exercises, PT TENS Application, PT Neuro Re-Ed/Balance, PT Aquatic Therapy, PT Therapeutic Activity and PT Manual Therapy             Goal: STG-Within one week, patient will transfer bed to chair     Dates: Start: 03/30/19       Description: 1) Individualized goal:  SBA without AD  2) Interventions: PT Group Therapy, PT E Stim Attended, PT Gait Training, PT Therapeutic Exercises, PT TENS Application, PT Neuro Re-Ed/Balance, PT Aquatic Therapy, PT Therapeutic Activity and PT Manual Therapy             Problem: PT-Long Term Goals     Dates: Start: 03/30/19       Goal: LTG-By discharge, patient will ambulate     Dates: Start: 03/30/19       Description: 1) Individualized goal:  150 ft with LRAD, spv  2) Interventions: PT Group Therapy, PT E Stim Attended, PT Gait Training, PT Therapeutic Exercises, PT TENS Application, PT Neuro Re-Ed/Balance, PT Aquatic Therapy, PT Therapeutic Activity and PT Manual Therapy             Goal: LTG-By discharge, patient will transfer one surface to another     Dates: Start: 03/30/19       Description: 1) Individualized goal:  Mod I without AD  2) Interventions: PT Group Therapy, PT E Stim Attended, PT Gait Training, PT Therapeutic Exercises, PT TENS Application, PT Neuro Re-Ed/Balance, PT Aquatic Therapy, PT Therapeutic Activity and PT Manual Therapy             Goal: LTG-By discharge, patient will perform home exercise program     Dates: Start: 03/30/19       Description: 1) Individualized goal:  spv with  "seated LE there-ex HEP  2) Interventions: PT Group Therapy, PT E Stim Attended, PT Gait Training, PT Therapeutic Exercises, PT TENS Application, PT Neuro Re-Ed/Balance, PT Aquatic Therapy, PT Therapeutic Activity and PT Manual Therapy             Goal: LTG-By discharge, patient will ambulate up/down 4-6 stairs     Dates: Start: 03/30/19       Description: 1) Individualized goal:  6\" curb with FWW and SBA, 6 4\" steps with 2 HRs, SBA  2) Interventions: PT Group Therapy, PT E Stim Attended, PT Gait Training, PT Therapeutic Exercises, PT TENS Application, PT Neuro Re-Ed/Balance, PT Aquatic Therapy, PT Therapeutic Activity and PT Manual Therapy                 "

## 2019-03-30 NOTE — CARE PLAN
Problem: Safety  Goal: Will remain free from injury  Outcome: PROGRESSING AS EXPECTED  Pt uses call light consistently and appropriately. Waits for assistance does not attempt self transfer this shift. Able to verbalize needs.    Problem: Bowel/Gastric:  Goal: Normal bowel function is maintained or improved  Outcome: PROGRESSING AS EXPECTED  Pt having loose stool today. Bowel meds held this morning.

## 2019-03-30 NOTE — THERAPY
"Occupational Therapy   Initial Evaluation     Patient Name: Lindsay Vargas  Age:  78 y.o., Sex:  female  Medical Record #: [id number]  Today's Date: 3/30/2019     Subjective    \"I\"m here to work hard.\"      Objective     03/30/19 0701   Prior Living Situation   Prior Services Home-Independent;Other (Comments)  (Assistance from  for some IADLS (driving, meds, $))   Housing / Facility 1 Story House   Steps Into Home 1   Steps In Home 0   Rail None   Elevator No   Bathroom Set up Bathtub / Shower Combination   Equipment Owned 4-Wheel Walker;Single Point Cane   Lives with - Patient's Self Care Capacity Spouse   Comments Pt reports walking 3-miles a day prior to fall.    Prior Level of ADL Function   Self Feeding Independent   Grooming / Hygiene Independent   Bathing Independent   Dressing Independent   Toileting Independent   Prior Level of IADL Function   Medication Management Requires Assist   Laundry Independent   Kitchen Mobility Independent   Finances Requires Assist   Home Management Requires Assist   Shopping Requires Assist   Prior Level Of Mobility Supervision With Device in Community   Driving / Transportation Relatives / Others Provide Transportation   Occupation (Pre-Hospital Vocational) Retired Due To Age   Leisure Interests Pets;Hobbies  (Dog owner, \"loves fashion\" )   IRF-ALVA:  Prior Functioning: Everyday Activities   Self Care Independent   Indoor Mobility (Ambulation) Independent   Stairs Independent   Functional Cognition Needed some help   Prior Device Use Walker  (SPC)   Pain   Intervention Declines   Cognition    Level of Consciousness Alert   IRF-ALVA:  Cognitive Pattern Assessment   Cognitive Pattern Assessment Used BIMS   IRF-ALVA:  Brief Interview for Mental Status (BIMS)   Repetition of Three Words (First Attempt) 1   Temporal Orientation: Able to Report the Correct Year Correct   Temporal Orientation: Able to Report the Correct Month Missed by 6 days to 1 month   Temporal Orientation: " "Able to Report the Correct Day of the Week Correct   Able to Recall \"Sock\" No, could not recall   Able to Recall \"Blue\" Yes, no cue required   Able to Recall \"Bed\" No, could not recall   BIMS Summary Score 8   Vision Screen   Vision Not tested   Passive ROM Upper Body   Passive ROM Upper Body X   Rt Shoulder Flexion Degrees 95  (visual estimate)   Rt Shoulder Abduction Degrees 75  (visual estimate)   Comments Pt reported hx of torn rotator cuff   Active ROM Upper Body   Active ROM Upper Body  X   Dominant Hand Right   Rt Shoulder Flexion Degrees 80  (visual estimation )   Rt Shoulder Abduction Degrees 55  (visual estimation)   Rt Shoulder Ext Rotation Degrees 35  (visual assessment )   Rt Shoulder Int Rotation Degrees 40  (Visual assessmetn )   Comments Reassess with goni   Strength Upper Body   Upper Body Strength  X   Rt Shoulder Flexion Strength Not Tested   Right  Impaired   Lt Shoulder Flexion Strength 5 (N)   Comments Rt shoulder strength not tested secondary to pt report of hx of rotator cuff injury.   Sensation Upper Body   Upper Extremity Sensation  WDL   Upper Body Muscle Tone   Upper Body Muscle Tone  WDL   Balance Assessment   Sitting Balance (Static) Fair +   Sitting Balance (Dynamic) Fair   Standing Balance (Static) Fair -   Standing Balance (Dynamic) Poor +   Weight Shift Sitting Fair   Weight Shift Standing Poor   Comments Observed during functional mobilty, ADL routine   Bed Mobility    Supine to Sit Minimal Assist   Sit to Stand Minimal Assist   Coordination Upper Body   Coordination X   Gross Motor Coordination RUE shoulder AROM impairments affect UB ADL independence.    IRF-ALVA:  Eating   Assistance Needed Set-up / clean-up   [place] Physical Assistance Level No physical assistance or only touching/steadying assist   CARE Score 5   Discharge Goal:  Assistance Needed Independent   Discharge Goal:  Physical Assistance Level No physical assistance or only touching/steadying assist   Discharge " Goal:  Score 6   IRF-ALVA:  Oral Hygiene   Assistance Needed Set-up / clean-up   Physical Assistance Level No physical assistance   CARE Score 5   Discharge Goal:  Assistance Needed Independent   Discharge Goal:  Physical Assistance Level No physical assistance or only touching/steadying assist   Discharge Goal:  Score 6   IRF-ALVA:  Shower/Bathe Self   Assistance Needed Physical assistance   Physical Assistance Level Less than 25%   CARE Score 3   Discharge Goal:  Assistance Needed Independent   Discharge Goal Score 6   IRF-ALVA:  Upper Body Dressing   Assistance Needed Physical assistance   Physical Assistance Level Less than 25%   CARE Score 3   Discharge Goal:  Assistance Needed Independent   Dischage Goal:  Score 6   IRF-ALVA:  Lower Body Dressing   Assistance Needed Physical assistance   Physical Assistance Level 25%-49%   CARE Score 3   Discharge Goal:  Assistance Needed Independent   Discharge Goal:  Score 6   IRF ALVA:  Putting On/Taking Off Footwear   Assistance Needed Physical assistance   Physical Assistance Level Less than 25%   CARE Score 3   Discharge Goal:  Assistance Needed Independent   Discharge Goal:  Score 6   IRF-ALVA:  Toileting Hygiene   Assistance Needed Set-up / clean-up   Physical Assistance Level No physical assistance or only touching/steadying assist   CARE Score 5   Discharge Goal:  Assistance Needed Independent   Discharge Goal:  Score 6   IRF-ALVA:  Toilet Transfer   Assistance Needed Physical assistance   Physical Assistance Level Less than 25%   CARE Score 3   Discharge Goal:  Assistance Needed Independent   Discahrge Goal:  Score 6   IRF-ALVA:  Hearing, Speech, and Vision   Expression of Ideas and Wants 4   Understanding Verbal and Non-Verbal Content 4   Problem List   Problem List Decreased Upper Extremity PROM Right;Decreased Upper Extremity AROM Right;Decreased Active Daily Living Skills;Decreased Homemaking Skills;Impaired Postural Control / Balance   Precautions   Precautions Fall  Risk   Comments [facility]   Current Discharge Plan   Current Discharge Plan Return to Prior Living Situation   Benefit    Therapy Benefit Patient Would Benefit from Inpatient Rehab Occupational Therapy to Maximize [place] with ADLs, IADLs and Functional Mobility.   Interdisciplinary Plan of Care Collaboration   IDT Collaboration with  Physical Therapist;Family / Caregiver   Patient Position at End of Therapy Seated;Family / Friend in Room   Collaboration Comments CLOF   Equipment Needs   Assistive Device / DME Tub Transfer Bench   Adaptive Equipment Dressing Stick   OT Total Time Spent   OT Individual Total Time Spent (Mins) 60   OT Charge Group   Charges Yes   OT Self Care / ADL 1   OT Evaluation OT Evaluation Mod       FIM Eating Score:  5 - Standby Prompting/Supervision or Set-up  Eating Description:  Set-up of equipment or meal/tube feeding    FIM Grooming Score:  5 - Standby Prompting/Supervision or Set-up  Grooming Description:  Verbal cueing, Increased time, Supervision for safety    FIM Bathing Score:  4 - Minimal Assistance  Bathing Description:  Long handled bath tool, Grab bar, Tub bench    FIM Upper Body Dressin - Minimal Assistance  Upper Body Dressing Description:  Increased time, Supervision for safety (Min A to don pullover garment over RUE )    FIM Lower Body Dressing Score:  3 - Moderate Assistance  Lower Body Dressing Description:   (Assist with initial brief threading and assist bring sock over heal bilaterally.)    FIM Toileting Body Dressing:     Toileting Description:       FIM Bed/Chair/Wheelchair Transfers Score: 4 - Minimal Assistance  Bed/Chair/Wheelchair Transfers Description:  Verbal cueing, Set-up of equipment, Initial preparation for task, Supervision for safety (Pt required tactile cueing to rotate into position of safety from standing position)    FIM Toilet Transfer Score:     Toilet Transfer Description:       FIM Tub/Shower Transfers Score:  4 - Minimal Assistance  Tub/Shower  Transfers Description:  Grab bar, Increased time, Supervision for safety, Verbal cueing (VQs to for hand/foot placement and w/c safety. )    Assessment  Patient is 78 y.o. female with a diagnosis of Debility.  Additional factors influencing patient status / progress (ie: cognitive factors, co-morbidities, social support, etc): Chronic back pain, hx of falls.      Plan  Recommend Occupational Therapy  minutes per day 5-7 days per week for 10-14 days for the following treatments:  OT Self Care/ADL, OT Community Reintegration, OT Neuro Re-Ed/Balance, OT Therapeutic Activity, OT Aquatic Therapy, OT Evaluation and OT Therapeutic Exercise.    Goals:  Long term and short term goals have been discussed with patient and spouse and they are in agreement.         Occupational Therapy Goals           Problem: Bathing     Dates: Start: 03/30/19       Goal: STG-Within one week, patient will bathe     Dates: Start: 03/30/19   Expected End: 04/06/19       Description: 1) Individualized Goal:  UB/LB at a level of SBA.   2) Interventions:  OT Self Care/ADL, OT Neuro Re-Ed/Balance, OT Therapeutic Activity, OT Aquatic Therapy, OT Evaluation and OT Therapeutic Exercise               Problem: Dressing     Dates: Start: 03/30/19       Goal: STG-Within one week, patient will dress UB     Dates: Start: 03/30/19   Expected End: 04/06/19       Description: 1) Individualized Goal:  At a level of SBA.   2) Interventions:  OT Self Care/ADL, OT Neuro Re-Ed/Balance, OT Therapeutic Activity, OT Evaluation and OT Therapeutic Exercise             Goal: STG-Within one week, patient will dress LB     Dates: Start: 03/30/19   Expected End: 04/06/19       Description: 1) Individualized Goal:  At a level of SBA w/ AE as needed.   2) Interventions:  OT Neuro Re-Ed/Balance, OT Therapeutic Activity, OT Evaluation and OT Therapeutic Exercise               Problem: Functional Transfers     Dates: Start: 03/30/19       Goal: STG-Within one week, patient  will transfer to tub/shower     Dates: Start: 03/30/19   Expected End: 04/06/19       Description: 1) Individualized Goal:  At a level of SBA.   2) Interventions:  OT Self Care/ADL, OT Cognitive Skill Dev, OT Neuro Re-Ed/Balance, OT Therapeutic Activity, OT Evaluation and OT Therapeutic Exercise               Problem: IADL's     Dates: Start: 03/30/19       Goal: STG-Within one week, patient will prepare a meal     Dates: Start: 03/30/19   Expected End: 04/06/19       Description: 1) Individualized Goal:  At a level of SBA.   2) Interventions:  OT Self Care/ADL, OT Manual Ther Technique, OT Neuro Re-Ed/Balance, OT Therapeutic Activity, OT Aquatic Therapy and OT Evaluation             Goal: STG-Within one week, patient will access kitchen area     Dates: Start: 03/30/19   Expected End: 04/06/19       Description: 1) Individualized Goal:  At a level of SBA with occasional cueing for safety.   2) Interventions:  OT Self Care/ADL, OT Community Reintegration, OT Neuro Re-Ed/Balance, OT Therapeutic Activity, OT Evaluation and OT Therapeutic Exercise               Problem: OT Long Term Goals     Dates: Start: 03/30/19       Goal: LTG-By discharge, patient will complete basic self care tasks     Dates: Start: 03/30/19   Expected End: 04/13/19       Description: 1) Individualized Goal:  At a level of Mod I.   2) Interventions:  OT Self Care/ADL, OT Neuro Re-Ed/Balance, OT Therapeutic Activity, OT Aquatic Therapy, OT Evaluation and OT Therapeutic Exercise             Goal: LTG-By discharge, patient will perform bathroom transfers     Dates: Start: 03/30/19   Expected End: 04/13/19       Description: 1) Individualized Goal:  At a level of Mod I.   2) Interventions:  OT Self Care/ADL, OT Neuro Re-Ed/Balance, OT Therapeutic Activity, OT Evaluation and OT Therapeutic Exercise             Goal: LTG-By discharge, patient will complete basic home management     Dates: Start: 03/30/19   Expected End: 04/13/19       Description: 1)  Individualized Goal:  At a level of SUP.   2) Interventions:  OT Cognitive Skill Dev, OT Community Reintegration, OT Neuro Re-Ed/Balance and OT Therapeutic Exercise               Problem: Toileting     Dates: Start: 03/30/19       Goal: STG-Within one week, patient will complete toileting tasks     Dates: Start: 03/30/19   Expected End: 04/06/19       Description: 1) Individualized Goal:  At a level of SBA.   2) Interventions:  OT Self Care/ADL, OT Neuro Re-Ed/Balance, OT Therapeutic Activity, OT Evaluation and OT Therapeutic Exercise

## 2019-03-30 NOTE — DISCHARGE SUMMARY
Discharge Summary    CHIEF COMPLAINT ON ADMISSION  Chief Complaint   Patient presents with   • Weakness     20-30 days, pt unable to support own weight standing past 2 days   • Dizziness     denies falls       Reason for Admission  Dizziness; Weakness     CODE STATUS  Full Code    HPI & HOSPITAL COURSE    Sam is a very pleasant 78 y.o. female with a past medical history of HTN,GERD, Depression,  presented to the emergency room on 3/26/2019 for evaluation of falls and weakness over the past 2 days. Patient was just discharged from [facility] 3 days ago.  Apparently patient has been unable to get around her house had several falls today, did not sustain any head trauma, denied any loss of consciousness.  As result of her fall she did injure her right side of her flank area on her right knee.  Pain currently only in her back, she feels spasm-like sensations, 6 out of 10 in severity, exacerbated by certain movement.  Patient also complained of having mild shortness of breath but denies have any chest pain, fevers chills or nausea vomiting.  Physical and occupational therapy evaluation was performed, as well as physiatry was consulted who graciously have accepted the patient to [facility] rehabilitation with meeting criteria.  Overall during her hospitalization patient has made significant clinical improvement in terms of overall discomfort and myalgias and pain in her lower extremities.  And currently she denies having any chest pain shortness of breath or nausea vomiting.  Patient will be discharged to [facility] rehab.        Therefore, she is discharged in good and stable condition to an inpatient rehabilitation hospital.        FOLLOW UP ITEMS POST DISCHARGE  3/29/2019    DISCHARGE DIAGNOSES  Principal Problem:    Falls POA: Unknown  Active Problems:    Hypokalemia POA: Yes    GERD (gastroesophageal reflux disease) POA: Unknown    Mood disorder (HCC) POA: Unknown  Resolved Problems:    * No resolved hospital problems.  *      FOLLOW UP  No future appointments.  Lenny RENTERIA M.D.  [address]  [place] [address] [phone]    Schedule an appointment as soon as possible for a visit in 1 week  Hospital follow-up appointment with PCP      MEDICATIONS ON DISCHARGE     Medication List      CONTINUE taking these medications      Instructions   acetaminophen 500 MG Tabs  Commonly known as:  TYLENOL   Take 1,000 mg by mouth every 6 hours as needed for Mild Pain.  Dose:  1000 mg     DULoxetine 60 MG Cpep delayed-release capsule  Commonly known as:  CYMBALTA   Take 60 mg by mouth every day.  Dose:  60 mg     omeprazole 40 MG delayed-release capsule  Commonly known as:  PRILOSEC   TAKE 1 CAPSULE BY MOUTH ONCE A DAY 30 MINUTES BEFORE BREAKFAST MEAL     potassium chloride SA 20 MEQ Tbcr  Commonly known as:  Kdur   Take 20 mEq by mouth every day.  Dose:  20 mEq     pramipexole 0.5 MG Tabs  Commonly known as:  MIRAPEX   Take 0.5 mg by mouth every bedtime.  Dose:  0.5 mg     propranolol  MG Cp24 capsule  Commonly known as:  INDERAL LA   Take 160 mg by mouth every day.  Dose:  160 mg            Allergies  No Known Allergies    DIET  No orders of the defined types were placed in this encounter.      ACTIVITY  As tolerated.  Weight bearing as tolerated    LINES, DRAINS, AND WOUNDS  This is an automated list. Peripheral IVs will be removed prior to discharge.  PIV Group Left Forearm 20g Flexible Catheter;Saline Lock (Active)       Surgical Incision  Incision Left Eye (Active)       Surgical Incision  Incision Right Eye (Active)       Surgical Incision  Incision Back (Active)       Surgical Incision  Incision N/A Back (Active)                  MENTAL STATUS ON TRANSFER  Level of Consciousness: Alert  Orientation : Oriented x 4       CONSULTATIONS  Physiatry    PROCEDURES  None    LABORATORY  Lab Results   Component Value Date    SODIUM 133 (L) 03/27/2019    POTASSIUM 3.2 (L) 03/27/2019    CHLORIDE 100 03/27/2019    CO2 23  03/27/2019    GLUCOSE 110 (H) 03/27/2019    BUN 9 03/27/2019    CREATININE 0.67 03/27/2019    CREATININE 0.9 12/29/2008        Lab Results   Component Value Date    WBC 7.1 03/27/2019    HEMOGLOBIN 12.8 03/27/2019    HEMATOCRIT 38.0 03/27/2019    PLATELETCT 201 03/27/2019        Total time of the discharge process exceeds 35 minutes.

## 2019-03-30 NOTE — THERAPY
Speech Language Pathology   Initial Assessment     Patient Name: Lindsay Vargas  AGE:  78 y.o., SEX:  female  Medical Record #: [id number]  Today's Date: 3/30/2019     Subjective    Pt pleasant and cooperative during speech/language evaluation. Pt verbose and benefited from verbal cues to maintain topic.      Objective     03/30/19 1001   Cosignature   Documentation Review Approved with modifications made by preceptor in flowsheet   Prior Living Situation   Prior Services None   Housing / Facility 1 Story House   Lives with - Patient's Self Care Capacity Spouse   Prior Level Of Function   Communication Within Functional Limits   Swallow Within Functional Limits   Dentition Edentulous   Dentures Uppers  (Not present)   Hearing Impaired Both Ears  (Amplifier used)   Hearing Aid None   Vision Reading    Patient's Primary Language English   Occupation (Pre-Hospital Vocational) Retired Due To Age   Receptive Language / Auditory Comprehension   Identifies Pictures Within Functional Limits (6-7)   Answers Yes / No Personal Questions Within Functional Limits (6-7)   Answers Yes / No Simple / Contextual Questions Minimal (4)   Follows One Unit Commands Within Functional Limits (6-7)   Follows Two Unit Commands Supervision (5)   Understands Simple, Structured Conversation  Supervision (5)   Understands Complex Conversation Moderate (3)   Expressive Language   Expressive Language (WDL) X   Naming Within Functional Limits (6-7)   Explains Functions Of Objects Supervision (5)   Repeats Speech Accurately Supervision (5)   Automatic Language Appropriate Supervision (5)   Verbalizes Wants / Needs Supervision (5)   Sustain Dialogue Within Given Topic Moderate (3)   Word Finding Deficits Minimal (4)   Cognition   Cognitive-Linguistic (WDL) X   Attention to Task Moderate (3)   Simple Attention Moderate (3)   Visual Scanning / Cancellation Skills Minimal (4)   Simple Information Processing Moderate (3)   Complex Information  Processing Severe (2)   Topic Maintenance  Moderate (3)   Verbal Short Term Memory (less than five minutes)   Simple Reasoning / Problem Solving Moderate (3)   Insight into Deficits Moderate (3)   Planning / Decision Making To Be Assessed   Executive Functioning / Organization To Be Assessed   Auditory Math Severe (2)   Clock Drawing Poor Planning;Numeric Errors;Omissions;Impaired Hand Placement;Disorganization   Social / Pragmatic Communication   Eye Contact Supervision (5)   Turn Taking Moderate (3)   Topic Maintenance Moderate (3)   Attention to Social Cues Moderate (3)   Outcome Measures   Outcome Measures Utilized MoCA   MoCA (Porter Cognitive Assessment)   Visuospatial/Executive 0   Naming 3   Attention - Digits 1   Attention - Letters 0   Attention - Serial 7 Subtraction 0   Language - Repeat 0   Language - Fluency 0   Language - Abstraction 2   Delayed Recall 0   Orientation 5   Level of Education 0   Total 11   Level of Severity Moderate cognitive impairment   Problem List   Problem List Cognitive-Linguistic Deficits;Attention Deficit   Current Discharge Plan   Current Discharge Plan Return to Prior Living Situation   Benefit   Therapy Benefit Patient would benefit from Inpatient Rehab Speech-Language Pathology to address above identified deficits.   Interdisciplinary Plan of Care Collaboration   IDT Collaboration with  Therapy Tech   Patient Position at End of Therapy Self Releasing Lap Belt Applied;Call Light within Reach;Chair Alarm On   Collaboration Comments Add 60 mins cog tx.   Speech Language Pathologist Assigned   Assigned SLP / Pager # [phone] cog   SLP Total Time Spent   SLP Individual Total Time Spent (Mins) 60   SLP Charge Group   Charges Yes   SLP Speech Language Evaluation Speech Sound Language Comprehension       FIM Eating Score:     Eating Description:       FIM Comprehension Score:  4 - Minimal Assistance  Comprehension Description:  Hearing aids/amplifiers, Verbal cues    FIM Expression  Score:  5 - Stand-by Prompting/Supervision or Set-up  Expression Description:  Verbal cueing    FIM Social Interaction Score:  4 - Minimal Assistance  Social Interaction Description:  Increased time, Verbal cues, Schedule    FIM Problem Solving Score:  4 - Minimal Assistance  Problem Solving Description:  Verbal cueing, Therapy schedule, Increased time, Bed/chair alarm    FIM Memory Score:  3 - Moderate Assistance  Memory Description:  Verbal cueing, Bed/chair alarm, Therapy schedule    Assessment    Pt completed Harry Cognitive Assessment with MAX verbal cues to attend to assessment. Pt obtained an 11/30 indicating MODERATE cognitive impairment. Pt presents w/ deficits in attention, memory, and executive funx. REC: Cognitive linguistic tx.     Patient is 78 y.o. female with a diagnosis of weakness and altered mental status secondary to falls.  Additional factors influencing patient status/progress (ie: cognitive factors, co-morbidities, social support, etc): attention, memory, and cognitive/linguistic deficits with supportive spouse.      Plan  Recommend Speech Therapy  minutes per day 5-7 days per week for 2 weeks for the following treatments:  SLP Self Care / ADL Training , SLP Cognitive Skill Development and SLP Group Treatment.    Goals:  Long term and short term goals have been discussed with patient and they are in agreement.

## 2019-03-31 PROCEDURE — A9270 NON-COVERED ITEM OR SERVICE: HCPCS | Performed by: PHYSICAL MEDICINE & REHABILITATION

## 2019-03-31 PROCEDURE — 99232 SBSQ HOSP IP/OBS MODERATE 35: CPT | Performed by: PHYSICAL MEDICINE & REHABILITATION

## 2019-03-31 PROCEDURE — 700102 HCHG RX REV CODE 250 W/ 637 OVERRIDE(OP): Performed by: PHYSICAL MEDICINE & REHABILITATION

## 2019-03-31 PROCEDURE — 97535 SELF CARE MNGMENT TRAINING: CPT

## 2019-03-31 PROCEDURE — 770010 HCHG ROOM/CARE - REHAB SEMI PRIVAT*

## 2019-03-31 PROCEDURE — 700111 HCHG RX REV CODE 636 W/ 250 OVERRIDE (IP): Performed by: PHYSICAL MEDICINE & REHABILITATION

## 2019-03-31 PROCEDURE — 97110 THERAPEUTIC EXERCISES: CPT

## 2019-03-31 RX ADMIN — VITAMIN D, TAB 1000IU (100/BT) 1000 UNITS: 25 TAB at 09:06

## 2019-03-31 RX ADMIN — PRAMIPEXOLE DIHYDROCHLORIDE 0.5 MG: 0.25 TABLET ORAL at 21:02

## 2019-03-31 RX ADMIN — PROPRANOLOL HYDROCHLORIDE 160 MG: 80 CAPSULE, EXTENDED RELEASE ORAL at 05:48

## 2019-03-31 RX ADMIN — DULOXETINE 60 MG: 30 CAPSULE, DELAYED RELEASE ORAL at 09:06

## 2019-03-31 RX ADMIN — ENOXAPARIN SODIUM 40 MG: 100 INJECTION SUBCUTANEOUS at 21:02

## 2019-03-31 RX ADMIN — OMEPRAZOLE 40 MG: 20 CAPSULE, DELAYED RELEASE ORAL at 09:06

## 2019-03-31 RX ADMIN — SENNOSIDES AND DOCUSATE SODIUM 2 TABLET: 8.6; 5 TABLET ORAL at 21:02

## 2019-03-31 RX ADMIN — POTASSIUM CHLORIDE 20 MEQ: 1500 TABLET, EXTENDED RELEASE ORAL at 21:02

## 2019-03-31 NOTE — CARE PLAN
Problem: Infection  Goal: Will remain free from infection  Pt is afebrile and VSS. No s/s of infection noted.     Problem: Skin Integrity  Goal: Risk for impaired skin integrity will decrease  Pt able to turn and reposition self for skin integrity.

## 2019-03-31 NOTE — CARE PLAN
Problem: Safety  Goal: Will remain free from injury  Outcome: PROGRESSING AS EXPECTED  Patient demonstrates good safety technique this shift.  Asks for assistance when needed and does not attempt self transfer.  Able to verbalize needs.    Problem: Skin Integrity  Goal: Risk for impaired skin integrity will decrease  Outcome: PROGRESSING AS EXPECTED  Patient's skin remains intact and free from new or accidental injury this shift.  Will continue to monitor.

## 2019-03-31 NOTE — PROGRESS NOTES
"Rehab Progress Note     Interval Events (Subjective)  Lindsay reports that she is doing well.  She slept well overnight and is very happy with the therapies that she is getting.  She is feeling encouraged.      We discussed that she has had a little bit of trouble swallowing her pills, but using applesauce or pudding has helped.  She does take potassium as an outpatient and has had trouble swallowing this in the past.    No pain complaints.  No shortness of breath or chest pain    Objective:  VITAL SIGNS: /64   Pulse 97   Temp 36.8 °C (98.2 °F) (Oral)   Resp 17   Ht 1.702 m (5' 7\")   Wt 68.3 kg (150 lb 9.2 oz)   SpO2 94%   Breastfeeding? No   BMI 23.58 kg/m²   Gen: No acute distress  CV: RRR,, normal S1, S2  Lungs: CTA bilaterally  Ext: No edema, no calf tenderness    Recent Results (from the past 72 hour(s))   CBC with Differential    Collection Time: 03/30/19  5:26 AM   Result Value Ref Range    WBC 8.2 4.8 - 10.8 K/uL    RBC 4.67 4.20 - 5.40 M/uL    Hemoglobin 13.1 12.0 - 16.0 g/dL    Hematocrit 39.4 37.0 - 47.0 %    MCV 84.4 81.4 - 97.8 fL    MCH 28.1 27.0 - 33.0 pg    MCHC 33.2 (L) 33.6 - 35.0 g/dL    RDW 45.8 35.9 - 50.0 fL    Platelet Count 178 164 - 446 K/uL    MPV 10.6 9.0 - 12.9 fL    Neutrophils-Polys 61.00 44.00 - 72.00 %    Lymphocytes 28.50 22.00 - 41.00 %    Monocytes 7.00 0.00 - 13.40 %    Eosinophils 2.30 0.00 - 6.90 %    Basophils 0.70 0.00 - 1.80 %    Immature Granulocytes 0.50 0.00 - 0.90 %    Nucleated RBC 0.00 /100 WBC    Neutrophils (Absolute) 4.97 2.00 - 7.15 K/uL    Lymphs (Absolute) 2.32 1.00 - 4.80 K/uL    Monos (Absolute) 0.57 0.00 - 0.85 K/uL    Eos (Absolute) 0.19 0.00 - 0.51 K/uL    Baso (Absolute) 0.06 0.00 - 0.12 K/uL    Immature Granulocytes (abs) 0.04 0.00 - 0.11 K/uL    NRBC (Absolute) 0.00 K/uL   Comp Metabolic Panel (CMP)    Collection Time: 03/30/19  5:26 AM   Result Value Ref Range    Sodium 136 135 - 145 mmol/L    Potassium 3.2 (L) 3.6 - 5.5 mmol/L    Chloride " 102 96 - 112 mmol/L    Co2 22 20 - 33 mmol/L    Anion Gap 12.0 (H) 0.0 - 11.9    Glucose 104 (H) 65 - 99 mg/dL    Bun 12 8 - 22 mg/dL    Creatinine 0.60 0.50 - 1.40 mg/dL    Calcium 8.8 8.5 - 10.5 mg/dL    AST(SGOT) 12 12 - 45 U/L    ALT(SGPT) 9 2 - 50 U/L    Alkaline Phosphatase 63 30 - 99 U/L    Total Bilirubin 0.6 0.1 - 1.5 mg/dL    Albumin 4.0 3.2 - 4.9 g/dL    Total Protein 6.0 6.0 - 8.2 g/dL    Globulin 2.0 1.9 - 3.5 g/dL    A-G Ratio 2.0 g/dL   Magnesium    Collection Time: 03/30/19  5:26 AM   Result Value Ref Range    Magnesium 1.5 1.5 - 2.5 mg/dL   Vitamin D, 25-hydroxy (blood)    Collection Time: 03/30/19  5:26 AM   Result Value Ref Range    25-Hydroxy   Vitamin D 25 17 (L) 30 - 100 ng/mL   ESTIMATED GFR    Collection Time: 03/30/19  5:26 AM   Result Value Ref Range    GFR If African American >60 >60 mL/min/1.73 m 2    GFR If Non African American >60 >60 mL/min/1.73 m 2       Current Facility-Administered Medications   Medication Frequency   • potassium chloride SA (Kdur) tablet 20 mEq Q EVENING   • vitamin D (cholecalciferol) tablet 1,000 Units DAILY   • artificial tears 1.4 % ophthalmic solution 1 Drop PRN   • benzocaine-menthol (CEPACOL) lozenge 1 Lozenge Q2HRS PRN   • enoxaparin (LOVENOX) inj 40 mg QHS   • mag hydrox-al hydrox-simeth (MAALOX PLUS ES or MYLANTA DS) suspension 20 mL Q2HRS PRN   • ondansetron (ZOFRAN ODT) dispertab 4 mg 4X/DAY PRN    Or   • ondansetron (ZOFRAN) syringe/vial injection 4 mg 4X/DAY PRN   • sodium chloride (OCEAN) 0.65 % nasal spray 2 Spray PRN   • traZODone (DESYREL) tablet 25 mg QHS PRN   • Pharmacy Consult Request ...Pain Management Review 1 Each PHARMACY TO DOSE   • Respiratory Care per Protocol Continuous RT   • melatonin tablet 3 mg HS PRN   • senna-docusate (PERICOLACE or SENOKOT S) 8.6-50 MG per tablet 2 Tab BID    And   • polyethylene glycol/lytes (MIRALAX) PACKET 1 Packet QDAY PRN    And   • magnesium hydroxide (MILK OF MAGNESIA) suspension 30 mL QDAY PRN    And   •  bisacodyl (DULCOLAX) suppository 10 mg QDAY PRN   • acetaminophen (TYLENOL) tablet 650 mg Q6HRS PRN   • DULoxetine (CYMBALTA) capsule 60 mg DAILY   • omeprazole (PRILOSEC) capsule 40 mg DAILY   • pramipexole (MIRAPEX) tablet 0.5 mg QHS   • propranolol CR (INDERAL LA) capsule 160 mg Q DAY       Orders Placed This Encounter   Procedures   • Diet Order Regular     Standing Status:   Standing     Number of Occurrences:   1     Order Specific Question:   Diet:     Answer:   Regular [1]       Assessment:  Active Hospital Problems    Diagnosis   • *Debility   • Falls   • Restless legs syndrome   • Chronic back pain   • Mood disorder (HCC)   • GERD (gastroesophageal reflux disease)        Hypokalemia       Vitamin D deficiency    Medical Decision Making and Plan:  Vitamin D deficiency: Start supplementation    Hypokalemia: Chronic, gave a total of 40mEq Kdur on 03/30/2019 and restarted her outpatient dose of 20mEq Kdur.  Plan to recheck    Chronic low back pain  Continue prn tylenol    Hypertension  Continue propranolol    GERD  Continue prilosec    Peripheral neuropathy  Continue cymbalta    History of depression  Continue cymbalta    Restless leg syndrome  Continue Deanne Dawson M.D.

## 2019-03-31 NOTE — THERAPY
"Occupational Therapy  Daily Treatment     Patient Name: Lindsay Vargas  Age:  78 y.o., Sex:  female  Medical Record #: [id number]  Today's Date: 3/31/2019     Precautions  Precautions: Fall Risk  Comments: [place], poor attention     Safety   ADL Safety : Requires Supervision for Safety, Impaired Insight into Safety  Comments: impaired attention, poor standing balance    Subjective    \"this is starting to hurt my shoulder\" re: prior L shoulder rotator cuff injury     Objective     19 1031   Safety    ADL Safety  Requires Supervision for Safety;Impaired Insight into Safety   Comments impaired attention, poor standing balance   Sitting Upper Body Exercises   Upper Extremity Bike Level 5 Resistance  (motomed BUE 15 min w/2 RB's, 51/49% symmetry, 1.28 mile)   Sitting Lower Body Exercises   Sit to Stand (1 set x 5 with increase time to w/c<>high mat CGA - SBA)   Balance   Standing Balance (Static) Fair -   Skilled Intervention Verbal Cuing   Comments static standing tolerance at high mat for BUE support - 5 min, light perturbation in all directions with no LOB   Interdisciplinary Plan of Care Collaboration   Patient Position at End of Therapy Seated;Call Light within Reach;Tray Table within Reach   OT Total Time Spent   OT Individual Total Time Spent (Mins) 60   OT Charge Group   OT Self Care / ADL 1   OT Therapeutic Exercise  3       FIM Upper Body Dressin - Minimal Assistance  Upper Body Dressing Description:  Increased time, Supervision for safety, Verbal cueing, Set-up of equipment    FIM Lower Body Dressing Score:  4 - Minimal Assistance  Lower Body Dressing Description:  Increased time, Supervision for safety, Verbal cueing, Set-up of equipment (assist with threading LLE into pants, SBA-CGA for standing balance)      Assessment    Pt completed dressing task and exercises with assist and increase time d/t poor attention, poor standing balance, impaired cognition - problem solving    Plan    Cont overall " strength/endurance and standing balance to improve ADL's and functional mobility

## 2019-04-01 LAB
ANION GAP SERPL CALC-SCNC: 10 MMOL/L (ref 0–11.9)
BUN SERPL-MCNC: 13 MG/DL (ref 8–22)
CALCIUM SERPL-MCNC: 8.7 MG/DL (ref 8.5–10.5)
CHLORIDE SERPL-SCNC: 104 MMOL/L (ref 96–112)
CO2 SERPL-SCNC: 23 MMOL/L (ref 20–33)
CREAT SERPL-MCNC: 0.63 MG/DL (ref 0.5–1.4)
GLUCOSE SERPL-MCNC: 102 MG/DL (ref 65–99)
POTASSIUM SERPL-SCNC: 3.6 MMOL/L (ref 3.6–5.5)
SODIUM SERPL-SCNC: 137 MMOL/L (ref 135–145)

## 2019-04-01 PROCEDURE — 92610 EVALUATE SWALLOWING FUNCTION: CPT

## 2019-04-01 PROCEDURE — 97110 THERAPEUTIC EXERCISES: CPT

## 2019-04-01 PROCEDURE — 700111 HCHG RX REV CODE 636 W/ 250 OVERRIDE (IP): Performed by: PHYSICAL MEDICINE & REHABILITATION

## 2019-04-01 PROCEDURE — 80048 BASIC METABOLIC PNL TOTAL CA: CPT

## 2019-04-01 PROCEDURE — G0515 COGNITIVE SKILLS DEVELOPMENT: HCPCS

## 2019-04-01 PROCEDURE — 97530 THERAPEUTIC ACTIVITIES: CPT

## 2019-04-01 PROCEDURE — 36415 COLL VENOUS BLD VENIPUNCTURE: CPT

## 2019-04-01 PROCEDURE — 99232 SBSQ HOSP IP/OBS MODERATE 35: CPT | Performed by: PHYSICAL MEDICINE & REHABILITATION

## 2019-04-01 PROCEDURE — A9270 NON-COVERED ITEM OR SERVICE: HCPCS | Performed by: PHYSICAL MEDICINE & REHABILITATION

## 2019-04-01 PROCEDURE — 97112 NEUROMUSCULAR REEDUCATION: CPT

## 2019-04-01 PROCEDURE — 700102 HCHG RX REV CODE 250 W/ 637 OVERRIDE(OP): Performed by: PHYSICAL MEDICINE & REHABILITATION

## 2019-04-01 PROCEDURE — 770010 HCHG ROOM/CARE - REHAB SEMI PRIVAT*

## 2019-04-01 RX ADMIN — PRAMIPEXOLE DIHYDROCHLORIDE 0.5 MG: 0.25 TABLET ORAL at 20:37

## 2019-04-01 RX ADMIN — PROPRANOLOL HYDROCHLORIDE 160 MG: 80 CAPSULE, EXTENDED RELEASE ORAL at 06:01

## 2019-04-01 RX ADMIN — VITAMIN D, TAB 1000IU (100/BT) 1000 UNITS: 25 TAB at 08:33

## 2019-04-01 RX ADMIN — ENOXAPARIN SODIUM 40 MG: 100 INJECTION SUBCUTANEOUS at 20:37

## 2019-04-01 RX ADMIN — POTASSIUM CHLORIDE 20 MEQ: 1500 TABLET, EXTENDED RELEASE ORAL at 20:37

## 2019-04-01 RX ADMIN — DULOXETINE 60 MG: 30 CAPSULE, DELAYED RELEASE ORAL at 08:33

## 2019-04-01 RX ADMIN — OMEPRAZOLE 40 MG: 20 CAPSULE, DELAYED RELEASE ORAL at 08:33

## 2019-04-01 RX ADMIN — SENNOSIDES AND DOCUSATE SODIUM 2 TABLET: 8.6; 5 TABLET ORAL at 20:40

## 2019-04-01 RX ADMIN — SENNOSIDES AND DOCUSATE SODIUM 2 TABLET: 8.6; 5 TABLET ORAL at 08:33

## 2019-04-01 NOTE — PROGRESS NOTES
"Rehab Progress Note     Date of Service: 4/1/2019  Chief Complaint: follow up debility    Interval Events (Subjective)    Patient seen and examined in her room today.  She thinks that therapy is going quite well and she likely will need to be here very long.  She states she was able to do some balance activities in the parallel bars.  She is chronically hard of hearing and states she needs to be evaluated for hearing aids.  Nursing report yesterday she had trouble swallowing pills.  Speech therapy to do a swallowing assessment without any issues.  Patient reports she is sleeping well and eating well.  She denies any pain.    Objective:  VITAL SIGNS: /60   Pulse 74   Temp 36.3 °C (97.3 °F)   Resp 14   Ht 1.702 m (5' 7\")   Wt 69.3 kg (152 lb 12.5 oz)   SpO2 95%   Breastfeeding? No   BMI 23.93 kg/m²   Gen: alert, no apparent distress  CV: regular rate and rhythm, no murmurs, no peripheral edema  Resp: clear to ascultation bilaterally, normal respiratory effort  GI: soft, non-tender abdomen, bowel sounds present  Neuro: notable for hyperverbose    Recent Results (from the past 72 hour(s))   CBC with Differential    Collection Time: 03/30/19  5:26 AM   Result Value Ref Range    WBC 8.2 4.8 - 10.8 K/uL    RBC 4.67 4.20 - 5.40 M/uL    Hemoglobin 13.1 12.0 - 16.0 g/dL    Hematocrit 39.4 37.0 - 47.0 %    MCV 84.4 81.4 - 97.8 fL    MCH 28.1 27.0 - 33.0 pg    MCHC 33.2 (L) 33.6 - 35.0 g/dL    RDW 45.8 35.9 - 50.0 fL    Platelet Count 178 164 - 446 K/uL    MPV 10.6 9.0 - 12.9 fL    Neutrophils-Polys 61.00 44.00 - 72.00 %    Lymphocytes 28.50 22.00 - 41.00 %    Monocytes 7.00 0.00 - 13.40 %    Eosinophils 2.30 0.00 - 6.90 %    Basophils 0.70 0.00 - 1.80 %    Immature Granulocytes 0.50 0.00 - 0.90 %    Nucleated RBC 0.00 /100 WBC    Neutrophils (Absolute) 4.97 2.00 - 7.15 K/uL    Lymphs (Absolute) 2.32 1.00 - 4.80 K/uL    Monos (Absolute) 0.57 0.00 - 0.85 K/uL    Eos (Absolute) 0.19 0.00 - 0.51 K/uL    Baso " (Absolute) 0.06 0.00 - 0.12 K/uL    Immature Granulocytes (abs) 0.04 0.00 - 0.11 K/uL    NRBC (Absolute) 0.00 K/uL   Comp Metabolic Panel (CMP)    Collection Time: 03/30/19  5:26 AM   Result Value Ref Range    Sodium 136 135 - 145 mmol/L    Potassium 3.2 (L) 3.6 - 5.5 mmol/L    Chloride 102 96 - 112 mmol/L    Co2 22 20 - 33 mmol/L    Anion Gap 12.0 (H) 0.0 - 11.9    Glucose 104 (H) 65 - 99 mg/dL    Bun 12 8 - 22 mg/dL    Creatinine 0.60 0.50 - 1.40 mg/dL    Calcium 8.8 8.5 - 10.5 mg/dL    AST(SGOT) 12 12 - 45 U/L    ALT(SGPT) 9 2 - 50 U/L    Alkaline Phosphatase 63 30 - 99 U/L    Total Bilirubin 0.6 0.1 - 1.5 mg/dL    Albumin 4.0 3.2 - 4.9 g/dL    Total Protein 6.0 6.0 - 8.2 g/dL    Globulin 2.0 1.9 - 3.5 g/dL    A-G Ratio 2.0 g/dL   Magnesium    Collection Time: 03/30/19  5:26 AM   Result Value Ref Range    Magnesium 1.5 1.5 - 2.5 mg/dL   Vitamin D, 25-hydroxy (blood)    Collection Time: 03/30/19  5:26 AM   Result Value Ref Range    25-Hydroxy   Vitamin D 25 17 (L) 30 - 100 ng/mL   ESTIMATED GFR    Collection Time: 03/30/19  5:26 AM   Result Value Ref Range    GFR If African American >60 >60 mL/min/1.73 m 2    GFR If Non African American >60 >60 mL/min/1.73 m 2   Basic Metabolic Panel    Collection Time: 04/01/19  5:28 AM   Result Value Ref Range    Sodium 137 135 - 145 mmol/L    Potassium 3.6 3.6 - 5.5 mmol/L    Chloride 104 96 - 112 mmol/L    Co2 23 20 - 33 mmol/L    Glucose 102 (H) 65 - 99 mg/dL    Bun 13 8 - 22 mg/dL    Creatinine 0.63 0.50 - 1.40 mg/dL    Calcium 8.7 8.5 - 10.5 mg/dL    Anion Gap 10.0 0.0 - 11.9   ESTIMATED GFR    Collection Time: 04/01/19  5:28 AM   Result Value Ref Range    GFR If African American >60 >60 mL/min/1.73 m 2    GFR If Non African American >60 >60 mL/min/1.73 m 2       Current Facility-Administered Medications   Medication Frequency   • potassium chloride SA (Kdur) tablet 20 mEq Q EVENING   • vitamin D (cholecalciferol) tablet 1,000 Units DAILY   • artificial tears 1.4 %  ophthalmic solution 1 Drop PRN   • benzocaine-menthol (CEPACOL) lozenge 1 Lozenge Q2HRS PRN   • enoxaparin (LOVENOX) inj 40 mg QHS   • mag hydrox-al hydrox-simeth (MAALOX PLUS ES or MYLANTA DS) suspension 20 mL Q2HRS PRN   • ondansetron (ZOFRAN ODT) dispertab 4 mg 4X/DAY PRN    Or   • ondansetron (ZOFRAN) syringe/vial injection 4 mg 4X/DAY PRN   • sodium chloride (OCEAN) 0.65 % nasal spray 2 Spray PRN   • traZODone (DESYREL) tablet 25 mg QHS PRN   • Pharmacy Consult Request ...Pain Management Review 1 Each PHARMACY TO DOSE   • Respiratory Care per Protocol Continuous RT   • melatonin tablet 3 mg HS PRN   • senna-docusate (PERICOLACE or SENOKOT S) 8.6-50 MG per tablet 2 Tab BID    And   • polyethylene glycol/lytes (MIRALAX) PACKET 1 Packet QDAY PRN    And   • magnesium hydroxide (MILK OF MAGNESIA) suspension 30 mL QDAY PRN    And   • bisacodyl (DULCOLAX) suppository 10 mg QDAY PRN   • acetaminophen (TYLENOL) tablet 650 mg Q6HRS PRN   • DULoxetine (CYMBALTA) capsule 60 mg DAILY   • omeprazole (PRILOSEC) capsule 40 mg DAILY   • pramipexole (MIRAPEX) tablet 0.5 mg QHS   • propranolol CR (INDERAL LA) capsule 160 mg Q DAY       Orders Placed This Encounter   Procedures   • Diet Order Regular     Standing Status:   Standing     Number of Occurrences:   1     Order Specific Question:   Diet:     Answer:   Regular [1]       Assessment:  Active Hospital Problems    Diagnosis   • *Debility   • Falls   • Restless legs syndrome   • Chronic back pain   • Mood disorder (HCC)   • GERD (gastroesophageal reflux disease)     This patient is a 78 y.o. female admitted for acute inpatient rehabilitation with Debility.    Therapy update 4/1/2019  Supervision eating  Supervision grooming  Min assist bathing  Min assist upper body dressing  Min assist lower body dressing  Mod assist toileting  Mod assistbladder  Not tested bowel  Min assist bed/chair transfer  Not tested toilet transfer  Min assist tub/shower transfer  Total assist  ambulation  Supervision wheelchair propulsion  Not tested stairs  Min assist comprehension  Supervision expression  Min assist social interaction  Min assist problem solving  Mod assist memory    We will have her first weekly conference on Wednesday to discuss her discharge date.    Medical Decision Making and Plan:    Debility  Continue full rehab program  PT/OT/SLP, 1 hr each discipline, 5 days per week    Chronic back pain  PRN tylenol  Previously on baclofen which caused altered mental status     Frequent falls  Therapy evaluation for LRD     Peripheral neuropathy  Continue Cymbalta     Hypertension  Continue propranolol  SBPs in last 24 hrs 122-131     History of depression  Continue Cymbalta     GERD  Continue Prilosec     Restless legs syndrome  Continue Requip    Hypokalemia  On supplementation  Check am labs    Bowel  Continue bowel meds  Last BM 3/31    DVT prophylaxis  Lovenox    Total time:  27 minutes.  I spent greater than 50% of the time for patient care, counseling, and coordination on this date, including patient face-to face time, unit/floor time with review of records/pertinent lab data and studies, as well as discussing diagnostic evaluation/work up, planned therapeutic interventions, and future disposition of care, as per the interval events/subjective and the assessment and plan as noted above.      Marleen Fontaine M.D.   Physical Medicine and Rehabilitation

## 2019-04-01 NOTE — PROGRESS NOTES
Received shift report and assumed care of patient.  Patient asleep, calm and stable, currently positioned in bed for comfort and safety; call light within reach.  No signs of pain or discomfort at this time.  Will continue to monitor.

## 2019-04-01 NOTE — THERAPY
Physical Therapy   Daily Treatment     Patient Name: Lindsay Vargas  Age:  78 y.o., Sex:  female  Medical Record #: [id number]  Today's Date: 4/1/2019     Precautions  Precautions: (P) Fall Risk  Comments: (P) [place], poor attention, hyperverbose    Subjective    Patient was highly talkative throughout session, and proud of progress. Patient believes that she will be a short stay.      Objective       04/01/19 1501   Precautions   Precautions Fall Risk   Comments [place], poor attention, hyperverbose   Cognition    Comments Self distracting/ hyperverbose, very difficult to redirect    Sitting Lower Body Exercises   Sit to Stand 1 set of 10   Standing Lower Body Exercises   Mini Squat Partial;1 set of 10   Bed Mobility    Supine to Sit Stand by Assist   Sit to Stand Contact Guard Assist  (cues for sequencing)   Scooting Stand by Assist   Rolling Supervised   Neuro-Muscular Treatments   Neuro-Muscular Treatments Weight Shift Right;Weight Shift Left;Verbal Cuing;Sequencing;Tactile Cuing;Postural Facilitation;Postural Changes   Comments Cues/ facilitation for proximity to FWW and upright posture   PT Total Time Spent   PT Individual Total Time Spent (Mins) 60   PT Charge Group   PT Neuromuscular Re-Education / Balance 2   PT Therapeutic Activities 2       FIM Bed/Chair/Wheelchair Transfers Score: 4 - Minimal Assistance  Bed/Chair/Wheelchair Transfers Description:   (CGA reach pivot bed to wc)    FIM Walking Score:  1 - Total Assistance  Walking Description:  Walker, Requires wheelchair follow (Ataxic, inconsisent 50 ft + 100 ft min A with wc follow indoors)      Assessment    Patient was very difficult to redirect from talking to participating in treatment. Patient presents with variable, ataxic gait.  RLE appeared to remain in flexed position during midstance, with excessive toe out. Step length was inconsistent.     Plan    Continue pregait, safety with mobility, and attention to task. Review transfer sequencing.

## 2019-04-01 NOTE — DISCHARGE PLANNING
CASE MANAGEMENT INITIAL ASSESSMENT    Admit Date:  3/29/2019     I met with patient to discuss role of case management / discharge planning / team conference. Patient alert, appropriate in responses.   Patient is a  78 y.o. female transferred from [facility] where she was IP from 3/26 to 3/29/19. Patient had a previous admission to [facility] for altered mental status and dizziness. She was discharged to SNF but only stayed there 3 days. She then returned to the ED with weakness and fall, was admitted to [facility] on 3/26/19.  On 3/29/19, patient was admitted to [facility].  Admitting physician is Dr. Marleen Fontaine.     Diagnosis: 16 Debility (Non Cardiac, Non Pulmonary)  Debility  Falls frequently  HTN (hypertension)    Co-morbidities:   Patient Active Problem List    Diagnosis Date Noted   • Falls 03/26/2019     Priority: High   • Debility 03/29/2019   • Restless legs syndrome 03/29/2019   • Chronic back pain 03/29/2019   • GERD (gastroesophageal reflux disease) 03/26/2019   • Mood disorder ([facility]) 03/26/2019   • Hypokalemia 03/18/2019   • Hyponatremia 03/18/2019   • Dehydration 03/18/2019   • SIRS (systemic inflammatory response syndrome) ([facility]) 03/18/2019   • Acute metabolic encephalopathy 03/18/2019   • NSTEMI (non-ST elevated myocardial infarction) ([facility]) 03/18/2019   • Syncope 03/18/2019   • Palpitations 12/26/2018   • Localized edema 12/26/2018   • Lumbar radiculitis 09/29/2016   • 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 02/18/2016   • Combined form of senile cataract of left eye 02/04/2016     Prior Living Situation:  Housing / Facility: 1 Story House  Lives with - Patient's Self Care Capacity: Spouse    Prior Level of Function:  Medication Management: Requires Assist  Finances: Requires Assist  Home Management: Requires Assist  Shopping: Requires Assist  Prior Level Of Mobility: Supervision With Device in Community  Driving / Transportation: Relatives / Others Provide Transportation    Support  Systems:  Primary : Abdulaziz Vargas, Spouse, [phone]  Other support systems: Tequila Reyes, daughter [phone]. 1 granddaughter and 1 grandson live in [place] and are supportive in addition to patient's daughter.   Advance Directives: No    Previous Services Utilized:   Equipment Owned: Front-Wheel Walker, Wheelchair, Grab Bar(s) By Toilet  Prior Services: Other (Comments) (skilled care)    Other Information:  Occupation (Pre-Hospital Vocational): Retired Due To Age     Primary Payor Source: [employer]  Primary Care Practitioner : Dr. Darby    Patient / Family Goal:  Patient / Family Goal: To be back like I was. Discharge disposition is to discharge home to supportive care of spouse and family. Patient lives with her  in [facility], with 1 step to enter, in [place]. Supportive daughter lives in [place]. Patient reports she has two grandchildren who are also very supportive.   Discharge needs: OP vs HH therapies.. DME needs TBD. Has a wc, spc, FWW (4WW indicated in chart too), grab bars by toilet, walk in shower. Patient reports  is going to install grab bars in the shower. Follow up PCP appointment needed.  Strengths: Patient is motivated, supportive family, adequate financial resources.     Additional Case Management Questions:  Have you ever received case management services for yourself or a family member? no    Do you feel you have and an understanding of what services  provide? yes    Do you have any additional questions regarding case management?    no      CASE MANAGEMENT PLAN OF CARE   Individualized Goals:   1. Improve strength and mobility  2. Discharge to home with spouse  3. DME, physician office appointments and therapy follow up after discharge arranged    Barriers:   1. Functional deficits  2. Back pain and spasms  3. Cognitive deficits    Plan:  1. Continue to follow patient through hospitalization and provide discharge planning in  collaboration with patient, family, physicians and ancillary services.     2. Utilize community resources to ensure a safe discharge.

## 2019-04-01 NOTE — THERAPY
"Occupational Therapy  Daily Treatment     Patient Name: Lindsay Vargas  Age:  78 y.o., Sex:  female  Medical Record #: [id number]  Today's Date: 4/1/2019     Precautions  Precautions: Fall Risk  Comments: [place], poor attention     Safety   ADL Safety : Requires Supervision for Safety, Impaired Insight into Safety  Comments: impaired attention, poor standing balance    Subjective    \"Im ready to go home, I think I am ready\"     Objective       04/01/19 0931   Sitting Upper Body Exercises   Tricep Press 3 sets of 10;Bilateral;Weight (See Comments for lbs)  (20# rickshaw)   Standing Lower Body Exercises   Marching 3 sets of 10  (alt foot taps on 6.5\" platform )   Toe Rise 3 sets of 10;Bilateral  (standing on foam pad)   Mini Lunge 3 sets of 10;Partial  (ant/posterior stepping using targets for visual cues)   Other Exercises in // bars with BUE support to maintain balance   Interdisciplinary Plan of Care Collaboration   Patient Position at End of Therapy Seated  (in ST office)   OT Total Time Spent   OT Individual Total Time Spent (Mins) 60   OT Charge Group   OT Therapeutic Exercise  4       Assessment    Pt completed UB/LB therex with no complaints of pain. Pt requires BUE support to maintain standing balance. Pt highly motivated and cooperative, but at the same time is ready to go home    Plan    Cont overall strength/endurance and standing balance to improve ADL's and functional mobility    "

## 2019-04-02 LAB
ANION GAP SERPL CALC-SCNC: 7 MMOL/L (ref 0–11.9)
BUN SERPL-MCNC: 11 MG/DL (ref 8–22)
CALCIUM SERPL-MCNC: 9.2 MG/DL (ref 8.5–10.5)
CHLORIDE SERPL-SCNC: 104 MMOL/L (ref 96–112)
CO2 SERPL-SCNC: 26 MMOL/L (ref 20–33)
CREAT SERPL-MCNC: 0.69 MG/DL (ref 0.5–1.4)
GLUCOSE SERPL-MCNC: 98 MG/DL (ref 65–99)
POTASSIUM SERPL-SCNC: 4.2 MMOL/L (ref 3.6–5.5)
SODIUM SERPL-SCNC: 137 MMOL/L (ref 135–145)

## 2019-04-02 PROCEDURE — 36415 COLL VENOUS BLD VENIPUNCTURE: CPT

## 2019-04-02 PROCEDURE — 97535 SELF CARE MNGMENT TRAINING: CPT

## 2019-04-02 PROCEDURE — 97116 GAIT TRAINING THERAPY: CPT

## 2019-04-02 PROCEDURE — 97112 NEUROMUSCULAR REEDUCATION: CPT

## 2019-04-02 PROCEDURE — A9270 NON-COVERED ITEM OR SERVICE: HCPCS | Performed by: PHYSICAL MEDICINE & REHABILITATION

## 2019-04-02 PROCEDURE — 80048 BASIC METABOLIC PNL TOTAL CA: CPT

## 2019-04-02 PROCEDURE — 700102 HCHG RX REV CODE 250 W/ 637 OVERRIDE(OP): Performed by: PHYSICAL MEDICINE & REHABILITATION

## 2019-04-02 PROCEDURE — 97110 THERAPEUTIC EXERCISES: CPT

## 2019-04-02 PROCEDURE — 99231 SBSQ HOSP IP/OBS SF/LOW 25: CPT | Performed by: PHYSICAL MEDICINE & REHABILITATION

## 2019-04-02 PROCEDURE — 97530 THERAPEUTIC ACTIVITIES: CPT

## 2019-04-02 PROCEDURE — 700111 HCHG RX REV CODE 636 W/ 250 OVERRIDE (IP): Performed by: PHYSICAL MEDICINE & REHABILITATION

## 2019-04-02 PROCEDURE — G0515 COGNITIVE SKILLS DEVELOPMENT: HCPCS

## 2019-04-02 PROCEDURE — 770010 HCHG ROOM/CARE - REHAB SEMI PRIVAT*

## 2019-04-02 RX ADMIN — SENNOSIDES AND DOCUSATE SODIUM 2 TABLET: 8.6; 5 TABLET ORAL at 08:29

## 2019-04-02 RX ADMIN — ENOXAPARIN SODIUM 40 MG: 100 INJECTION SUBCUTANEOUS at 20:43

## 2019-04-02 RX ADMIN — DULOXETINE 60 MG: 30 CAPSULE, DELAYED RELEASE ORAL at 08:28

## 2019-04-02 RX ADMIN — PRAMIPEXOLE DIHYDROCHLORIDE 0.5 MG: 0.25 TABLET ORAL at 20:42

## 2019-04-02 RX ADMIN — OMEPRAZOLE 40 MG: 20 CAPSULE, DELAYED RELEASE ORAL at 08:28

## 2019-04-02 RX ADMIN — PROPRANOLOL HYDROCHLORIDE 160 MG: 80 CAPSULE, EXTENDED RELEASE ORAL at 05:39

## 2019-04-02 RX ADMIN — SENNOSIDES AND DOCUSATE SODIUM 2 TABLET: 8.6; 5 TABLET ORAL at 20:43

## 2019-04-02 RX ADMIN — VITAMIN D, TAB 1000IU (100/BT) 1000 UNITS: 25 TAB at 08:28

## 2019-04-02 NOTE — PROGRESS NOTES
"Rehab Progress Note     Date of Service: 4/2/2019  Chief Complaint: follow up debility    Interval Events (Subjective)    Patient seen and examined in the therapy gym today. She is working on UE weight with OT. She has no new complaints. OT reports poor balance, but function is improving. Patient reports no grab bars in the bathroom at home, but is willing to put some in.     Objective:  VITAL SIGNS: /80   Pulse 77   Temp 37 °C (98.6 °F) (Oral)   Resp 18   Ht 1.702 m (5' 7\")   Wt 69.3 kg (152 lb 12.5 oz)   SpO2 97%   Breastfeeding? No   BMI 23.93 kg/m²   Gen: alert, no apparent distress  CV: regular rate and rhythm, no murmurs, no peripheral edema  Resp: clear to ascultation bilaterally, normal respiratory effort  GI: soft, non-tender abdomen, bowel sounds present  Neuro: notable for hyperverbose, very hard of hearing    Recent Results (from the past 72 hour(s))   Basic Metabolic Panel    Collection Time: 04/01/19  5:28 AM   Result Value Ref Range    Sodium 137 135 - 145 mmol/L    Potassium 3.6 3.6 - 5.5 mmol/L    Chloride 104 96 - 112 mmol/L    Co2 23 20 - 33 mmol/L    Glucose 102 (H) 65 - 99 mg/dL    Bun 13 8 - 22 mg/dL    Creatinine 0.63 0.50 - 1.40 mg/dL    Calcium 8.7 8.5 - 10.5 mg/dL    Anion Gap 10.0 0.0 - 11.9   ESTIMATED GFR    Collection Time: 04/01/19  5:28 AM   Result Value Ref Range    GFR If African American >60 >60 mL/min/1.73 m 2    GFR If Non African American >60 >60 mL/min/1.73 m 2   Basic Metabolic Panel    Collection Time: 04/02/19  5:24 AM   Result Value Ref Range    Sodium 137 135 - 145 mmol/L    Potassium 4.2 3.6 - 5.5 mmol/L    Chloride 104 96 - 112 mmol/L    Co2 26 20 - 33 mmol/L    Glucose 98 65 - 99 mg/dL    Bun 11 8 - 22 mg/dL    Creatinine 0.69 0.50 - 1.40 mg/dL    Calcium 9.2 8.5 - 10.5 mg/dL    Anion Gap 7.0 0.0 - 11.9   ESTIMATED GFR    Collection Time: 04/02/19  5:24 AM   Result Value Ref Range    GFR If African American >60 >60 mL/min/1.73 m 2    GFR If Non  " American >60 >60 mL/min/1.73 m 2       Current Facility-Administered Medications   Medication Frequency   • vitamin D (cholecalciferol) tablet 1,000 Units DAILY   • artificial tears 1.4 % ophthalmic solution 1 Drop PRN   • benzocaine-menthol (CEPACOL) lozenge 1 Lozenge Q2HRS PRN   • enoxaparin (LOVENOX) inj 40 mg QHS   • mag hydrox-al hydrox-simeth (MAALOX PLUS ES or MYLANTA DS) suspension 20 mL Q2HRS PRN   • ondansetron (ZOFRAN ODT) dispertab 4 mg 4X/DAY PRN    Or   • ondansetron (ZOFRAN) syringe/vial injection 4 mg 4X/DAY PRN   • sodium chloride (OCEAN) 0.65 % nasal spray 2 Spray PRN   • traZODone (DESYREL) tablet 25 mg QHS PRN   • Pharmacy Consult Request ...Pain Management Review 1 Each PHARMACY TO DOSE   • Respiratory Care per Protocol Continuous RT   • melatonin tablet 3 mg HS PRN   • senna-docusate (PERICOLACE or SENOKOT S) 8.6-50 MG per tablet 2 Tab BID    And   • polyethylene glycol/lytes (MIRALAX) PACKET 1 Packet QDAY PRN    And   • magnesium hydroxide (MILK OF MAGNESIA) suspension 30 mL QDAY PRN    And   • bisacodyl (DULCOLAX) suppository 10 mg QDAY PRN   • acetaminophen (TYLENOL) tablet 650 mg Q6HRS PRN   • DULoxetine (CYMBALTA) capsule 60 mg DAILY   • omeprazole (PRILOSEC) capsule 40 mg DAILY   • pramipexole (MIRAPEX) tablet 0.5 mg QHS   • propranolol CR (INDERAL LA) capsule 160 mg Q DAY       Orders Placed This Encounter   Procedures   • Diet Order Regular     Standing Status:   Standing     Number of Occurrences:   1     Order Specific Question:   Diet:     Answer:   Regular [1]       Assessment:  Active Hospital Problems    Diagnosis   • *Debility   • Falls   • Restless legs syndrome   • Chronic back pain   • Mood disorder (HCC)   • GERD (gastroesophageal reflux disease)     This patient is a 78 y.o. female admitted for acute inpatient rehabilitation with Debility.    We will have her first weekly conference on Wednesday to discuss her discharge date.    Medical Decision Making and  Plan:    Debility  Continue full rehab program  PT/OT/SLP, 1 hr each discipline, 5 days per week    Chronic back pain, stable  PRN tylenol  Previously on baclofen which caused altered mental status     Frequent falls  Poor balance  Therapy evaluation for LRD     Peripheral neuropathy  Continue Cymbalta     Hypertension  Continue propranolol  SBPs in last 24 hrs 121-152     History of depression  Continue Cymbalta     GERD  Continue Prilosec     Restless legs syndrome  Continue Requip    Hypokalemia, resolved  Discontinue supplementation    Bowel  Continue bowel meds  Last  3/31    DVT prophylaxis  Lovenox    Total time:  15 minutes.  I spent greater than 50% of the time for patient care, counseling, and coordination on this date, including patient face-to face time, unit/floor time with review of records/pertinent lab data and studies, as well as discussing diagnostic evaluation/work up, planned therapeutic interventions, and future disposition of care, as per the interval events/subjective and the assessment and plan as noted above.        Marleen Fontaine M.D.   Physical Medicine and Rehabilitation

## 2019-04-02 NOTE — CARE PLAN
Problem: Problem Solving STGs  Goal: STG-Within one week, patient will solve basic problems  1) Individualized goal:  Related to health and safety with MOD assistance and 80% accuracy.   2) Interventions:  SLP Self Care / ADL Training , SLP Cognitive Skill Development and SLP Group Treatment     Outcome: NOT MET  Pt's presents with severe attention, memory, planning and organizational deficits.   Goal: STG-Within one week, patient will  1) Individualized goal:  Complete sustained attention tasks w/ 80% accuracy and MOD A.   2) Interventions:  SLP Self Care / ADL Training , SLP Cognitive Skill Development and SLP Group Treatment     Outcome: NOT MET  Pt's presents with severe attention, memory, planning and organizational deficits.     Problem: Memory STGs  Goal: STG-Within one week, patient will  1) Individualized goal:  Utilize external memory aides (e.g., therapy journal) to recall novel information r/t rehabilitation with 80% accuracy and MOD A.   2) Interventions:  SLP Self Care / ADL Training , SLP Cognitive Skill Development and SLP Group Treatment     Outcome: NOT MET  Pt's presents with severe attention, memory, planning and organizational deficits. Pt grossly unable to follow cognitively challenging directions.

## 2019-04-02 NOTE — THERAPY
Speech Language Pathology  Daily Treatment     Patient Name: Lindsay Vargas  Age:  78 y.o., Sex:  female  Medical Record #: [id number]  Today's Date: 4/1/2019     Subjective    Pt pleasant and cooperative during tx.     Objective       04/01/19 1101   Cognition   Visual Scanning / Cancellation Skills Minimal (4)   Functional Memory Activities Severe (2)   SLP Total Time Spent   SLP Individual Total Time Spent (Mins) 30   Charge Group   SLP Cognitive Skill Development 2         Assessment    Therapy journal introduced this day.  Pt required mod-max verbal and gestural cues to complete entries in therapy journal.  Visual scanning (1 target): 77% independent; 100% given mod verbal cues.      Plan    Continue to target recall and attn

## 2019-04-02 NOTE — CARE PLAN
Problem: Safety  Goal: Will remain free from falls  Outcome: PROGRESSING AS EXPECTED  Patient demonstrates good safety technique this shift.  Asks for assistance when needed, though therapy reports she has attempted self transfer.  Able to verbalize needs.  Will continue to monitor.    Problem: Venous Thromboembolism (VTW)/Deep Vein Thrombosis (DVT) Prevention:  Goal: Patient will participate in Venous Thrombosis (VTE)/Deep Vein Thrombosis (DVT)Prevention Measures  Outcome: PROGRESSING AS EXPECTED  Pt on daily Lovenox for DVT prophylaxis. No s/s of DVT or edema; will continue to monitor.

## 2019-04-02 NOTE — THERAPY
"Occupational Therapy  Daily Treatment     Patient Name: Lindsay Vargas  Age:  78 y.o., Sex:  female  Medical Record #: [id number]  Today's Date: 4/2/2019     Precautions  Precautions: Fall Risk, Other (See Comments)  Comments: [place], poor attention, hyperverbose    Safety   ADL Safety : Requires Supervision for Safety, Impaired Insight into Safety  Bathroom Safety: Requires Supervision for Safety, Impaired Insight into Safety  Comments: see FIMs for toileting, toilet transfer and grooming.     Subjective    \"I'm really strong, I feel like an athlete doing these exercises\"     Objective       04/02/19 1031   Precautions   Precautions Fall Risk;Other (See Comments)   Comments [place], poor attention, hyperverbose   Safety    ADL Safety  Requires Supervision for Safety;Impaired Insight into Safety   Bathroom Safety Requires Supervision for Safety;Impaired Insight into Safety   Comments see FIMs for toileting, toilet transfer and grooming.    Cognition    Speech/ Communication Hard of Hearing   Level of Consciousness Alert   Ability To Follow Commands 1 Step   New Learning Impaired   Attention Impaired   Sequencing Impaired   Initiation Impaired   Comments pt self distracting and hyperverbose; required constant redirection.   Sitting Upper Body Exercises   Bilateral Row 3 sets of 10;Bilateral;Weight (See Comments for lbs)  (20# weighted pulleys)   Tricep Press 3 sets of 10;Bilateral;Weight (See Comments for lbs)  (Rickshaw with 20# facing fwd)   Upper Extremity Bike Level 3 Resistance  (FluidoBike for BUE strength/endurance,10 min, 1.24km, 0 RB. )   Balance   Standing Balance (Static) Fair -   Standing Balance (Dynamic) Poor +   Comments during toileting/toilet transfer.   Interdisciplinary Plan of Care Collaboration   IDT Collaboration with  Family / Caregiver   Patient Position at End of Therapy Seated;Other (Comments)  (cafeteria for lunch)   Collaboration Comments pt's  present at end of session   OT Total Time " Spent   OT Individual Total Time Spent (Mins) 60   OT Charge Group   OT Self Care / ADL 1   OT Therapeutic Exercise  3       FIM Grooming Score:  5 - Standby Prompting/Supervision or Set-up  Grooming Description:  Increased time, Supervision for safety, Verbal cueing, Set-up of equipment    FIM Bathing Score:  0 - Not tested, patient refused  Bathing Description:   Pt prefers PM shower.    FIM Toiletin - Minimal Assistance  Toileting Description:  Grab bar, Increased time, Supervision for safety, Verbal cueing, Set-up of equipment    FIM Toilet Transfer Score:  4 - Minimal Assistance  Toilet Transfer Description:  Grab bar, Increased time, Supervision for safety, Verbal cueing, Set-up of equipment (w/c<>toilet SPT with GB, CGA and vc's for safety.)      Assessment    Pt completed BUE therex and endurance exercises with no complaints of pain. Pt required frequent redirection throughout session, but motivated for therapy. Pt con't to demo impaired insight into safety during functional transfers and ADLs.     Plan    Cont overall strength/endurance and standing balance to improve ADL's and functional mobility

## 2019-04-02 NOTE — THERAPY
Physical Therapy   Daily Treatment     Patient Name: Lindsay Vargas  Age:  78 y.o., Sex:  female  Medical Record #: [id number]  Today's Date: 4/2/2019     Precautions  Precautions: Fall Risk, Other (See Comments)  Comments: [place], poor attention, hyperverbose    Subjective    The patient was resting in bed and she agreed to PT.  She had no complaints of pain or dizziness.     Objective    The patient had an opportunity to practice supine to sit, to stand and to transfer bed to wheelchair.  She also participated in gait training using a fww, numerous verbal cues and tactile cues for sequencing and posture with a walker.  She tolerated 110 ft x1 and 135 ft x1.  The patient also utilized the NuStep for 10 minutes at level 2 and 41 steps per minute.    FIM Bed/Chair/Wheelchair Transfers Score: 4 - Minimal Assistance  Bed/Chair/Wheelchair Transfers Description:  Adaptive equipment, Increased time, Supervision for safety, Verbal cueing, Set-up of equipment    FIM Walking Score:  2 - Max Assistance  Walking Description:  Extra time, Safety concerns, Verbal cueing, Supervision for safety, Requires incidental assist, Walker    FIM Wheelchair Score:     Wheelchair Description:       FIM Stairs Score:  0 - Not tested,unsafe activity  Stairs Description:          04/02/19 0929   Precautions   Precautions Fall Risk;Other (See Comments)   Comments [place], impaired attention, impaired insight and problem solving   Pain 0 - 10 Group   Therapist Pain Assessment 0   Cognition    Cognition / Consciousness X   Speech/ Communication Hard of Hearing   Level of Consciousness Alert   Ability To Follow Commands 1 Step   Safety Awareness Impaired;Impulsive   New Learning Impaired   Attention Impaired   Sequencing Impaired   Initiation Impaired   Sitting Lower Body Exercises   Nustep Resistance Level 2  (41 steps per minute, 10 minutes)   Bed Mobility    Supine to Sit Stand by Assist   Sit to Stand Contact Guard Assist  (Cues for sequencing)    Scooting Stand by Assist   Neuro-Muscular Treatments   Neuro-Muscular Treatments Sequencing;Tactile Cuing;Tapping;Verbal Cuing;Weight Shift Right;Weight Shift Left   Comments Sequencing sit to/from stand, midline standing posture, sequencing gait with a fww, midline posture with a walker   PT Total Time Spent   PT Individual Total Time Spent (Mins) 60   PT Charge Group   Charges Yes   PT Gait Training 1   PT Therapeutic Exercise 1   PT Neuromuscular Re-Education / Balance 1   PT Therapeutic Activities 1     Assessment    The patient participated in bed mobility and transfers, gait training with a front wheel walker and she utilized the NuStep.  Her primary barriers are impaired cognition with impaired insight regarding her deficits, impaired problem solving, sequencing, learning new information, impaired tolerance for activity, dynamic standing balance and sitting balance, fall risk.  Many of the issues that she has with activities are related to her impaired cognition.    Plan    Safety education, reinforce sequencing and body mechanics, static and dynamic sitting and standing balance, therapeutic exercise to increase strength and endurance, transfer training and gait training

## 2019-04-02 NOTE — THERAPY
Speech Language Pathology   Initial Assessment     Patient Name: Lindsay Vargas  AGE:  78 y.o., SEX:  female  Medical Record #: [id number]  Today's Date: 4/1/2019     Subjective    Swallow evaluation ordered and completed this day.  Swallow evaluation recommend due to pts difficulty with medications last night.       Objective       04/01/19 1031   Prior Level Of Function   Dentition Edentulous  (natural lower, no upper denture (at home))   Dentures Uppers  (at home)   Hearing Impaired Both Ears   Hearing Aid None   Patient's Primary Language English   Occupation (Pre-Hospital Vocational) Retired Due To Age   Labial Function   Labial Function (WDL) WDL   Lingual Function   Lingual Function (WDL) WDL   Velar Function   Velar Function (WDL) WDL   Laryngeal Function   Laryngeal Function (WDL) WDL   Dysphagia Strategies / Recommendations   Strategies / Interventions Recommended (Yes / No) No   IRF-ALVA:  Swallowing/Nutritional Status   Swallowing/Nutritional Status Regular food   SLP Total Time Spent   SLP Individual Total Time Spent (Mins) 30   SLP Charge Group   SLP Oral Pharyngeal Evaluation Oral Pharyngeal Evaluation       FIM Eating Score:  6 - Modified Independent  Eating Description:  Increased time    FIM Comprehension Score:  4 - Minimal Assistance  Comprehension Description:  Hearing aids/amplifiers, Verbal cues    FIM Expression Score:  5 - Stand-by Prompting/Supervision or Set-up  Expression Description:  Verbal cueing    FIM Social Interaction Score:  4 - Minimal Assistance  Social Interaction Description:  Increased time, Verbal cues    FIM Problem Solving Score:  4 - Minimal Assistance  Problem Solving Description:  Verbal cueing, Therapy schedule, Bed/chair alarm, Increased time    FIM Memory Score:  3 - Moderate Assistance  Memory Description:  Therapy schedule, Verbal cueing, Bed/chair alarm    Assessment    Oral motor examination was unremarkable with the exception of missing upper dentures (at  home).  Pt tolerated regular textures, and thin liquids without oral residue or s/s of aspiration.  Lungs clear per physician.  Pt reported that she crushed large pills prior to admission to the hospital.    Plan    Continue regular textures/thin liquids, large meds crushed, small meds whole with liquid wash.  Continue speech/cognitive therapy.  Further dysphagia tx is not warranted at this time.  Pt may be referred back to this service as deemed appropriate.          Speech Therapy Problems           Problem: Memory STGs     Dates: Start: 03/30/19       Goal: STG-Within one week, patient will     Dates: Start: 03/30/19       Description: 1) Individualized goal:  Utilize external memory aides (e.g., therapy journal) to recall novel information r/t rehabilitation with 80% accuracy and MOD A.   2) Interventions:  SLP Self Care / ADL Training , SLP Cognitive Skill Development and SLP Group Treatment               Problem: Problem Solving STGs     Dates: Start: 03/30/19       Goal: STG-Within one week, patient will solve basic problems     Dates: Start: 03/30/19       Description: 1) Individualized goal:  Related to health and safety with MOD assistance and 80% accuracy.   2) Interventions:  SLP Self Care / ADL Training , SLP Cognitive Skill Development and SLP Group Treatment             Goal: STG-Within one week, patient will     Dates: Start: 03/30/19       Description: 1) Individualized goal:  Complete sustained attention tasks w/ 80% accuracy and MOD A.   2) Interventions:  SLP Self Care / ADL Training , SLP Cognitive Skill Development and SLP Group Treatment               Problem: Speech/Swallowing LTGs     Dates: Start: 03/30/19       Goal: LTG-By discharge, patient will     Dates: Start: 03/30/19       Description: 1) Individualized goal:  Utilize attention and memory strategies w/ MOD I ion 80% of opportunities for a safe D/C to prior level of function with supportive care giver.   2) Interventions:  SLP Self  Care / ADL Training , SLP Cognitive Skill Development and SLP Group Treatment

## 2019-04-02 NOTE — THERAPY
"Speech Language Pathology  Daily Treatment     Patient Name: Lindsay Vargas  Age:  78 y.o., Sex:  female  Medical Record #: [id number]  Today's Date: 4/2/2019     Subjective    Pt was pleasant and overtly verbose. SO present. Pt reported feeling nauseous, dizzy and overwhelmed during therapy. SLP provided counseling and pt to recover and cont to participate in given task. Subsequent to therapy pt's SO explained that in 50 years of marriage his wife has never driven, licked a stamp, paid a bill, and leaves the house once a week for a hair appointment. SO reported that her cognitive status may be 50% below where she was premorbidly.      Objective       04/02/19 1403   Cognition   Attention to Task Severe (2)   Simple Attention Severe (2)   Simple Information Processing Severe (2)   Complex Information Processing Profound (1)   Topic Maintenance  Severe (2)   Executive Functioning / Organization Profound (1)   SLP Total Time Spent   SLP Individual Total Time Spent (Mins) 60   Charge Group   SLP Cognitive Skill Development 4           Assessment    Pt required TOTAL assist to complete details of her memory log. Pt was provided multiple explanations of tasks, but at no point appeared to grasp what was being asked of her, e.g. When asked who her speech therapist was she wrote 'speech therapist'. When asked her her Doctor she provided no response. When she was informed it was Dr. Wilkinson she replied, \"Yes, he's wonderful, a wonderful chiropodist.\"    Plan    Continue to provide the pt with insight and memory strategies regarding her hospitalization and safety.    "

## 2019-04-02 NOTE — REHAB-SLP IDT TEAM NOTE
Speech Therapy   Cognitive Linquistic Functions  Comprehension Initial:  4 - Minimal Assistance  Comprehension Current:  4 - Minimal Assistance   Comprehension Description:  Hearing aids/amplifiers, Verbal cues  Expression Initial:  5 - Stand-by Prompting/Supervision or Set-up  Expression Current:  5 - Stand-by Prompting/Supervision or Set-up   Expression Description:  Verbal cueing  Social Interaction Initial:  4 - Minimal Assistance  Social Interaction Current:  4 - Minimal Assistance   Social Interaction Description:  Increased time, Verbal cues  Problem Solving Initial:  4 - Minimal Assistance  Problem Solving Current:  4 - Minimal Assistance   Problem Solving Description:  Verbal cueing, Therapy schedule, Bed/chair alarm, Increased time  Memory Initial:  3 - Moderate Assistance  Memory Current:  3 - Moderate Assistance   Memory Description:  Therapy schedule, Verbal cueing, Bed/chair alarm  Executive Functioning / Organization Initial:  To Be Assessed  Executive Functioning / Organization Current:  Profound (1)   Executive Functioning Desciption:  Severe deficits in planning, organization, memory and attention.  Swallowing  Swallowing Status: Pt not being treated for swallowing by SLP.  Orders Placed This Encounter   Procedures   • Diet Order Regular     Standing Status:   Standing     Number of Occurrences:   1     Order Specific Question:   Diet:     Answer:   Regular [1]     Behavior Modification Plan  Allow for rest time, Keep instructions simple/concrete, Use nonverbal cues (model/demonstrate or gesture), Give clear feedback, Set clear goals, Be calm, Redirect to task/topic, Provide reasonable choices, Decrease the chance of failure by offering activities that are within the patient's abilities, Analyze tasks (break down into smaller steps) and Reinforce participation in desired tasks  Assistive Technology  Low/Impaired vision equipment and Low tech: Calendar, planner, schedule, alarms/timers, pill  organizer, post-it notes, lists  Family Training/Education:  Ongoing education provided to pt and SO.  DC Recommendations:   TBD  Strengths:  Pleasant and cooperative, Supportive family and Willingly participates in therapeutic activities  Barriers:  Unable to follow instructions, Confused, Poor carryover of learning and Poor insight/denial of deficits, moderate to severe cognitive deficits overall   # of short term goals set=3  # of short term goals met=0       Speech Therapy Problems           Problem: Memory STGs     Dates: Start: 03/30/19       Goal: STG-Within one week, patient will     Dates: Start: 03/30/19       Description: 1) Individualized goal:  Utilize external memory aides (e.g., therapy journal) to recall novel information r/t rehabilitation with 80% accuracy and MOD A.   2) Interventions:  SLP Self Care / ADL Training , SLP Cognitive Skill Development and SLP Group Treatment       Note:     Goal Note filed on 04/02/19 1604 by Rodrigo Crawley, Student    Goal: STG-Within one week, patient will  Outcome: NOT MET  Pt's presents with severe attention, memory, planning and organizational   deficits. Pt grossly unable to follow cognitively challenging directions.                  Problem: Problem Solving STGs     Dates: Start: 03/30/19       Goal: STG-Within one week, patient will solve basic problems     Dates: Start: 03/30/19       Description: 1) Individualized goal:  Related to health and safety with MOD assistance and 80% accuracy.   2) Interventions:  SLP Self Care / ADL Training , SLP Cognitive Skill Development and SLP Group Treatment       Note:     Goal Note filed on 04/02/19 1604 by Rodrigo Crawley, Student    Goal: STG-Within one week, patient will solve basic problems  Outcome: NOT MET  Pt's presents with severe attention, memory, planning and organizational   deficits.                 Goal: STG-Within one week, patient will     Dates: Start: 03/30/19       Description: 1) Individualized  goal:  Complete sustained attention tasks w/ 80% accuracy and MOD A.   2) Interventions:  SLP Self Care / ADL Training , SLP Cognitive Skill Development and SLP Group Treatment       Note:     Goal Note filed on 04/02/19 1604 by Rodrigo Crawley, Student    Goal: STG-Within one week, patient will  Outcome: NOT MET  Pt's presents with severe attention, memory, planning and organizational   deficits.                   Problem: Speech/Swallowing LTGs     Dates: Start: 03/30/19       Goal: LTG-By discharge, patient will     Dates: Start: 03/30/19       Description: 1) Individualized goal:  Utilize attention and memory strategies w/ MOD I ion 80% of opportunities for a safe D/C to prior level of function with supportive care giver.   2) Interventions:  SLP Self Care / ADL Training , SLP Cognitive Skill Development and SLP Group Treatment                    Section completed by:  Rodrigo Crawley, Student

## 2019-04-02 NOTE — REHAB-PHARMACY IDT TEAM NOTE
Pharmacy   Pharmacy  Antibiotics/Antifungals/Antivirals:  Medication:      Active Orders     None        Route:         n/a  Stop Date:  n/a  Reason:   Antihypertensives/Cardiac:  Medication:    Orders (72h ago through future)    Start     Ordered    03/30/19 0600  propranolol CR (INDERAL LA) capsule 160 mg  Q DAY     Comments:  Re-entered for product selection    03/29/19 1039        Patient Vitals for the past 24 hrs:   BP Pulse   04/02/19 0700 152/80 77   04/02/19 0500 121/60 72   04/01/19 1905 132/54 76   04/01/19 1441 122/60 74       Anticoagulation:  Medication:  lovenox  INR:      Other key medications:  Duloxetine, omeprazole, pramipexole  A review of the medication list reveals no issues at this time. Patient is currently on an antihypertensive. Recommend home blood pressure monitoring/recording if antihypertensive regimen continues.Section completed by:  Monica Sandoval [facility]

## 2019-04-03 PROCEDURE — 97116 GAIT TRAINING THERAPY: CPT

## 2019-04-03 PROCEDURE — 700102 HCHG RX REV CODE 250 W/ 637 OVERRIDE(OP): Performed by: PHYSICAL MEDICINE & REHABILITATION

## 2019-04-03 PROCEDURE — A9270 NON-COVERED ITEM OR SERVICE: HCPCS | Performed by: PHYSICAL MEDICINE & REHABILITATION

## 2019-04-03 PROCEDURE — 700111 HCHG RX REV CODE 636 W/ 250 OVERRIDE (IP): Performed by: PHYSICAL MEDICINE & REHABILITATION

## 2019-04-03 PROCEDURE — 97535 SELF CARE MNGMENT TRAINING: CPT

## 2019-04-03 PROCEDURE — 97110 THERAPEUTIC EXERCISES: CPT

## 2019-04-03 PROCEDURE — G0515 COGNITIVE SKILLS DEVELOPMENT: HCPCS

## 2019-04-03 PROCEDURE — 99233 SBSQ HOSP IP/OBS HIGH 50: CPT | Performed by: PHYSICAL MEDICINE & REHABILITATION

## 2019-04-03 PROCEDURE — 770010 HCHG ROOM/CARE - REHAB SEMI PRIVAT*

## 2019-04-03 RX ADMIN — SENNOSIDES AND DOCUSATE SODIUM 2 TABLET: 8.6; 5 TABLET ORAL at 08:21

## 2019-04-03 RX ADMIN — PRAMIPEXOLE DIHYDROCHLORIDE 0.5 MG: 0.25 TABLET ORAL at 20:27

## 2019-04-03 RX ADMIN — SENNOSIDES AND DOCUSATE SODIUM 2 TABLET: 8.6; 5 TABLET ORAL at 20:27

## 2019-04-03 RX ADMIN — ACETAMINOPHEN 650 MG: 325 TABLET, FILM COATED ORAL at 05:12

## 2019-04-03 RX ADMIN — OMEPRAZOLE 40 MG: 20 CAPSULE, DELAYED RELEASE ORAL at 08:20

## 2019-04-03 RX ADMIN — DULOXETINE 60 MG: 30 CAPSULE, DELAYED RELEASE ORAL at 08:20

## 2019-04-03 RX ADMIN — VITAMIN D, TAB 1000IU (100/BT) 1000 UNITS: 25 TAB at 08:21

## 2019-04-03 RX ADMIN — ENOXAPARIN SODIUM 40 MG: 100 INJECTION SUBCUTANEOUS at 20:27

## 2019-04-03 RX ADMIN — PROPRANOLOL HYDROCHLORIDE 160 MG: 80 CAPSULE, EXTENDED RELEASE ORAL at 05:12

## 2019-04-03 NOTE — REHAB-CM IDT TEAM NOTE
Case Management    DC Planning  DC destination/dispostion: Plan to discharge home to supportive care of spouse and family. Patient lives with her  in [facility], with 1 step to enter, in [place]. Supportive daughter lives in [place]. Patient reports she has two grandchildren who are also very supportive.     Referrals: Home health service vs outpatient therapy    DC Needs:  DME needs TBD. Has a wc, spc, FWW (4WW indicated in chart too), grab bars by toilet, walk in shower. Patient reports  is going to install grab bars in the shower. Follow up PCP appointment needed.    Barriers to discharge: [place], Ataxia, functional deficits      Strengths: Independent PLOF, supportive family    Section completed by:  Jaelyn Landaverde CM MSW

## 2019-04-03 NOTE — CARE PLAN
Problem: Bathing  Goal: STG-Within one week, patient will bathe  1) Individualized Goal:  UB/LB at a level of SBA.   2) Interventions:  OT Self Care/ADL, OT Neuro Re-Ed/Balance, OT Therapeutic Activity, OT Aquatic Therapy, OT Evaluation and OT Therapeutic Exercise     Outcome: NOT MET  Min A, poor standing balance    Problem: Dressing  Goal: STG-Within one week, patient will dress UB  1) Individualized Goal:  At a level of SBA.   2) Interventions:  OT Self Care/ADL, OT Neuro Re-Ed/Balance, OT Therapeutic Activity, OT Evaluation and OT Therapeutic Exercise     Outcome: NOT MET  Min A  Goal: STG-Within one week, patient will dress LB  1) Individualized Goal:  At a level of SBA w/ AE as needed.   2) Interventions:  OT Neuro Re-Ed/Balance, OT Therapeutic Activity, OT Evaluation and OT Therapeutic Exercise     Outcome: NOT MET  Min - Mod A d/t poor balance    Problem: Toileting  Goal: STG-Within one week, patient will complete toileting tasks  1) Individualized Goal:  At a level of SBA.   2) Interventions:  OT Self Care/ADL, OT Neuro Re-Ed/Balance, OT Therapeutic Activity, OT Evaluation and OT Therapeutic Exercise     Outcome: NOT MET  Min A    Problem: Functional Transfers  Goal: STG-Within one week, patient will transfer to tub/shower  1) Individualized Goal:  At a level of SBA.   2) Interventions:  OT Self Care/ADL, OT Cognitive Skill Dev, OT Neuro Re-Ed/Balance, OT Therapeutic Activity, OT Evaluation and OT Therapeutic Exercise     Outcome: NOT MET  Min A    Problem: IADL's  Goal: STG-Within one week, patient will prepare a meal  1) Individualized Goal:  At a level of SBA.   2) Interventions:  OT Self Care/ADL, OT Manual Ther Technique, OT Neuro Re-Ed/Balance, OT Therapeutic Activity, OT Aquatic Therapy and OT Evaluation     Outcome: NOT MET  Not yet addressed  Goal: STG-Within one week, patient will access kitchen area  1) Individualized Goal:  At a level of SBA with occasional cueing for safety.   2) Interventions:   OT Self Care/ADL, OT Community Reintegration, OT Neuro Re-Ed/Balance, OT Therapeutic Activity, OT Evaluation and OT Therapeutic Exercise     Outcome: NOT MET  Not yet addressed

## 2019-04-03 NOTE — REHAB-NURSING IDT TEAM NOTE
Nursing   Nursing  Diet/Nutrition:  Regular and Thin Liquids  Medication Administration:  Whole with Liquid Wash  % consumed at meals in last 24 hours:  Consumed % of meals per documentation.  Meal/Snack Consumption for the past 24 hrs:   Oral Nutrition   04/02/19 1221 Lunch;Between 50-75% Consumed   04/02/19 0930 Breakfast;Between % Consumed       Snack schedule:  HS    Appetite:  Good  Fluid Intake/Output in past 24 hours: In: 240 [P.O.:240]  Out: -   Admit Weight:  Weight: 68.3 kg (150 lb 9.2 oz)  Weight Last 7 Days   [68.3 kg (150 lb 9.2 oz)-69.3 kg (152 lb 12.5 oz)] 69.3 kg (152 lb 12.5 oz) (03/31 1524)    Pain Issues:    Location:  --  --         Severity:  Denies   Scheduled pain medications:  None     PRN pain medications used in last 24 hours:  None   Non Pharmacologic Interventions:  rest  Effectiveness of pain management plan:  good=patient states acceptable comfort after interventions    Bowel:    Bowel Assist Initial Score:  3 - Moderate Assistance  Bowel Assist Current Score:  3 - Moderate Assistance  Bowl Accidents in last 7 days:  0  Last bowel movement: 04/02/19  Stool Description: Large, Brown, Formed     Usual bowel pattern:  every other day  Scheduled bowel medications:  senna-docusate (PERICOLACE or SENOKOT S)   PRN bowel medications used in last 24 hours:  None  Nursing Interventions:     Effectiveness of bowel program:   good=regular, predictable, controlled emptying of bowel  Bladder:    Bladder Assist Initial Score:  3 - Moderate Assistance  Bladder Assist Current Score:  3 - Moderate Assistance  Bladder Accidents in last 7 days:  0  PVR range for past 24-48 hours: -- ()  Intermittent Catheterization:  n/a  Medications affecting bladder:  None    Time void schedule/voiding pattern:  Voiding every 2-4 hours  Interventions:  Increased time, Supervision, Verbal cueing, Brief, Pad  Effectiveness of bladder training:  Good=regular, predictable, emptying of bladder, patient initiates  time voiding    [place]:    Type:     Patient Lines/Drains/Airways Status    Active Catheter     None              Date placed:          Justification:    Diabetes:  Blood Sugar Frequency:  None    Range of BS for last 48 hours:       Scheduled diabetic medications:  None  Sliding scale usage in past 24 hours:  No  Total Short acting insulin in the past 24 hours:  None  Total Long acting insulin in the past 24 hours:  None    Wound:         Patient Lines/Drains/Airways Status    Active Current Wounds     Name: Placement date: Placement time: Site: Days:    Surgical Incision  Incision Left Eye 02/04/16   1013      1153    Surgical Incision  Incision Right Eye 02/18/16   0913      1139    Surgical Incision  Incision Back 09/29/16   0815      915    Surgical Incision  Incision N/A Back 11/10/16   0827      873                   Interventions:  n/a    Wound Vac Location:  Not applicable  Dressing last changed:  Not applicable  Pump Mode Pressure Setting       Description of drainage:  Not applicable    Sleep/wake cycle:   Average hours slept:  Sleeps 3-4 hours without waking  Sleep medication usage:  None    Patient/Family Training/Education:  Fall Prevention and Safety  Strengths: Alert and oriented, Willingly participates in therapeutic activities and Pleasant and cooperative   Barriers:   Aspiration risk            Nursing Problems           Problem: Bowel/Gastric:     Goal: Normal bowel function is maintained or improved           Goal: Will not experience complications related to bowel motility             Problem: Communication     Goal: The ability to communicate needs accurately and effectively will improve             Problem: Discharge Barriers/Planning     Goal: Patient's continuum of care needs will be met             Problem: Infection     Goal: Will remain free from infection             Problem: Knowledge Deficit     Goal: Knowledge of disease process/condition, treatment plan, diagnostic tests, and  medications will improve           Goal: Knowledge of the prescribed therapeutic regimen will improve             Problem: Respiratory:     Goal: Respiratory status will improve             Problem: Safety     Goal: Will remain free from injury           Goal: Will remain free from falls             Problem: Skin Integrity     Goal: Risk for impaired skin integrity will decrease             Problem: Venous Thromboembolism (VTW)/Deep Vein Thrombosis (DVT) Prevention:     Goal: Patient will participate in Venous Thrombosis (VTE)/Deep Vein Thrombosis (DVT)Prevention Measures     Flowsheet:     Taken at 04/01/19 1130    Pharmacologic Prophylaxis Used LMWH: Enoxaparin(Lovenox) by Fe Acosta R.N.                       Long Term Goals:   At discharge patient will be able to function safely at home and in the community with support.    Section completed by:  Viola Mallory RLUNA

## 2019-04-03 NOTE — THERAPY
"Physical Therapy   Daily Treatment     Patient Name: Lindsay Vargas  Age:  78 y.o., Sex:  female  Medical Record #: [id number]  Today's Date: 4/3/2019     Precautions  Precautions: Fall Risk, Other (See Comments)  Comments: [place], poor attention, hyperverbose    Subjective    \"I'm a very easy patient.\"     Objective       04/03/19 1401   Precautions   Precautions Fall Risk;Other (See Comments)   Comments [place], poor attention, hyperverbose   Cognition    Level of Consciousness Alert   Sitting Lower Body Exercises   Sit to Stand 2 sets of 10  (pushes up from seat surface)   Nustep Resistance Level 3  (x10 min UE and LE. 685 steps)   Bed Mobility    Supine to Sit Stand by Assist   Sit to Stand Contact Guard Assist   Interdisciplinary Plan of Care Collaboration   IDT Collaboration with  Family / Caregiver   Patient Position at End of Therapy Seated;Chair Alarm On;Family / Friend in Room   Collaboration Comments pt's SO present for part of session   PT Total Time Spent   PT Individual Total Time Spent (Mins) 60   PT Charge Group   PT Gait Training 2   PT Therapeutic Exercise 2       FIM Bed/Chair/Wheelchair Transfers Score: 4 - Minimal Assistance  Bed/Chair/Wheelchair Transfers Description:   (Min SPT, supine->sit SBA)    FIM Walking Score:  4 - Minimal Assistance  Walking Description:   (pt amb 2x240' and x2x35' with FWW and CGA to Min A, cues for safety with FWW)    FIM Stairs Score:  4 - Minimal Assistance  Stairs Description:   (pt ascended/descended 2x6 adaptive steps with B HRs, reciprocal pattern to ascend and step-to pattern to descend, CGA, VCs for safety)      Assessment    Pt demos improved participation with this session compared to previous sessions. Pt hyperverbose however with redirection is able to complete instructed tasks with increased time.     Plan    Gait with FWW, safety edu, standing balance, strength/endurance, functional transfers    "

## 2019-04-03 NOTE — THERAPY
"Occupational Therapy  Daily Treatment     Patient Name: Lindsay Vargas  Age:  78 y.o., Sex:  female  Medical Record #: [id number]  Today's Date: 4/3/2019     Precautions  Precautions: Fall Risk, Other (See Comments)  Comments: [place], poor attention, hyperverbose    Safety   ADL Safety : Impaired Insight into Safety, Requires Cueing for Safety, Requires Physical Assist for Safety  Bathroom Safety: Requires Physical Assist for Safety, Impaired Insight into Safety, Requires Cuing for Safety  Comments: close SBA - CGA for standing balance, poor attention to task/safety    Subjective    \"I know I'll need to put bars in the shower and I cannot take baths anymore\"     Objective       19 0831   Safety    ADL Safety  Impaired Insight into Safety;Requires Cueing for Safety;Requires Physical Assist for Safety   Bathroom Safety Requires Physical Assist for Safety;Impaired Insight into Safety;Requires Cuing for Safety   Comments close SBA - CGA for standing balance, poor attention to task/safety   Interdisciplinary Plan of Care Collaboration   Patient Position at End of Therapy Seated;Call Light within Reach;Tray Table within Reach   OT Total Time Spent   OT Individual Total Time Spent (Mins) 60   OT Charge Group   OT Self Care / ADL 4         FIM Grooming Score:  5 - Standby Prompting/Supervision or Set-up  Grooming Description:  Increased time, Set-up of equipment    FIM Bathing Score:  4 - Minimal Assistance  Bathing Description:       FIM Upper Body Dressin - Minimal Assistance  Upper Body Dressing Description:  Increased time, Supervision for safety, Verbal cueing, Set-up of equipment (pull shirt down)    FIM Lower Body Dressing Score:  4 - Minimal Assistance  Lower Body Dressing Description:  Increased time, Supervision for safety, Verbal cueing, Set-up of equipment (Min A to maintain balance, L lateral lean)    FIM Toileting Body Dressing:  3 - Moderate Assistance  Toileting Description:  Grab bar, Increased " time, Supervision for safety, Verbal cueing, Set-up of equipment       FIM Toilet Transfer Score:  4 - Minimal Assistance  Toilet Transfer Description:  Grab bar, Increased time, Supervision for safety, Verbal cueing, Set-up of equipment    FIM Tub/Shower Transfers Score:  4 - Minimal Assistance  Tub/Shower Transfers Description:  Grab bar, Increased time, Supervision for safety, Verbal cueing, Set-up of equipment      Assessment    Pt completed shower routine at overall Min A at w/c level, pt requires close supervision d/t poor balance    Plan    Cont overall strength/endurance and standing balance to improve ADL's and functional mobility

## 2019-04-03 NOTE — THERAPY
Speech Language Pathology  Daily Treatment     Patient Name: Lindsay Vargas  Age:  78 y.o., Sex:  female  Medical Record #: [id number]  Today's Date: 4/3/2019     Subjective    Pt was pleasant and overtly verbose and tangental. Pt stated she was excited because she had pink tennis shoes to give to the SLP.     Objective       04/03/19 1003   Cognition   Attention to Task Severe (2)   Simple Attention Severe (2)   Simple Information Processing Severe (2)   Complex Information Processing Profound (1)   Topic Maintenance  Profound (1)   Verbal Short Term Memory 5 Minutes   Functional Memory Activities Severe (2)   SLP Total Time Spent   SLP Individual Total Time Spent (Mins) 60   Charge Group   SLP Cognitive Skill Development 4           Assessment    Pt provided with simple attention/scanning task (paola the 'w's and 'h's). Results as follows: Provided MIN assist (multiple reps of explanation oif task, redirection to task) pt was 84% accurate. Pt and clinician discussed safety as it relates to her bathroom at home. Provided MAX assist (repeated redirection to task, repeated explanation of task, multiple verbal and written cues, provision of responses verbally and written by clinician) pt was able to recall bathroom safety strategies with 100% accuracy. Pt has been provided with written explanation of safe bathroom strategies.     Plan    Continue to address pt's memory and problem solving as it relates to her hospitalization and safety.

## 2019-04-03 NOTE — REHAB-OT IDT TEAM NOTE
Occupational Therapy   Activities of Daily Living  Eating Initial:  5 - Standby Prompting/Supervision or Set-up  Eating Current:  6 - Modified Independent   Eating Description:  Increased time  Grooming Initial:  5 - Standby Prompting/Supervision or Set-up  Grooming Current:  5 - Standby Prompting/Supervision or Set-up   Grooming Description:  Increased time, Set-up of equipment  Bathing Initial:  4 - Minimal Assistance  Bathing Current:  4 - Minimal Assistance   Bathing Description:  Grab bar, Tub bench, Hand held shower, Increased time, Supervision for safety, Verbal cueing, Set-up of equipment (CGA- Min A when standing d/t impaired balance)  Upper Body Dressing Initial:  4 - Minimal Assistance  Upper Body Dressing Current:  4 - Minimal Assistance   Upper Body Dressing Description:  Increased time, Supervision for safety, Verbal cueing, Set-up of equipment (pull shirt down)  Lower Body Dressing Initial:  3 - Moderate Assistance  Lower Body Dressing Current:  4 - Minimal Assistance   Lower Body Dressing Description:  4 - Minimal Assistance  Toileting Initial:  3 - Moderate Assistance  Toileting Current:  3 - Moderate Assistance   Toileting Description:  Grab bar, Increased time, Supervision for safety, Verbal cueing, Set-up of equipment  Toilet Transfer Initial:  4 - Minimal Assistance  Toilet Transfer Current:  4 - Minimal Assistance   Toilet Transfer Description:  4 - Minimal Assistance  Tub / Shower Transfer Initial:  4 - Minimal Assistance  Tub / Shower Transfer Current:  4 - Minimal Assistance   Tub / Shower Transfer Description:  Grab bar, Increased time, Supervision for safety, Verbal cueing, Set-up of equipment  IADL:  N/A  Family Training/Education:  TBD  DME/DC Recommendations:  Shower chair, grab bars in shower/toilet    Strengths:  Able to follow instructions, Motivated for self care and independence, Pleasant and cooperative, Supportive family and Willingly participates in therapeutic  activities  Barriers:  Decreased endurance, Generalized weakness, Poor activity tolerance, Poor balance and Other: Impaired cognition - problem solving, attention, memory     # of short term goals set= 7  # of short term goals met= 0/7          Occupational Therapy Goals           Problem: Bathing     Dates: Start: 03/30/19       Goal: STG-Within one week, patient will bathe     Dates: Start: 03/30/19   Expected End: 04/06/19       Description: 1) Individualized Goal:  UB/LB at a level of SBA.   2) Interventions:  OT Self Care/ADL, OT Neuro Re-Ed/Balance, OT Therapeutic Activity, OT Aquatic Therapy, OT Evaluation and OT Therapeutic Exercise       Note:     Goal Note filed on 04/03/19 1108 by Aruna Frye MS,OTR/L    Goal: STG-Within one week, patient will bathe  Outcome: NOT MET  Min A, poor standing balance                  Problem: Dressing     Dates: Start: 03/30/19       Goal: STG-Within one week, patient will dress UB     Dates: Start: 03/30/19   Expected End: 04/06/19       Description: 1) Individualized Goal:  At a level of SBA.   2) Interventions:  OT Self Care/ADL, OT Neuro Re-Ed/Balance, OT Therapeutic Activity, OT Evaluation and OT Therapeutic Exercise       Note:     Goal Note filed on 04/03/19 1108 by Aruna Frye MS,OTR/L    Goal: STG-Within one week, patient will dress UB  Outcome: NOT MET  Min A                Goal: STG-Within one week, patient will dress LB     Dates: Start: 03/30/19   Expected End: 04/06/19       Description: 1) Individualized Goal:  At a level of SBA w/ AE as needed.   2) Interventions:  OT Neuro Re-Ed/Balance, OT Therapeutic Activity, OT Evaluation and OT Therapeutic Exercise       Note:     Goal Note filed on 04/03/19 1108 by Aruna Frye MS,OTR/L    Goal: STG-Within one week, patient will dress LB  Outcome: NOT MET  Min - Mod A d/t poor balance                  Problem: Functional Transfers     Dates: Start: 03/30/19       Goal: STG-Within one week, patient  will transfer to tub/shower     Dates: Start: 03/30/19   Expected End: 04/06/19       Description: 1) Individualized Goal:  At a level of SBA.   2) Interventions:  OT Self Care/ADL, OT Cognitive Skill Dev, OT Neuro Re-Ed/Balance, OT Therapeutic Activity, OT Evaluation and OT Therapeutic Exercise       Note:     Goal Note filed on 04/03/19 1108 by Aruna Frye MS,OTR/L    Goal: STG-Within one week, patient will transfer to tub/shower  Outcome: NOT MET  Min A                  Problem: IADL's     Dates: Start: 03/30/19       Goal: STG-Within one week, patient will prepare a meal     Dates: Start: 03/30/19   Expected End: 04/06/19       Description: 1) Individualized Goal:  At a level of SBA.   2) Interventions:  OT Self Care/ADL, OT Manual Ther Technique, OT Neuro Re-Ed/Balance, OT Therapeutic Activity, OT Aquatic Therapy and OT Evaluation       Note:     Goal Note filed on 04/03/19 1108 by Aruna Frye MS,OTR/L    Goal: STG-Within one week, patient will prepare a meal  Outcome: NOT MET  Not yet addressed                Goal: STG-Within one week, patient will access kitchen area     Dates: Start: 03/30/19   Expected End: 04/06/19       Description: 1) Individualized Goal:  At a level of SBA with occasional cueing for safety.   2) Interventions:  OT Self Care/ADL, OT Community Reintegration, OT Neuro Re-Ed/Balance, OT Therapeutic Activity, OT Evaluation and OT Therapeutic Exercise       Note:     Goal Note filed on 04/03/19 1108 by Aruna Frye MS,OTR/L    Goal: STG-Within one week, patient will access kitchen area  Outcome: NOT MET  Not yet addressed                  Problem: OT Long Term Goals     Dates: Start: 03/30/19       Goal: LTG-By discharge, patient will complete basic self care tasks     Dates: Start: 03/30/19   Expected End: 04/13/19       Description: 1) Individualized Goal:  At a level of Mod I.   2) Interventions:  OT Self Care/ADL, OT Neuro Re-Ed/Balance, OT Therapeutic Activity, OT  Aquatic Therapy, OT Evaluation and OT Therapeutic Exercise             Goal: LTG-By discharge, patient will perform bathroom transfers     Dates: Start: 03/30/19   Expected End: 04/13/19       Description: 1) Individualized Goal:  At a level of Mod I.   2) Interventions:  OT Self Care/ADL, OT Neuro Re-Ed/Balance, OT Therapeutic Activity, OT Evaluation and OT Therapeutic Exercise             Goal: LTG-By discharge, patient will complete basic home management     Dates: Start: 03/30/19   Expected End: 04/13/19       Description: 1) Individualized Goal:  At a level of SUP.   2) Interventions:  OT Cognitive Skill Dev, OT Community Reintegration, OT Neuro Re-Ed/Balance and OT Therapeutic Exercise               Problem: Toileting     Dates: Start: 03/30/19       Goal: STG-Within one week, patient will complete toileting tasks     Dates: Start: 03/30/19   Expected End: 04/06/19       Description: 1) Individualized Goal:  At a level of SBA.   2) Interventions:  OT Self Care/ADL, OT Neuro Re-Ed/Balance, OT Therapeutic Activity, OT Evaluation and OT Therapeutic Exercise       Note:     Goal Note filed on 04/03/19 1108 by Aruna Frye MS,OTR/L    Goal: STG-Within one week, patient will complete toileting tasks  Outcome: NOT MET  Min A                      Section completed by:  Aruna Frye MS,OTR/L

## 2019-04-03 NOTE — REHAB-COLLABORATIVE ONGOING IDT TEAM NOTE
Weekly Interdisciplinary Team Conference Note    Weekly Interdisciplinary Team Conference # 1  Date:  4/3/2019    Clinicians present and reporting at team conference include the following:   MD: Marleen Fontaine MD   RN:  Fe Acosta RN    PT:   Darcy Méndez, PT, DPT  OT:  Aruna Frye MS, OTR/L   ST:  Kasandra Marin MS, CCC-SLP  CM:  CHIARA Duke  REC:  None  RT:  None  RPh:  Monica Sandoval [facility]  Other:   None  All reporting clinicians have a working knowledge of this patient's plan of care.    Targeted DC Date:  4/11/19     Medical    Patient Active Problem List    Diagnosis Date Noted   • Falls 03/26/2019     Priority: High   • Debility 03/29/2019   • Restless legs syndrome 03/29/2019   • Chronic back pain 03/29/2019   • GERD (gastroesophageal reflux disease) 03/26/2019   • Mood disorder (Formerly [facility]) 03/26/2019   • Hypokalemia 03/18/2019   • Hyponatremia 03/18/2019   • Dehydration 03/18/2019   • SIRS (systemic inflammatory response syndrome) (Formerly [facility]) 03/18/2019   • Acute metabolic encephalopathy 03/18/2019   • NSTEMI (non-ST elevated myocardial infarction) (Formerly [facility]) 03/18/2019   • Syncope 03/18/2019   • Palpitations 12/26/2018   • Localized edema 12/26/2018   • Lumbar radiculitis 09/29/2016   • 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 **           Nursing  Diet/Nutrition:  Regular and Thin Liquids  Medication Administration:  Whole with Liquid Wash  % consumed at meals in last 24 hours:  Consumed % of meals per documentation.  Meal/Snack Consumption for the past 24 hrs:   Oral Nutrition   04/02/19 1221 Lunch;Between 50-75% Consumed   04/02/19 0930 Breakfast;Between % Consumed       Snack schedule:  HS    Appetite:  Good  Fluid Intake/Output in past 24 hours: In: 240 [P.O.:240]  Out: -   Admit Weight:  Weight: 68.3 kg (150 lb 9.2 oz)  Weight Last 7 Days   [68.3 kg (150  lb 9.2 oz)-69.3 kg (152 lb 12.5 oz)] 69.3 kg (152 lb 12.5 oz) (03/31 1524)    Pain Issues:    Location:  --  --         Severity:  Denies   Scheduled pain medications:  None     PRN pain medications used in last 24 hours:  None   Non Pharmacologic Interventions:  rest  Effectiveness of pain management plan:  good=patient states acceptable comfort after interventions    Bowel:    Bowel Assist Initial Score:  3 - Moderate Assistance  Bowel Assist Current Score:  3 - Moderate Assistance  Bowl Accidents in last 7 days:  0  Last bowel movement: 04/02/19  Stool Description: Large, Brown, Formed     Usual bowel pattern:  every other day  Scheduled bowel medications:  senna-docusate (PERICOLACE or SENOKOT S)   PRN bowel medications used in last 24 hours:  None  Nursing Interventions:     Effectiveness of bowel program:   good=regular, predictable, controlled emptying of bowel  Bladder:    Bladder Assist Initial Score:  3 - Moderate Assistance  Bladder Assist Current Score:  3 - Moderate Assistance  Bladder Accidents in last 7 days:  0  PVR range for past 24-48 hours: -- ()  Intermittent Catheterization:  n/a  Medications affecting bladder:  None    Time void schedule/voiding pattern:  Voiding every 2-4 hours  Interventions:  Increased time, Supervision, Verbal cueing, Brief, Pad  Effectiveness of bladder training:  Good=regular, predictable, emptying of bladder, patient initiates time voiding    [place]:    Type:     Patient Lines/Drains/Airways Status    Active Catheter     None              Date placed:          Justification:    Diabetes:  Blood Sugar Frequency:  None    Range of BS for last 48 hours:       Scheduled diabetic medications:  None  Sliding scale usage in past 24 hours:  No  Total Short acting insulin in the past 24 hours:  None  Total Long acting insulin in the past 24 hours:  None    Wound:         Patient Lines/Drains/Airways Status    Active Current Wounds     Name: Placement date: Placement time: Site:  Days:    Surgical Incision  Incision Left Eye 02/04/16   1013      1153    Surgical Incision  Incision Right Eye 02/18/16   0913      1139    Surgical Incision  Incision Back 09/29/16   0815      915    Surgical Incision  Incision N/A Back 11/10/16   0827      873                   Interventions:  n/a    Wound Vac Location:  Not applicable  Dressing last changed:  Not applicable  Pump Mode Pressure Setting       Description of drainage:  Not applicable    Sleep/wake cycle:   Average hours slept:  Sleeps 3-4 hours without waking  Sleep medication usage:  None    Patient/Family Training/Education:  Fall Prevention and Safety  Strengths: Alert and oriented, Willingly participates in therapeutic activities and Pleasant and cooperative   Barriers:   Aspiration risk            Nursing Problems           Problem: Bowel/Gastric:     Goal: Normal bowel function is maintained or improved           Goal: Will not experience complications related to bowel motility             Problem: Communication     Goal: The ability to communicate needs accurately and effectively will improve             Problem: Discharge Barriers/Planning     Goal: Patient's continuum of care needs will be met             Problem: Infection     Goal: Will remain free from infection             Problem: Knowledge Deficit     Goal: Knowledge of disease process/condition, treatment plan, diagnostic tests, and medications will improve           Goal: Knowledge of the prescribed therapeutic regimen will improve             Problem: Respiratory:     Goal: Respiratory status will improve             Problem: Safety     Goal: Will remain free from injury           Goal: Will remain free from falls             Problem: Skin Integrity     Goal: Risk for impaired skin integrity will decrease             Problem: Venous Thromboembolism (VTW)/Deep Vein Thrombosis (DVT) Prevention:     Goal: Patient will participate in Venous Thrombosis (VTE)/Deep Vein Thrombosis  (DVT)Prevention Measures     Flowsheet:     Taken at 04/01/19 1130    Pharmacologic Prophylaxis Used LMWH: Enoxaparin(Lovenox) by Fe Acosta RJOAO.                       Long Term Goals:   At discharge patient will be able to function safely at home and in the community with support.    Section completed by:  Viola Mallory R.N.              Mobility  Bed mobility:   SBA to CGA  Bed /Chair/Wheelchair Transfer Initial:  2 - Max Assistance  Bed /Chair/Wheelchair Transfer Current:  4 - Minimal Assistance   Bed/Chair/Wheelchair Transfer Description:  Adaptive equipment, Increased time, Supervision for safety, Verbal cueing, Set-up of equipment  Walk Initial:  2 - Max Assistance  Walk Current:  2 - Max Assistance   Walk Description:  Extra time, Safety concerns, Verbal cueing, Supervision for safety, Requires incidental assist, Walker  Wheelchair Initial:  5 - Standby Prompting/Supervision or Set-up  Wheelchair Current:  5 - Standby Prompting/Supervision or Set-up   Wheelchair Description:  Extra time, Supervision for safety, Verbal cueing (150 ft using UEs, excessive time to complete, SBA)  Stairs Initial:  0 - Not tested,unsafe activity  Stairs Current: 0 - Not tested,unsafe activity   Stairs Description:    Patient/Family Training/Education:  Ongoing to include gait, transfer,safety edu  DME/DC Recommendations:  TBD. Likely HH, outpatient PT vs HH, intermittent SPV per family  Strengths:  Independent PLOF, Making steady progress towards goals, Supportive family and Willingly participates in therapeutic activities  Barriers:   Other: ataxia, hyperverbose, [place], impaired carryover of learning, decreased endurance  # of short term goals set= 4 current STGs  # of short term goals met=0/4       Physical Therapy Problems           Problem: Mobility     Dates: Start: 03/30/19       Goal: STG-Within one week, patient will ambulate household distance     Dates: Start: 03/30/19       Description: 1) Individualized  "goal:  100 ft with FWW, min A   2) Interventions:  PT Group Therapy, PT E Stim Attended, PT Gait Training, PT Therapeutic Exercises, PT TENS Application, PT Neuro Re-Ed/Balance, PT Aquatic Therapy, PT Therapeutic Activity and PT Manual Therapy             Goal: STG-Within one week, patient will ambulate up/down a curb     Dates: Start: 03/30/19       Description: 1) Individualized goal:  4\" curb/ step with FWW and min A  2) Interventions: PT Group Therapy, PT E Stim Attended, PT Gait Training, PT Therapeutic Exercises, PT TENS Application, PT Neuro Re-Ed/Balance, PT Aquatic Therapy, PT Therapeutic Activity and PT Manual Therapy               Problem: Mobility Transfers     Dates: Start: 03/30/19       Goal: STG-Within one week, patient will sit to stand     Dates: Start: 03/30/19       Description: 1) Individualized goal:  SBA with FWW  2) Interventions: PT Group Therapy, PT E Stim Attended, PT Gait Training, PT Therapeutic Exercises, PT TENS Application, PT Neuro Re-Ed/Balance, PT Aquatic Therapy, PT Therapeutic Activity and PT Manual Therapy       Note:     Goal Note filed on 04/03/19 0819 by Darcy Méndez DPT    Goal: STG-Within one week, patient will sit to stand  Outcome: NOT MET  CGA                Goal: STG-Within one week, patient will transfer bed to chair     Dates: Start: 03/30/19       Description: 1) Individualized goal:  SBA without AD  2) Interventions: PT Group Therapy, PT E Stim Attended, PT Gait Training, PT Therapeutic Exercises, PT TENS Application, PT Neuro Re-Ed/Balance, PT Aquatic Therapy, PT Therapeutic Activity and PT Manual Therapy     Note:     Goal Note filed on 04/03/19 0819 by TEE MarcialT    Goal: STG-Within one week, patient will transfer bed to chair  Outcome: NOT MET  CGA                  Problem: PT-Long Term Goals     Dates: Start: 03/30/19       Goal: LTG-By discharge, patient will ambulate     Dates: Start: 03/30/19       Description: 1) Individualized goal:  150 ft " "with LRAD, spv  2) Interventions: PT Group Therapy, PT E Stim Attended, PT Gait Training, PT Therapeutic Exercises, PT TENS Application, PT Neuro Re-Ed/Balance, PT Aquatic Therapy, PT Therapeutic Activity and PT Manual Therapy             Goal: LTG-By discharge, patient will transfer one surface to another     Dates: Start: 03/30/19       Description: 1) Individualized goal:  Mod I without AD  2) Interventions: PT Group Therapy, PT E Stim Attended, PT Gait Training, PT Therapeutic Exercises, PT TENS Application, PT Neuro Re-Ed/Balance, PT Aquatic Therapy, PT Therapeutic Activity and PT Manual Therapy             Goal: LTG-By discharge, patient will perform home exercise program     Dates: Start: 03/30/19       Description: 1) Individualized goal:  spv with seated LE there-ex HEP  2) Interventions: PT Group Therapy, PT E Stim Attended, PT Gait Training, PT Therapeutic Exercises, PT TENS Application, PT Neuro Re-Ed/Balance, PT Aquatic Therapy, PT Therapeutic Activity and PT Manual Therapy             Goal: LTG-By discharge, patient will ambulate up/down 4-6 stairs     Dates: Start: 03/30/19       Description: 1) Individualized goal:  6\" curb with FWW and SBA, 6 4\" steps with 2 HRs, SBA  2) Interventions: PT Group Therapy, PT E Stim Attended, PT Gait Training, PT Therapeutic Exercises, PT TENS Application, PT Neuro Re-Ed/Balance, PT Aquatic Therapy, PT Therapeutic Activity and PT Manual Therapy                     Section completed by:  Darcy Méndez DPT       Activities of Daily Living  Eating Initial:  5 - Standby Prompting/Supervision or Set-up  Eating Current:  6 - Modified Independent   Eating Description:  Increased time  Grooming Initial:  5 - Standby Prompting/Supervision or Set-up  Grooming Current:  5 - Standby Prompting/Supervision or Set-up   Grooming Description:  Increased time, Set-up of equipment  Bathing Initial:  4 - Minimal Assistance  Bathing Current:  4 - Minimal Assistance   Bathing " Description:  Grab bar, Tub bench, Hand held shower, Increased time, Supervision for safety, Verbal cueing, Set-up of equipment (CGA- Min A when standing d/t impaired balance)  Upper Body Dressing Initial:  4 - Minimal Assistance  Upper Body Dressing Current:  4 - Minimal Assistance   Upper Body Dressing Description:  Increased time, Supervision for safety, Verbal cueing, Set-up of equipment (pull shirt down)  Lower Body Dressing Initial:  3 - Moderate Assistance  Lower Body Dressing Current:  4 - Minimal Assistance   Lower Body Dressing Description:  4 - Minimal Assistance  Toileting Initial:  3 - Moderate Assistance  Toileting Current:  3 - Moderate Assistance   Toileting Description:  Grab bar, Increased time, Supervision for safety, Verbal cueing, Set-up of equipment  Toilet Transfer Initial:  4 - Minimal Assistance  Toilet Transfer Current:  4 - Minimal Assistance   Toilet Transfer Description:  4 - Minimal Assistance  Tub / Shower Transfer Initial:  4 - Minimal Assistance  Tub / Shower Transfer Current:  4 - Minimal Assistance   Tub / Shower Transfer Description:  Grab bar, Increased time, Supervision for safety, Verbal cueing, Set-up of equipment  IADL:  N/A  Family Training/Education:  TBD  DME/DC Recommendations:  Shower chair, grab bars in shower/toilet    Strengths:  Able to follow instructions, Motivated for self care and independence, Pleasant and cooperative, Supportive family and Willingly participates in therapeutic activities  Barriers:  Decreased endurance, Generalized weakness, Poor activity tolerance, Poor balance and Other: Impaired cognition - problem solving, attention, memory     # of short term goals set= 7  # of short term goals met= 0/7          Occupational Therapy Goals           Problem: Bathing     Dates: Start: 03/30/19       Goal: STG-Within one week, patient will bathe     Dates: Start: 03/30/19   Expected End: 04/06/19       Description: 1) Individualized Goal:  UB/LB at a level of  SBA.   2) Interventions:  OT Self Care/ADL, OT Neuro Re-Ed/Balance, OT Therapeutic Activity, OT Aquatic Therapy, OT Evaluation and OT Therapeutic Exercise       Note:     Goal Note filed on 04/03/19 1108 by Aruna Frye MS,OTR/L    Goal: STG-Within one week, patient will bathe  Outcome: NOT MET  Min A, poor standing balance                  Problem: Dressing     Dates: Start: 03/30/19       Goal: STG-Within one week, patient will dress UB     Dates: Start: 03/30/19   Expected End: 04/06/19       Description: 1) Individualized Goal:  At a level of SBA.   2) Interventions:  OT Self Care/ADL, OT Neuro Re-Ed/Balance, OT Therapeutic Activity, OT Evaluation and OT Therapeutic Exercise       Note:     Goal Note filed on 04/03/19 1108 by Aruna Frye MS,OTR/L    Goal: STG-Within one week, patient will dress UB  Outcome: NOT MET  Min A                Goal: STG-Within one week, patient will dress LB     Dates: Start: 03/30/19   Expected End: 04/06/19       Description: 1) Individualized Goal:  At a level of SBA w/ AE as needed.   2) Interventions:  OT Neuro Re-Ed/Balance, OT Therapeutic Activity, OT Evaluation and OT Therapeutic Exercise       Note:     Goal Note filed on 04/03/19 1108 by Aruna Frye MS,OTR/L    Goal: STG-Within one week, patient will dress LB  Outcome: NOT MET  Min - Mod A d/t poor balance                  Problem: Functional Transfers     Dates: Start: 03/30/19       Goal: STG-Within one week, patient will transfer to tub/shower     Dates: Start: 03/30/19   Expected End: 04/06/19       Description: 1) Individualized Goal:  At a level of SBA.   2) Interventions:  OT Self Care/ADL, OT Cognitive Skill Dev, OT Neuro Re-Ed/Balance, OT Therapeutic Activity, OT Evaluation and OT Therapeutic Exercise       Note:     Goal Note filed on 04/03/19 1108 by Aruna Frye MS,OTR/L    Goal: STG-Within one week, patient will transfer to tub/shower  Outcome: NOT MET  Min A                   Problem: IADL's     Dates: Start: 03/30/19       Goal: STG-Within one week, patient will prepare a meal     Dates: Start: 03/30/19   Expected End: 04/06/19       Description: 1) Individualized Goal:  At a level of SBA.   2) Interventions:  OT Self Care/ADL, OT Manual Ther Technique, OT Neuro Re-Ed/Balance, OT Therapeutic Activity, OT Aquatic Therapy and OT Evaluation       Note:     Goal Note filed on 04/03/19 1108 by Aruna Frye MS,OTR/L    Goal: STG-Within one week, patient will prepare a meal  Outcome: NOT MET  Not yet addressed                Goal: STG-Within one week, patient will access kitchen area     Dates: Start: 03/30/19   Expected End: 04/06/19       Description: 1) Individualized Goal:  At a level of SBA with occasional cueing for safety.   2) Interventions:  OT Self Care/ADL, OT Community Reintegration, OT Neuro Re-Ed/Balance, OT Therapeutic Activity, OT Evaluation and OT Therapeutic Exercise       Note:     Goal Note filed on 04/03/19 1108 by Aruna Frye MS,OTR/L    Goal: STG-Within one week, patient will access kitchen area  Outcome: NOT MET  Not yet addressed                  Problem: OT Long Term Goals     Dates: Start: 03/30/19       Goal: LTG-By discharge, patient will complete basic self care tasks     Dates: Start: 03/30/19   Expected End: 04/13/19       Description: 1) Individualized Goal:  At a level of Mod I.   2) Interventions:  OT Self Care/ADL, OT Neuro Re-Ed/Balance, OT Therapeutic Activity, OT Aquatic Therapy, OT Evaluation and OT Therapeutic Exercise             Goal: LTG-By discharge, patient will perform bathroom transfers     Dates: Start: 03/30/19   Expected End: 04/13/19       Description: 1) Individualized Goal:  At a level of Mod I.   2) Interventions:  OT Self Care/ADL, OT Neuro Re-Ed/Balance, OT Therapeutic Activity, OT Evaluation and OT Therapeutic Exercise             Goal: LTG-By discharge, patient will complete basic home management     Dates: Start:  03/30/19   Expected End: 04/13/19       Description: 1) Individualized Goal:  At a level of SUP.   2) Interventions:  OT Cognitive Skill Dev, OT Community Reintegration, OT Neuro Re-Ed/Balance and OT Therapeutic Exercise               Problem: Toileting     Dates: Start: 03/30/19       Goal: STG-Within one week, patient will complete toileting tasks     Dates: Start: 03/30/19   Expected End: 04/06/19       Description: 1) Individualized Goal:  At a level of SBA.   2) Interventions:  OT Self Care/ADL, OT Neuro Re-Ed/Balance, OT Therapeutic Activity, OT Evaluation and OT Therapeutic Exercise       Note:     Goal Note filed on 04/03/19 1108 by Aruna Frye MS,OTR/L    Goal: STG-Within one week, patient will complete toileting tasks  Outcome: NOT MET  Min A                      Section completed by:  Aruna Frye MS,OTR/L       Cognitive Linquistic Functions  Comprehension Initial:  4 - Minimal Assistance  Comprehension Current:  4 - Minimal Assistance   Comprehension Description:  Hearing aids/amplifiers, Verbal cues  Expression Initial:  5 - Stand-by Prompting/Supervision or Set-up  Expression Current:  5 - Stand-by Prompting/Supervision or Set-up   Expression Description:  Verbal cueing  Social Interaction Initial:  4 - Minimal Assistance  Social Interaction Current:  4 - Minimal Assistance   Social Interaction Description:  Increased time, Verbal cues  Problem Solving Initial:  4 - Minimal Assistance  Problem Solving Current:  4 - Minimal Assistance   Problem Solving Description:  Verbal cueing, Therapy schedule, Bed/chair alarm, Increased time  Memory Initial:  3 - Moderate Assistance  Memory Current:  3 - Moderate Assistance   Memory Description:  Therapy schedule, Verbal cueing, Bed/chair alarm  Executive Functioning / Organization Initial:  To Be Assessed  Executive Functioning / Organization Current:  Profound (1)   Executive Functioning Desciption:  Severe deficits in planning, organization,  memory and attention.  Swallowing  Swallowing Status: Pt not being treated for swallowing by SLP.  Orders Placed This Encounter   Procedures   • Diet Order Regular     Standing Status:   Standing     Number of Occurrences:   1     Order Specific Question:   Diet:     Answer:   Regular [1]     Behavior Modification Plan  Allow for rest time, Keep instructions simple/concrete, Use nonverbal cues (model/demonstrate or gesture), Give clear feedback, Set clear goals, Be calm, Redirect to task/topic, Provide reasonable choices, Decrease the chance of failure by offering activities that are within the patient's abilities, Analyze tasks (break down into smaller steps) and Reinforce participation in desired tasks  Assistive Technology  Low/Impaired vision equipment and Low tech: Calendar, planner, schedule, alarms/timers, pill organizer, post-it notes, lists  Family Training/Education:  Ongoing education provided to pt and SO.  DC Recommendations:   TBD  Strengths:  Pleasant and cooperative, Supportive family and Willingly participates in therapeutic activities  Barriers:  Unable to follow instructions, Confused, Poor carryover of learning and Poor insight/denial of deficits, moderate to severe cognitive deficits overall   # of short term goals set=3  # of short term goals met=0       Speech Therapy Problems           Problem: Memory STGs     Dates: Start: 03/30/19       Goal: STG-Within one week, patient will     Dates: Start: 03/30/19       Description: 1) Individualized goal:  Utilize external memory aides (e.g., therapy journal) to recall novel information r/t rehabilitation with 80% accuracy and MOD A.   2) Interventions:  SLP Self Care / ADL Training , SLP Cognitive Skill Development and SLP Group Treatment       Note:     Goal Note filed on 04/02/19 7549 by Rodrigo Crawley, Student    Goal: STG-Within one week, patient will  Outcome: NOT MET  Pt's presents with severe attention, memory, planning and organizational    deficits. Pt grossly unable to follow cognitively challenging directions.                  Problem: Problem Solving STGs     Dates: Start: 03/30/19       Goal: STG-Within one week, patient will solve basic problems     Dates: Start: 03/30/19       Description: 1) Individualized goal:  Related to health and safety with MOD assistance and 80% accuracy.   2) Interventions:  SLP Self Care / ADL Training , SLP Cognitive Skill Development and SLP Group Treatment       Note:     Goal Note filed on 04/02/19 1604 by Rajesh Munguia    Goal: STG-Within one week, patient will solve basic problems  Outcome: NOT MET  Pt's presents with severe attention, memory, planning and organizational   deficits.                 Goal: STG-Within one week, patient will     Dates: Start: 03/30/19       Description: 1) Individualized goal:  Complete sustained attention tasks w/ 80% accuracy and MOD A.   2) Interventions:  SLP Self Care / ADL Training , SLP Cognitive Skill Development and SLP Group Treatment       Note:     Goal Note filed on 04/02/19 1604 by Rajesh Munguia    Goal: STG-Within one week, patient will  Outcome: NOT MET  Pt's presents with severe attention, memory, planning and organizational   deficits.                   Problem: Speech/Swallowing LTGs     Dates: Start: 03/30/19       Goal: LTG-By discharge, patient will     Dates: Start: 03/30/19       Description: 1) Individualized goal:  Utilize attention and memory strategies w/ MOD I ion 80% of opportunities for a safe D/C to prior level of function with supportive care giver.   2) Interventions:  SLP Self Care / ADL Training , SLP Cognitive Skill Development and SLP Group Treatment                    Section completed by:  Rodrigo Crawley, Student          REHAB-Pharmacy IDT Team Note by Monica Sandoval RPH at 4/2/2019 10:45 AM  Version 1 of 1    Author:  Monica Sandoval RPH Service:  (none) Author Type:  Pharmacist    Filed:  4/2/2019 10:46 AM  Date of Service:  4/2/2019 10:45 AM Status:  Signed    :  Monica Sandoval RPH (Pharmacist)         Pharmacy   Pharmacy  Antibiotics/Antifungals/Antivirals:  Medication:      Active Orders     None        Route:         n/a  Stop Date:  n/a  Reason:   Antihypertensives/Cardiac:  Medication:    Orders (72h ago through future)    Start     Ordered    03/30/19 0600  propranolol CR (INDERAL LA) capsule 160 mg  Q DAY     Comments:  Re-entered for product selection    03/29/19 1039        Patient Vitals for the past 24 hrs:   BP Pulse   04/02/19 0700 152/80 77   04/02/19 0500 121/60 72   04/01/19 1905 132/54 76   04/01/19 1441 122/60 74       Anticoagulation:  Medication:  lovenox  INR:      Other key medications:  Duloxetine, omeprazole, pramipexole  A review of the medication list reveals no issues at this time. Patient is currently on an antihypertensive. Recommend home blood pressure monitoring/recording if antihypertensive regimen continues.Section completed by:  Monica Sandoval RPH[TK.1]        Attribution Key     TK.1 - Monica Sandoval RPH on 4/2/2019 10:45 AM                    DC Planning  DC destination/dispostion: Plan to discharge home to supportive care of spouse and family. [facility]. Supportive daughter lives in [place]. Patient reports she has two grandchildren who are also very supportive.     Referrals: Home health service vs outpatient therapy    DC Needs:  DME needs TBD. Has a wc, spc, FWW (4WW indicated in chart too), grab bars by toilet, walk in shower. Patient reports  is going to install grab bars in the shower. Follow up PCP appointment needed.    Barriers to discharge: [place], Ataxia, functional deficits      Strengths: Independent PLOF, supportive family    Section completed by:  Jaelyn Landaverde CM MSW  Summary: Anticipate home health services for discharge. Patient with poor standing balance, poor attention,  "hyperverbose, needs a \"stop sign\" to quiet.Family training to begin for spouse. PCP only appointment needed.    Physician Summary  Marleen Fontaine MD participated and led team conference discussion.  "

## 2019-04-03 NOTE — REHAB-PT IDT TEAM NOTE
Physical Therapy   Mobility  Bed mobility:   SBA to CGA  Bed /Chair/Wheelchair Transfer Initial:  2 - Max Assistance  Bed /Chair/Wheelchair Transfer Current:  4 - Minimal Assistance   Bed/Chair/Wheelchair Transfer Description:  Adaptive equipment, Increased time, Supervision for safety, Verbal cueing, Set-up of equipment  Walk Initial:  2 - Max Assistance  Walk Current:  2 - Max Assistance   Walk Description:  Extra time, Safety concerns, Verbal cueing, Supervision for safety, Requires incidental assist, Walker  Wheelchair Initial:  5 - Standby Prompting/Supervision or Set-up  Wheelchair Current:  5 - Standby Prompting/Supervision or Set-up   Wheelchair Description:  Extra time, Supervision for safety, Verbal cueing (150 ft using UEs, excessive time to complete, SBA)  Stairs Initial:  0 - Not tested,unsafe activity  Stairs Current: 0 - Not tested,unsafe activity   Stairs Description:    Patient/Family Training/Education:  Ongoing to include gait, transfer,safety edu  DME/DC Recommendations:  TBD. Likely HH, outpatient PT vs HH, intermittent SPV per family  Strengths:  Independent PLOF, Making steady progress towards goals, Supportive family and Willingly participates in therapeutic activities  Barriers:   Other: ataxia, hyperverbose, [place], impaired carryover of learning, decreased endurance  # of short term goals set= 4 current STGs  # of short term goals met=0/4       Physical Therapy Problems           Problem: Mobility     Dates: Start: 03/30/19       Goal: STG-Within one week, patient will ambulate household distance     Dates: Start: 03/30/19       Description: 1) Individualized goal:  100 ft with FWW, min A   2) Interventions:  PT Group Therapy, PT E Stim Attended, PT Gait Training, PT Therapeutic Exercises, PT TENS Application, PT Neuro Re-Ed/Balance, PT Aquatic Therapy, PT Therapeutic Activity and PT Manual Therapy             Goal: STG-Within one week, patient will ambulate up/down a curb     Dates: Start:  "03/30/19       Description: 1) Individualized goal:  4\" curb/ step with FWW and min A  2) Interventions: PT Group Therapy, PT E Stim Attended, PT Gait Training, PT Therapeutic Exercises, PT TENS Application, PT Neuro Re-Ed/Balance, PT Aquatic Therapy, PT Therapeutic Activity and PT Manual Therapy               Problem: Mobility Transfers     Dates: Start: 03/30/19       Goal: STG-Within one week, patient will sit to stand     Dates: Start: 03/30/19       Description: 1) Individualized goal:  SBA with FWW  2) Interventions: PT Group Therapy, PT E Stim Attended, PT Gait Training, PT Therapeutic Exercises, PT TENS Application, PT Neuro Re-Ed/Balance, PT Aquatic Therapy, PT Therapeutic Activity and PT Manual Therapy       Note:     Goal Note filed on 04/03/19 0819 by Darcy Méndez DPT    Goal: STG-Within one week, patient will sit to stand  Outcome: NOT MET  CGA                Goal: STG-Within one week, patient will transfer bed to chair     Dates: Start: 03/30/19       Description: 1) Individualized goal:  SBA without AD  2) Interventions: PT Group Therapy, PT E Stim Attended, PT Gait Training, PT Therapeutic Exercises, PT TENS Application, PT Neuro Re-Ed/Balance, PT Aquatic Therapy, PT Therapeutic Activity and PT Manual Therapy     Note:     Goal Note filed on 04/03/19 0819 by Darcy Méndez DPT    Goal: STG-Within one week, patient will transfer bed to chair  Outcome: NOT MET  CGA                  Problem: PT-Long Term Goals     Dates: Start: 03/30/19       Goal: LTG-By discharge, patient will ambulate     Dates: Start: 03/30/19       Description: 1) Individualized goal:  150 ft with LRAD, spv  2) Interventions: PT Group Therapy, PT E Stim Attended, PT Gait Training, PT Therapeutic Exercises, PT TENS Application, PT Neuro Re-Ed/Balance, PT Aquatic Therapy, PT Therapeutic Activity and PT Manual Therapy             Goal: LTG-By discharge, patient will transfer one surface to another     Dates: Start: 03/30/19    " "   Description: 1) Individualized goal:  Mod I without AD  2) Interventions: PT Group Therapy, PT E Stim Attended, PT Gait Training, PT Therapeutic Exercises, PT TENS Application, PT Neuro Re-Ed/Balance, PT Aquatic Therapy, PT Therapeutic Activity and PT Manual Therapy             Goal: LTG-By discharge, patient will perform home exercise program     Dates: Start: 03/30/19       Description: 1) Individualized goal:  spv with seated LE there-ex HEP  2) Interventions: PT Group Therapy, PT E Stim Attended, PT Gait Training, PT Therapeutic Exercises, PT TENS Application, PT Neuro Re-Ed/Balance, PT Aquatic Therapy, PT Therapeutic Activity and PT Manual Therapy             Goal: LTG-By discharge, patient will ambulate up/down 4-6 stairs     Dates: Start: 03/30/19       Description: 1) Individualized goal:  6\" curb with FWW and SBA, 6 4\" steps with 2 HRs, SBA  2) Interventions: PT Group Therapy, PT E Stim Attended, PT Gait Training, PT Therapeutic Exercises, PT TENS Application, PT Neuro Re-Ed/Balance, PT Aquatic Therapy, PT Therapeutic Activity and PT Manual Therapy                     Section completed by:  TEE MarcialT     "

## 2019-04-03 NOTE — CARE PLAN
"Problem: Mobility  Goal: STG-Within one week, patient will ambulate household distance  1) Individualized goal:  100 ft with FWW, min A   2) Interventions:  PT Group Therapy, PT E Stim Attended, PT Gait Training, PT Therapeutic Exercises, PT TENS Application, PT Neuro Re-Ed/Balance, PT Aquatic Therapy, PT Therapeutic Activity and PT Manual Therapy     Outcome: NOT MET    Goal: STG-Within one week, patient will ambulate up/down a curb  1) Individualized goal:  4\" curb/ step with FWW and min A  2) Interventions: PT Group Therapy, PT E Stim Attended, PT Gait Training, PT Therapeutic Exercises, PT TENS Application, PT Neuro Re-Ed/Balance, PT Aquatic Therapy, PT Therapeutic Activity and PT Manual Therapy     Outcome: NOT MET      Problem: Mobility Transfers  Goal: STG-Within one week, patient will sit to stand  1) Individualized goal:  SBA with FWW  2) Interventions: PT Group Therapy, PT E Stim Attended, PT Gait Training, PT Therapeutic Exercises, PT TENS Application, PT Neuro Re-Ed/Balance, PT Aquatic Therapy, PT Therapeutic Activity and PT Manual Therapy     Outcome: NOT MET  CGA  Goal: STG-Within one week, patient will transfer bed to chair  1) Individualized goal:  SBA without AD  2) Interventions: PT Group Therapy, PT E Stim Attended, PT Gait Training, PT Therapeutic Exercises, PT TENS Application, PT Neuro Re-Ed/Balance, PT Aquatic Therapy, PT Therapeutic Activity and PT Manual Therapy   Outcome: NOT MET  CGA      "

## 2019-04-03 NOTE — CARE PLAN
Problem: Skin Integrity  Goal: Risk for impaired skin integrity will decrease  Outcome: PROGRESSING AS EXPECTED  Patient's skin remains intact and free from new or accidental injury this shift.  Will continue to monitor.    Problem: Respiratory:  Goal: Respiratory status will improve  Outcome: PROGRESSING AS EXPECTED  Pt is on room air; no increased WOB or SOB. Diminished lung sounds in BLL; will continue to monitor.

## 2019-04-03 NOTE — PROGRESS NOTES
"Rehab Progress Note     Date of Service: 4/3/2019  Chief Complaint: follow up debility    Interval Events (Subjective)    Patient seen and examined in her room today.  We discussed the results of her team conference.  Advised patient all staff report the biggest barrier to her functional improvement is that she talks too much and this causes impaired attention.  Patient appreciated the honesty.  She denies any pain.  She is eating well and sleeping well.  Just wants to get better so her and her  can return to some traveling that they used to do.    Objective:  VITAL SIGNS: /70   Pulse 80   Temp 36.3 °C (97.3 °F) (Oral)   Resp 18   Ht 1.702 m (5' 7\")   Wt 69.3 kg (152 lb 12.5 oz)   SpO2 99%   Breastfeeding? No   BMI 23.93 kg/m²   Gen: alert, no apparent distress  CV: regular rate and rhythm, no murmurs, no peripheral edema  Resp: clear to ascultation bilaterally, normal respiratory effort  GI: soft, non-tender abdomen, bowel sounds present  Neuro: notable for hyperverbose    Recent Results (from the past 72 hour(s))   Basic Metabolic Panel    Collection Time: 04/01/19  5:28 AM   Result Value Ref Range    Sodium 137 135 - 145 mmol/L    Potassium 3.6 3.6 - 5.5 mmol/L    Chloride 104 96 - 112 mmol/L    Co2 23 20 - 33 mmol/L    Glucose 102 (H) 65 - 99 mg/dL    Bun 13 8 - 22 mg/dL    Creatinine 0.63 0.50 - 1.40 mg/dL    Calcium 8.7 8.5 - 10.5 mg/dL    Anion Gap 10.0 0.0 - 11.9   ESTIMATED GFR    Collection Time: 04/01/19  5:28 AM   Result Value Ref Range    GFR If African American >60 >60 mL/min/1.73 m 2    GFR If Non African American >60 >60 mL/min/1.73 m 2   Basic Metabolic Panel    Collection Time: 04/02/19  5:24 AM   Result Value Ref Range    Sodium 137 135 - 145 mmol/L    Potassium 4.2 3.6 - 5.5 mmol/L    Chloride 104 96 - 112 mmol/L    Co2 26 20 - 33 mmol/L    Glucose 98 65 - 99 mg/dL    Bun 11 8 - 22 mg/dL    Creatinine 0.69 0.50 - 1.40 mg/dL    Calcium 9.2 8.5 - 10.5 mg/dL    Anion Gap 7.0 0.0 " - 11.9   ESTIMATED GFR    Collection Time: 04/02/19  5:24 AM   Result Value Ref Range    GFR If African American >60 >60 mL/min/1.73 m 2    GFR If Non African American >60 >60 mL/min/1.73 m 2       Current Facility-Administered Medications   Medication Frequency   • vitamin D (cholecalciferol) tablet 1,000 Units DAILY   • artificial tears 1.4 % ophthalmic solution 1 Drop PRN   • benzocaine-menthol (CEPACOL) lozenge 1 Lozenge Q2HRS PRN   • enoxaparin (LOVENOX) inj 40 mg QHS   • mag hydrox-al hydrox-simeth (MAALOX PLUS ES or MYLANTA DS) suspension 20 mL Q2HRS PRN   • ondansetron (ZOFRAN ODT) dispertab 4 mg 4X/DAY PRN    Or   • ondansetron (ZOFRAN) syringe/vial injection 4 mg 4X/DAY PRN   • sodium chloride (OCEAN) 0.65 % nasal spray 2 Spray PRN   • traZODone (DESYREL) tablet 25 mg QHS PRN   • Pharmacy Consult Request ...Pain Management Review 1 Each PHARMACY TO DOSE   • Respiratory Care per Protocol Continuous RT   • melatonin tablet 3 mg HS PRN   • senna-docusate (PERICOLACE or SENOKOT S) 8.6-50 MG per tablet 2 Tab BID    And   • polyethylene glycol/lytes (MIRALAX) PACKET 1 Packet QDAY PRN    And   • magnesium hydroxide (MILK OF MAGNESIA) suspension 30 mL QDAY PRN    And   • bisacodyl (DULCOLAX) suppository 10 mg QDAY PRN   • acetaminophen (TYLENOL) tablet 650 mg Q6HRS PRN   • DULoxetine (CYMBALTA) capsule 60 mg DAILY   • omeprazole (PRILOSEC) capsule 40 mg DAILY   • pramipexole (MIRAPEX) tablet 0.5 mg QHS   • propranolol CR (INDERAL LA) capsule 160 mg Q DAY       Orders Placed This Encounter   Procedures   • Diet Order Regular     Standing Status:   Standing     Number of Occurrences:   1     Order Specific Question:   Diet:     Answer:   Regular [1]       Assessment:  Active Hospital Problems    Diagnosis   • *Debility   • Falls   • Restless legs syndrome   • Chronic back pain   • Mood disorder (HCC)   • GERD (gastroesophageal reflux disease)     This patient is a 78 y.o. female admitted for acute inpatient  rehabilitation with Debility.    I led and attended the weekly conference today, and agree with the IDT conference documentation and plan of care as noted below.    Date of conference: 4/3/2019    Goals:    1) ambulate 100 ft with FWW min assist  2) curb with min assist and FWW  3) SBA bathing  4) SBA US dressing  5) use external memory aides  6) improved attention    Met 0/4 PT goals, met 0/7 OT goals, met 0/3 goals - unable to meet goals due to her hyperverbosity/decreased attention to task    Barriers:    1) poor attention  2) hard of hearing  3) impaired carryover of learning  4) hyper verbosity  5) poor insight/denial of deficits  6) moderate to severe cognitive deficits    Therapy update 4/3/2019  Modified Independent eating  Supervision grooming  Min assist bathing  Min assist upper body dressing  Min assist lower body dressing  Mod assist toileting  Mod assistbladder  Mod assist bowel  Min assist bed/chair transfer  Min assist toilet transfer  Min assist tub/shower transfer  Max assist ambulation  Supervision wheelchair propulsion  Not tested stairs  Min assist comprehension  Supervision expression  Min assist social interaction  Min assist problem solving  Mod assist memory    CM/social support: Plan to discharge home to supportive care of spouse and family. Patient lives with her  in [facility], with 1 step to enter, in [facility]. Supportive daughter lives in [place]. Patient reports she has two grandchildren who are also very supportive.     Anticipated DC date: 4/11/2019    Home health: PT/OT/SLP/RN    Equip: TBD    Follow up: PCP      Medical Decision Making and Plan:    Debility  Cognitive Deficits, moderate to severe  Hyper verbose, impaired attention  Continue full rehab program  PT/OT/SLP, 1 hr each discipline, 5 days per week    Chronic back pain, stable  PRN tylenol  Previously on baclofen which caused altered mental status     Frequent falls  Poor balance  Therapy evaluation for LRD  Per  therapy, due to poor attention     Peripheral neuropathy  Continue Cymbalta     Hypertension  Continue propranolol  SBPs in last 24 hrs 114-122     History of depression  Continue Cymbalta     GERD  Continue Prilosec     Restless legs syndrome  Continue Requip    Hypokalemia, resolved  Discontinued supplementation    Bowel  Continue bowel meds  Last BM 4/2    DVT prophylaxis  Lovenox    Total time:  41 minutes.  I spent greater than 50% of the time for patient care, counseling, and coordination on this date, including patient face-to face time, unit/floor time with review of records/pertinent lab data and studies, as well as discussing diagnostic evaluation/work up, planned therapeutic interventions, and future disposition of care, as per the interval events/subjective and the assessment and plan as noted above.        Marleen Fontaine M.D.   Physical Medicine and Rehabilitation

## 2019-04-04 PROCEDURE — 700102 HCHG RX REV CODE 250 W/ 637 OVERRIDE(OP): Performed by: PHYSICAL MEDICINE & REHABILITATION

## 2019-04-04 PROCEDURE — 97110 THERAPEUTIC EXERCISES: CPT

## 2019-04-04 PROCEDURE — 97530 THERAPEUTIC ACTIVITIES: CPT

## 2019-04-04 PROCEDURE — G0515 COGNITIVE SKILLS DEVELOPMENT: HCPCS

## 2019-04-04 PROCEDURE — 99233 SBSQ HOSP IP/OBS HIGH 50: CPT | Performed by: PHYSICAL MEDICINE & REHABILITATION

## 2019-04-04 PROCEDURE — 770010 HCHG ROOM/CARE - REHAB SEMI PRIVAT*

## 2019-04-04 PROCEDURE — 700111 HCHG RX REV CODE 636 W/ 250 OVERRIDE (IP): Performed by: PHYSICAL MEDICINE & REHABILITATION

## 2019-04-04 PROCEDURE — A9270 NON-COVERED ITEM OR SERVICE: HCPCS | Performed by: PHYSICAL MEDICINE & REHABILITATION

## 2019-04-04 PROCEDURE — 97116 GAIT TRAINING THERAPY: CPT

## 2019-04-04 RX ORDER — PROPRANOLOL HCL 60 MG
120 CAPSULE, EXTENDED RELEASE 24HR ORAL
Status: DISCONTINUED | OUTPATIENT
Start: 2019-04-05 | End: 2019-04-11 | Stop reason: HOSPADM

## 2019-04-04 RX ADMIN — ENOXAPARIN SODIUM 40 MG: 100 INJECTION SUBCUTANEOUS at 21:16

## 2019-04-04 RX ADMIN — OMEPRAZOLE 40 MG: 20 CAPSULE, DELAYED RELEASE ORAL at 08:59

## 2019-04-04 RX ADMIN — VITAMIN D, TAB 1000IU (100/BT) 1000 UNITS: 25 TAB at 08:59

## 2019-04-04 RX ADMIN — DULOXETINE 60 MG: 30 CAPSULE, DELAYED RELEASE ORAL at 08:59

## 2019-04-04 RX ADMIN — PROPRANOLOL HYDROCHLORIDE 160 MG: 80 CAPSULE, EXTENDED RELEASE ORAL at 05:12

## 2019-04-04 RX ADMIN — SENNOSIDES AND DOCUSATE SODIUM 2 TABLET: 8.6; 5 TABLET ORAL at 21:17

## 2019-04-04 RX ADMIN — PRAMIPEXOLE DIHYDROCHLORIDE 0.5 MG: 0.25 TABLET ORAL at 21:16

## 2019-04-04 NOTE — CARE PLAN
Problem: Communication  Goal: The ability to communicate needs accurately and effectively will improve  Outcome: PROGRESSING AS EXPECTED  IDT discussed pt's hyperverbosity and short attention span as primary barriers to functional improvement; team in agreement that pt needs visual cues to stop talking (a stop sign?). MD agreed to talk with pt, will continue to monitor and develop strategies.    Problem: Discharge Barriers/Planning  Goal: Patient's continuum of care needs will be met  Outcome: PROGRESSING AS EXPECTED  Per IDT conference today, pt's planned discharge home set for 4/11.

## 2019-04-04 NOTE — THERAPY
"Physical Therapy   Daily Treatment     Patient Name: Lindsay Vargas  Age:  78 y.o., Sex:  female  Medical Record #: [id number]  Today's Date: 4/4/2019     Precautions  Precautions: Fall Risk, Other (See Comments)  Comments: [place], poor attention, hyperverbose    Subjective    Pt received in  escorted by nursing student from dining room. \"I'm the first one in the dining room and the last to leave.\" Pt reports a good appetite.      Objective       04/04/19 0901   Precautions   Precautions Fall Risk;Other (See Comments)   Comments [place], poor attention, hyperverbose   Vitals   Pulse 82   Patient BP Position Sitting   Blood Pressure  138/58   Cognition    Speech/ Communication Hard of Hearing   Level of Consciousness Alert   Ability To Follow Commands 1 Step   Attention Impaired   Sequencing Impaired   Initiation Impaired   Comments instruction in hand placement  during STS transfers   Sitting Lower Body Exercises   Nustep Resistance Level 4  (LE and UE propulsion x10 min, 601 steps)   Bed Mobility    Sit to Stand Contact Guard Assist   Neuro-Muscular Treatments   Neuro-Muscular Treatments Sequencing;Verbal Cuing   Interdisciplinary Plan of Care Collaboration   IDT Collaboration with  Physician   Patient Position at End of Therapy Seated;Other (Comments)  (pt prefers to stay in gym in  unitl next therapy session)   Collaboration Comments re: orthostatic hypotension   PT Total Time Spent   PT Individual Total Time Spent (Mins) 60   PT Charge Group   PT Gait Training 1   PT Therapeutic Exercise 1   PT Therapeutic Activities 2       FIM Walking Score:  2 - Max Assistance  Walking Description:   (pt amb x55' and x50' with FWW and [place], distance limited by pt with ortstatic hypotension  and increased instability this session)      Assessment    Pt limited by orthostatic hypotension during session. Decreased ambulation distance compared to yesterday. Pt continues to be hyperverbose, requiring frequent redirection to task. "     Plan    Gait with FWW, standing balance, strength/endurance, safety edu

## 2019-04-04 NOTE — PROGRESS NOTES
"Rehab Progress Note     Date of Service: 4/4/2019  Chief Complaint: follow up debility    Interval Events (Subjective)    Patient seen and examined in the therapy gym today. She remembers what we talked about yesterday - which was that she talks too much. PT reports she did better yesterday afternoon, was a contact guard assist. Patient did have some mild orthostasis today, dropped from 138/58 --> 104/62, and she was symptomatic. Advised patient I will make some changes to her medications. She has no other complaints today.    Objective:  VITAL SIGNS: /65   Pulse 72   Temp 36.7 °C (98.1 °F) (Oral)   Resp 18   Ht 1.702 m (5' 7\")   Wt 69.3 kg (152 lb 12.5 oz)   SpO2 98%   Breastfeeding? No   BMI 23.93 kg/m²   Gen: alert, no apparent distress  CV: regular rate and rhythm, no murmurs, no peripheral edema  Resp: clear to ascultation bilaterally, normal respiratory effort  GI: soft, non-tender abdomen, bowel sounds present  Neuro: notable for hyperverbose    Recent Results (from the past 72 hour(s))   Basic Metabolic Panel    Collection Time: 04/02/19  5:24 AM   Result Value Ref Range    Sodium 137 135 - 145 mmol/L    Potassium 4.2 3.6 - 5.5 mmol/L    Chloride 104 96 - 112 mmol/L    Co2 26 20 - 33 mmol/L    Glucose 98 65 - 99 mg/dL    Bun 11 8 - 22 mg/dL    Creatinine 0.69 0.50 - 1.40 mg/dL    Calcium 9.2 8.5 - 10.5 mg/dL    Anion Gap 7.0 0.0 - 11.9   ESTIMATED GFR    Collection Time: 04/02/19  5:24 AM   Result Value Ref Range    GFR If African American >60 >60 mL/min/1.73 m 2    GFR If Non African American >60 >60 mL/min/1.73 m 2       Current Facility-Administered Medications   Medication Frequency   • [START ON 4/5/2019] propranolol LA (INDERAL LA) capsule 120 mg Q DAY   • vitamin D (cholecalciferol) tablet 1,000 Units DAILY   • artificial tears 1.4 % ophthalmic solution 1 Drop PRN   • benzocaine-menthol (CEPACOL) lozenge 1 Lozenge Q2HRS PRN   • enoxaparin (LOVENOX) inj 40 mg QHS   • mag hydrox-al " hydrox-simeth (MAALOX PLUS ES or MYLANTA DS) suspension 20 mL Q2HRS PRN   • ondansetron (ZOFRAN ODT) dispertab 4 mg 4X/DAY PRN    Or   • ondansetron (ZOFRAN) syringe/vial injection 4 mg 4X/DAY PRN   • sodium chloride (OCEAN) 0.65 % nasal spray 2 Spray PRN   • traZODone (DESYREL) tablet 25 mg QHS PRN   • Pharmacy Consult Request ...Pain Management Review 1 Each PHARMACY TO DOSE   • Respiratory Care per Protocol Continuous RT   • melatonin tablet 3 mg HS PRN   • senna-docusate (PERICOLACE or SENOKOT S) 8.6-50 MG per tablet 2 Tab BID    And   • polyethylene glycol/lytes (MIRALAX) PACKET 1 Packet QDAY PRN    And   • magnesium hydroxide (MILK OF MAGNESIA) suspension 30 mL QDAY PRN    And   • bisacodyl (DULCOLAX) suppository 10 mg QDAY PRN   • acetaminophen (TYLENOL) tablet 650 mg Q6HRS PRN   • DULoxetine (CYMBALTA) capsule 60 mg DAILY   • omeprazole (PRILOSEC) capsule 40 mg DAILY   • pramipexole (MIRAPEX) tablet 0.5 mg QHS       Orders Placed This Encounter   Procedures   • Diet Order Regular     Standing Status:   Standing     Number of Occurrences:   1     Order Specific Question:   Diet:     Answer:   Regular [1]       Assessment:  Active Hospital Problems    Diagnosis   • *Debility   • Falls   • Restless legs syndrome   • Chronic back pain   • Mood disorder (HCC)   • GERD (gastroesophageal reflux disease)     This patient is a 78 y.o. female admitted for acute inpatient rehabilitation with Debility.    I led and attended the weekly conference, and agree with the IDT conference documentation and plan of care as noted below.    Date of conference: 4/3/2019    Goals:    1) ambulate 100 ft with FWW min assist  2) curb with min assist and FWW  3) SBA bathing  4) SBA US dressing  5) use external memory aides  6) improved attention    Met 0/4 PT goals, met 0/7 OT goals, met 0/3 goals - unable to meet goals due to her hyperverbosity/decreased attention to task    Barriers:    1) poor attention  2) hard of hearing  3)  impaired carryover of learning  4) hyper verbosity  5) poor insight/denial of deficits  6) moderate to severe cognitive deficits    Admission FIM 63 --> 76 (4/3)    CM/social support: Plan to discharge home to supportive care of spouse and family. Patient lives with her  in [facility], with 1 step to enter, in [place]. Supportive daughter lives in [place]. Patient reports she has two grandchildren who are also very supportive.     Anticipated DC date: 4/11/2019    Home health: PT/OT/SLP/RN    Equip: TBD    Follow up: PCP      Medical Decision Making and Plan:    Debility  Cognitive Deficits, moderate to severe  Hyper verbose, impaired attention  Continue full rehab program  PT/OT/SLP, 1 hr each discipline, 5 days per week    Chronic back pain, stable  PRN tylenol  Previously on baclofen which caused altered mental status     Frequent falls  Poor balance  Therapy evaluation for LRD  Per therapy, due to poor attention     Peripheral neuropathy  Continue Cymbalta     Hypertension  Orthostatic hypotension  Continue propranolol --> dose decreased to 120 mg   SBPs in last 24 hrs 104-130  Continue to monitor     History of depression  Continue Cymbalta     GERD  Continue Prilosec     Restless legs syndrome  Continue Requip    Hypokalemia, resolved  Discontinued supplementation    Bowel  Continue bowel meds  Last BM 4/3    DVT prophylaxis  Lovenox    Total time:  35 minutes.  I spent greater than 50% of the time for patient care, counseling, and coordination on this date, including patient face-to face time, unit/floor time with review of records/pertinent lab data and studies, as well as discussing diagnostic evaluation/work up, planned therapeutic interventions, and future disposition of care, as per the interval events/subjective and the assessment and plan as noted above.      Marleen Fontaine M.D.   Physical Medicine and Rehabilitation

## 2019-04-04 NOTE — THERAPY
Speech Language Pathology  Daily Treatment     Patient Name: Lindsay Vargas  Age:  78 y.o., Sex:  female  Medical Record #: [id number]  Today's Date: 4/4/2019     Subjective    Pt was pleasant and cooperative. Pt was overtly verbose throughout, but did state that her doctor had told her she needs to talk less.     Objective       04/04/19 1333   Cognition   Simple Attention Moderate (3)   Simple Information Processing Moderate (3)   Complex Information Processing Severe (2)   Topic Maintenance  Severe (2)   Simple Reasoning / Problem Solving Moderate (3)   Planning / Decision Making Moderate (3)   Executive Functioning / Organization Moderate (3)   SLP Total Time Spent   SLP Individual Total Time Spent (Mins) 60   Charge Group   SLP Cognitive Skill Development 4         Assessment    Pt was presented with a sequencing task with written cues. Task abandoned due to pt's vision. Pt presented with pictorial sequencing task (consisting of 4 cards depicting household chores). Results as follows: Provided MIN assist, pt was 60% accurate. This increased to 100% provided MOD assist (redirection, highlighting of errors). Pt discussed safety in her home environment with clinician. Provided MIN assist pt was able to recall 4 points of safety regarding her bathroom. Consistent redirection to task required.     Plan    Address pt's memory and attention.

## 2019-04-04 NOTE — CARE PLAN
Problem: Bowel/Gastric:  Goal: Normal bowel function is maintained or improved  Outcome: PROGRESSING AS EXPECTED  Pt's last BM was 4/3. No complaints of abdominal pain or constipation noted. Normoactive bowel sounds auscultated in all four quadrants.     Problem: Skin Integrity  Goal: Risk for impaired skin integrity will decrease  Outcome: PROGRESSING AS EXPECTED  Pt is able to reposition herself while in bed. Pt remains free of any new or accidental injury to skin this shift.

## 2019-04-04 NOTE — THERAPY
"Occupational Therapy  Daily Treatment     Patient Name: Lindsay Vargas  Age:  78 y.o., Sex:  female  Medical Record #: [id number]  Today's Date: 4/4/2019     Precautions  Precautions: Fall Risk, Other (See Comments)  Comments: [place], poor attention, hyperverbose    Safety   ADL Safety : Impaired Insight into Safety, Requires Cueing for Safety, Requires Physical Assist for Safety  Bathroom Safety: Requires Physical Assist for Safety, Impaired Insight into Safety, Requires Cuing for Safety  Comments: close SBA - CGA for standing balance, poor attention to task/safety    Subjective    \"I don't want my  to be worried about me, we never been apart this long\" - pt tearful, emotional support provided     Objective       04/04/19 1031   Sitting Upper Body Exercises   Upper Extremity Bike Level 5 Resistance  (6 min, .74 mile, 0 RB)   Other Exercise seated at EOM - lateral reaching outside JULIET 2 sets x 10   Supine Lower Body Exercise   Bridges 3 sets of 10   Trunk Rotation 3 sets of 10   Knee to Chest 3 sets of 10   Other Exercises supine - small yellow ball in between knees BLE knee to chest   Interdisciplinary Plan of Care Collaboration   Patient Position at End of Therapy Seated  (in dining room)   OT Total Time Spent   OT Individual Total Time Spent (Mins) 60   OT Charge Group   OT Therapeutic Exercise  4       Assessment    Tx session focused on core strengthening - pt completed to the best of her abilities, however frequently had muscle spasms in BLE's during exercises.     Plan    Cont overall strength/endurance and standing balance to improve ADL's and functional mobility    "

## 2019-04-04 NOTE — CARE PLAN
Problem: Communication  Goal: The ability to communicate needs accurately and effectively will improve  Outcome: PROGRESSING AS EXPECTED  Plan of care for the day discussed this morning with pt. All questions and concerns answered at this time. Pt reports sleeping well last night. Will continue to monitor      Problem: Bowel/Gastric:  Goal: Normal bowel function is maintained or improved  Outcome: PROGRESSING AS EXPECTED  Pt refused BM meds d/t already having bowel movement today.

## 2019-04-05 PROCEDURE — 700102 HCHG RX REV CODE 250 W/ 637 OVERRIDE(OP): Performed by: PHYSICAL MEDICINE & REHABILITATION

## 2019-04-05 PROCEDURE — 97530 THERAPEUTIC ACTIVITIES: CPT

## 2019-04-05 PROCEDURE — 99231 SBSQ HOSP IP/OBS SF/LOW 25: CPT | Performed by: PHYSICAL MEDICINE & REHABILITATION

## 2019-04-05 PROCEDURE — A9270 NON-COVERED ITEM OR SERVICE: HCPCS | Performed by: PHYSICAL MEDICINE & REHABILITATION

## 2019-04-05 PROCEDURE — 700111 HCHG RX REV CODE 636 W/ 250 OVERRIDE (IP): Performed by: PHYSICAL MEDICINE & REHABILITATION

## 2019-04-05 PROCEDURE — 97112 NEUROMUSCULAR REEDUCATION: CPT

## 2019-04-05 PROCEDURE — 770010 HCHG ROOM/CARE - REHAB SEMI PRIVAT*

## 2019-04-05 PROCEDURE — 97535 SELF CARE MNGMENT TRAINING: CPT

## 2019-04-05 PROCEDURE — 97116 GAIT TRAINING THERAPY: CPT

## 2019-04-05 PROCEDURE — G0515 COGNITIVE SKILLS DEVELOPMENT: HCPCS

## 2019-04-05 PROCEDURE — 97110 THERAPEUTIC EXERCISES: CPT

## 2019-04-05 RX ADMIN — OMEPRAZOLE 40 MG: 20 CAPSULE, DELAYED RELEASE ORAL at 08:18

## 2019-04-05 RX ADMIN — SENNOSIDES AND DOCUSATE SODIUM 2 TABLET: 8.6; 5 TABLET ORAL at 08:18

## 2019-04-05 RX ADMIN — ENOXAPARIN SODIUM 40 MG: 100 INJECTION SUBCUTANEOUS at 20:49

## 2019-04-05 RX ADMIN — PRAMIPEXOLE DIHYDROCHLORIDE 0.5 MG: 0.25 TABLET ORAL at 20:50

## 2019-04-05 RX ADMIN — PROPRANOLOL HYDROCHLORIDE 120 MG: 60 CAPSULE, EXTENDED RELEASE ORAL at 05:32

## 2019-04-05 RX ADMIN — SENNOSIDES AND DOCUSATE SODIUM 2 TABLET: 8.6; 5 TABLET ORAL at 20:50

## 2019-04-05 RX ADMIN — VITAMIN D, TAB 1000IU (100/BT) 1000 UNITS: 25 TAB at 08:18

## 2019-04-05 RX ADMIN — DULOXETINE 60 MG: 30 CAPSULE, DELAYED RELEASE ORAL at 08:18

## 2019-04-05 RX ADMIN — ACETAMINOPHEN 650 MG: 325 TABLET, FILM COATED ORAL at 22:50

## 2019-04-05 NOTE — THERAPY
"Physical Therapy   Daily Treatment     Patient Name: Lindsay Vargas  Age:  78 y.o., Sex:  female  Medical Record #: [id number]  Today's Date: 4/5/2019     Precautions  Precautions: Fall Risk, Other (See Comments)  Comments: [place], poor attention, hyperverbose    Subjective    Pt received in dining room, reports she is feeling better today, and doing \"very well\" in the cognitive therapy.     Objective       04/05/19 0931   Precautions   Precautions Fall Risk;Other (See Comments)   Comments [place], poor attention, hyperverbose   Cognition    Level of Consciousness Alert   Ability To Follow Commands 1 Step   Safety Awareness Impaired   Attention Impaired   Sequencing Impaired   Initiation Impaired   Sitting Lower Body Exercises   Nustep Resistance Level 4  (UE and LE propulsio x10 min)   Bed Mobility    Supine to Sit Modified Independent  (on std queen bed)   Sit to Supine Modified Independent   Sit to Stand Stand by Assist   Scooting Modified Independent   Interdisciplinary Plan of Care Collaboration   Patient Position at End of Therapy In Bed;Call Light within Reach;Tray Table within Reach;Phone within Reach   PT Total Time Spent   PT Individual Total Time Spent (Mins) 60   PT Charge Group   PT Gait Training 2   PT Therapeutic Exercise 1   PT Therapeutic Activities 1       FIM Bed/Chair/Wheelchair Transfers Score: 5 - Standby Prompting/Supervision or Set-up  Bed/Chair/Wheelchair Transfers Description:   (SPT with FWW and SBA, mod bed on std queen bed Mod I)    FIM Walking Score:  4 - Minimal Assistance  Walking Description:   (pt amb x480' with FWW, and SBA to CGA, decreased gait speed)    FIM Stairs Score:  5 - Standby Prompting/Supervision or Set-up  Stairs Description:   (pt ascended/descended 2x6 adaptive steps continuously with B HRs, reciprocal pattern to ascend and step-to pattern to descend, SBA)      Assessment    Pt denies symptoms of orthostatis this session. Pt demos increased independence with mobility " this session compared to yesterday.    Plan    Gait with FWW, standing balance, functional transfers, safety edu, strength/endurance

## 2019-04-05 NOTE — CARE PLAN
Problem: Communication  Goal: The ability to communicate needs accurately and effectively will improve  Outcome: PROGRESSING AS EXPECTED  Plan of care for the day discussed this morning with pt. All questions and concerns answered at this time. Pt reports sleeping well last night. Will continue to monitor      Problem: Infection  Goal: Will remain free from infection  Outcome: PROGRESSING AS EXPECTED  Patient remains free from s/s infection; afebrile.  Will continue to monitor.

## 2019-04-05 NOTE — CARE PLAN
Problem: Communication  Goal: The ability to communicate needs accurately and effectively will improve  Outcome: PROGRESSING AS EXPECTED  Plan of care for the day discussed this morning with pt. All questions and concerns answered at this time. Pt reports sleeping well last night. Will continue to monitor      Problem: Safety  Goal: Will remain free from injury  Outcome: PROGRESSING AS EXPECTED  Patient uses call light consistently and appropriately this shift.  Waits for assistance when needed and does not attempt self transfer.  Able to verbalize needs.  Will continue to monitor.

## 2019-04-05 NOTE — CARE PLAN
Problem: Bowel/Gastric:  Goal: Normal bowel function is maintained or improved  Outcome: PROGRESSING AS EXPECTED  Pt had a BM on 4/4. No complaints of abdominal pain or constipation noted. Bowel sounds are normoactive x4 quadrants.    Problem: Skin Integrity  Goal: Risk for impaired skin integrity will decrease  Outcome: PROGRESSING AS EXPECTED  Skin remains intact and free of any new injury this shift. Pt is able to reposition herself while in bed.

## 2019-04-05 NOTE — THERAPY
Speech Language Pathology  Daily Treatment     Patient Name: Lindsay Vargas  Age:  78 y.o., Sex:  female  Medical Record #: [id number]  Today's Date: 4/5/2019     Subjective    Pt was pleasant and cooperative. Pt was considerably less verbose than previous sessions. Pt was observed to clavically breathe when concentrating and required multiple verbal reminders from the clinician to breath diaphragmatically .     Objective       04/05/19 1033   Cognition   Attention to Task Minimal (4)   Simple Attention Minimal (4)   Simple Information Processing Minimal (4)   Topic Maintenance  Moderate (3)   Planning / Decision Making Moderate (3)   Executive Functioning / Organization Moderate (3)           Assessment    Pt was presented with shaping arranging tasks addressing her planning, organization and spatial awareness. Provided SPV pt was 100% accurate for the simplest task, increasing to MOD-MAX assist (highlighting errors of incorrect placements and incorrectly selected pieces) assist to achieve 100% accuracy for the most complex task. Pt was presented with pictorial representations of safe and unsafe variations of a situation and asked to identify the safe and unsafe scenario from a field of 2 cards. Provided SPV pt correctly identified the appropriate cards in 11/13 scenarios, increasing to 13/13 provided MIN assist. Pt roman parallels from the pictures and her own life, demonstrating a good understanding of the safety aspects presented by the cards.    Plan    Continue to address the pt's cognitive functioning.

## 2019-04-05 NOTE — THERAPY
"Occupational Therapy  Daily Treatment     Patient Name: Lindsay Vargas  Age:  78 y.o., Sex:  female  Medical Record #: [id number]  Today's Date: 4/5/2019     Precautions  Precautions: Fall Risk, Other (See Comments)  Comments: [place], poor attention, hyperverbose    Safety   ADL Safety : Requires Supervision for Safety, Impaired Insight into Safety, Requires Cueing for Safety  Bathroom Safety: Requires Supervision for Safety, Impaired Insight into Safety, Requires Cuing for Safety  Comments: close SBA - CGA for standing balance, improve attention to task/safety but still requires cues for safety strategies    Subjective    \"I'm getting tired, I need to sit down\" during balance exercises     Objective       04/05/19 0701   Safety    ADL Safety  Requires Supervision for Safety;Impaired Insight into Safety;Requires Cueing for Safety   Bathroom Safety Requires Supervision for Safety;Impaired Insight into Safety;Requires Cuing for Safety   Comments close SBA - CGA for standing balance, improve attention to task/safety but still requires cues for safety strategies   Balance   Standing Balance (Static) Fair -   Standing Balance (Dynamic) Fair -   Weight Shift Standing Fair   Skilled Intervention Facilitation;Sequencing;Tactile Cuing;Verbal Cuing   Comments standing in // bars - pt relies heavily on bars for BUE support. Weight shift ant<>posterior, R<>L on foam pad. Alt feet - stepping on balance disc. ambulating back/forth onto x 2 foam pads. lateral stepping over/onto foam pad with CGA d/t near LOB with RLE buckling. All exercises 2 sets x 10-15 each   Interdisciplinary Plan of Care Collaboration   Patient Position at End of Therapy Seated;Call Light within Reach;Tray Table within Reach   OT Total Time Spent   OT Individual Total Time Spent (Mins) 60   OT Charge Group   OT Self Care / ADL 2   OT Neuromuscular Re-education / Balance 2          FIM Grooming Score:  5 - Standby Prompting/Supervision or Set-up  Grooming " Description:  Increased time    FIM Bathing Score:  4 - Minimal Assistance  Bathing Description:       FIM Upper Body Dressin - Standby Prompting/Supervision or Set-up  Upper Body Dressing Description:  Increased time    FIM Lower Body Dressing Score:  4 - Minimal Assistance  Lower Body Dressing Description:  Increased time, Supervision for safety, Verbal cueing, Set-up of equipment (CGA in standing)    FIM Toileting Body Dressin - Minimal Assistance  Toileting Description:  Grab bar, Increased time, Supervision for safety, Verbal cueing, Set-up of equipment (CGA in standing)    FIM Toilet Transfer Score:  4 - Minimal Assistance  Toilet Transfer Description:  Grab bar, Increased time, Supervision for safety, Verbal cueing, Set-up of equipment (CGA stand pivot transfer at w/c)    FIM Tub/Shower Transfers Score:  4 - Minimal Assistance  Tub/Shower Transfers Description:  Grab bar, Increased time, Supervision for safety, Verbal cueing, Set-up of equipment (CGA stand pivot transfer w/c<>bench with GB)      Assessment    Pt demo improve attention to task, but cont to require cues for safety strategies during shower/dressing routine and close SBA/CGA when completing standing tasks d/t poor standing dynamic balance - pt relies heavily on grab bars to maintain balance    Plan    Cont overall strength/endurance and standing balance to improve ADL's and functional mobility

## 2019-04-05 NOTE — DISCHARGE PLANNING
I met with pt providing update from IDT, and confirmed dc date of 4/11.   She is in agreement with date, and receptive to home health.  No preference on agencies, and pt in agreement w/ referral to [facility] for home health.   PT reports her spouse will obtain a shower chair and a hand held shower.   He will transport home.

## 2019-04-06 PROCEDURE — A9270 NON-COVERED ITEM OR SERVICE: HCPCS | Performed by: PHYSICAL MEDICINE & REHABILITATION

## 2019-04-06 PROCEDURE — 770010 HCHG ROOM/CARE - REHAB SEMI PRIVAT*

## 2019-04-06 PROCEDURE — 700102 HCHG RX REV CODE 250 W/ 637 OVERRIDE(OP): Performed by: PHYSICAL MEDICINE & REHABILITATION

## 2019-04-06 PROCEDURE — 700111 HCHG RX REV CODE 636 W/ 250 OVERRIDE (IP): Performed by: PHYSICAL MEDICINE & REHABILITATION

## 2019-04-06 RX ADMIN — VITAMIN D, TAB 1000IU (100/BT) 1000 UNITS: 25 TAB at 07:55

## 2019-04-06 RX ADMIN — ACETAMINOPHEN 650 MG: 325 TABLET, FILM COATED ORAL at 15:01

## 2019-04-06 RX ADMIN — PROPRANOLOL HYDROCHLORIDE 120 MG: 60 CAPSULE, EXTENDED RELEASE ORAL at 05:17

## 2019-04-06 RX ADMIN — ENOXAPARIN SODIUM 40 MG: 100 INJECTION SUBCUTANEOUS at 20:16

## 2019-04-06 RX ADMIN — DULOXETINE 60 MG: 30 CAPSULE, DELAYED RELEASE ORAL at 07:56

## 2019-04-06 RX ADMIN — OMEPRAZOLE 40 MG: 20 CAPSULE, DELAYED RELEASE ORAL at 07:55

## 2019-04-06 RX ADMIN — ACETAMINOPHEN 650 MG: 325 TABLET, FILM COATED ORAL at 08:03

## 2019-04-06 RX ADMIN — SENNOSIDES AND DOCUSATE SODIUM 2 TABLET: 8.6; 5 TABLET ORAL at 20:15

## 2019-04-06 RX ADMIN — PRAMIPEXOLE DIHYDROCHLORIDE 0.5 MG: 0.25 TABLET ORAL at 20:15

## 2019-04-06 ASSESSMENT — PATIENT HEALTH QUESTIONNAIRE - PHQ9
2. FEELING DOWN, DEPRESSED, IRRITABLE, OR HOPELESS: NOT AT ALL
1. LITTLE INTEREST OR PLEASURE IN DOING THINGS: NOT AT ALL
SUM OF ALL RESPONSES TO PHQ9 QUESTIONS 1 AND 2: 0

## 2019-04-06 NOTE — CARE PLAN
Problem: Communication  Goal: The ability to communicate needs accurately and effectively will improve  Patient alert and oriented x 4.    Problem: Safety  Goal: Will remain free from falls  Pt uses call light consistently and appropriately. Waits for assistance does not attempt self transfer this shift. Able to verbalize needs.    Problem: Pain Management  Goal: Pain level will decrease to patient's comfort goal   04/06/19 0805   OTHER   Pain Rating Scale (NPRS) 4   Non Verbal Scale  Calm;Unlabored Breathing   Comfort Goal 0;Comfort at Rest;Comfort with Movement;Stay Alert;Perform Activity   Medicated with Tylenol per patient request.   0930 States pain medication was effective.

## 2019-04-06 NOTE — PROGRESS NOTES
DATE OF SERVICE:  04/05/2019    The patient is doing very well at followup.  She will be discharged early next   week.  She talked about the goals she would like to accomplish before being   discharged and what she wants to focus on after returning home.  Patient said   she believes that she will be safe and will respect her limitations.  Her mood   seemed upbeat and positive.       ____________________________________     SHUN LUCIA, PHD    IVANNA / MAXIMINO    DD:  04/06/2019 09:10:35  DT:  04/06/2019 12:44:52    D#:  [id number]  Job#:  [id number]

## 2019-04-06 NOTE — CARE PLAN
Problem: Safety  Goal: Will remain free from injury  Call light with in reach. Redirection to use call light for assistance. Non skid socks. Upper siderails up x2 while in bed.    Problem: Pain Management  Goal: Pain level will decrease to patient's comfort goal  Educate patient of non-pharmacological comfort measures: repositioning, relaxation/breathing technique, cold compress and activities. Complains of right ear, right neck arthritic pain, as needed medication given with relief. No respiratory distress. Able to make needs known. Assisted as needed. HS snacks given. Will continue to monitor.

## 2019-04-07 PROBLEM — H92.01 RIGHT EAR PAIN: Status: ACTIVE | Noted: 2019-04-07

## 2019-04-07 PROBLEM — R21 RASH: Status: ACTIVE | Noted: 2019-04-07

## 2019-04-07 PROCEDURE — 700102 HCHG RX REV CODE 250 W/ 637 OVERRIDE(OP): Performed by: PHYSICAL MEDICINE & REHABILITATION

## 2019-04-07 PROCEDURE — A9270 NON-COVERED ITEM OR SERVICE: HCPCS | Performed by: PHYSICAL MEDICINE & REHABILITATION

## 2019-04-07 PROCEDURE — 97535 SELF CARE MNGMENT TRAINING: CPT

## 2019-04-07 PROCEDURE — 770010 HCHG ROOM/CARE - REHAB SEMI PRIVAT*

## 2019-04-07 PROCEDURE — 99233 SBSQ HOSP IP/OBS HIGH 50: CPT | Mod: 25 | Performed by: PHYSICAL MEDICINE & REHABILITATION

## 2019-04-07 PROCEDURE — 3E0X3BZ INTRODUCTION OF ANESTHETIC AGENT INTO CRANIAL NERVES, PERCUTANEOUS APPROACH: ICD-10-PCS | Performed by: PHYSICAL MEDICINE & REHABILITATION

## 2019-04-07 PROCEDURE — 700101 HCHG RX REV CODE 250: Performed by: PHYSICAL MEDICINE & REHABILITATION

## 2019-04-07 PROCEDURE — 64405 NJX AA&/STRD GR OCPL NRV: CPT | Mod: RT | Performed by: PHYSICAL MEDICINE & REHABILITATION

## 2019-04-07 PROCEDURE — 700111 HCHG RX REV CODE 636 W/ 250 OVERRIDE (IP): Performed by: PHYSICAL MEDICINE & REHABILITATION

## 2019-04-07 PROCEDURE — 97110 THERAPEUTIC EXERCISES: CPT

## 2019-04-07 PROCEDURE — 3E0X33Z INTRODUCTION OF ANTI-INFLAMMATORY INTO CRANIAL NERVES, PERCUTANEOUS APPROACH: ICD-10-PCS | Performed by: PHYSICAL MEDICINE & REHABILITATION

## 2019-04-07 RX ORDER — LIDOCAINE HYDROCHLORIDE 20 MG/ML
2 INJECTION, SOLUTION EPIDURAL; INFILTRATION; INTRACAUDAL; PERINEURAL ONCE
Status: COMPLETED | OUTPATIENT
Start: 2019-04-07 | End: 2019-04-07

## 2019-04-07 RX ORDER — DEXAMETHASONE SODIUM PHOSPHATE 4 MG/ML
4 INJECTION, SOLUTION INTRA-ARTICULAR; INTRALESIONAL; INTRAMUSCULAR; INTRAVENOUS; SOFT TISSUE ONCE
Status: COMPLETED | OUTPATIENT
Start: 2019-04-07 | End: 2019-04-07

## 2019-04-07 RX ORDER — DEXAMETHASONE SODIUM PHOSPHATE 4 MG/ML
4 INJECTION, SOLUTION INTRA-ARTICULAR; INTRALESIONAL; INTRAMUSCULAR; INTRAVENOUS; SOFT TISSUE EVERY 6 HOURS
Status: DISCONTINUED | OUTPATIENT
Start: 2019-04-07 | End: 2019-04-07

## 2019-04-07 RX ADMIN — SENNOSIDES AND DOCUSATE SODIUM 2 TABLET: 8.6; 5 TABLET ORAL at 20:34

## 2019-04-07 RX ADMIN — OMEPRAZOLE 40 MG: 20 CAPSULE, DELAYED RELEASE ORAL at 08:20

## 2019-04-07 RX ADMIN — ACETAMINOPHEN 650 MG: 325 TABLET, FILM COATED ORAL at 08:19

## 2019-04-07 RX ADMIN — DULOXETINE 60 MG: 30 CAPSULE, DELAYED RELEASE ORAL at 08:20

## 2019-04-07 RX ADMIN — VITAMIN D, TAB 1000IU (100/BT) 1000 UNITS: 25 TAB at 08:19

## 2019-04-07 RX ADMIN — LIDOCAINE HYDROCHLORIDE 2 ML: 20 INJECTION, SOLUTION EPIDURAL; INFILTRATION; INTRACAUDAL; PERINEURAL at 09:30

## 2019-04-07 RX ADMIN — PROPRANOLOL HYDROCHLORIDE 120 MG: 60 CAPSULE, EXTENDED RELEASE ORAL at 05:48

## 2019-04-07 RX ADMIN — PRAMIPEXOLE DIHYDROCHLORIDE 0.5 MG: 0.25 TABLET ORAL at 20:33

## 2019-04-07 RX ADMIN — ACETAMINOPHEN 650 MG: 325 TABLET, FILM COATED ORAL at 02:02

## 2019-04-07 RX ADMIN — ENOXAPARIN SODIUM 40 MG: 100 INJECTION SUBCUTANEOUS at 20:33

## 2019-04-07 RX ADMIN — DEXAMETHASONE SODIUM PHOSPHATE 4 MG: 4 INJECTION, SOLUTION INTRAMUSCULAR; INTRAVENOUS at 09:30

## 2019-04-07 ASSESSMENT — PATIENT HEALTH QUESTIONNAIRE - PHQ9
1. LITTLE INTEREST OR PLEASURE IN DOING THINGS: NOT AT ALL
2. FEELING DOWN, DEPRESSED, IRRITABLE, OR HOPELESS: NOT AT ALL
SUM OF ALL RESPONSES TO PHQ9 QUESTIONS 1 AND 2: 0

## 2019-04-07 NOTE — PROCEDURES
Patient: Lindsay Vargas 78 y.o.     Date of Service: 4/7/2019    Physician/s: Vikram Tapia MD    Pre-operative Diagnosis: right GREATER occipital neurlagia, Chronic headaches    Post-operative Diagnosis: right GREATER occipital neurlagia, Chronic headaches    Procedure: right  GREATER occipital nerve injection     Description of procedure:    The risks, benefits, and alternatives of the procedure were reviewed and discussed with the patient.  Written informed consent was freely obtained. A pre-procedural time-out was conducted by the physician verifying patient’s identity, procedure to be performed, procedure site and side, and allergy verification. Appropriate equipment was determined to be in place for the procedure.     No sedation was used for this procedure.     In the office suite the patient was placed in a prone position, and his/her forehead was rested on the table in flexion.  The occipital protuberance was palpated and the path of the right greater occipital nerve was approximated and marked.  The skin was prepped and draped in the usual sterile fashion. A 27g 1.5 inch needle was placed into skin and ollowing negative aspiration, 2mL's of 1% lidocaine with 0.5mL of 4mg/mL dexamethasone was injected to the above side(s) perineurally about each nerve. The needle was subsequently removed. The patient's skin was wiped with a 4x4 gauze, the area was cleansed with alcohol prep, and a bandaid was applied. There were no complications noted.     Dexamethasone [id number]  Lot [id number]  Expiration 2/2020    Vikram Tapia MD  Physical Medicine and Rehabilitation  Interventional Spine and Sports Physiatry  [facility]

## 2019-04-07 NOTE — CARE PLAN
Problem: Pain Management  Goal: Pain level will decrease to patient's comfort goal  0762     Dr. Tapia at bedside at this time.

## 2019-04-07 NOTE — THERAPY
"Occupational Therapy  Daily Treatment     Patient Name: Lindsay Vargas  Age:  78 y.o., Sex:  female  Medical Record #: [id number]  Today's Date: 4/7/2019     Precautions  Precautions: Fall Risk, Other (See Comments)  Comments: [place], poor attention, hyperverbose    Safety   ADL Safety : Requires Physical Assist for Safety, Requires Cueing for Safety  Bathroom Safety: Requires Physical Assist for Safety, Requires Cuing for Safety  Comments: pt requires CGA for standing balance, Max cues for RLE positioning and physical assist to block RLE with transfers d/t R foot sliding forward    Subjective    \"the doctor said to take it easy with my therapy and that I will probably be off a little bit after having those neck shots\" re: Right greater occipital nerve block performed by Dr. Tapia      Objective       04/07/19 1301   Safety    ADL Safety  Requires Physical Assist for Safety;Requires Cueing for Safety   Bathroom Safety Requires Physical Assist for Safety;Requires Cuing for Safety   Comments pt requires CGA for standing balance, Max cues for RLE positioning and physical assist to block RLE with transfers d/t R foot sliding forward   Supine Lower Body Exercise   Hip Abduction 3 sets of 10;Bilateral   Straight Leg Raises 3 sets of 10;Bilateral   Knee to Chest 3 sets of 10;Bilateral   Interdisciplinary Plan of Care Collaboration   Patient Position at End of Therapy Seated;Call Light within Reach;Tray Table within Reach   OT Total Time Spent   OT Individual Total Time Spent (Mins) 60   OT Charge Group   OT Self Care / ADL 2   OT Therapeutic Exercise  2       FIM Grooming Score:  4 - Minimal Assistance  Grooming Description:  Increased time, Supervision for safety, Verbal cueing, Set-up of equipment (pt standing at sink for hand hygiene - Min A  for balance)    FIM Toileting:  3 - Moderate Assistance  Toileting Description:  Grab bar, Increased time, Supervision for safety, Verbal cueing, Set-up of equipment (CGA for " standing, cues for safety)    FIM Toilet Transfer Score:  4 - Minimal Assistance  Toilet Transfer Description:  Grab bar, Increased time, Supervision for safety, Verbal cueing, Set-up of equipment (stand pivot w/c<>toilet with GB)      Assessment    Pt preferred LB therex d/t recent neck injections. Pt required more assist than usual with functional transfers d/t the need to have RLE blocked    Plan    Cont overall strength/endurance and standing balance to improve ADL's and functional mobility

## 2019-04-07 NOTE — CARE PLAN
Problem: Safety  Goal: Will remain free from injury  Call light with in reach. Redirection to use call light for assistance. Non skid socks. Upper siderails up x2 while in bed. No complains of pain at this time. No respiratory distress. HS snacks given. Will continue to monitor.

## 2019-04-07 NOTE — PROGRESS NOTES
Consent signed and placed in pt's chart for Right greater occipital nerve block performed by Dr. Tapia at bedside. Pt tolerated procedure well.

## 2019-04-07 NOTE — CONSULTS
DATE OF SERVICE:  04/03/2019    BEHAVIORAL MEDICINE EVALUATION    BRIEF HISTORY OF PRESENTING COMPLAINTS:  The patient is an 78-year-old white    female who was referred for behavioral medicine evaluation by Dr. Fontaine.  The patient was transferred to rehab from acute where she presented   with altered mental status and dizziness.  It was thought that her symptoms   were secondary to dehydration, hyponatremia and polypharmacy.  The patient was   discharged to skilled nursing for rehab, but only stayed for 3 days as she   was not satisfied with her care.  She returned to the ED with complaints of   continued weakness and falls.  The patient also reported back pain and spasms.    The patient was evaluated by rehab and thought to be an appropriate   candidate.  She was transferred to [facility] on   03/29/2019.    PAST MEDICAL HISTORY:  Significant for anesthesia, arthritis, cataracts,   chronic pain, hiatal hernia, hypertension, dyslipidemia, migraine headaches,   and pneumonia.    PSYCHOLOGICAL STATUS:  MENTAL STATUS EXAMINATION:  The patient is a well-nourished, medium built   female of medium stature who appeared her stated age of 78.  At presentation,   the patient was alert.  She was sitting in a wheelchair next to her bed   talking to her  when approached.  The patient oriented well to my   presence.  The patient was kempt in appearance.  She was dressed casually in   street attire that was appropriate to her age and setting.  The patient's   manner of presentation was cooperative and friendly.    The patient was well oriented to time, place, and person.  Her language was   logical and goal oriented, and her speech was normal for rate and rhythm.  The   patient's concentration and memory functioning appeared intact.    The patient's affect was well modulated, stable, and mildly intense.  She   related well.  Her mood appeared euthymic and appropriate to the context.    There  was no evidence of delusional or perceptual disturbance.  Also, the   patient showed no unusual pain or motor behavior during the interview.    SPECIFIC BEHAVIORAL COMPLAINTS:  The patient denied any clinically significant   symptoms of a negative mood.  She reported no strong feelings of depression,   anxiety, or anger.    The patient reported her back pain is mild.  She rated her average pain   intensity as a 3/10.  The patient reported some loss of appetite, but she said   her appetite is returning.  She also said she has shown more energy in the   past few days.  She described her sleep as good.    The patient reported no interpersonal discord or discomfort, marital strife or   any family disharmony.    The patient also reported no ETOH or other drug abuse or any use of tobacco.    PSYCHIATRIC HISTORY:  The patient denied any history significant for   psychiatric disturbance or treatment including in or outpatient care.    PSYCHOMETRIC TESTING:  The patient was administered 2 psychometric tests and 2   screening instruments.  The PS/PC-R revealed no clinically significant   symptoms of a negative mood.  Her CDR survey showed no problems with level of   consciousness, attention, thinking, perception, speech or memory.  She did   report some loss of vigor and problems with behavioral activation and basic   self-care.  She denied any mood disturbance.  She reported some loss of   appetite and mild chronic low back pain.    The patient was screened for any elder abuse or risk of suicide.  There was no   strong evidence for either problem.    SOCIAL HISTORY:  The patient is retired and .  She lives with her    in [place], [place].    IMPRESSION:  Mild adjustment difficulties.    RECOMMENDATIONS:  The patient will be followed for status and supportive care.       ____________________________________     SHUN LUCIA, PHD    IVANNA / MAXIMINO    DD:  04/07/2019 12:19:20  DT:  04/07/2019 12:35:28    D#:  [id number]   Job#:  [id number]

## 2019-04-07 NOTE — PROGRESS NOTES
Received shift report and assumed care of patient. Patient awake, calm and stable, currently positioned in bed for comfort and safety, personal items within reach at bedside,  call light within reach. POC discussed.  Patient using warm pack for discomfort to neck and right ear.

## 2019-04-07 NOTE — CARE PLAN
Problem: Communication  Goal: The ability to communicate needs accurately and effectively will improve  Patient alert and oriented x 4, she is able to communicate her needs accurately and effectively.      Problem: Pain Management  Goal: Pain level will decrease to patient's comfort goal   04/07/19 0819   OTHER   Pain Rating Scale (NPRS) 6   Comfort Goal 0;Comfort at Rest;Comfort with Movement;Stay Alert;Perform Activity   Medicated with Tylenol 2 tabs per prn order. Patient complaining of right ear, neck and right shoulder pain.      Dr. Tapia notified.

## 2019-04-08 ENCOUNTER — HOME HEALTH ADMISSION (OUTPATIENT)
Dept: HOME HEALTH SERVICES | Facility: HOME HEALTHCARE | Age: 79
End: 2019-04-08
Payer: MEDICARE

## 2019-04-08 PROCEDURE — G0515 COGNITIVE SKILLS DEVELOPMENT: HCPCS

## 2019-04-08 PROCEDURE — 97116 GAIT TRAINING THERAPY: CPT

## 2019-04-08 PROCEDURE — 97110 THERAPEUTIC EXERCISES: CPT

## 2019-04-08 PROCEDURE — 99232 SBSQ HOSP IP/OBS MODERATE 35: CPT | Performed by: PHYSICAL MEDICINE & REHABILITATION

## 2019-04-08 PROCEDURE — 97535 SELF CARE MNGMENT TRAINING: CPT

## 2019-04-08 PROCEDURE — 700111 HCHG RX REV CODE 636 W/ 250 OVERRIDE (IP): Performed by: PHYSICAL MEDICINE & REHABILITATION

## 2019-04-08 PROCEDURE — A9270 NON-COVERED ITEM OR SERVICE: HCPCS | Performed by: PHYSICAL MEDICINE & REHABILITATION

## 2019-04-08 PROCEDURE — 700102 HCHG RX REV CODE 250 W/ 637 OVERRIDE(OP): Performed by: PHYSICAL MEDICINE & REHABILITATION

## 2019-04-08 PROCEDURE — 770010 HCHG ROOM/CARE - REHAB SEMI PRIVAT*

## 2019-04-08 RX ADMIN — PRAMIPEXOLE DIHYDROCHLORIDE 0.5 MG: 0.25 TABLET ORAL at 20:14

## 2019-04-08 RX ADMIN — SENNOSIDES AND DOCUSATE SODIUM 2 TABLET: 8.6; 5 TABLET ORAL at 08:39

## 2019-04-08 RX ADMIN — ENOXAPARIN SODIUM 40 MG: 100 INJECTION SUBCUTANEOUS at 20:14

## 2019-04-08 RX ADMIN — PROPRANOLOL HYDROCHLORIDE 120 MG: 60 CAPSULE, EXTENDED RELEASE ORAL at 05:23

## 2019-04-08 RX ADMIN — OMEPRAZOLE 40 MG: 20 CAPSULE, DELAYED RELEASE ORAL at 08:39

## 2019-04-08 RX ADMIN — DULOXETINE 60 MG: 30 CAPSULE, DELAYED RELEASE ORAL at 08:40

## 2019-04-08 RX ADMIN — VITAMIN D, TAB 1000IU (100/BT) 1000 UNITS: 25 TAB at 08:40

## 2019-04-08 RX ADMIN — SENNOSIDES AND DOCUSATE SODIUM 2 TABLET: 8.6; 5 TABLET ORAL at 20:14

## 2019-04-08 ASSESSMENT — PATIENT HEALTH QUESTIONNAIRE - PHQ9
SUM OF ALL RESPONSES TO PHQ9 QUESTIONS 1 AND 2: 0
1. LITTLE INTEREST OR PLEASURE IN DOING THINGS: NOT AT ALL
2. FEELING DOWN, DEPRESSED, IRRITABLE, OR HOPELESS: NOT AT ALL

## 2019-04-08 NOTE — THERAPY
"Occupational Therapy  Daily Treatment     Patient Name: Lindsay Vargas  Age:  78 y.o., Sex:  female  Medical Record #: [id number]  Today's Date: 2019     Precautions  Precautions: (P) Fall Risk, Other (See Comments)  Comments: (P) [place], poor attention, hyperverbose    Safety   ADL Safety : Requires Supervision for Safety, Requires Cueing for Safety  Bathroom Safety: Requires Supervision for Safety, Requires Cuing for Safety  Comments: pt completed shower and dressing, and cont to demo poor standing balance during standing tasks although will use GB or sink for UE support to maintain balance. Cues required for narrow JULIET, RLE positioning, upright posture    Subjective    \"my feet have always been numb, so its hard to know what they are doing\"      Objective       19 0701   Safety    ADL Safety  Requires Supervision for Safety;Requires Cueing for Safety   Bathroom Safety Requires Supervision for Safety;Requires Cuing for Safety   Comments pt completed shower and dressing, and cont to demo poor standing balance during standing tasks although will use GB or sink for UE support to maintain balance. Cues required for narrow JULIET, RLE positioning, upright posture   Interdisciplinary Plan of Care Collaboration   Patient Position at End of Therapy Seated;Call Light within Reach;Tray Table within Reach   OT Total Time Spent   OT Individual Total Time Spent (Mins) 60   OT Charge Group   OT Self Care / ADL 4         FIM Grooming Score:  5 - Standby Prompting/Supervision or Set-up  Grooming Description:  Increased time, Set-up of equipment (when seated at w/c)    FIM Bathing Score:  4 - Minimal Assistance  Bathing Description:       FIM Upper Body Dressin - Standby Prompting/Supervision or Set-up  Upper Body Dressing Description:  Increased time    FIM Lower Body Dressing Score:  4 - Minimal Assistance  Lower Body Dressing Description:  Increased time, Supervision for safety, Verbal cueing, Set-up of equipment " (CGA in standing, 2 LOB)    FIM Toileting Body Dressing:  3 - Moderate Assistance  Toileting Description:       FIM Toilet Transfer Score:  4 - Minimal Assistance  Toilet Transfer Description:       FIM Tub/Shower Transfers Score:  4 - Minimal Assistance  Tub/Shower Transfers Description:  Grab bar, Increased time, Supervision for safety, Verbal cueing, Set-up of equipment (CGA stand pivot w/c<>bench with GB)      Assessment    Pt completed shower routine at w/c level with Min A overall d/t poor standing balance    Plan    Standing balance, reaching/FWW mobility tasks

## 2019-04-08 NOTE — PROGRESS NOTES
Rehab Progress Note     Encounter Date: 4/7/2019     CC: headaches    Interval Events (Subjective)  The patient is complaining of a right earache and headaches which start in the right posterior head and radiate to the right ear, moderate to severe intensity, constant, aching. Denies fevers and chills. Denies changes in hearing. Chronic rash in the BL face.     Objective:  VITAL SIGNS:   Vitals:    04/07/19 0633 04/07/19 1400 04/07/19 1600 04/07/19 1920   BP: 133/72 137/59  118/66   Pulse: 75 77  82   Resp: 16 17  20   Temp: 36.4 °C (97.6 °F) 36.7 °C (98 °F)  36.1 °C (97 °F)   TempSrc: Oral Oral  Temporal   SpO2:       Weight:   69.9 kg (154 lb 1.6 oz)    Height:               Constitutional: NAD,   HENT: NCAT, oral pharynx clear, no changes in the external ear. No TTP of the ear.   Eyes: EOMI. PERRL  Neck: supple, no LA  Cardiovascular: RRR, nl S1/S2, no murmurs.  Thorax & Lungs: CTA bilaterally  Abdomen: soft, non-tender, non-distended, BS present.  Skin: warm/dry/intact.  Mild erythema on the bilateral face.  No signs of vesicular rash.  No signs of shingles.  Genitalia: Deferred  Rectal: deferred  Extremities: no C/C/E, distal pulses 3+ and symmetric    No results found for this or any previous visit (from the past 72 hour(s)).    Current Facility-Administered Medications   Medication Frequency   • propranolol LA (INDERAL LA) capsule 120 mg Q DAY   • vitamin D (cholecalciferol) tablet 1,000 Units DAILY   • artificial tears 1.4 % ophthalmic solution 1 Drop PRN   • benzocaine-menthol (CEPACOL) lozenge 1 Lozenge Q2HRS PRN   • enoxaparin (LOVENOX) inj 40 mg QHS   • mag hydrox-al hydrox-simeth (MAALOX PLUS ES or MYLANTA DS) suspension 20 mL Q2HRS PRN   • ondansetron (ZOFRAN ODT) dispertab 4 mg 4X/DAY PRN    Or   • ondansetron (ZOFRAN) syringe/vial injection 4 mg 4X/DAY PRN   • sodium chloride (OCEAN) 0.65 % nasal spray 2 Spray PRN   • traZODone (DESYREL) tablet 25 mg QHS PRN   • Pharmacy Consult Request ...Pain  Management Review 1 Each PHARMACY TO DOSE   • Respiratory Care per Protocol Continuous RT   • melatonin tablet 3 mg HS PRN   • senna-docusate (PERICOLACE or SENOKOT S) 8.6-50 MG per tablet 2 Tab BID    And   • polyethylene glycol/lytes (MIRALAX) PACKET 1 Packet QDAY PRN    And   • magnesium hydroxide (MILK OF MAGNESIA) suspension 30 mL QDAY PRN    And   • bisacodyl (DULCOLAX) suppository 10 mg QDAY PRN   • acetaminophen (TYLENOL) tablet 650 mg Q6HRS PRN   • DULoxetine (CYMBALTA) capsule 60 mg DAILY   • omeprazole (PRILOSEC) capsule 40 mg DAILY   • pramipexole (MIRAPEX) tablet 0.5 mg QHS       Orders Placed This Encounter   Procedures   • Diet Order Regular     Standing Status:   Standing     Number of Occurrences:   1     Order Specific Question:   Diet:     Answer:   Regular [1]         Intake/Output Summary (Last 24 hours) at 04/07/19 2206  Last data filed at 04/07/19 1300   Gross per 24 hour   Intake              660 ml   Output                0 ml   Net              660 ml         Assessment:  Active Hospital Problems    Diagnosis   • *Debility   • Falls   • Restless legs syndrome   • Chronic back pain   • Mood disorder (HCC)   • GERD (gastroesophageal reflux disease)     IMAGING        Results for orders placed during the hospital encounter of 05/17/18   DX-CERVICAL SPINE-2 OR 3 VIEWS    Impression 1.  3 mm degenerative subluxation at C5-6 and 2 mm degenerative subluxation at C6-7    2.  Multilevel endplate spurring and facet arthropathy      Results for orders placed during the hospital encounter of 10/03/13   DX-CERVICAL SPINE-4+ VIEWS    Impression 1. No acute findings.    2. Degenerative disk disease at C4-5.    3. Multilevel facet arthropathy.          INTERPRETING LOCATION:  [address] [place], [place] [address], [place], [address]        Results for orders placed during the hospital encounter of 01/05/15   DX-CHEST-2 VIEWS    Impression No evidence of acute cardiopulmonary process.            Results for orders placed during  the hospital encounter of 07/14/14   DX-FEMUR    Impression No acute fracture. Mild right knee joint degenerative changes.      Results for orders placed during the hospital encounter of 09/15/15   DX-FINGER(S) 2+ LEFT    Impression Multifocal degenerative change.            Results for orders placed during the hospital encounter of 07/14/14   DX-FOOT-COMPLETE 3+    Impression No radiographic evidence of acute displaced fracture or subluxation.    Bony changes which are most pronounced between the third and fourth proximal metatarsal shafts suggesting DISH over remote injury.  The finding is similar to the contralateral foot.    DIP osteoarthritis        Results for orders placed during the hospital encounter of 07/27/10   DX-FOREARM                Results for orders placed during the hospital encounter of 08/03/05   DX-HIPS-COMPLETE - BILATERAL 3+    Impression IMPRESSION:    MILD BILATERAL HIP OSTEOARTHRITIS.    GAK/wds           Read By MEHNAZ FRANCO MD on Aug  3 2005 11:27AM  : LINDSEY Transcription Date: Aug  3 2005 12:12PM  THIS DOCUMENT HAS BEEN ELECTRONICALLY SIGNED BY: MEHNAZ FRANCO MD on   Aug  3 2005 12:24PM        Results for orders placed during the hospital encounter of 03/26/19   DX-KNEE 2- RIGHT    Impression No acute fracture. Degenerative changes.      Results for orders placed during the hospital encounter of 04/15/16   DX-KNEE 3 VIEWS LEFT    Impression 1.  Moderate osteoarthropathy, most severe in the lateral and patellofemoral compartments.    2.  No acute findings.        Results for orders placed during the hospital encounter of 10/25/16   DX-KNEE COMPLETE 4+ LEFT    Impression No evidence of acute fracture or dislocation.    Moderate to advanced left knee osteoarthritis.      Results for orders placed during the hospital encounter of 06/14/16   DX-LUMBAR SPINE-2 OR 3 VIEWS    Impression Stable moderate spondylosis characterized mostly by degenerative disc  disease    Unchanged grade 1L4/5 anterolisthesis    Right renal artery pseudoaneurysm      Results for orders placed during the hospital encounter of 03/21/18   DX-LUMBAR SPINE-4+ VIEWS    Impression New 30 degrees levoscoliosis likely is degenerative    No abnormal motion with flexion or extension    Right renal artery pseudoaneurysm                        Results for orders placed during the hospital encounter of 07/27/10   DX-SHOULDER 2+        Results for orders placed during the hospital encounter of 06/14/16   DX-THORACIC SPINE-2 VIEWS    Impression Multiple chronic mild anterior wedge compression fractures. Superimposed minimal acute fracture is not entirely excluded and if symptoms warrant, MRI could be performed as it is much more sensitive      Results for orders placed during the hospital encounter of 07/14/14   DX-TIBIA AND FIBULA    Impression Soft tissue swelling without acute bony abnormality          Results for orders placed during the hospital encounter of 04/14/10   DX-WRIST-LIMITED 2-    Impression IMPRESSION:     1. OSTEOARTHRITIC CHANGE INVOLVING THE FIRST CARPOMETACARPAL JOINT.    2. NO GROSS EVIDENCE OF FRACTURE ON TWO VIEWS.  IF THERE IS CLINICAL   CONCERN FOR FRACTURE RECOMMEND DEDICATED FOUR VIEWS OF THE WRIST.      GAK/theodoret     Read By MEHNAZ FRANCO MD on Apr 14 2010  3:05PM  : IVANNA Transcription Date: Apr 15 2010  8:06AM  THIS DOCUMENT HAS BEEN ELECTRONICALLY SIGNED BY: MEHNAZ FRANCO MD on   Apr 16 2010 11:00AM                                    Results for orders placed during the hospital encounter of 09/28/04   CT-SOFT TISSUE NECK WITH    Impression IMPRESSION:    1. NO DEFINITE LYMPHADENOPATHY WITHIN THE NECK.    2. QUESTION SOME MINIMAL ASYMMETRY WITH SLIGHTLY MORE PROMINENT SOFT   TISSUE ON THE RIGHT SIDE OF THE PHARYNX AT THE LEVEL OF THE UVULA.  THIS   COULD BE DUE TO SOME MINIMAL MUCOUS.    IF THERE IS CLINICAL SUSPICION   FOR A SOFT TISSUE MASS IN THIS  REGION, DIRECT VISUALIZATION IS   RECOMMENDED.     3. SALIVARY GLANDS ARE SYMMETRIC IN APPEARANCE.         Read By SHASHA BHATTI MD on Sep 28 2004 10:22AM  : [facility] Transcription Date: Sep 28 2004  3:21PM  THIS DOCUMENT HAS BEEN ELECTRONICALLY SIGNED BY: SHASHA BHATTI MD on   Sep 28 2004  3:34PM                  Results for orders placed during the hospital encounter of 03/04/04   CT-CHEST, HIGH RESOLUTION LUNG    Impression IMPRESSION:      1.   NO EVIDENCE OF INTERSTITIAL LUNG DISEASE.     2.   MILD PLEURAL THICKENING IN THE LEFT HEMITHORAX INFEROLATERALLY WITH   ADJACENT MINIMAL PULMONARY SCARRING.     3.   DIFFUSE HEPATIC FATTY INFILTRATION.    4.   APPROXIMATELY 2 CM THIN-WALLED PERIPHERALLY CALCIFIED MASS IN THE   RIGHT RENAL HILUM ON THE  TOPOGRAM, EITHER A CALCIFIED CYST OR RENAL   ARTERY ANEURYSM.  CT IS RECOMMENDED FOR FURTHER EVALUATION.          Report faxed to [phone] Dr. Narayan on 03/04/04 at 19:55/wds.        READING DR:        MIKE DEVI MD  READING DATE:      Mar  4 2004  4:56PM  REPORT STATUS:     FINAL REPORT    TRANSCRIPTION CODE:         WDM  TRANSCRIPTION DATE:         Mar  4 2004  7:54PM    THIS DOCUMENT HAS BEEN ELECTRONICALLY SIGNED BY:  MIKE DEVI MD -   Mar  5 2004  8:06AM                          Results for orders placed during the hospital encounter of 03/26/19   CT-ABDOMEN-PELVIS WITH    Impression 1.  No acute abnormality or solid organ injury is appreciated on CT of abdomen and pelvis.    2.  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    3.  Diverticulosis again noted.                       Results for orders placed during the hospital encounter of 06/09/07   CT-PELVIS W/O PLUS RECONS    Impression IMPRESSION:    1. NO EVIDENCE OF DISPLACED PELVIC FRACTURE.     2. OSTEOPENIA.     3. DEGENERATIVE CHANGES IN THE PELVIS AS WELL AS THE LUMBOSACRAL SPINE.                      Medical Decision Making and Plan:       CANDY  (gastroesophageal reflux disease)  Stable. Continue prilosec    Debility  Continue comprehensive inpatient rehab    Right ear pain  Acute on chronic right ear pain. No changes in hearing. No TTP to external ear    Rash  Chronic rash to the BL face. No signs of shingles  we discussed treatment plans.  There are no signs with shingles.  This is a chronic rash.  The patient states she has not had follow-up with Dr. treviño.  Consider follow-up with a dermatologist following inpatient rehabilitation.      Total time:  37 minutes.  I spent greater than 50% of the time for patient care and coordination on this date, including unit/floor time, and face-to-face time with the patient as per assessment and plan above.  This was separate from procedural time.    Vikram Tapia M.D.  Physical medicine rehabilitation  [facility]

## 2019-04-08 NOTE — CARE PLAN
Problem: Safety  Goal: Will remain free from injury  Call light with in reach. Redirection to use call light for assistance. Non skid socks. Upper siderails up x2 while in bed.    Problem: Pain Management  Goal: Pain level will decrease to patient's comfort goal  Educate patient of non-pharmacological comfort measures: repositioning, relaxation/breathing technique, warm compress and activities. Complains of neck pain, warm pack given and repositioned in bed with relief. No respiratory distress. Will continue to monitor.

## 2019-04-08 NOTE — CARE PLAN
Problem: Safety  Goal: Will remain free from injury  Patient uses call light appropriately. Bed locked and in the lowest position. Non skid socks in place. Hourly rounding in place.     Problem: Bowel/Gastric:  Goal: Will not experience complications related to bowel motility  Pt. Is continent of bowels. LBM 4/7/19. Bowel sounds present in all four quadrants.

## 2019-04-08 NOTE — PROGRESS NOTES
"Rehab Progress Note     Date of Service: 4/8/2019  Chief Complaint: follow up debility    Interval Events (Subjective)    Patient seen and examined in her speech therapy session today.  She reports she is just not having a very good day.  She is very sad and crying.  She would like to get home with her  is soon as possible.  She is very perseverative on her daughter who passed away 6 years ago.    In addition over the weekend patient received a right-sided greater occipital nerve block from Dr. Tapia at bedside for her headache.  She reports ever since that she is just does not feel well.    Objective:  VITAL SIGNS: /52   Pulse 69   Temp 36.6 °C (97.8 °F) (Oral)   Resp 18   Ht 1.702 m (5' 7\")   Wt 69.9 kg (154 lb 1.6 oz)   SpO2 98%   Breastfeeding? No   BMI 24.14 kg/m²   Gen: alert, no apparent distress  CV: regular rate and rhythm, no murmurs, no peripheral edema  Resp: clear to ascultation bilaterally, normal respiratory effort  GI: soft, non-tender abdomen, bowel sounds present  Psych: tearful and crying    No results found for this or any previous visit (from the past 72 hour(s)).    Current Facility-Administered Medications   Medication Frequency   • propranolol LA (INDERAL LA) capsule 120 mg Q DAY   • vitamin D (cholecalciferol) tablet 1,000 Units DAILY   • artificial tears 1.4 % ophthalmic solution 1 Drop PRN   • benzocaine-menthol (CEPACOL) lozenge 1 Lozenge Q2HRS PRN   • enoxaparin (LOVENOX) inj 40 mg QHS   • mag hydrox-al hydrox-simeth (MAALOX PLUS ES or MYLANTA DS) suspension 20 mL Q2HRS PRN   • ondansetron (ZOFRAN ODT) dispertab 4 mg 4X/DAY PRN    Or   • ondansetron (ZOFRAN) syringe/vial injection 4 mg 4X/DAY PRN   • sodium chloride (OCEAN) 0.65 % nasal spray 2 Spray PRN   • traZODone (DESYREL) tablet 25 mg QHS PRN   • Pharmacy Consult Request ...Pain Management Review 1 Each PHARMACY TO DOSE   • Respiratory Care per Protocol Continuous RT   • melatonin tablet 3 mg HS PRN   • " senna-docusate (PERICOLACE or SENOKOT S) 8.6-50 MG per tablet 2 Tab BID    And   • polyethylene glycol/lytes (MIRALAX) PACKET 1 Packet QDAY PRN    And   • magnesium hydroxide (MILK OF MAGNESIA) suspension 30 mL QDAY PRN    And   • bisacodyl (DULCOLAX) suppository 10 mg QDAY PRN   • acetaminophen (TYLENOL) tablet 650 mg Q6HRS PRN   • DULoxetine (CYMBALTA) capsule 60 mg DAILY   • omeprazole (PRILOSEC) capsule 40 mg DAILY   • pramipexole (MIRAPEX) tablet 0.5 mg QHS       Orders Placed This Encounter   Procedures   • Diet Order Regular     Standing Status:   Standing     Number of Occurrences:   1     Order Specific Question:   Diet:     Answer:   Regular [1]       Assessment:  Active Hospital Problems    Diagnosis   • *Debility   • Falls   • Restless legs syndrome   • Chronic back pain   • Mood disorder (HCC)   • GERD (gastroesophageal reflux disease)     This patient is a 78 y.o. female admitted for acute inpatient rehabilitation with Debility.    I led and attended the weekly conference, and agree with the IDT conference documentation and plan of care as noted below.    Date of conference: 4/3/2019    Goals:    1) ambulate 100 ft with FWW min assist  2) curb with min assist and FWW  3) SBA bathing  4) SBA US dressing  5) use external memory aides  6) improved attention    Met 0/4 PT goals, met 0/7 OT goals, met 0/3 goals - unable to meet goals due to her hyperverbosity/decreased attention to task    Barriers:    1) poor attention  2) hard of hearing  3) impaired carryover of learning  4) hyper verbosity  5) poor insight/denial of deficits  6) moderate to severe cognitive deficits    Admission FIM 63 --> 76 (4/3)    CM/social support: Plan to discharge home to supportive care of spouse and family. Patient lives with her  in [facility], with 1 step to enter, in [place]. Supportive daughter lives in [place]. Patient reports she has two grandchildren who are also very supportive.     Anticipated DC date:  4/11/2019    Home health: PT/OT/SLP/RN    Equip: TBD    Follow up: PCP      Medical Decision Making and Plan:    Debility  Cognitive Deficits, moderate to severe, improving  Hyper verbose, impaired attention, improving  Continue full rehab program  PT/OT/SLP, 1 hr each discipline, 5 days per week  Need to have  come in for family training prior to d/c as therapy reports she will need close SBA to CGA    Chronic back pain, stable  PRN tylenol  Previously on baclofen which caused altered mental status     Headache  S/p right posterior occipital nerve block with Dr. Tapia on 4/7    Frequent falls  Poor balance  Therapy evaluation for LRD  Per therapy, due to poor attention  Will continue to need SBA to CGA from her  at discharge     Peripheral neuropathy  Continue Cymbalta     Hypertension  Orthostatic hypotension  Continue propranolol --> dose decreased to 120 mg   SBPs in last 24 hrs 112-146  Continue to monitor     History of depression  Continue Cymbalta     GERD  Continue Prilosec     Restless legs syndrome  Continue Requip    Hypokalemia, resolved  Discontinued supplementation    Bowel  Continue bowel meds  Last BM 4/7    DVT prophylaxis  Lovenox    Total time:  25 minutes.  I spent greater than 50% of the time for patient care, counseling, and coordination on this date, including patient face-to face time, unit/floor time with review of records/pertinent lab data and studies, as well as discussing diagnostic evaluation/work up, planned therapeutic interventions, and future disposition of care, as per the interval events/subjective and the assessment and plan as noted above.        Marleen Fontaine M.D.   Physical Medicine and Rehabilitation

## 2019-04-08 NOTE — DISCHARGE PLANNING
Home health referral sent to [facility] per choice form.  Awaiting response.

## 2019-04-08 NOTE — THERAPY
Physical Therapy   Daily Treatment     Patient Name: Lindsay Vargas  Age:  78 y.o., Sex:  female  Medical Record #: [id number]  Today's Date: 4/8/2019     Precautions  Precautions: (P) Fall Risk, Other (See Comments)  Comments: (P) [place], poor attention, hyperverbose    Subjective    Pt received in wc in room, agreeable to PT session. States her spouse is coming in for training with therapies tomorrow.     Objective       04/08/19 0931   Precautions   Precautions Fall Risk;Other (See Comments)   Comments [place], poor attention, hyperverbose   Cognition    Level of Consciousness Alert   Ability To Follow Commands 1 Step   Safety Awareness Impaired   New Learning Impaired   Attention Impaired   Sitting Lower Body Exercises   Nustep Resistance Level 4  (UE and LE propulsion x10 min, 0.26 miles)   Other Exercises standing at water UE ergometer x5:15 min, level 1, SBA and TCs for neutral standing posture, however pt unable to correct to neutral d/t reports of pain   Bed Mobility    Sit to Stand Contact Guard Assist  (VCs for hand placement, and wc brake management)   Neuro-Muscular Treatments   Neuro-Muscular Treatments Tactile Cuing;Verbal Cuing   Interdisciplinary Plan of Care Collaboration   IDT Collaboration with  Certified Nursing Assistant   Patient Position at End of Therapy Seated;Chair Alarm On;Call Light within Reach;Tray Table within Reach;Phone within Reach   Collaboration Comments CNA with pt at end of session   PT Total Time Spent   PT Individual Total Time Spent (Mins) 60   PT Charge Group   PT Gait Training 2   PT Therapeutic Exercise 2       FIM Walking Score:  4 - Minimal Assistance  Walking Description:   (pt amb x275' with FWW and CGA, Min A for balance in initial stance d/t posterior lean)    FIM Stairs Score:  4 - Minimal Assistance  Stairs Description:   (pt ascended/descended 2x6 adaptive steps with B HRs, reciprocal pattern to ascend  and step-to pattern to descend, CGA)    SPT Nustep<>wc with use  of FWW and CGA, VCs for hand placement.    Assessment    Pt limited by impaired standing, impaired standing posture and standing balance. Pt requires CGA to Min A for standing mobility.    Plan    Gait training with FWW, standing balance on compliant and non-compliant surfaces with and without UE support, dual-task training, functional transfers, curb training with FWW. Prepare for DC on 4/11. FT scheduled for tomorrow with pt's spouse.

## 2019-04-08 NOTE — DISCHARGE PLANNING
ATTN: Case Management  RE: Referral for Home Health    As of 04/08/19, we have accepted the Home Health referral for the patient listed above.    A [facility] clinician will be out to see the patient within 48 hours. If you have any questions or concerns regarding the patient’s transition to Home Health, please do not hesitate to contact us at [phone].      We look forward to collaborating with you,  [facility] Team

## 2019-04-09 PROCEDURE — 97116 GAIT TRAINING THERAPY: CPT

## 2019-04-09 PROCEDURE — 700102 HCHG RX REV CODE 250 W/ 637 OVERRIDE(OP): Performed by: PHYSICAL MEDICINE & REHABILITATION

## 2019-04-09 PROCEDURE — A9270 NON-COVERED ITEM OR SERVICE: HCPCS | Performed by: PHYSICAL MEDICINE & REHABILITATION

## 2019-04-09 PROCEDURE — 700111 HCHG RX REV CODE 636 W/ 250 OVERRIDE (IP): Performed by: PHYSICAL MEDICINE & REHABILITATION

## 2019-04-09 PROCEDURE — 97110 THERAPEUTIC EXERCISES: CPT

## 2019-04-09 PROCEDURE — 97530 THERAPEUTIC ACTIVITIES: CPT

## 2019-04-09 PROCEDURE — 97535 SELF CARE MNGMENT TRAINING: CPT

## 2019-04-09 PROCEDURE — G0515 COGNITIVE SKILLS DEVELOPMENT: HCPCS

## 2019-04-09 PROCEDURE — 99231 SBSQ HOSP IP/OBS SF/LOW 25: CPT | Performed by: PHYSICAL MEDICINE & REHABILITATION

## 2019-04-09 PROCEDURE — 770010 HCHG ROOM/CARE - REHAB SEMI PRIVAT*

## 2019-04-09 RX ADMIN — SENNOSIDES AND DOCUSATE SODIUM 2 TABLET: 8.6; 5 TABLET ORAL at 21:33

## 2019-04-09 RX ADMIN — VITAMIN D, TAB 1000IU (100/BT) 1000 UNITS: 25 TAB at 08:40

## 2019-04-09 RX ADMIN — ENOXAPARIN SODIUM 40 MG: 100 INJECTION SUBCUTANEOUS at 21:33

## 2019-04-09 RX ADMIN — OMEPRAZOLE 40 MG: 20 CAPSULE, DELAYED RELEASE ORAL at 08:41

## 2019-04-09 RX ADMIN — PRAMIPEXOLE DIHYDROCHLORIDE 0.5 MG: 0.25 TABLET ORAL at 21:33

## 2019-04-09 RX ADMIN — DULOXETINE 60 MG: 30 CAPSULE, DELAYED RELEASE ORAL at 08:40

## 2019-04-09 RX ADMIN — SENNOSIDES AND DOCUSATE SODIUM 2 TABLET: 8.6; 5 TABLET ORAL at 08:40

## 2019-04-09 RX ADMIN — PROPRANOLOL HYDROCHLORIDE 120 MG: 60 CAPSULE, EXTENDED RELEASE ORAL at 05:32

## 2019-04-09 RX ADMIN — ACETAMINOPHEN 650 MG: 325 TABLET, FILM COATED ORAL at 23:53

## 2019-04-09 RX ADMIN — ACETAMINOPHEN 650 MG: 325 TABLET, FILM COATED ORAL at 01:31

## 2019-04-09 ASSESSMENT — PATIENT HEALTH QUESTIONNAIRE - PHQ9
SUM OF ALL RESPONSES TO PHQ9 QUESTIONS 1 AND 2: 0
2. FEELING DOWN, DEPRESSED, IRRITABLE, OR HOPELESS: NOT AT ALL
1. LITTLE INTEREST OR PLEASURE IN DOING THINGS: NOT AT ALL
SUM OF ALL RESPONSES TO PHQ9 QUESTIONS 1 AND 2: 0
2. FEELING DOWN, DEPRESSED, IRRITABLE, OR HOPELESS: NOT AT ALL
1. LITTLE INTEREST OR PLEASURE IN DOING THINGS: NOT AT ALL

## 2019-04-09 NOTE — DOCUMENTATION QUERY
[facility]                                                                         Query Response Note      PATIENT:               DWIGHT VILLA  ACCT #:                  [id number]  MRN:                       [id number]  :                       1940  ADMIT DATE:       3/26/2019 4:10 PM  DISCH DATE:        3/29/2019 10:11 AM  RESPONDING  PROVIDER #:        [id number]           RESPONSE TEXT:    Unable to determine    QUERY TEXT:    Depression Type [id number]    Depression is documented in the Medical Record.  Please specify the type.    NOTE:  If an appropriate response is not listed below, please respond with a new note.              The patient's Clinical Indicators include:  Depression is documented on the History&Physical and the patient is on current long-term use of Cymbalta.  Query created by: Yi Vidal on 2019 7:45 AM        Electronically signed by:  VENKATESH HUA MD 2019 7:01 PM

## 2019-04-09 NOTE — THERAPY
"Physical Therapy   Daily Treatment     Patient Name: Lindsay Vargas  Age:  78 y.o., Sex:  female  Medical Record #: [id number]  Today's Date: 4/9/2019     Precautions  Precautions: Fall Risk, Other (See Comments)  Comments: [place], poor attention, hyperverbose    Subjective    \"I'm not feeling well today.\" WIth edu and encouragement pt willing to participate in therapy.     Objective       04/09/19 0901   Precautions   Precautions Fall Risk;Other (See Comments)   Comments [place], poor attention, hyperverbose   Cognition    Level of Consciousness Alert   Ability To Follow Commands 1 Step   New Learning Impaired   Attention Impaired   Sequencing Impaired   Bed Mobility    Supine to Sit Modified Independent   Sit to Supine Modified Independent   Sit to Stand Stand by Assist   Scooting Modified Independent   Rolling Modified Independent   Interdisciplinary Plan of Care Collaboration   Patient Position at End of Therapy Seated;Chair Alarm On;Call Light within Reach;Tray Table within Reach;Phone within Reach   PT Total Time Spent   PT Individual Total Time Spent (Mins) 60   PT Charge Group   PT Gait Training 1   PT Therapeutic Activities 3     PT provided pt with top and she donned while seated in wc with SBA. PT donned pt's shoes and socks.     FIM Bed/Chair/Wheelchair Transfers Score: 5 - Standby Prompting/Supervision or Set-up  Bed/Chair/Wheelchair Transfers Description:   (bed mob on std queen bed Mod I, STS to FWW SBA)    FIM Walking Score:  4 - Minimal Assistance  Walking Description:   (pt amb x390' with FWW and intermittnet CGA for balance to SBA , decreased ranjith/gait speed, +trendelenberg, fwd lean on FWW)      Assessment    Pt limited by impaired attention, decreased safety awareness, impaired standing balance.    Plan    Gait with FWW, safety edu c mobility, standing balance, proximal mm strengthening    "

## 2019-04-09 NOTE — CARE PLAN
Problem: Safety  Goal: Will remain free from falls    Intervention: Implement fall precautions  Use of call light encouraged to make needs known.Bed in low position, non skid socks/shoes on when out of bed.Call light within reach.Will continue to monitor and assess needs and safety.      Problem: Bowel/Gastric:  Goal: Normal bowel function is maintained or improved    Intervention: Educate patient and significant other/support system about signs and symptoms of constipation and interventions to implement  Pt is continent of bowel per report.Scheduled medication given.[facility] 04/08.Will continue to monitor and assess for s/sx of constipation.      Problem: Pain Management  Goal: Pain level will decrease to patient's comfort goal  Pt is calm, comfortable and stable.Repositioned with pillows for comfort.Will continue to monitor and assess pain level and medicate as needed.

## 2019-04-09 NOTE — THERAPY
Speech Language Pathology  Daily Treatment     Patient Name: Lindsay Vargas  Age:  78 y.o., Sex:  female  Medical Record #: [id number]  Today's Date: 4/9/2019     Subjective    Pt emotional on this date required frequent rest breaks and redirection throughout the session.      Objective       04/08/19 1333   Cosignature   Documentation Review Approved with modifications made by preceptor in flowsheet   Cognition   Attention to Task Severe (2)   Simple Attention Moderate (3)   Simple Information Processing Moderate (3)   Complex Information Processing Profound (1)   Topic Maintenance  Severe (2)   Simple Reasoning / Problem Solving Severe (2)   Planning / Decision Making Moderate (3)   Executive Functioning / Organization Severe (2)   SLP Total Time Spent   SLP Individual Total Time Spent (Mins) 60   Charge Group   SLP Cognitive Skill Development 4           Assessment    Pt required MOD A overall for attention tasks (sustained and divided) with repetition and breakdown of instructions in order to complete with 100% accuracy. Pt presented with safety situations and generated appropriate response on 80% of trials indep.     Plan    D/c planning, insight, safety and attention

## 2019-04-09 NOTE — THERAPY
Speech Language Pathology  Daily Treatment     Patient Name: Lindsay Vargas  Age:  78 y.o., Sex:  female  Medical Record #: [id number]  Today's Date: 4/9/2019     Subjective    Pt pleasant and cooperative. Excited about upcomming d/c.      Objective       04/09/19 1303   Cognition   Attention to Task Moderate (3)   Visual Scanning / Cancellation Skills Minimal (4)   Topic Maintenance  Severe (2)   Insight into Deficits Moderate (3)   Planning / Decision Making Moderate (3)   Executive Functioning / Organization Moderate (3)   SLP Total Time Spent   SLP Individual Total Time Spent (Mins) 60   Charge Group   SLP Cognitive Skill Development 4       FIM Comprehension Score:  4 - Minimal Assistance  Comprehension Description:       FIM Expression Score:  5 - Stand-by Prompting/Supervision or Set-up  Expression Description:       FIM Social Interaction Score:  4 - Minimal Assistance  Social Interaction Description:       FIM Problem Solving Score:  4 - Minimal Assistance  Problem Solving Description:       FIM Memory Score:  3 - Moderate Assistance  Memory Description:         Assessment    Pt presented with attention task, pt completed simple target tasks with 80% accuracy indep, increased to 100% provided min/mod a. Pt presented with increased complexity task and accuracy decreased to 40% indep. Pt requires frequent repetitions of instructions and breakdown of information. Provided with functional safety and problem solving situations pt indep provided appropriate solutions with 80% accuracy indep. Rec: completion of family training to prepare for upcoming d/c.     Plan    Family training and outcome assessment.

## 2019-04-09 NOTE — THERAPY
"Occupational Therapy  Daily Treatment     Patient Name: Lindsay Vargas  Age:  78 y.o., Sex:  female  Medical Record #: [id number]  Today's Date: 4/9/2019     Precautions  Precautions: Fall Risk, Other (See Comments)  Comments: [place], poor attention, hyperverbose    Safety   ADL Safety : Requires Supervision for Safety, Requires Cueing for Safety  Bathroom Safety: Requires Supervision for Safety, Requires Cuing for Safety  Comments: pt completed shower and dressing, and cont to demo poor standing balance during standing tasks although will use GB or sink for UE support to maintain balance. Cues required for narrow JULIET, RLE positioning, upright posture    Subjective    \"my  is supposed to come  In tomorrow\"     Objective    Education provided re: sequencing sit<>stands to FWW. Min-Max cues for hand placement, FWW mobility safety, turning safely with FWW. Practiced multiple times at various surfaces and pt cont to require cues by end of the session.      04/09/19 1401   Sitting Upper Body Exercises   Chest Press 3 sets of 10;Bilateral;Weight (See Comments for lbs)  (3# free weight)   External Shoulder Rotation 3 sets of 10;Bilateral;Weight (See Comments for lbs)  (3# free weight)   Other Exercise AAROM BUE shoulder flexion 3 sets x 15; deep breathing exercises completed following each exercise set   Interdisciplinary Plan of Care Collaboration   Patient Position at End of Therapy In Bed;Call Light within Reach;Tray Table within Reach   OT Total Time Spent   OT Individual Total Time Spent (Mins) 60   OT Charge Group   OT Self Care / ADL 2   OT Therapeutic Exercise  2       Assessment    Pt requires Min -Max cues for sequencing FWW mobility and the use of safety strategies. Due to poor memory and attention deficits, pt demo fair-poor carry over of learning and will benefit from ongoing education     Plan    Standing balance, reaching/FWW mobility tasks    "

## 2019-04-09 NOTE — REHAB-PHARMACY IDT TEAM NOTE
Pharmacy   Pharmacy  Antibiotics/Antifungals/Antivirals:  Medication:      Active Orders     None        Route:         n/a  Stop Date:  n/a  Reason:   Antihypertensives/Cardiac:  Medication:    Orders (72h ago through future)    Start     Ordered    04/05/19 0600  propranolol LA (INDERAL LA) capsule 120 mg  Q DAY     Comments:  Re-entered for product selection    04/04/19 0940        Patient Vitals for the past 24 hrs:   BP Pulse   04/09/19 0846 142/70 70   04/09/19 0736 160/80 64   04/09/19 0700 (!) 161/71 64   04/09/19 0532 130/70 65   04/08/19 1845 136/70 68   04/08/19 1400 112/52 69       Anticoagulation:  Medication:  Lovenox  INR:      Other key medications: duloxetine, omeprazole, pramipexole.   A review of the medication list reveals no issues at this time. Patient is currently on an antihypertensive. Recommend home blood pressure monitoring/recording if antihypertensive regimen continues  Section completed by:  Monica Sandoval RPH

## 2019-04-09 NOTE — REHAB-CM IDT TEAM NOTE
Case Management    DC Planning  DC destination/dispostion: Return home w/ spouse single story home with 1 step to enter + walk in shower in [place]. Supportive daughter lives in [place]; supportive grandchildren. Pt has a wc, spc, FWW, 4WW, and grab bars by toilet. Spouse to install grab bars in the shower  PT able to self cath at home which she has been doing for years.     [facility].        DC Needs: Follow up PCP appointment needed--pt will need to make appt as MD does not schedule appointments until he receives dc summary.      Barriers to discharge: Distractability at times      Strengths: Independent PLOF, supportive family, pleasant, and cooperative     Section completed by:  GREER Higginbotham, CCM

## 2019-04-09 NOTE — PROGRESS NOTES
"Rehab Progress Note     Date of Service: 4/9/2019  Chief Complaint: follow up debility    Interval Events (Subjective)    Patient seen and examined in her room today.  Her mood is much improved today.  She does report that the injection she got to the posterior neck this weekend from Dr. Tapia did help her headaches.  She has no new complaints.  She is participating well with therapy.  Still on track for discharge home in 2 days.  She is looking forward to getting home and seeing her .    Objective:  VITAL SIGNS: /60   Pulse 74   Temp 36.6 °C (97.8 °F) (Temporal)   Resp 18   Ht 1.702 m (5' 7\")   Wt 69.9 kg (154 lb 1.6 oz)   SpO2 96%   Breastfeeding? No   BMI 24.14 kg/m²   Gen: alert, no apparent distress  CV: regular rate and rhythm, no murmurs, no peripheral edema  Resp: clear to ascultation bilaterally, normal respiratory effort  GI: soft, non-tender abdomen, bowel sounds present    No results found for this or any previous visit (from the past 72 hour(s)).    Current Facility-Administered Medications   Medication Frequency   • propranolol LA (INDERAL LA) capsule 120 mg Q DAY   • vitamin D (cholecalciferol) tablet 1,000 Units DAILY   • artificial tears 1.4 % ophthalmic solution 1 Drop PRN   • benzocaine-menthol (CEPACOL) lozenge 1 Lozenge Q2HRS PRN   • enoxaparin (LOVENOX) inj 40 mg QHS   • mag hydrox-al hydrox-simeth (MAALOX PLUS ES or MYLANTA DS) suspension 20 mL Q2HRS PRN   • ondansetron (ZOFRAN ODT) dispertab 4 mg 4X/DAY PRN    Or   • ondansetron (ZOFRAN) syringe/vial injection 4 mg 4X/DAY PRN   • sodium chloride (OCEAN) 0.65 % nasal spray 2 Spray PRN   • traZODone (DESYREL) tablet 25 mg QHS PRN   • Pharmacy Consult Request ...Pain Management Review 1 Each PHARMACY TO DOSE   • Respiratory Care per Protocol Continuous RT   • melatonin tablet 3 mg HS PRN   • senna-docusate (PERICOLACE or SENOKOT S) 8.6-50 MG per tablet 2 Tab BID    And   • polyethylene glycol/lytes (MIRALAX) PACKET 1 " Packet QDAY PRN    And   • magnesium hydroxide (MILK OF MAGNESIA) suspension 30 mL QDAY PRN    And   • bisacodyl (DULCOLAX) suppository 10 mg QDAY PRN   • acetaminophen (TYLENOL) tablet 650 mg Q6HRS PRN   • DULoxetine (CYMBALTA) capsule 60 mg DAILY   • omeprazole (PRILOSEC) capsule 40 mg DAILY   • pramipexole (MIRAPEX) tablet 0.5 mg QHS       Orders Placed This Encounter   Procedures   • Diet Order Regular     Standing Status:   Standing     Number of Occurrences:   1     Order Specific Question:   Diet:     Answer:   Regular [1]       Assessment:  Active Hospital Problems    Diagnosis   • *Debility   • Falls   • Restless legs syndrome   • Chronic back pain   • Mood disorder (HCC)   • GERD (gastroesophageal reflux disease)     This patient is a 78 y.o. female admitted for acute inpatient rehabilitation with Debility.    I led and attended the weekly conference, and agree with the IDT conference documentation and plan of care as noted below.    Date of conference: 4/3/2019    Goals:    1) ambulate 100 ft with FWW min assist  2) curb with min assist and FWW  3) SBA bathing  4) SBA US dressing  5) use external memory aides  6) improved attention    Met 0/4 PT goals, met 0/7 OT goals, met 0/3 goals - unable to meet goals due to her hyperverbosity/decreased attention to task    Barriers:    1) poor attention  2) hard of hearing  3) impaired carryover of learning  4) hyper verbosity  5) poor insight/denial of deficits  6) moderate to severe cognitive deficits    Admission FIM 63 --> 76 (4/3)    CM/social support: Plan to discharge home to supportive care of spouse and family. Patient lives with her  in [facility], with 1 step to enter, in [facility]. Supportive daughter lives in [place]. Patient reports she has two grandchildren who are also very supportive.     Anticipated DC date: 4/11/2019    Home health: PT/OT/SLP/RN    Equip: TBD    Follow up: PCP      Medical Decision Making and Plan:    Debility  Cognitive  Deficits, moderate to severe, improving  Hyper verbose, impaired attention, improving  Continue full rehab program  PT/OT/SLP, 1 hr each discipline, 5 days per week  Need to have  come in for family training prior to d/c as therapy reports she will need close SBA to CGA    Chronic back pain, stable  PRN tylenol  Previously on baclofen which caused altered mental status     Headache  S/p right posterior occipital nerve block with Dr. Tapia on 4/7    Frequent falls  Poor balance  Therapy evaluation for LRD  Per therapy, due to poor attention  Will continue to need SBA to CGA from her  at discharge     Peripheral neuropathy  Continue Cymbalta     Hypertension  Orthostatic hypotension  Continue propranolol --> dose decreased to 120 mg   SBPs in last 24 hrs 119-161  Continue to monitor     History of depression  Continue Cymbalta     GERD  Continue Prilosec     Restless legs syndrome  Continue Requip    Hypokalemia, resolved  Discontinued supplementation    Bowel  Continue bowel meds  Last BM 4/9    DVT prophylaxis  Lovenox    Total time:  17 minutes.  I spent greater than 50% of the time for patient care, counseling, and coordination on this date, including patient face-to face time, unit/floor time with review of records/pertinent lab data and studies, as well as discussing diagnostic evaluation/work up, planned therapeutic interventions, and future disposition of care, as per the interval events/subjective and the assessment and plan as noted above.    I have performed a physical exam, reviewed and updated ROS, as well as the assessment and plan today 4/9/2019. In review of note from 4/8/2019 there are no new changes except as documented above.      Marleen Fontaine M.D.   Physical Medicine and Rehabilitation

## 2019-04-10 PROCEDURE — 700111 HCHG RX REV CODE 636 W/ 250 OVERRIDE (IP): Performed by: PHYSICAL MEDICINE & REHABILITATION

## 2019-04-10 PROCEDURE — 700102 HCHG RX REV CODE 250 W/ 637 OVERRIDE(OP): Performed by: PHYSICAL MEDICINE & REHABILITATION

## 2019-04-10 PROCEDURE — 770010 HCHG ROOM/CARE - REHAB SEMI PRIVAT*

## 2019-04-10 PROCEDURE — 97535 SELF CARE MNGMENT TRAINING: CPT

## 2019-04-10 PROCEDURE — 97530 THERAPEUTIC ACTIVITIES: CPT

## 2019-04-10 PROCEDURE — 97110 THERAPEUTIC EXERCISES: CPT

## 2019-04-10 PROCEDURE — G0515 COGNITIVE SKILLS DEVELOPMENT: HCPCS

## 2019-04-10 PROCEDURE — 99233 SBSQ HOSP IP/OBS HIGH 50: CPT | Performed by: PHYSICAL MEDICINE & REHABILITATION

## 2019-04-10 PROCEDURE — A9270 NON-COVERED ITEM OR SERVICE: HCPCS | Performed by: PHYSICAL MEDICINE & REHABILITATION

## 2019-04-10 RX ADMIN — ACETAMINOPHEN 650 MG: 325 TABLET, FILM COATED ORAL at 13:20

## 2019-04-10 RX ADMIN — OMEPRAZOLE 40 MG: 20 CAPSULE, DELAYED RELEASE ORAL at 08:44

## 2019-04-10 RX ADMIN — DULOXETINE 60 MG: 30 CAPSULE, DELAYED RELEASE ORAL at 08:44

## 2019-04-10 RX ADMIN — PRAMIPEXOLE DIHYDROCHLORIDE 0.5 MG: 0.25 TABLET ORAL at 20:22

## 2019-04-10 RX ADMIN — VITAMIN D, TAB 1000IU (100/BT) 1000 UNITS: 25 TAB at 08:44

## 2019-04-10 RX ADMIN — PROPRANOLOL HYDROCHLORIDE 120 MG: 60 CAPSULE, EXTENDED RELEASE ORAL at 06:09

## 2019-04-10 NOTE — CARE PLAN
Problem: Bathing  Goal: STG-Within one week, patient will bathe  1) Individualized Goal:  UB/LB at a level of SBA.   2) Interventions:  OT Self Care/ADL, OT Neuro Re-Ed/Balance, OT Therapeutic Activity, OT Aquatic Therapy, OT Evaluation and OT Therapeutic Exercise     Outcome: NOT MET  Min A    Problem: Dressing  Goal: STG-Within one week, patient will dress UB  1) Individualized Goal:  At a level of SBA.   2) Interventions:  OT Self Care/ADL, OT Neuro Re-Ed/Balance, OT Therapeutic Activity, OT Evaluation and OT Therapeutic Exercise     Outcome: MET Date Met: 04/10/19    Goal: STG-Within one week, patient will dress LB  1) Individualized Goal:  At a level of SBA w/ AE as needed.   2) Interventions:  OT Neuro Re-Ed/Balance, OT Therapeutic Activity, OT Evaluation and OT Therapeutic Exercise     Outcome: NOT MET  Min A    Problem: Toileting  Goal: STG-Within one week, patient will complete toileting tasks  1) Individualized Goal:  At a level of SBA.   2) Interventions:  OT Self Care/ADL, OT Neuro Re-Ed/Balance, OT Therapeutic Activity, OT Evaluation and OT Therapeutic Exercise     Outcome: NOT MET  CGA     Problem: Functional Transfers  Goal: STG-Within one week, patient will transfer to tub/shower  1) Individualized Goal:  At a level of SBA.   2) Interventions:  OT Self Care/ADL, OT Cognitive Skill Dev, OT Neuro Re-Ed/Balance, OT Therapeutic Activity, OT Evaluation and OT Therapeutic Exercise     Outcome: NOT MET  CGA    Problem: IADL's  Goal: STG-Within one week, patient will prepare a meal  1) Individualized Goal:  At a level of SBA.   2) Interventions:  OT Self Care/ADL, OT Manual Ther Technique, OT Neuro Re-Ed/Balance, OT Therapeutic Activity, OT Aquatic Therapy and OT Evaluation     Outcome: NOT MET  Min - Mod A  Goal: STG-Within one week, patient will access kitchen area  1) Individualized Goal:  At a level of SBA with occasional cueing for safety.   2) Interventions:  OT Self Care/ADL, OT Community  Reintegration, OT Neuro Re-Ed/Balance, OT Therapeutic Activity, OT Evaluation and OT Therapeutic Exercise     Outcome: NOT MET  CGA

## 2019-04-10 NOTE — REHAB-SLP IDT TEAM NOTE
Speech Therapy   Cognitive Linquistic Functions  Comprehension Initial:  4 - Minimal Assistance  Comprehension Current:  4 - Minimal Assistance   Comprehension Description:  Hearing aids/amplifiers, Verbal cues  Expression Initial:  5 - Stand-by Prompting/Supervision or Set-up  Expression Current:  5 - Stand-by Prompting/Supervision or Set-up   Expression Description:  Verbal cueing  Social Interaction Initial:  4 - Minimal Assistance  Social Interaction Current:  4 - Minimal Assistance   Social Interaction Description:  Increased time, Verbal cues  Problem Solving Initial:  4 - Minimal Assistance  Problem Solving Current:  4 - Minimal Assistance   Problem Solving Description:  Verbal cueing, Therapy schedule, Bed/chair alarm, Increased time  Memory Initial:  3 - Moderate Assistance  Memory Current:  3 - Moderate Assistance   Memory Description:  Therapy schedule, Verbal cueing, Bed/chair alarm  Executive Functioning / Organization Initial:  To Be Assessed  Executive Functioning / Organization Current:  Moderate (3)   Executive Functioning Desciption: attention, planning and organization skills.   Swallowing  Swallowing Status:  SLP not following for dysphagia management   Orders Placed This Encounter   Procedures   • Diet Order Regular     Standing Status:   Standing     Number of Occurrences:   1     Order Specific Question:   Diet:     Answer:   Regular [1]     Behavior Modification Plan  Allow for rest time, Keep instructions simple/concrete, Give clear feedback, Set clear goals, Be calm, Redirect to task/topic, Provide reasonable choices, Decrease the chance of failure by offering activities that are within the patient's abilities, Analyze tasks (break down into smaller steps) and Reinforce participation in desired tasks  Assistive Technology  Low/Impaired vision equipment and Low tech: Calendar, planner, schedule, alarms/timers, pill organizer, post-it notes, lists  Family Training/Education:  Ongoing with pt  and   DC Recommendations: TBD  Strengths:  Effective communication skills, Motivated for self care and independence, Pleasant and cooperative and Willingly participates in therapeutic activities  Barriers:  Other: hyperverbose, [place], decreased attention, mild-moderate cognitive deficits.   # of short term goals set=3  # of short term goals met=2       Speech Therapy Problems           Problem: Memory STGs     Dates: Start: 03/30/19       Goal: STG-Within one week, patient will     Dates: Start: 03/30/19       Description: 1) Individualized goal:  Utilize external memory aides (e.g., therapy journal) to recall novel information r/t rehabilitation with 80% accuracy and MOD A.   2) Interventions:  SLP Self Care / ADL Training , SLP Cognitive Skill Development and SLP Group Treatment       Note:     Goal Note filed on 04/02/19 1604 by Rodrigo Crawley, Student    Goal: STG-Within one week, patient will  Outcome: NOT MET  Pt's presents with severe attention, memory, planning and organizational   deficits. Pt grossly unable to follow cognitively challenging directions.                  Problem: Speech/Swallowing LTGs     Dates: Start: 03/30/19       Goal: LTG-By discharge, patient will     Dates: Start: 03/30/19       Description: 1) Individualized goal:  Utilize attention and memory strategies w/ MOD I ion 80% of opportunities for a safe D/C to prior level of function with supportive care giver.   2) Interventions:  SLP Self Care / ADL Training , SLP Cognitive Skill Development and SLP Group Treatment                    Section completed by:  Kasandra Marin MS,CCC-SLP

## 2019-04-10 NOTE — CARE PLAN
Problem: Safety  Goal: Will remain free from falls    Intervention: Implement fall precautions  Appropriately uses call light to make needs known.Bed in low position, non skid socks/shoes on when out of bed.Call light within reach.Will continue to monitor and assess needs and safety.      Problem: Pain Management  Goal: Pain level will decrease to patient's comfort goal    Intervention: Follow pain managment plan developed in collaboration with patient and Interdisciplinary Team  Medicated per request for pain.Repositioned with pillows for comfort.Will continue to monitor and assess pain level and medicate as needed.

## 2019-04-10 NOTE — CARE PLAN
Problem: Problem Solving STGs  Goal: STG-Within one week, patient will solve basic problems  1) Individualized goal:  Related to health and safety with MOD assistance and 80% accuracy.   2) Interventions:  SLP Self Care / ADL Training , SLP Cognitive Skill Development and SLP Group Treatment     Outcome: MET Date Met: 04/10/19    Goal: STG-Within one week, patient will  1) Individualized goal:  Complete sustained attention tasks w/ 80% accuracy and MOD A.   2) Interventions:  SLP Self Care / ADL Training , SLP Cognitive Skill Development and SLP Group Treatment     Outcome: MET Date Met: 04/10/19      Problem: Memory STGs  Goal: STG-Within one week, patient will  1) Individualized goal:  Utilize external memory aides (e.g., therapy journal) to recall novel information r/t rehabilitation with 80% accuracy and MOD A.   2) Interventions:  SLP Self Care / ADL Training , SLP Cognitive Skill Development and SLP Group Treatment     Outcome: NOT MET

## 2019-04-10 NOTE — THERAPY
"Occupational Therapy  Daily Treatment     Patient Name: Lindsay Vargas  Age:  78 y.o., Sex:  female  Medical Record #: [id number]  Today's Date: 4/10/2019     Precautions  Precautions: Fall Risk, Other (See Comments)  Comments: [place], poor attention, hyperverbose    Safety   ADL Safety : Requires Supervision for Safety, Impaired Insight into Safety, Requires Cueing for Safety  Bathroom Safety: Requires Supervision for Safety, Impaired Insight into Safety, Requires Cuing for Safety  Comments: pt requires close SBA - CGA with standing and functional mobility using FWW (see FIMs). Mod cues for sequencing sit<>stand hand/feet placement and FWW safety techniques. Pt completed dry step-in shower transfer using GB's to shower chair at FWW level. Pt requires CGA and cues for safety. Pt reports that they plan on installing grab bars in shower with shower chair, however shower chair may not be installed by d/c. Therefore I highly recommended to pt and emphasized to pt the importance of grab bars d/t poor standing balance and to await for  therapists and take sink baths for the time being - pt receptive to education and in agreement    Subjective    \"I'll make sure my  is there with me\"     Objective     04/10/19 0701   Safety    ADL Safety  Requires Supervision for Safety;Impaired Insight into Safety;Requires Cueing for Safety   Bathroom Safety Requires Supervision for Safety;Impaired Insight into Safety;Requires Cuing for Safety   Comments pt requires close SBA - CGA with standing and functional mobility using FWW (see FIMs). Mod cues for sequencing sit<>stand hand/feet placement and FWW safety techniques. Pt completed dry step-in shower transfer using GB's to shower chair at FWW level. Pt requires CGA and cues for safety. Pt reports that they plan on installing grab bars in shower with shower chair, however shower chair may not be installed by d/c. Therefore I highly recommended to pt and emphasized to pt the " importance of grab bars d/t poor standing balance and to await for HH therapists and take sink baths for the time being - pt receptive to education and in agreement   Interdisciplinary Plan of Care Collaboration   Patient Position at End of Therapy Seated  (in dining room)   OT Total Time Spent   OT Individual Total Time Spent (Mins) 60   OT Charge Group   OT Self Care / ADL 4          FIM Grooming Score:  5 - Standby Prompting/Supervision or Set-up (in standing at sink with FWW)  Grooming Description:       FIM Upper Body Dressin - Standby Prompting/Supervision or Set-up  Upper Body Dressing Description:       FIM Lower Body Dressing Score:  4 - Minimal Assistance  Lower Body Dressing Description:   (assist with donning shoes - pt will benefit from shoe horn)    FIM Toileting Body Dressin - Minimal Assistance  Toileting Description:  Grab bar, Increased time, Supervision for safety, Verbal cueing, Set-up of equipment (with FWW)     FIM Toilet Transfer Score:  4 - Minimal Assistance  Toilet Transfer Description:  Grab bar, Increased time, Supervision for safety, Verbal cueing, Set-up of equipment (with FWW)    Assessment    Pt completed ADL tasks at FWW level including step-in shower transfer and requires SBA - CGA with Mod cues for safety    Plan    D/c tomorrow; FWW mobility

## 2019-04-10 NOTE — REHAB-NURSING IDT TEAM NOTE
Nursing   Nursing  Diet/Nutrition:  Regular and Thin Liquids  Medication Administration:  Crush all Medications in Puree  % consumed at meals in last 24 hours:  Consumed 50%-75*% of meals per documentation.  Meal/Snack Consumption for the past 24 hrs:   Oral Nutrition   04/09/19 1812 Dinner;Between 50-75% Consumed     Snack schedule:  None  Appetite:  Fair  Fluid Intake/Output in past 24 hours: In: 240 [P.O.:240]  Out: -   Admit Weight:  Weight: 68.3 kg (150 lb 9.2 oz)  Weight Last 7 Days   [69.9 kg (154 lb 1.6 oz)] 69.9 kg (154 lb 1.6 oz) (04/07 1600)    Pain Issues:    Location:  Neck          Severity:  Moderate   Scheduled pain medications:  None     PRN pain medications used in last 24 hours:  acetaminophen (TYLENOL)    Non Pharmacologic Interventions:  emotional support, repositioned and rest  Effectiveness of pain management plan:  good=patient states acceptable comfort after interventions    Bowel:    Bowel Assist Initial Score:  3 - Moderate Assistance  Bowel Assist Current Score:  4 - Minimal Assistance  Bowl Accidents in last 7 days:  0  Last bowel movement: 04/09/19  Stool Description: Large, Soft     Usual bowel pattern:  every other day  Scheduled bowel medications:  senna-docusate (PERICOLACE or SENOKOT S)   PRN bowel medications used in last 24 hours:  None  Nursing Interventions:  Increased time, Scheduled medication, Supervision, Positioning on commode/toilet  Effectiveness of bowel program:   fair=sometimes needs prn bowel meds for constipation  Bladder:    Bladder Assist Initial Score:  3 - Moderate Assistance  Bladder Assist Current Score:  4 - Minimal Assistance  Bladder Accidents in last 7 days:  0  PVR range for past 24-48 hours: -- ()  Medications affecting bladder:  None    Interventions:  Increased time, Supervision  Effectiveness of bladder training:  Good=regular, predictable, emptying of bladder, patient initiates time voiding    Sleep/wake cycle:   Average hours slept:  Sleeps 4-6  hours without waking  Sleep medication usage:  None    Patient/Family Training/Education:  [place] Precautions, Bladder Management/Training, Bowel Management/Training, Diet/Nutrition, Fall Prevention, General Self Care, Medication Management, Pain Management, Respiratory Hygiene, Safety and Skin Care    Strengths: Alert and oriented, Willingly participates in therapeutic activities, Able to follow instructions and Supportive family   Barriers:   Fatigue, Generalized weakness and Limited mobility            Nursing Problems           Problem: Bowel/Gastric:     Goal: Normal bowel function is maintained or improved           Goal: Will not experience complications related to bowel motility             Problem: Communication     Goal: The ability to communicate needs accurately and effectively will improve             Problem: Discharge Barriers/Planning     Goal: Patient's continuum of care needs will be met             Problem: Infection     Goal: Will remain free from infection             Problem: Knowledge Deficit     Goal: Knowledge of disease process/condition, treatment plan, diagnostic tests, and medications will improve           Goal: Knowledge of the prescribed therapeutic regimen will improve             Problem: Mobility     Goal: Risk for activity intolerance will decrease             Problem: Pain Management     Goal: Pain level will decrease to patient's comfort goal     Flowsheet:     Taken at 04/07/19 0930    Pain Rating Scale (NPRS) 3 - Sometimes distracts me by Isela Vargas, L.P.N.    Comfort Goal 0;Comfort at Rest;Comfort with Movement;Sleep Comfortably;Stay Alert by Isela Vargas, L.P.N.    Taken at 04/07/19 0819    Comfort Goal 0;Comfort at Rest;Comfort with Movement;Stay Alert;Perform Activity by Isela Vargas, L.P.N.    Pain Rating Scale (NPRS) 6 - Hard to ignore, avoid usual activities by Isela Vargas, L.P.N.                  Problem: Respiratory:     Goal:  Respiratory status will improve             Problem: Safety     Goal: Will remain free from injury           Goal: Will remain free from falls             Problem: Skin Integrity     Goal: Risk for impaired skin integrity will decrease             Problem: Venous Thromboembolism (VTW)/Deep Vein Thrombosis (DVT) Prevention:     Goal: Patient will participate in Venous Thrombosis (VTE)/Deep Vein Thrombosis (DVT)Prevention Measures     Flowsheet:     Taken at 04/01/19 1130    Pharmacologic Prophylaxis Used LMWH: Enoxaparin(Lovenox) by Fe Acosta R.N.                       Long Term Goals:   At discharge patient will be able to function safely at home and in the community with support.    Section completed by:  Linda Crews R.N.       Read and reviewed by Jovana Graff RN

## 2019-04-10 NOTE — DISCHARGE PLANNING
Case Management Discharge Instructions for Lindsay Vargas  Discharge Date:  Thursday 4/11/2019        · Discharge Location: Home with spouse and home health follow up.      Agency Name [facility] Phone[facility] .  [facility] for the following:  Home Health: Registered Nurse, Occupational Therapist, Physical Therapist, Speech Therapist      · Durable Medical Equipment:  None ordered patient has all required equipment at home to inlcude front wheeled walker.         Follow-up Information:  · L[facility]ENTERIA M.D. [facility].   [id number]  [facility] [id number] [phone]   Please call to schedule follow up appointment with Dr. Darby within 2 weeks from discharge as his office is unable to schedule follow up visits until he reviews medical records from hospital stay.

## 2019-04-10 NOTE — REHAB-PT IDT TEAM NOTE
"Physical Therapy   Mobility  Bed mobility:   Mod I on std bed  Bed /Chair/Wheelchair Transfer Initial:  2 - Max Assistance  Bed /Chair/Wheelchair Transfer Current:  5 - Standby Prompting/Supervision or Set-up   Bed/Chair/Wheelchair Transfer Description:   (bed mob on std queen bed Mod I, STS to FWW SBA)  Walk Initial:  2 - Max Assistance  Walk Current:  4 - Minimal Assistance   Walk Description:   (pt amb x390' with FWW and intermittnet CGA for balance to SBA , decreased ranjith/gait speed, +trendelenberg, fwd lean on FWW)  Wheelchair Initial:  5 - Standby Prompting/Supervision or Set-up  Wheelchair Current:  5 - Standby Prompting/Supervision or Set-up   Wheelchair Description:  Extra time, Supervision for safety, Verbal cueing (150 ft using UEs, excessive time to complete, SBA)  Stairs Initial:  0 - Not tested,unsafe activity  Stairs Current: 4 - Minimal Assistance   Stairs Description:  (pt ascended/descended 2x6 adaptive steps with B HRs, reciprocal pattern to ascend  and step-to pattern to descend, CGA)  Patient/Family Training/Education:  Safety with transfers+mobility including hand placement/wc brake management/FWW use. FT has not yet been completed.  DME/DC Recommendations:  HH vs outpatient PT, FWW, 24 hr SPV per spouse. FT with spouse prior to dc  Strengths:  Alert and oriented, Making steady progress towards goals, Motivated for self care and independence, Pleasant and cooperative, Supportive family and Willingly participates in therapeutic activities  Barriers:   Other: inconsistency with mobility, imparied standing balance, decreased endurance, impaired safety awareness, verbose/impaired attention  # of short term goals set= 3 current STGs  # of short term goals met=1/4 met       Physical Therapy Problems           Problem: Mobility     Dates: Start: 03/30/19       Goal: STG-Within one week, patient will ambulate up/down a curb     Dates: Start: 03/30/19       Description: 1) Individualized goal:  4\" " curb/ step with FWW and min A  2) Interventions: PT Group Therapy, PT E Stim Attended, PT Gait Training, PT Therapeutic Exercises, PT TENS Application, PT Neuro Re-Ed/Balance, PT Aquatic Therapy, PT Therapeutic Activity and PT Manual Therapy       Note:     Goal Note filed on 04/10/19 0820 by Darcy Méndez DPT    Goal: STG-Within one week, patient will ambulate up/down a curb  Outcome: NOT MET  TBA                  Problem: Mobility Transfers     Dates: Start: 03/30/19       Goal: STG-Within one week, patient will sit to stand     Dates: Start: 03/30/19       Description: 1) Individualized goal:  SBA with FWW  2) Interventions: PT Group Therapy, PT E Stim Attended, PT Gait Training, PT Therapeutic Exercises, PT TENS Application, PT Neuro Re-Ed/Balance, PT Aquatic Therapy, PT Therapeutic Activity and PT Manual Therapy       Note:     Goal Note filed on 04/10/19 0820 by Darcy Méndez DPT    Goal: STG-Within one week, patient will sit to stand  Outcome: NOT MET  CGA to SBA, requires intermittent steadying assist                Goal: STG-Within one week, patient will transfer bed to chair     Dates: Start: 03/30/19       Description: 1) Individualized goal:  SBA without AD  2) Interventions: PT Group Therapy, PT E Stim Attended, PT Gait Training, PT Therapeutic Exercises, PT TENS Application, PT Neuro Re-Ed/Balance, PT Aquatic Therapy, PT Therapeutic Activity and PT Manual Therapy     Note:     Goal Note filed on 04/10/19 0820 by Darcy Méndez DPT    Goal: STG-Within one week, patient will transfer bed to chair  Outcome: NOT MET  CGA with FWW                  Problem: PT-Long Term Goals     Dates: Start: 03/30/19       Goal: LTG-By discharge, patient will ambulate     Dates: Start: 03/30/19       Description: 1) Individualized goal:  150 ft with LRAD, spv  2) Interventions: PT Group Therapy, PT E Stim Attended, PT Gait Training, PT Therapeutic Exercises, PT TENS Application, PT Neuro Re-Ed/Balance, PT  "Aquatic Therapy, PT Therapeutic Activity and PT Manual Therapy             Goal: LTG-By discharge, patient will transfer one surface to another     Dates: Start: 03/30/19       Description: 1) Individualized goal:  Mod I without AD  2) Interventions: PT Group Therapy, PT E Stim Attended, PT Gait Training, PT Therapeutic Exercises, PT TENS Application, PT Neuro Re-Ed/Balance, PT Aquatic Therapy, PT Therapeutic Activity and PT Manual Therapy             Goal: LTG-By discharge, patient will perform home exercise program     Dates: Start: 03/30/19       Description: 1) Individualized goal:  spv with seated LE there-ex HEP  2) Interventions: PT Group Therapy, PT E Stim Attended, PT Gait Training, PT Therapeutic Exercises, PT TENS Application, PT Neuro Re-Ed/Balance, PT Aquatic Therapy, PT Therapeutic Activity and PT Manual Therapy             Goal: LTG-By discharge, patient will ambulate up/down 4-6 stairs     Dates: Start: 03/30/19       Description: 1) Individualized goal:  6\" curb with FWW and SBA, 6 4\" steps with 2 HRs, SBA  2) Interventions: PT Group Therapy, PT E Stim Attended, PT Gait Training, PT Therapeutic Exercises, PT TENS Application, PT Neuro Re-Ed/Balance, PT Aquatic Therapy, PT Therapeutic Activity and PT Manual Therapy                     Section completed by:  Darcy Méndez DPT     "

## 2019-04-10 NOTE — PROGRESS NOTES
"Rehab Progress Note     Date of Service: 4/10/2019  Chief Complaint: follow up debility    Interval Events (Subjective)    Patient seen and examined in the therapy gym today.  Her  Abdulaziz is present.  They are doing family training with physical and occupational therapy.  Patient does feel like she will be ready for discharge home tomorrow.  She has no new complaints.    Objective:  VITAL SIGNS: /73   Pulse 72   Temp 36.4 °C (97.5 °F) (Temporal)   Resp 18   Ht 1.702 m (5' 7\")   Wt 69.9 kg (154 lb 1.6 oz)   SpO2 92%   Breastfeeding? No   BMI 24.14 kg/m²   Gen: alert, no apparent distress  CV: regular rate and rhythm, no murmurs, no peripheral edema  Resp: clear to ascultation bilaterally, normal respiratory effort  GI: soft, non-tender abdomen, bowel sounds present  Neuro: notable for verbose    No results found for this or any previous visit (from the past 72 hour(s)).    Current Facility-Administered Medications   Medication Frequency   • propranolol LA (INDERAL LA) capsule 120 mg Q DAY   • vitamin D (cholecalciferol) tablet 1,000 Units DAILY   • artificial tears 1.4 % ophthalmic solution 1 Drop PRN   • benzocaine-menthol (CEPACOL) lozenge 1 Lozenge Q2HRS PRN   • enoxaparin (LOVENOX) inj 40 mg QHS   • mag hydrox-al hydrox-simeth (MAALOX PLUS ES or MYLANTA DS) suspension 20 mL Q2HRS PRN   • ondansetron (ZOFRAN ODT) dispertab 4 mg 4X/DAY PRN    Or   • ondansetron (ZOFRAN) syringe/vial injection 4 mg 4X/DAY PRN   • sodium chloride (OCEAN) 0.65 % nasal spray 2 Spray PRN   • traZODone (DESYREL) tablet 25 mg QHS PRN   • Pharmacy Consult Request ...Pain Management Review 1 Each PHARMACY TO DOSE   • Respiratory Care per Protocol Continuous RT   • melatonin tablet 3 mg HS PRN   • senna-docusate (PERICOLACE or SENOKOT S) 8.6-50 MG per tablet 2 Tab BID    And   • polyethylene glycol/lytes (MIRALAX) PACKET 1 Packet QDAY PRN    And   • magnesium hydroxide (MILK OF MAGNESIA) suspension 30 mL QDAY PRN    And "   • bisacodyl (DULCOLAX) suppository 10 mg QDAY PRN   • acetaminophen (TYLENOL) tablet 650 mg Q6HRS PRN   • DULoxetine (CYMBALTA) capsule 60 mg DAILY   • omeprazole (PRILOSEC) capsule 40 mg DAILY   • pramipexole (MIRAPEX) tablet 0.5 mg QHS       Orders Placed This Encounter   Procedures   • Diet Order Regular     Standing Status:   Standing     Number of Occurrences:   1     Order Specific Question:   Diet:     Answer:   Regular [1]       Assessment:  Active Hospital Problems    Diagnosis   • *Debility   • Falls   • Restless legs syndrome   • Chronic back pain   • Mood disorder (HCC)   • GERD (gastroesophageal reflux disease)     This patient is a 78 y.o. female admitted for acute inpatient rehabilitation with Debility.    I led and attended the weekly conference, and agree with the IDT conference documentation and plan of care as noted below.    Date of conference: 4/10/2019    Goals:    1) min assist curb with FWW  2) SBA transfers  3) SBA bathing  4) SBA LB dressing  5) use memory strategies with 80% accuracy and mod assist    Met 1/4 PT goals, met 1/7 OT goals, met 2/3 SLP goals    Barriers:    1) inconsistency with mobility  2) impaired standing balance  3) decreased endurance  4) impaired safety awareness  5) verbose/impaired attention  6) poor activity tolerance  7) impaired cognition - problem solving, attention, memory  8) hard of hearing  9) generalized weakness  10) brushed meds    Therapy update 4/10/2019  Modified Independent eating  Supervision grooming  Min assist bathing  Supervision upper body dressing  Min assist lower body dressing  Min assist toileting  Min assistbladder  Min assist bowel  Supervision bed/chair transfer  Min assist toilet transfer  Min assist tub/shower transfer  Supervision ambulation  Supervision wheelchair propulsion  Supervision stairs  Min assist comprehension  Supervision expression  Min assist social interaction  Min assist problem solving  Mod assist memory    Goals from  last week's conference:    1) ambulate 100 ft with FWW min assist - met  2) curb with min assist and FWW - met  3) SBA bathing - ongoing  4) SBA US dressing - ongoing  5) use external memory aides - ongoing  6) improved attention - ongoing    Admission FIM 63 --> 76 (4/3)    CM/social support: Return home w/ spouse single story home with 1 step to enter + walk in shower in [place]. Supportive daughter lives in [place]; supportive grandchildren. Pt has a wc, spc, FWW, 4WW, and grab bars by toilet. Spouse to install grab bars in the shower    Anticipated DC date: 4/11/2019    Home health: PT/OT/SLP/RN    Equip: TBD    Follow up: PCP      Medical Decision Making and Plan:    Debility  Cognitive Deficits, moderate to severe, improving  Hyper verbose, impaired attention, improving  Continue full rehab program  PT/OT/SLP, 1 hr each discipline, 5 days per week  Need to have  come in for family training prior to d/c as therapy reports she will need close SBA to CGA    Chronic back pain, stable  PRN tylenol  Previously on baclofen which caused altered mental status     Headache  S/p right posterior occipital nerve block with Dr. Tapia on 4/7    Frequent falls  Poor balance  Therapy evaluation for LRD  Per therapy, due to poor attention  Will continue to need SBA to CGA from her  at discharge     Peripheral neuropathy  Continue Cymbalta     Hypertension  Orthostatic hypotension  Continue propranolol --> dose decreased to 120 mg   SBPs in last 24 hrs 117-145  Continue to monitor     History of depression  Continue Cymbalta     GERD  Continue Prilosec     Restless legs syndrome  Continue Requip    Hypokalemia, resolved  Discontinued supplementation    Bowel  Continue bowel meds  Last BM 4/10    DVT prophylaxis  Lovenox    Total time:  40 minutes.  I spent greater than 50% of the time for patient care, counseling, and coordination on this date, including patient face-to face time, unit/floor time with review of  records/pertinent lab data and studies, as well as discussing diagnostic evaluation/work up, planned therapeutic interventions, and future disposition of care, as per the interval events/subjective and the assessment and plan as noted above.        Marleen Fontaine M.D.   Physical Medicine and Rehabilitation

## 2019-04-10 NOTE — CARE PLAN
"Problem: Mobility  Goal: STG-Within one week, patient will ambulate household distance  1) Individualized goal:  100 ft with FWW, min A   2) Interventions:  PT Group Therapy, PT E Stim Attended, PT Gait Training, PT Therapeutic Exercises, PT TENS Application, PT Neuro Re-Ed/Balance, PT Aquatic Therapy, PT Therapeutic Activity and PT Manual Therapy     Outcome: MET Date Met: 04/10/19    Goal: STG-Within one week, patient will ambulate up/down a curb  1) Individualized goal:  4\" curb/ step with FWW and min A  2) Interventions: PT Group Therapy, PT E Stim Attended, PT Gait Training, PT Therapeutic Exercises, PT TENS Application, PT Neuro Re-Ed/Balance, PT Aquatic Therapy, PT Therapeutic Activity and PT Manual Therapy     Outcome: NOT MET  TBA    Problem: Mobility Transfers  Goal: STG-Within one week, patient will sit to stand  1) Individualized goal:  SBA with FWW  2) Interventions: PT Group Therapy, PT E Stim Attended, PT Gait Training, PT Therapeutic Exercises, PT TENS Application, PT Neuro Re-Ed/Balance, PT Aquatic Therapy, PT Therapeutic Activity and PT Manual Therapy     Outcome: NOT MET  CGA to SBA, requires intermittent steadying assist  Goal: STG-Within one week, patient will transfer bed to chair  1) Individualized goal:  SBA without AD  2) Interventions: PT Group Therapy, PT E Stim Attended, PT Gait Training, PT Therapeutic Exercises, PT TENS Application, PT Neuro Re-Ed/Balance, PT Aquatic Therapy, PT Therapeutic Activity and PT Manual Therapy   Outcome: NOT MET  CGA with FWW      "

## 2019-04-10 NOTE — CARE PLAN
Problem: Safety  Goal: Will remain free from injury  Patient uses call light appropriately. Bed locked and in the lowest position. Non skid socks in place. Hourly rounding in place.     Problem: Pain Management  Goal: Pain level will decrease to patient's comfort goal  Pt. c/o neck pain. Hot pack and Tylenol was given.

## 2019-04-10 NOTE — REHAB-COLLABORATIVE ONGOING IDT TEAM NOTE
Weekly Interdisciplinary Team Conference Note    Weekly Interdisciplinary Team Conference # 2  Date:  4/10/2019    Clinicians present and reporting at team conference include the following:   MD: Marleen Fontaine MD   RN:  Jovana Graff RN    PT:   Darcy Méndez, PT, DPT  OT:  Aruna Frye, MS, OTR/L   ST:  Kasandra Marin, MS, CCC-SLP  CM:  Darcy Portillo, MARCELINAW, AYANA  REC:  Not Applicable  RT:  None  RPh:  Renan [facility]  Other:   None  All reporting clinicians have a working knowledge of this patient's plan of care.    Targeted DC Date:  4/11/2019     Medical    Patient Active Problem List    Diagnosis Date Noted   • Falls 03/26/2019     Priority: High   • Right ear pain 04/07/2019   • Rash 04/07/2019   • Debility 03/29/2019   • Restless legs syndrome 03/29/2019   • Chronic back pain 03/29/2019   • GERD (gastroesophageal reflux disease) 03/26/2019   • Mood disorder ([facility]) 03/26/2019   • Hypokalemia 03/18/2019   • Hyponatremia 03/18/2019   • Dehydration 03/18/2019   • SIRS (systemic inflammatory response syndrome) (Former[facility]) 03/18/2019   • Acute metabolic encephalopathy 03/18/2019   • NSTEMI (non-ST elevated myocardial infarction) (Former[facility]) 03/18/2019   • Syncope 03/18/2019   • Palpitations 12/26/2018   • Localized edema 12/26/2018   • Lumbar radiculitis 09/29/2016   • 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 **           Nursing  Diet/Nutrition:  Regular and Thin Liquids  Medication Administration:  Crush all Medications in Puree  % consumed at meals in last 24 hours:  Consumed 50%-75*% of meals per documentation.  Meal/Snack Consumption for the past 24 hrs:   Oral Nutrition   04/09/19 1812 Dinner;Between 50-75% Consumed     Snack schedule:  None  Appetite:  Fair  Fluid Intake/Output in past 24 hours: In: 240 [P.O.:240]  Out: -   Admit Weight:  Weight: 68.3 kg (150 lb 9.2 oz)  Weight Last 7  Days   [69.9 kg (154 lb 1.6 oz)] 69.9 kg (154 lb 1.6 oz) (04/07 1600)    Pain Issues:    Location:  Neck          Severity:  Moderate   Scheduled pain medications:  None     PRN pain medications used in last 24 hours:  acetaminophen (TYLENOL)    Non Pharmacologic Interventions:  emotional support, repositioned and rest  Effectiveness of pain management plan:  good=patient states acceptable comfort after interventions    Bowel:    Bowel Assist Initial Score:  3 - Moderate Assistance  Bowel Assist Current Score:  4 - Minimal Assistance  Bowl Accidents in last 7 days:  0  Last bowel movement: 04/09/19  Stool Description: Large, Soft     Usual bowel pattern:  every other day  Scheduled bowel medications:  senna-docusate (PERICOLACE or SENOKOT S)   PRN bowel medications used in last 24 hours:  None  Nursing Interventions:  Increased time, Scheduled medication, Supervision, Positioning on commode/toilet  Effectiveness of bowel program:   fair=sometimes needs prn bowel meds for constipation  Bladder:    Bladder Assist Initial Score:  3 - Moderate Assistance  Bladder Assist Current Score:  4 - Minimal Assistance  Bladder Accidents in last 7 days:  0  PVR range for past 24-48 hours: -- ()  Medications affecting bladder:  None    Interventions:  Increased time, Supervision  Effectiveness of bladder training:  Good=regular, predictable, emptying of bladder, patient initiates time voiding    Sleep/wake cycle:   Average hours slept:  Sleeps 4-6 hours without waking  Sleep medication usage:  None    Patient/Family Training/Education:  Aspiration Precautions, Bladder Management/Training, Bowel Management/Training, Diet/Nutrition, Fall Prevention, General Self Care, Medication Management, Pain Management, Respiratory Hygiene, Safety and Skin Care    Strengths: Alert and oriented, Willingly participates in therapeutic activities, Able to follow instructions and Supportive family   Barriers:   Fatigue, Generalized weakness and  Limited mobility            Nursing Problems           Problem: Bowel/Gastric:     Goal: Normal bowel function is maintained or improved           Goal: Will not experience complications related to bowel motility             Problem: Communication     Goal: The ability to communicate needs accurately and effectively will improve             Problem: Discharge Barriers/Planning     Goal: Patient's continuum of care needs will be met             Problem: Infection     Goal: Will remain free from infection             Problem: Knowledge Deficit     Goal: Knowledge of disease process/condition, treatment plan, diagnostic tests, and medications will improve           Goal: Knowledge of the prescribed therapeutic regimen will improve             Problem: Mobility     Goal: Risk for activity intolerance will decrease             Problem: Pain Management     Goal: Pain level will decrease to patient's comfort goal     Flowsheet:     Taken at 04/07/19 0930    Pain Rating Scale (NPRS) 3 - Sometimes distracts me by Isela Vargas, L.P.N.    Comfort Goal 0;Comfort at Rest;Comfort with Movement;Sleep Comfortably;Stay Alert by Isela Vargas, L.P.N.    Taken at 04/07/19 0819    Comfort Goal 0;Comfort at Rest;Comfort with Movement;Stay Alert;Perform Activity by Isela Vargas, L.P.N.    Pain Rating Scale (NPRS) 6 - Hard to ignore, avoid usual activities by Isela Vargas, L.P.N.                  Problem: Respiratory:     Goal: Respiratory status will improve             Problem: Safety     Goal: Will remain free from injury           Goal: Will remain free from falls             Problem: Skin Integrity     Goal: Risk for impaired skin integrity will decrease             Problem: Venous Thromboembolism (VTW)/Deep Vein Thrombosis (DVT) Prevention:     Goal: Patient will participate in Venous Thrombosis (VTE)/Deep Vein Thrombosis (DVT)Prevention Measures     Flowsheet:     Taken at 04/01/19 1130    Pharmacologic  Prophylaxis Used LMWH: Enoxaparin(Lovenox) by Fe Acosta R.N.                       Long Term Goals:   At discharge patient will be able to function safely at home and in the community with support.    Section completed by:  Linda Crews R.N.              Mobility  Bed mobility:   Mod I on std bed  Bed /Chair/Wheelchair Transfer Initial:  2 - Max Assistance  Bed /Chair/Wheelchair Transfer Current:  5 - Standby Prompting/Supervision or Set-up   Bed/Chair/Wheelchair Transfer Description:   (bed mob on std queen bed Mod I, STS to FWW SBA)  Walk Initial:  2 - Max Assistance  Walk Current:  4 - Minimal Assistance   Walk Description:   (pt amb x390' with FWW and intermittnet CGA for balance to SBA , decreased ranjith/gait speed, +trendelenberg, fwd lean on FWW)  Wheelchair Initial:  5 - Standby Prompting/Supervision or Set-up  Wheelchair Current:  5 - Standby Prompting/Supervision or Set-up   Wheelchair Description:  Extra time, Supervision for safety, Verbal cueing (150 ft using UEs, excessive time to complete, SBA)  Stairs Initial:  0 - Not tested,unsafe activity  Stairs Current: 4 - Minimal Assistance   Stairs Description:  (pt ascended/descended 2x6 adaptive steps with B HRs, reciprocal pattern to ascend  and step-to pattern to descend, CGA)  Patient/Family Training/Education:  Safety with transfers+mobility including hand placement/wc brake management/FWW use. FT has not yet been completed.  DME/DC Recommendations:  HH vs outpatient PT, FWW, 24 hr SPV per spouse. FT with spouse prior to dc  Strengths:  Alert and oriented, Making steady progress towards goals, Motivated for self care and independence, Pleasant and cooperative, Supportive family and Willingly participates in therapeutic activities  Barriers:   Other: inconsistency with mobility, imparied standing balance, decreased endurance, impaired safety awareness, verbose/impaired attention  # of short term goals set= 3 current STGs  # of short  "term goals met=1/4 met       Physical Therapy Problems           Problem: Mobility     Dates: Start: 03/30/19       Goal: STG-Within one week, patient will ambulate up/down a curb     Dates: Start: 03/30/19       Description: 1) Individualized goal:  4\" curb/ step with FWW and min A  2) Interventions: PT Group Therapy, PT E Stim Attended, PT Gait Training, PT Therapeutic Exercises, PT TENS Application, PT Neuro Re-Ed/Balance, PT Aquatic Therapy, PT Therapeutic Activity and PT Manual Therapy       Note:     Goal Note filed on 04/10/19 0820 by TEE MarcialT    Goal: STG-Within one week, patient will ambulate up/down a curb  Outcome: NOT MET  TBA                  Problem: Mobility Transfers     Dates: Start: 03/30/19       Goal: STG-Within one week, patient will sit to stand     Dates: Start: 03/30/19       Description: 1) Individualized goal:  SBA with FWW  2) Interventions: PT Group Therapy, PT E Stim Attended, PT Gait Training, PT Therapeutic Exercises, PT TENS Application, PT Neuro Re-Ed/Balance, PT Aquatic Therapy, PT Therapeutic Activity and PT Manual Therapy       Note:     Goal Note filed on 04/10/19 0820 by Darcy Méndez DPT    Goal: STG-Within one week, patient will sit to stand  Outcome: NOT MET  CGA to SBA, requires intermittent steadying assist                Goal: STG-Within one week, patient will transfer bed to chair     Dates: Start: 03/30/19       Description: 1) Individualized goal:  SBA without AD  2) Interventions: PT Group Therapy, PT E Stim Attended, PT Gait Training, PT Therapeutic Exercises, PT TENS Application, PT Neuro Re-Ed/Balance, PT Aquatic Therapy, PT Therapeutic Activity and PT Manual Therapy     Note:     Goal Note filed on 04/10/19 0820 by Darcy Méndez DPT    Goal: STG-Within one week, patient will transfer bed to chair  Outcome: NOT MET  CGA with FWW                  Problem: PT-Long Term Goals     Dates: Start: 03/30/19       Goal: LTG-By discharge, patient will " "ambulate     Dates: Start: 03/30/19       Description: 1) Individualized goal:  150 ft with LRAD, spv  2) Interventions: PT Group Therapy, PT E Stim Attended, PT Gait Training, PT Therapeutic Exercises, PT TENS Application, PT Neuro Re-Ed/Balance, PT Aquatic Therapy, PT Therapeutic Activity and PT Manual Therapy             Goal: LTG-By discharge, patient will transfer one surface to another     Dates: Start: 03/30/19       Description: 1) Individualized goal:  Mod I without AD  2) Interventions: PT Group Therapy, PT E Stim Attended, PT Gait Training, PT Therapeutic Exercises, PT TENS Application, PT Neuro Re-Ed/Balance, PT Aquatic Therapy, PT Therapeutic Activity and PT Manual Therapy             Goal: LTG-By discharge, patient will perform home exercise program     Dates: Start: 03/30/19       Description: 1) Individualized goal:  spv with seated LE there-ex HEP  2) Interventions: PT Group Therapy, PT E Stim Attended, PT Gait Training, PT Therapeutic Exercises, PT TENS Application, PT Neuro Re-Ed/Balance, PT Aquatic Therapy, PT Therapeutic Activity and PT Manual Therapy             Goal: LTG-By discharge, patient will ambulate up/down 4-6 stairs     Dates: Start: 03/30/19       Description: 1) Individualized goal:  6\" curb with FWW and SBA, 6 4\" steps with 2 HRs, SBA  2) Interventions: PT Group Therapy, PT E Stim Attended, PT Gait Training, PT Therapeutic Exercises, PT TENS Application, PT Neuro Re-Ed/Balance, PT Aquatic Therapy, PT Therapeutic Activity and PT Manual Therapy                     Section completed by:  Darcy Méndez DPT       Activities of Daily Living  Eating Initial:  5 - Standby Prompting/Supervision or Set-up  Eating Current:  6 - Modified Independent   Eating Description:  Increased time  Grooming Initial:  5 - Standby Prompting/Supervision or Set-up  Grooming Current:  5 - Standby Prompting/Supervision or Set-up   Grooming Description:  Increased time, Set-up of equipment (when seated at " w/c)  Bathing Initial:  4 - Minimal Assistance  Bathing Current:  4 - Minimal Assistance   Bathing Description:  Grab bar, Tub bench, Hand held shower, Increased time, Supervision for safety, Verbal cueing, Set-up of equipment (CGA when standing)  Upper Body Dressing Initial:  4 - Minimal Assistance  Upper Body Dressing Current:  5 - Standby Prompting/Supervision or Set-up   Upper Body Dressing Description:  Increased time  Lower Body Dressing Initial:  3 - Moderate Assistance  Lower Body Dressing Current:  4 - Minimal Assistance   Lower Body Dressing Description:  4 - Minimal Assistance  Toileting Initial:  3 - Moderate Assistance  Toileting Current:  4 - Minimal Assistance   Toileting Description:  Grab bar, Increased time, Supervision for safety, Verbal cueing, Set-up of equipment (with FWW)  Toilet Transfer Initial:  4 - Minimal Assistance  Toilet Transfer Current:  4 - Minimal Assistance   Toilet Transfer Description:  4 - Minimal Assistance  Tub / Shower Transfer Initial:  4 - Minimal Assistance  Tub / Shower Transfer Current:  4 - Minimal Assistance   Tub / Shower Transfer Description:  Grab bar, Increased time, Supervision for safety, Verbal cueing, Set-up of equipment (CGA stand pivot w/c<>bench with GB)  IADL:  N/A  Family Training/Education:  TBD  DME/DC Recommendations:  Shower chair, grab bars in shower/toilet. Pt reports plans for grab bar installation in shower.     Strengths:  Able to follow instructions, Motivated for self care and independence, Pleasant and cooperative, Supportive family and Willingly participates in therapeutic activities  Barriers:  Decreased endurance, Generalized weakness, Poor activity tolerance, Poor balance and Other: Impaired cognition - problem solving, attention, memory     # of short term goals set= 6    # of short term goals met= 1/7          Occupational Therapy Goals           Problem: Bathing     Dates: Start: 03/30/19       Goal: STG-Within one week, patient will  bathe     Dates: Start: 03/30/19   Expected End: 04/06/19       Description: 1) Individualized Goal:  UB/LB at a level of SBA.   2) Interventions:  OT Self Care/ADL, OT Neuro Re-Ed/Balance, OT Therapeutic Activity, OT Aquatic Therapy, OT Evaluation and OT Therapeutic Exercise       Note:     Goal Note filed on 04/10/19 0802 by Aruna Frye MS,OTR/L    Goal: STG-Within one week, patient will bathe  Outcome: NOT MET  Min A                  Problem: Dressing     Dates: Start: 03/30/19       Goal: STG-Within one week, patient will dress LB     Dates: Start: 03/30/19   Expected End: 04/06/19       Description: 1) Individualized Goal:  At a level of SBA w/ AE as needed.   2) Interventions:  OT Neuro Re-Ed/Balance, OT Therapeutic Activity, OT Evaluation and OT Therapeutic Exercise       Note:     Goal Note filed on 04/10/19 0802 by Aruna Frye, MS,OTR/L    Goal: STG-Within one week, patient will dress LB  Outcome: NOT MET  Min A                  Problem: Functional Transfers     Dates: Start: 03/30/19       Goal: STG-Within one week, patient will transfer to tub/shower     Dates: Start: 03/30/19   Expected End: 04/06/19       Description: 1) Individualized Goal:  At a level of SBA.   2) Interventions:  OT Self Care/ADL, OT Cognitive Skill Dev, OT Neuro Re-Ed/Balance, OT Therapeutic Activity, OT Evaluation and OT Therapeutic Exercise       Note:     Goal Note filed on 04/10/19 0802 by Aruna Frye MS,OTR/L    Goal: STG-Within one week, patient will transfer to tub/shower  Outcome: NOT MET  CGA                  Problem: IADL's     Dates: Start: 03/30/19       Goal: STG-Within one week, patient will prepare a meal     Dates: Start: 03/30/19   Expected End: 04/06/19       Description: 1) Individualized Goal:  At a level of SBA.   2) Interventions:  OT Self Care/ADL, OT Manual Ther Technique, OT Neuro Re-Ed/Balance, OT Therapeutic Activity, OT Aquatic Therapy and OT Evaluation       Note:     Goal Note  filed on 04/10/19 0802 by Aruna Frye, MS,OTR/L    Goal: STG-Within one week, patient will prepare a meal  Outcome: NOT MET  Min - Mod A                Goal: STG-Within one week, patient will access kitchen area     Dates: Start: 03/30/19   Expected End: 04/06/19       Description: 1) Individualized Goal:  At a level of SBA with occasional cueing for safety.   2) Interventions:  OT Self Care/ADL, OT Community Reintegration, OT Neuro Re-Ed/Balance, OT Therapeutic Activity, OT Evaluation and OT Therapeutic Exercise       Note:     Goal Note filed on 04/10/19 0802 by Aruna Frye, MS,OTR/L    Goal: STG-Within one week, patient will access kitchen area  Outcome: NOT MET  CGA                  Problem: OT Long Term Goals     Dates: Start: 03/30/19       Goal: LTG-By discharge, patient will complete basic self care tasks     Dates: Start: 03/30/19   Expected End: 04/13/19       Description: 1) Individualized Goal:  At a level of Mod I.   2) Interventions:  OT Self Care/ADL, OT Neuro Re-Ed/Balance, OT Therapeutic Activity, OT Aquatic Therapy, OT Evaluation and OT Therapeutic Exercise             Goal: LTG-By discharge, patient will perform bathroom transfers     Dates: Start: 03/30/19   Expected End: 04/13/19       Description: 1) Individualized Goal:  At a level of Mod I.   2) Interventions:  OT Self Care/ADL, OT Neuro Re-Ed/Balance, OT Therapeutic Activity, OT Evaluation and OT Therapeutic Exercise             Goal: LTG-By discharge, patient will complete basic home management     Dates: Start: 03/30/19   Expected End: 04/13/19       Description: 1) Individualized Goal:  At a level of SUP.   2) Interventions:  OT Cognitive Skill Dev, OT Community Reintegration, OT Neuro Re-Ed/Balance and OT Therapeutic Exercise               Problem: Toileting     Dates: Start: 03/30/19       Goal: STG-Within one week, patient will complete toileting tasks     Dates: Start: 03/30/19   Expected End: 04/06/19       Description:  1) Individualized Goal:  At a level of SBA.   2) Interventions:  OT Self Care/ADL, OT Neuro Re-Ed/Balance, OT Therapeutic Activity, OT Evaluation and OT Therapeutic Exercise       Note:     Goal Note filed on 04/10/19 0802 by Aruna Frye MS,OTR/L    Goal: STG-Within one week, patient will complete toileting tasks  Outcome: NOT MET  CGA                       Section completed by:  Aruna Frye MS,OTR/L       Cognitive Linquistic Functions  Comprehension Initial:  4 - Minimal Assistance  Comprehension Current:  4 - Minimal Assistance   Comprehension Description:  Hearing aids/amplifiers, Verbal cues  Expression Initial:  5 - Stand-by Prompting/Supervision or Set-up  Expression Current:  5 - Stand-by Prompting/Supervision or Set-up   Expression Description:  Verbal cueing  Social Interaction Initial:  4 - Minimal Assistance  Social Interaction Current:  4 - Minimal Assistance   Social Interaction Description:  Increased time, Verbal cues  Problem Solving Initial:  4 - Minimal Assistance  Problem Solving Current:  4 - Minimal Assistance   Problem Solving Description:  Verbal cueing, Therapy schedule, Bed/chair alarm, Increased time  Memory Initial:  3 - Moderate Assistance  Memory Current:  3 - Moderate Assistance   Memory Description:  Therapy schedule, Verbal cueing, Bed/chair alarm  Executive Functioning / Organization Initial:  To Be Assessed  Executive Functioning / Organization Current:  Moderate (3)   Executive Functioning Desciption: attention, planning and organization skills.   Swallowing  Swallowing Status:  SLP not following for dysphagia management   Orders Placed This Encounter   Procedures   • Diet Order Regular     Standing Status:   Standing     Number of Occurrences:   1     Order Specific Question:   Diet:     Answer:   Regular [1]     Behavior Modification Plan  Allow for rest time, Keep instructions simple/concrete, Give clear feedback, Set clear goals, Be calm, Redirect to  task/topic, Provide reasonable choices, Decrease the chance of failure by offering activities that are within the patient's abilities, Analyze tasks (break down into smaller steps) and Reinforce participation in desired tasks  Assistive Technology  Low/Impaired vision equipment and Low tech: Calendar, planner, schedule, alarms/timers, pill organizer, post-it notes, lists  Family Training/Education:  Ongoing with pt and   DC Recommendations: TBD  Strengths:  Effective communication skills, Motivated for self care and independence, Pleasant and cooperative and Willingly participates in therapeutic activities  Barriers:  Other: hyperverbose, [place], decreased attention, mild-moderate cognitive deficits.   # of short term goals set=3  # of short term goals met=2       Speech Therapy Problems           Problem: Memory STGs     Dates: Start: 03/30/19       Goal: STG-Within one week, patient will     Dates: Start: 03/30/19       Description: 1) Individualized goal:  Utilize external memory aides (e.g., therapy journal) to recall novel information r/t rehabilitation with 80% accuracy and MOD A.   2) Interventions:  SLP Self Care / ADL Training , SLP Cognitive Skill Development and SLP Group Treatment       Note:     Goal Note filed on 04/02/19 1604 by Rodrigo Crawley, Student    Goal: STG-Within one week, patient will  Outcome: NOT MET  Pt's presents with severe attention, memory, planning and organizational   deficits. Pt grossly unable to follow cognitively challenging directions.                  Problem: Speech/Swallowing LTGs     Dates: Start: 03/30/19       Goal: LTG-By discharge, patient will     Dates: Start: 03/30/19       Description: 1) Individualized goal:  Utilize attention and memory strategies w/ MOD I ion 80% of opportunities for a safe D/C to prior level of function with supportive care giver.   2) Interventions:  SLP Self Care / ADL Training , SLP Cognitive Skill Development and SLP Group Treatment                     Section completed by:  Kasandra Marin MS,CCC-SLP          REHAB-Pharmacy IDT Team Note by Monica Sandoval RPH at 4/9/2019 10:47 AM  Version 1 of 1    Author:  Monica Sandoval RPH Service:  (none) Author Type:  Pharmacist    Filed:  4/9/2019 10:49 AM Date of Service:  4/9/2019 10:47 AM Status:  Signed    :  Monica Sandoval RPH (Pharmacist)         Pharmacy   Pharmacy  Antibiotics/Antifungals/Antivirals:  Medication:      Active Orders     None        Route:         n/a  Stop Date:  n/a  Reason:   Antihypertensives/Cardiac:  Medication:    Orders (72h ago through future)    Start     Ordered    04/05/19 0600  propranolol LA (INDERAL LA) capsule 120 mg  Q DAY     Comments:  Re-entered for product selection    04/04/19 0940        Patient Vitals for the past 24 hrs:   BP Pulse   04/09/19 0846 142/70 70   04/09/19 0736 160/80 64   04/09/19 0700 (!) 161/71 64   04/09/19 0532 130/70 65   04/08/19 1845 136/70 68   04/08/19 1400 112/52 69       Anticoagulation:  Medication:  Lovenox  INR:      Other key medications: duloxetine, omeprazole, pramipexole.   A review of the medication list reveals no issues at this time. Patient is currently on an antihypertensive. Recommend home blood pressure monitoring/recording if antihypertensive regimen continues  Section completed by:  Monica Sandoval RPH[TK.1]        Attribution Key     TK.1 - Monica Sandoval RPH on 4/9/2019 10:47 AM                    DC Planning  DC destination/dispostion: Return home w/ spouse single story home with 1 step to enter + walk in shower in [place]. Supportive daughter lives in [place]; supportive grandchildren. Pt has a wc, spc, FWW, 4WW, and grab bars by toilet. Spouse to install grab bars in the shower  PT able to self cath at home which she has been doing for years.     [facility]        DC Needs: Follow up PCP appointment needed--pt will need to make appt as MD does not schedule  appointments until he receives dc summary.      Barriers to discharge: Distractability at times      Strengths: Independent PLOF, supportive family, pleasant, and cooperative     Section completed by:  Darcy Portillo LSW, CCM     CM Summary:  Impaired attention, balance, and endurance; spouse to complete meds/finances; family training completed w/ spouse; OT sba-cga with all adl's; needs cues for sequencing, sba to cga w/ gait w/ fww  Home health for PT/OT/SLP/RN; pt has fww; OT recommends shower chair and install grab bars in shower.  Dc planned for 4/11.  Pt prefers to have meds crushed in applesause; she has been advised not to crush long acting meds.     Physician Summary  Marleen Fontaine MD participated and led team conference discussion.

## 2019-04-10 NOTE — DISCHARGE PLANNING
CASE MANAGEMENT / I met with pt providing an update on team conference, plan of care, and confirmed dc date of 4/11/2019.  She is in agreement.  Referral made to [facility]; no dme needed, and pt will need to call PCP as her md 's office unable to make appts until md reviews dc summary that will be faxed to him.  Spouse will provide transport home.     Plan:  Dc home on 4/11.

## 2019-04-10 NOTE — CARE PLAN
Problem: Safety  Goal: Will remain free from injury  Pt uses call light consistently and appropriately. Waits for assistance does not attempt self transfer this shift. Able to verbalize needs.       Problem: Bowel/Gastric:  Goal: Normal bowel function is maintained or improved  Pt. Is continent of bowels. LBM 4/9/19. Bowel sounds present in all four quadrants.

## 2019-04-10 NOTE — REHAB-OT IDT TEAM NOTE
Occupational Therapy   Activities of Daily Living  Eating Initial:  5 - Standby Prompting/Supervision or Set-up  Eating Current:  6 - Modified Independent   Eating Description:  Increased time  Grooming Initial:  5 - Standby Prompting/Supervision or Set-up  Grooming Current:  5 - Standby Prompting/Supervision or Set-up   Grooming Description:  Increased time, Set-up of equipment (when seated at w/c)  Bathing Initial:  4 - Minimal Assistance  Bathing Current:  4 - Minimal Assistance   Bathing Description:  Grab bar, Tub bench, Hand held shower, Increased time, Supervision for safety, Verbal cueing, Set-up of equipment (CGA when standing)  Upper Body Dressing Initial:  4 - Minimal Assistance  Upper Body Dressing Current:  5 - Standby Prompting/Supervision or Set-up   Upper Body Dressing Description:  Increased time  Lower Body Dressing Initial:  3 - Moderate Assistance  Lower Body Dressing Current:  4 - Minimal Assistance   Lower Body Dressing Description:  4 - Minimal Assistance  Toileting Initial:  3 - Moderate Assistance  Toileting Current:  4 - Minimal Assistance   Toileting Description:  Grab bar, Increased time, Supervision for safety, Verbal cueing, Set-up of equipment (with FWW)  Toilet Transfer Initial:  4 - Minimal Assistance  Toilet Transfer Current:  4 - Minimal Assistance   Toilet Transfer Description:  4 - Minimal Assistance  Tub / Shower Transfer Initial:  4 - Minimal Assistance  Tub / Shower Transfer Current:  4 - Minimal Assistance   Tub / Shower Transfer Description:  Grab bar, Increased time, Supervision for safety, Verbal cueing, Set-up of equipment (CGA stand pivot w/c<>bench with GB)  IADL:  N/A  Family Training/Education:  TBD  DME/DC Recommendations:  Shower chair, grab bars in shower/toilet. Pt reports plans for grab bar installation in shower.     Strengths:  Able to follow instructions, Motivated for self care and independence, Pleasant and cooperative, Supportive family and Willingly  participates in therapeutic activities  Barriers:  Decreased endurance, Generalized weakness, Poor activity tolerance, Poor balance and Other: Impaired cognition - problem solving, attention, memory     # of short term goals set= 6    # of short term goals met= 1/7          Occupational Therapy Goals           Problem: Bathing     Dates: Start: 03/30/19       Goal: STG-Within one week, patient will bathe     Dates: Start: 03/30/19   Expected End: 04/06/19       Description: 1) Individualized Goal:  UB/LB at a level of SBA.   2) Interventions:  OT Self Care/ADL, OT Neuro Re-Ed/Balance, OT Therapeutic Activity, OT Aquatic Therapy, OT Evaluation and OT Therapeutic Exercise       Note:     Goal Note filed on 04/10/19 0802 by Aruna Frye MS,OTR/L    Goal: STG-Within one week, patient will bathe  Outcome: NOT MET  Min A                  Problem: Dressing     Dates: Start: 03/30/19       Goal: STG-Within one week, patient will dress LB     Dates: Start: 03/30/19   Expected End: 04/06/19       Description: 1) Individualized Goal:  At a level of SBA w/ AE as needed.   2) Interventions:  OT Neuro Re-Ed/Balance, OT Therapeutic Activity, OT Evaluation and OT Therapeutic Exercise       Note:     Goal Note filed on 04/10/19 0802 by Aruna Frye MS,OTR/L    Goal: STG-Within one week, patient will dress LB  Outcome: NOT MET  Min A                  Problem: Functional Transfers     Dates: Start: 03/30/19       Goal: STG-Within one week, patient will transfer to tub/shower     Dates: Start: 03/30/19   Expected End: 04/06/19       Description: 1) Individualized Goal:  At a level of SBA.   2) Interventions:  OT Self Care/ADL, OT Cognitive Skill Dev, OT Neuro Re-Ed/Balance, OT Therapeutic Activity, OT Evaluation and OT Therapeutic Exercise       Note:     Goal Note filed on 04/10/19 0802 by Aruna Frye MS,OTR/L    Goal: STG-Within one week, patient will transfer to tub/shower  Outcome: NOT MET  CGA                   Problem: IADL's     Dates: Start: 03/30/19       Goal: STG-Within one week, patient will prepare a meal     Dates: Start: 03/30/19   Expected End: 04/06/19       Description: 1) Individualized Goal:  At a level of SBA.   2) Interventions:  OT Self Care/ADL, OT Manual Ther Technique, OT Neuro Re-Ed/Balance, OT Therapeutic Activity, OT Aquatic Therapy and OT Evaluation       Note:     Goal Note filed on 04/10/19 0802 by Aruna Frye MS,OTR/L    Goal: STG-Within one week, patient will prepare a meal  Outcome: NOT MET  Min - Mod A                Goal: STG-Within one week, patient will access kitchen area     Dates: Start: 03/30/19   Expected End: 04/06/19       Description: 1) Individualized Goal:  At a level of SBA with occasional cueing for safety.   2) Interventions:  OT Self Care/ADL, OT Community Reintegration, OT Neuro Re-Ed/Balance, OT Therapeutic Activity, OT Evaluation and OT Therapeutic Exercise       Note:     Goal Note filed on 04/10/19 0802 by Aruna Frye MS,OTR/L    Goal: STG-Within one week, patient will access kitchen area  Outcome: NOT MET  CGA                  Problem: OT Long Term Goals     Dates: Start: 03/30/19       Goal: LTG-By discharge, patient will complete basic self care tasks     Dates: Start: 03/30/19   Expected End: 04/13/19       Description: 1) Individualized Goal:  At a level of Mod I.   2) Interventions:  OT Self Care/ADL, OT Neuro Re-Ed/Balance, OT Therapeutic Activity, OT Aquatic Therapy, OT Evaluation and OT Therapeutic Exercise             Goal: LTG-By discharge, patient will perform bathroom transfers     Dates: Start: 03/30/19   Expected End: 04/13/19       Description: 1) Individualized Goal:  At a level of Mod I.   2) Interventions:  OT Self Care/ADL, OT Neuro Re-Ed/Balance, OT Therapeutic Activity, OT Evaluation and OT Therapeutic Exercise             Goal: LTG-By discharge, patient will complete basic home management     Dates: Start: 03/30/19   Expected End:  04/13/19       Description: 1) Individualized Goal:  At a level of SUP.   2) Interventions:  OT Cognitive Skill Dev, OT Community Reintegration, OT Neuro Re-Ed/Balance and OT Therapeutic Exercise               Problem: Toileting     Dates: Start: 03/30/19       Goal: STG-Within one week, patient will complete toileting tasks     Dates: Start: 03/30/19   Expected End: 04/06/19       Description: 1) Individualized Goal:  At a level of SBA.   2) Interventions:  OT Self Care/ADL, OT Neuro Re-Ed/Balance, OT Therapeutic Activity, OT Evaluation and OT Therapeutic Exercise       Note:     Goal Note filed on 04/10/19 0802 by Aruna Frye MS,OTR/L    Goal: STG-Within one week, patient will complete toileting tasks  Outcome: NOT MET  CGA                       Section completed by:  Aruna Frye MS,OTR/L

## 2019-04-10 NOTE — THERAPY
Speech Language Pathology  Daily Treatment     Patient Name: Lindsay Vargas  Age:  78 y.o., Sex:  female  Medical Record #: [id number]  Today's Date: 4/10/2019     Subjective    Patient was in room at time of ST. Complains of 8/10 pain in neck (RN aware and provided medication), but was willing to participate.      Objective       04/10/19 1302   Cognition   Attention to Task Moderate (3)   Simple Attention Moderate (3)   SLP Total Time Spent   SLP Individual Total Time Spent (Mins) 30   Charge Group   SLP Cognitive Skill Development 2           Assessment    Patient required frequent redirections with c/o pain in neck. Was able to answer basic problem solving questions related to safety, but did demonstrate decreased attention to task.     Plan    Prepare for d/c home with spouse.

## 2019-04-10 NOTE — THERAPY
Physical Therapy   Daily Treatment     Patient Name: Lindsay Vargas  Age:  78 y.o., Sex:  female  Medical Record #: [id number]  Today's Date: 4/10/2019     Precautions  Precautions: Fall Risk, Other (See Comments)  Comments: [place], poor attention, hyperverbose    Subjective    Pt received in wc in room, agreeable to PT session. Reports she had a shower this morning and it went really well.     Objective       04/10/19 0901   Precautions   Precautions Fall Risk;Other (See Comments)   Comments [place], poor attention, hyperverbose   Cognition    Level of Consciousness Alert   Attention Impaired   Sequencing Impaired   Initiation Impaired   Standing Lower Body Exercises   Other Exercises x2-4 reps of : marching, heel raises, squats, hip abd, hip ext, ham curls and marching for HEP edu   Bed Mobility    Sit to Stand Stand by Assist  (VCs for hand placemet)   Interdisciplinary Plan of Care Collaboration   IDT Collaboration with  Family / Caregiver;Physician   Patient Position at End of Therapy Seated;Family / Friend in Room   Collaboration Comments FT completed with pt's spouse MD Abdulaziz assessed pt during session   PT Total Time Spent   PT Individual Total Time Spent (Mins) 60   PT Charge Group   PT Therapeutic Exercise 2   PT Therapeutic Activities 2       FIM Walking Score:  5 - Standby Prompting/Supervision or Set-up  Walking Description:  Walker, Verbal cueing, Supervision for safety    FIM Stairs Score:  5 - Standby Prompting/Supervision or Set-up  Stairs Description:  Hand rails, Verbal cueing, Supervision for safety, Extra time    Pt's spouse present during latter part of session. PT educated spouse in guarding technique (CGA to close SBA)  With use of gait belt. Pt's spouse then ambulated x120' with pt safely.   Pt performed car tx with FWW level with CGA to close SBA: ambulated to edge of seat, sat, then swung LEs into vehicle. Reversed this process to exit vehicle.   PT provided handout, visual demo and  "explanation of standing LE there ex HEP, then pt completed x2-3 reps of the above standing therex. PT advised pt in holding on to kitchen counter and her spouse providing close SBA throughout.   Pt ascended/descended 4\" curb with FWW and CGA following visual demo and explanation.   Pt picked up object from floor with UE support of FWW and CGA by PT for steadying assist.     Assessment    Pt and her spouse verbalize readiness for Dc to home tomorrow. They have a FWW. Recommending  PT for continued gait and balance training and safety in home environment. Pt requires SBA to CGA for all standing mobility activities.     Plan    DC to home with spouse tomorrow.    "

## 2019-04-10 NOTE — THERAPY
Speech Language Pathology  Daily Treatment     Patient Name: Lindsay Vargas  Age:  78 y.o., Sex:  female  Medical Record #: [id number]  Today's Date: 4/10/2019     Subjective    Patient was in room with spouse at time of ST. Was willing to participate.      Objective       04/10/19 1032   Cognition   Attention to Task Moderate (3)   Simple Attention Moderate (3)   Simple Information Processing Moderate (3)   Topic Maintenance  Moderate (3)   SLP Total Time Spent   SLP Individual Total Time Spent (Mins) 30   Charge Group   SLP Cognitive Skill Development 2       FIM Eating Score:     Eating Description:       FIM Comprehension Score:  4 - Minimal Assistance  Comprehension Description:       FIM Expression Score:  5 - Stand-by Prompting/Supervision or Set-up  Expression Description:       FIM Social Interaction Score:  4 - Minimal Assistance  Social Interaction Description:       FIM Problem Solving Score:  4 - Minimal Assistance  Problem Solving Description:       FIM Memory Score:  3 - Moderate Assistance  Memory Description:         Assessment    Patient required mod verbal cues to complete sustained attention tasks. Reacquired frequent redirections and cues for sequencing alphabet.     Plan    Continue to target attention, prepare for d/c.

## 2019-04-11 VITALS
RESPIRATION RATE: 18 BRPM | DIASTOLIC BLOOD PRESSURE: 79 MMHG | OXYGEN SATURATION: 97 % | SYSTOLIC BLOOD PRESSURE: 146 MMHG | WEIGHT: 154.1 LBS | BODY MASS INDEX: 24.19 KG/M2 | HEIGHT: 67 IN | HEART RATE: 75 BPM | TEMPERATURE: 97.8 F

## 2019-04-11 PROBLEM — R41.89 COGNITIVE DEFICITS: Status: ACTIVE | Noted: 2019-04-11

## 2019-04-11 PROBLEM — I10 ESSENTIAL HYPERTENSION: Status: ACTIVE | Noted: 2019-04-11

## 2019-04-11 PROCEDURE — 700102 HCHG RX REV CODE 250 W/ 637 OVERRIDE(OP): Performed by: PHYSICAL MEDICINE & REHABILITATION

## 2019-04-11 PROCEDURE — 99239 HOSP IP/OBS DSCHRG MGMT >30: CPT | Performed by: PHYSICAL MEDICINE & REHABILITATION

## 2019-04-11 PROCEDURE — A9270 NON-COVERED ITEM OR SERVICE: HCPCS | Performed by: PHYSICAL MEDICINE & REHABILITATION

## 2019-04-11 RX ORDER — OMEPRAZOLE 40 MG/1
CAPSULE, DELAYED RELEASE ORAL
Qty: 30 CAP | Refills: 0 | Status: SHIPPED | OUTPATIENT
Start: 2019-04-11 | End: 2023-05-10

## 2019-04-11 RX ORDER — PROPRANOLOL HYDROCHLORIDE 160 MG/1
160 CAPSULE, EXTENDED RELEASE ORAL DAILY
Qty: 30 CAP | Refills: 0 | Status: ON HOLD | OUTPATIENT
Start: 2019-04-11 | End: 2019-04-28

## 2019-04-11 RX ORDER — PRAMIPEXOLE DIHYDROCHLORIDE 0.5 MG/1
0.5 TABLET ORAL
Qty: 30 TAB | Refills: 0 | Status: SHIPPED | OUTPATIENT
Start: 2019-04-11 | End: 2019-06-13

## 2019-04-11 RX ORDER — AMOXICILLIN 250 MG
2 CAPSULE ORAL 2 TIMES DAILY
Qty: 30 TAB | Refills: 0 | COMMUNITY
Start: 2019-04-11 | End: 2019-04-22

## 2019-04-11 RX ORDER — DULOXETIN HYDROCHLORIDE 60 MG/1
60 CAPSULE, DELAYED RELEASE ORAL DAILY
Qty: 30 CAP | Refills: 0 | Status: SHIPPED | OUTPATIENT
Start: 2019-04-11

## 2019-04-11 RX ORDER — ACETAMINOPHEN 325 MG/1
650 TABLET ORAL EVERY 6 HOURS PRN
Qty: 30 TAB | Refills: 0 | Status: SHIPPED | OUTPATIENT
Start: 2019-04-11 | End: 2021-09-15

## 2019-04-11 RX ADMIN — DULOXETINE 60 MG: 30 CAPSULE, DELAYED RELEASE ORAL at 08:19

## 2019-04-11 RX ADMIN — PROPRANOLOL HYDROCHLORIDE 120 MG: 60 CAPSULE, EXTENDED RELEASE ORAL at 04:49

## 2019-04-11 RX ADMIN — OMEPRAZOLE 40 MG: 20 CAPSULE, DELAYED RELEASE ORAL at 08:20

## 2019-04-11 RX ADMIN — VITAMIN D, TAB 1000IU (100/BT) 1000 UNITS: 25 TAB at 08:19

## 2019-04-11 ASSESSMENT — ACTIVITIES OF DAILY LIVING (ADL)
TOILET_TRANSFER_LEVEL_OF_ASSIST: REQUIRES PHYSICAL ASSIST WITH TOILET TRANSFER
TOILETING_LEVEL_OF_ASSIST: REQUIRES PHYSICAL ASSIST WITH TOILETING
SHOWER_TRANSFER_LEVEL_OF_ASSIST: REQUIRES PHYSICAL ASSIST WITH SHOWER TRANSFER

## 2019-04-11 NOTE — CARE PLAN
Problem: Safety  Goal: Will remain free from injury  Pt remains free from accidental injury.Appropriately uses call light to make needs known.Bed in low position, non skid socks/shoes on when out of bed.Call light within reach.Will continue to monitor and assess needs and safety.    Problem: Venous Thromboembolism (VTW)/Deep Vein Thrombosis (DVT) Prevention:  Goal: Patient will participate in Venous Thrombosis (VTE)/Deep Vein Thrombosis (DVT)Prevention Measures  Pt refused scheduled lovenox at hs.Education given on the importance/benefit of the medication, pt verbalized understanding.Will continue to monitor and assess.    Problem: Bowel/Gastric:  Goal: Normal bowel function is maintained or improved    Intervention: Educate patient and significant other/support system about signs and symptoms of constipation and interventions to implement  Pt refused scheduled medication at hs.Continent of bowel.LBM 04/10.Will continue to monitor and assess for s/sx of constipation.

## 2019-04-11 NOTE — DISCHARGE SUMMARY
Rehab Discharge Note    Admission: 3/29/2019    Discharge: 4/11/2019    Admission Diagnosis:   Active Hospital Problems    Diagnosis   • *Debility   • Falls   • Cognitive deficits   • Essential hypertension   • Right ear pain   • Rash   • Restless legs syndrome   • Chronic back pain   • Mood disorder (HCC)   • GERD (gastroesophageal reflux disease)       Discharge Diagnosis:  Active Hospital Problems    Diagnosis   • *Debility   • Falls   • Cognitive deficits   • Essential hypertension   • Right ear pain   • Rash   • Restless legs syndrome   • Chronic back pain   • Mood disorder (HCC)   • GERD (gastroesophageal reflux disease)       HPI prior to rehab  Patient is a 78 y.o. female  with a past medical history of hypertension, GERD, depression, admitted to [facility] on 3/26/2019, with complaints of weakness and falls. She had a recent admission for altered mental status , dizziness, was thought to be secondary to dehydration, hyponatremia, and polypharmacy. She was discharged to SNF for rehab, but only stayed there for 3 days as she was not satisfied with the care there. She then returned to the ED with complaints of continued weakness and falls, with trauma to her right knee and left flank. She had negative Head CT, x-rays negative for fracture. She is having issues with back pain and spasms, with a history of being on baclofen prior, which has been discontinued due to her recent confusion and polypharmacy.     Patient current reports mild back pain, midline. Per review of records, had a MRI in 2016 that showed chronic L4 compression fracture. She also reports numbness in her feet, for many years. No history of diabetes or thyroid disease. She is very hard of hearing, no aides. Wears reading glasses. Denies any issues with her bowels or bladder.     Patient was evaluated by Rehab Medicine physician and Physical Therapy and Occupational Therapy and determined to be appropriate for acute inpatient rehab and  was transferred to [facility] on 3/29/2019.     Rehab Hospital Course    Debility, improved  Cognitive Deficits, moderate to severe, improving  Hyper verbose, impaired attention, improving    Chronic back pain, stable  PRN tylenol  Previously on baclofen which caused altered mental status     Headache  S/p right posterior occipital nerve block with Dr. Tapia on      Frequent falls  Poor balance  Per therapy, due to poor attention  Will continue to need SBA to CGA from her  at discharge     Peripheral neuropathy  Continue Cymbalta     Hypertension  Orthostatic hypotension  Continue propranolol --> dose decreased to 120 mg -->  Increased back up to 160 mg at discharge as pressure's improved    History of depression  Continue Cymbalta     GERD  Continue Prilosec     Restless legs syndrome  Continue Requip     Hypokalemia, resolved  Discontinued supplementation     Bowel  Continue bowel meds  Last BM      DVT prophylaxis  Lovenox    Lab Results   Component Value Date/Time    SODIUM 137 2019 05:24 AM    POTASSIUM 4.2 2019 05:24 AM    CHLORIDE 104 2019 05:24 AM    CO2 26 2019 05:24 AM    GLUCOSE 98 2019 05:24 AM    BUN 11 2019 05:24 AM    CREATININE 0.69 2019 05:24 AM    CREATININE 0.9 2008 10:00 AM      Lab Results   Component Value Date/Time    WBC 8.2 2019 05:26 AM    RBC 4.67 2019 05:26 AM    HEMOGLOBIN 13.1 2019 05:26 AM    HEMATOCRIT 39.4 2019 05:26 AM    MCV 84.4 2019 05:26 AM    MCH 28.1 2019 05:26 AM    MCHC 33.2 (L) 2019 05:26 AM    MPV 10.6 2019 05:26 AM    NEUTSPOLYS 61.00 2019 05:26 AM    LYMPHOCYTES 28.50 2019 05:26 AM    MONOCYTES 7.00 2019 05:26 AM    EOSINOPHILS 2.30 2019 05:26 AM    BASOPHILS 0.70 2019 05:26 AM    ANISOCYTOSIS 1+ 2019 04:39 AM        Functional Status at Discharge  Eatin - Modified Independent  Eating Description:   Increased time  Groomin - Modified Independent  Grooming Description:  Increased time  Bathin - Minimal Assistance  Bathing Description:  Grab bar, Tub bench, Hand held shower, Increased time, Supervision for safety, Verbal cueing, Set-up of equipment (CGA when standing)  Upper Body Dressin - Modified Independent  Upper Body Dressing Description:  Increased time  Lower Body Dressin - Minimal Assistance  Lower Body Dressing Description:  4 - Minimal Assistance  Discharge Location : Home  Patient Discharging with Assist of: Spouse / Significant Other  Level of Supervision Required: 24 Hour Supervision  Recommended Equipment for Discharge: Front-Wheeled Walker;Shower Chair;Grab Bars in Tub / Shower;Grab Bars by Toilet;Hand Held Shower Head  Recommended Services Upon Discharge: Home Health Occupational Therapy  Long Term Goals Met: 0  Long Term Goals Not Met: 3  Reason(s) for Goals Not Met: pt requires SBA - CGA when ambulating at FWW   Criteria for Termination of Services: Maximum Function Achieved for Inpatient Rehabilitation  Walk:  5 - Standby Prompting/Supervision or Set-up  Distance Walked:  Walks a minimum of 150 feet  Walk Description:  Walker, Verbal cueing, Supervision for safety  Wheelchair:  5 - Standby Prompting/Supervision or Set-up  Distance Propelled:  Propels a minimum of 150 feet   Wheelchair Description:  Extra time, Supervision for safety, Verbal cueing (150 ft using UEs, excessive time to complete, SBA)  Stairs 5 - Standby Prompting/Supervision or Set-up  Stairs DescriptionHand rails, Verbal cueing, Supervision for safety, Extra time     Comprehension Mode:  Auditory  Comprehension:  4 - Minimal Assistance  Comprehension Description:  Hearing aids/amplifiers, Verbal cues  Expression Mode:  Vocal  Expression:  5 - Stand-by Prompting/Supervision or Set-up  Expression Description:  Verbal cueing  Social Interaction:  4 - Minimal Assistance  Social Interaction Description:  Increased  time, Verbal cues  Problem Solvin - Minimal Assistance  Problem Solving Description:  Verbal cueing, Therapy schedule, Bed/chair alarm, Increased time  Memory:  3 - Moderate Assistance  Memory Description:  Therapy schedule, Verbal cueing, Bed/chair alarm  Progress since Admit: limited  Discharge Location : Home  Patient Discharging with Assist of: Spouse / Significant Other  Level of Supervision Required: 24 Hour Supervision  Recommended Services Upon Discharge: Home Health Speech Therapy  Long Term Goals Met: 0/1  Long Term Goals Not Met: 1/1  Reason(s) for Goals Not Met: pt cont to present with mild-moderate cognitive deficits.   Criteria for Termination of Services: Maximum Function Achieved for Inpatient Rehabilitation    Discharge Medication:     Medication List      START taking these medications      Instructions   Cholecalciferol 1000 UNIT Tabs  Start taking on:  2019  Commonly known as:  VITAMIND D3  Notes to patient:  Bone Health   Take 1 Tab by mouth every day.  Dose:  1000 Units     senna-docusate 8.6-50 MG Tabs  Commonly known as:  PERICOLACE or SENOKOT S  Notes to patient:  Stool softener   Take 2 Tabs by mouth 2 Times a Day.  Dose:  2 Tab        CHANGE how you take these medications      Instructions   acetaminophen 325 MG Tabs  What changed:  · medication strength  · how much to take  · reasons to take this  Commonly known as:  TYLENOL  Notes to patient:  Pain   Take 2 Tabs by mouth every 6 hours as needed (Mild Pain; (Pain scale 1-3); Temp greater than 100.5 F).  Dose:  650 mg        CONTINUE taking these medications      Instructions   DULoxetine 60 MG Cpep delayed-release capsule  Commonly known as:  CYMBALTA  Notes to patient:  Depression   Take 1 Cap by mouth every day.  Dose:  60 mg     omeprazole 40 MG delayed-release capsule  Commonly known as:  PRILOSEC  Notes to patient:  Heartburn   TAKE 1 CAPSULE BY MOUTH ONCE A DAY 30 MINUTES BEFORE BREAKFAST MEAL     pramipexole 0.5 MG  Tabs  Commonly known as:  MIRAPEX  Notes to patient:  Restless leg syndrome   Take 1 Tab by mouth every bedtime.  Dose:  0.5 mg     propranolol  MG Cp24 capsule  Commonly known as:  INDERAL LA  Notes to patient:  High Blood Pressure   Take 1 Cap by mouth every day.  Dose:  160 mg        STOP taking these medications    potassium chloride SA 20 MEQ Tbcr  Commonly known as:  Kdur          · Discharge Location: Home with spouse and home health follow up.      Agency Name / Address / Phone: [facility] .  They will call you to [facility] follow up home visits for the following:  Home Health: Registered Nurse, Occupational Therapist, Physical Therapist, Speech Therapist      · Durable Medical Equipment:  None ordered patient has all required equipment at home to inlcude front wheeled walker.         Follow-up Information:  · Lenny [facility]ENTERIA M.D. [facility].   [id number]  [facility] [id number] [phone]   Please call to schedule follow up appointment with Dr. Darby within 2 weeks from discharge as his office is unable to schedule follow up visits until he reviews medical records from hospital stay.    Condition on Discharge:  Good.    More than 33 minutes was spent on discharging this patient, including face-to-face time, prescription management, and the dictation of this note.

## 2019-04-11 NOTE — DISCHARGE PLANNING
Case management Summary:   Met with pt and spouse today. They would like to be dc 'd @ 10am if possible.    Informed them they will need to call PCP to schedule follow up appts as Dr. Marie needs to review dc summary before appt can be made.   Referral made to [facility],  and they are have accepted referral and are ready to follow.    Pt has all dme @ home.       During hospitalization, I have provided support and education and have been available for questions and information during hours of operation, communicated with therapy team and MD along with providing links/resources  to outside services.    Patient verbalizes agreement with all plans and has an understanding of the next steps within the post acute services.     Individualized Goals:   1. Improve strength and mobility  2. Discharge to home with spouse  3. DME, physician office appointments and therapy follow up after discharge arranged    Outcome:   1. Met. Strength/mobility has improved.  2. Met.  DC home w/ spouse.   3. Met.  Pt has fww @ home.  PT's PCP's office will not schedule appt until he reviews dc summary.  One will be faxed to his office. I advised pt/spouse to call and schedule within 2 weeks of dc.      NO  Further intervention.

## 2019-04-11 NOTE — CARE PLAN
Problem: Speech/Swallowing LTGs  Goal: LTG-By discharge, patient will  1) Individualized goal:  Utilize attention and memory strategies w/ MOD I ion 80% of opportunities for a safe D/C to prior level of function with supportive care giver.   2) Interventions:  SLP Self Care / ADL Training , SLP Cognitive Skill Development and SLP Group Treatment     Outcome: DISCHARGED-GOAL NOT MET Date Met: 04/11/19      Problem: Memory STGs  Goal: STG-Within one week, patient will  1) Individualized goal:  Utilize external memory aides (e.g., therapy journal) to recall novel information r/t rehabilitation with 80% accuracy and MOD A.   2) Interventions:  SLP Self Care / ADL Training , SLP Cognitive Skill Development and SLP Group Treatment     Outcome: DISCHARGED-GOAL NOT MET Date Met: 04/11/19

## 2019-04-11 NOTE — PROGRESS NOTES
Patient discharged to home per order.  Reviewed all discharge instructions, appointments, discharge medications, and wound care instructions with patient and her ; they verbalize understanding.  Education provided in discharge instructions about high blood pressure,  and Home Safety and Fall Prevention. Discharge paperwork completed; signed copies in chart.  Patient has education binder and all belongings; signed copy in chart.  Pt alert, calm, stable; no change in status from morning assessment.  Patient left facility at about 1010 am via wheelchair accompanied by staff; escorted to car by staff.  Have enjoyed working with this pleasant patient.

## 2019-04-11 NOTE — PROGRESS NOTES
Received shift report and assumed care of patient.  Patient awake, calm and stable, up into wheelchair for meal. Denies pain or discomfort at this time.  Will continue to monitor.

## 2019-04-12 ENCOUNTER — ANTICOAGULATION MONITORING (OUTPATIENT)
Dept: MEDICAL GROUP | Facility: PHYSICIAN GROUP | Age: 79
End: 2019-04-12

## 2019-04-12 ENCOUNTER — HOME CARE VISIT (OUTPATIENT)
Dept: HOME HEALTH SERVICES | Facility: HOME HEALTHCARE | Age: 79
End: 2019-04-12
Payer: MEDICARE

## 2019-04-12 VITALS
RESPIRATION RATE: 18 BRPM | SYSTOLIC BLOOD PRESSURE: 110 MMHG | DIASTOLIC BLOOD PRESSURE: 60 MMHG | HEIGHT: 67 IN | BODY MASS INDEX: 24.33 KG/M2 | WEIGHT: 155 LBS | HEART RATE: 76 BPM | OXYGEN SATURATION: 98 % | TEMPERATURE: 98.4 F

## 2019-04-12 PROCEDURE — G0493 RN CARE EA 15 MIN HH/HOSPICE: HCPCS

## 2019-04-12 PROCEDURE — 665001 SOC-HOME HEALTH

## 2019-04-12 ASSESSMENT — ENCOUNTER SYMPTOMS
DIFFICULTY THINKING: 1
POOR JUDGMENT: 1
ADDITIONAL INFORMATION: 0-3 PAIN RANGE
SHORTNESS OF BREATH: T
NAUSEA: DENIES
VOMITING: DENIES

## 2019-04-12 ASSESSMENT — ACTIVITIES OF DAILY LIVING (ADL)
OASIS_M1830: 03
TRANSPORTATION COMMENTS: FATIGUES EASILY

## 2019-04-12 ASSESSMENT — PATIENT HEALTH QUESTIONNAIRE - PHQ9
2. FEELING DOWN, DEPRESSED, IRRITABLE, OR HOPELESS: 00
1. LITTLE INTEREST OR PLEASURE IN DOING THINGS: 00
CLINICAL INTERPRETATION OF PHQ2 SCORE: 0

## 2019-04-12 NOTE — PROGRESS NOTES
Medications compared to discharge medications.  No discrepancies found.  She is off her potassium as instructed.    Flavio Calhoun, GwendolynD

## 2019-04-12 NOTE — DISCHARGE INSTRUCTIONS
[facility] NURSING DISCHARGE INSTRUCTIONS    Blood Pressure : 154/75  Weight: 69.9 kg (154 lb 1.6 oz)  Nursing recommendations for Lindsay Vargas at time of discharge are as follows:  Client verbalized understanding of all discharge instructions and prescriptions.     Review all your home medications and newly ordered medications with your doctor and/or pharmacist. Follow medication instructions as directed by your doctor and/or pharmacist.    Pain Management:   Discharge Pain Medication Instructions:  Comfort Goal: Comfort at Rest, Comfort with Movement, Perform Activity, Play, Sleep Comfortably, Stay Alert  Notify your primary care provider if pain is unrelieved with these measures, if the pain is new, or increased in intensity.    Discharge Skin Characteristics: Warm, Dry  Discharge Skin Exam:    Surgical Incision  Incision Left Eye (Active)       Surgical Incision  Incision Right Eye (Active)       Surgical Incision  Incision Back (Active)       Surgical Incision  Incision N/A Back (Active)     Skin / Wound Care Instructions: Please contact your primary care physician for any change in skin integrity.     If You Have Surgical Incisions / Wounds:  Monitor surgical site(s) for signs of increased swelling, redness or symptoms of drainage from the site or fever as this could indicate signs and symptoms of infection. If these symptoms are noted, notifiy your primary care provider.      Discharge Safety Instructions: Should Not Be Left Alone In The House     Discharge Safety Concerns: History Of Falls, Unsteady Gait, Weakness  The interdisciplinary team has made recommendation that you should not be left alone  in the house due to history of falls, weakness and unsteady gait  Anti-embolic stockings are not required to increase circulation to the lower extremities.    Discharge Diet: Regular     Discharge Liquids: Thin Liquids  Discharge Bowel Function: Continent  Please contact your  primary care physician for any changes in bowel habits.  Discharge Bowel Program:    Discharge Bladder Function: Continent  Discharge Urinary Devices:        Nursing Discharge Plan:   Influenza Vaccine Indication: Not indicated: Previously immunized this influenza season and > 8 years of age    Case Management Discharge Instructions:   Discharge Location: Home with Home Health  Agency Name/Address/Phone: [facility] .  They will call you to Critical access hospital follow up home visits.   Home Health: Registered Nurse, Occupational Therapist, Physical Therapist, Speech Therapist  Outpatient Services:    DME Provider/Phone: None ordered patient has all required equipment at home to inlcude front wheeled walker.   Medical Equipment Ordered:    Prescription Faxed to:        Discharge Medication Instructions:  Below are the medications your physician expects you to take upon discharge:      Hypertension  Hypertension is another name for high blood pressure. High blood pressure forces your heart to work harder to pump blood. A blood pressure reading has two numbers, which includes a higher number over a lower number (example: 110/72).  Follow these instructions at home:  · Have your blood pressure rechecked by your doctor.  · Only take medicine as told by your doctor. Follow the directions carefully. The medicine does not work as well if you skip doses. Skipping doses also puts you at risk for problems.  · Do not smoke.  · Monitor your blood pressure at home as told by your doctor.  Contact a doctor if:  · You think you are having a reaction to the medicine you are taking.  · You have repeat headaches or feel dizzy.  · You have puffiness (swelling) in your ankles.  · You have trouble with your vision.  Get help right away if:  · You get a very bad headache and are confused.  · You feel weak, numb, or faint.  · You get chest or belly (abdominal) pain.  · You throw up (vomit).  · You cannot breathe very well.  This  "information is not intended to replace advice given to you by your health care provider. Make sure you discuss any questions you have with your health care provider.  Document Released: 06/05/2009 Document Revised: 05/25/2017 Document Reviewed: 10/10/2014  [employer] Interactive Patient Education © 2017 [employer]      Depression / Suicide Risk    As you are discharged from this [facility], it is important to learn how to keep safe from harming yourself.    Recognize the warning signs:  · Abrupt changes in personality, positive or negative- including increase in energy   · Giving away possessions  · Change in eating patterns- significant weight changes-  positive or negative  · Change in sleeping patterns- unable to sleep or sleeping all the time   · Unwillingness or inability to communicate  · Depression  · Unusual sadness, discouragement and loneliness  · Talk of wanting to die  · Neglect of personal appearance   · Rebelliousness- reckless behavior  · Withdrawal from people/activities they love  · Confusion- inability to concentrate     If you or a loved one observes any of these behaviors or has concerns about self-harm, here's what you can do:  · Talk about it- your feelings and reasons for harming yourself  · Remove any means that you might use to hurt yourself (examples: pills, rope, extension cords, firearm)  · Get professional help from the community (Mental Health, Substance Abuse, psychological counseling)  · Do not be alone:Call your Safe Contact- someone whom you trust who will be there for you.  · Call your local CRISIS HOTLINE 427-1555 or 526-034-7397  · Call your local Children's Mobile Crisis Response Team [place] (429) 602-8070 or www.ThinkVidya  · Call the toll free National Suicide Prevention Hotlines   · National Suicide Prevention Lifeline 343-614-YQTC (9265)  · National Hope Line Network 800-SUICIDE (261-7368)    Prevent Falls in Your Home    \"Falling once doubles your " "chance of falling again\"        -Center for Disease Control and Prevention    Falls in the home can lead to serious injury (fractures, brain injuries), hospitalizations, increased medical costs, and could even be fatal.  The good news is, there are many precautions you can take to avoid falls in your home and help keep you safe:     · If prescribed an assistive device (walker, crutches), use as instructed by the healthcare provider\"   · Remove any tripping hazards from your home, including loose cords, throw rugs and clutter  · Keep a nightlight on in dark (hallways, bathrooms, etc)   · Get up slowly, to make sure you feel okay before getting up  · Be aware of any side effects of your medications: some medications may make you dizzy  · Place a non-skid rubber mat in your shower or tub-consider a shower bench or chair if unsteady on your feet  · Wear supportive shoes or non-skid socks when moving around  · Start an exercise program once approved by your provider.  If you are feeling weak following a hospital stay, talk to your doctor about home health or outpatient therapy programs designed to help rebuild your strength and endurance      Occupational Therapy Discharge Instructions for Lindsay Malik Sam    4/11/2019    Level of Assist Required for Eating: Able to Complete Eating without Assist  Level of Assist Required for Grooming: Able to Complete Grooming without Assist  Level of Assist Required for Dressing: Requires Supervision with Dressing  Equipment for Dressing: Long Handled Shoe Horn  Level of Assist Required for Toileting: Requires Physical Assist with Toileting  Level of Assist Required for Toilet Transfer: Requires Physical Assist with Toilet Transfer  Equipment for Toilet Transfer: Grab Bars by Toilet  Level of Assist Required for Bathing: Requires Physical Assist with Bathing  Equipment for Bathing: Shower Chair, Grab Bars in Tub / Shower, Hand Held Shower Head  Level of Assist Required for Shower " Transfer: Requires Physical Assist with Shower Transfer  Equipment for Shower Transfer: Grab Bars in Tub / Shower, Shower Chair  Level of Assist Required for Home Mgmt: Requires Physical Assist with Home Management  Level of Assist Required for Meal Prep: Requires Physical Assist with Meal Preparation  Driving: May not Drive, Please Contact Physician for Further Information  Home Exercise Program: None Issued    Lindsay will need close supervision to steadying assist whenever standing and walking with the walker. Take care Lindsay and good luck with your continued recovery! - Aruna Frye MS, OT/L    Speech Therapy Discharge Instructions for Lindsay Vargas    4/11/2019    Diet: Regular - all foods allowed, Thin Liquids - any liquid like water, coffee, tea, juices, or clear fluids like Gatorade, etc.  Supervision: 24 hour supervision for safety   Cognition / Communication: Pt has demonstrated progress limited progress while at [facility]. Pt has increased cognitive skills however at the time of d/c pt cont to present with mild-moderate cognitive deficits. It is recommneded that pt have assistance with all medication management and financial management tasks. Pt would benefit from cont cognitive intervention to address attention, safety, recall and functional problem solving.      Physical Therapy Discharge Instructions for Lindsay Vargas    4/12/2019    Weight Bearing Status - Patient Should:  (no weight bearing restrictions)  Level of Assist Required for Ambulation: Supervision on Flat Surfaces, Assist for Balance on Curbs, Supervision on Stairs (please use handrails if going up/down stairs)  Device Recommended for Ambulation: Front-Wheeled Walker  Level of Assist Required for Transfers: Supervision  Device Recommended for Transfers: Front-Wheeled Walker  Home Exercise Program: Refer to Home Exercise Program Handout for Details  Prosthesis / Orthosis Recommendation / Location: No Prosthesis  or Orthosis  Recommended

## 2019-04-12 NOTE — CARE PLAN
"Problem: PT-Long Term Goals  Goal: LTG-By discharge, patient will ambulate  1) Individualized goal:  150 ft with LRAD, spv  2) Interventions: PT Group Therapy, PT E Stim Attended, PT Gait Training, PT Therapeutic Exercises, PT TENS Application, PT Neuro Re-Ed/Balance, PT Aquatic Therapy, PT Therapeutic Activity and PT Manual Therapy     Outcome: MET Date Met: 04/12/19    Goal: LTG-By discharge, patient will transfer one surface to another  1) Individualized goal:  Mod I without AD  2) Interventions: PT Group Therapy, PT E Stim Attended, PT Gait Training, PT Therapeutic Exercises, PT TENS Application, PT Neuro Re-Ed/Balance, PT Aquatic Therapy, PT Therapeutic Activity and PT Manual Therapy     Outcome: NOT MET    Goal: LTG-By discharge, patient will perform home exercise program  1) Individualized goal:  spv with seated LE there-ex HEP  2) Interventions: PT Group Therapy, PT E Stim Attended, PT Gait Training, PT Therapeutic Exercises, PT TENS Application, PT Neuro Re-Ed/Balance, PT Aquatic Therapy, PT Therapeutic Activity and PT Manual Therapy     Outcome: MET Date Met: 04/12/19    Goal: LTG-By discharge, patient will ambulate up/down 4-6 stairs  1) Individualized goal:  6\" curb with FWW and SBA, 6 4\" steps with 2 HRs, SBA  2) Interventions: PT Group Therapy, PT E Stim Attended, PT Gait Training, PT Therapeutic Exercises, PT TENS Application, PT Neuro Re-Ed/Balance, PT Aquatic Therapy, PT Therapeutic Activity and PT Manual Therapy     Outcome: MET Date Met: 04/12/19        "

## 2019-04-15 ENCOUNTER — HOME CARE VISIT (OUTPATIENT)
Dept: HOME HEALTH SERVICES | Facility: HOME HEALTHCARE | Age: 79
End: 2019-04-15
Payer: MEDICARE

## 2019-04-15 ENCOUNTER — PATIENT OUTREACH (OUTPATIENT)
Dept: HEALTH INFORMATION MANAGEMENT | Facility: OTHER | Age: 79
End: 2019-04-15

## 2019-04-15 NOTE — PROGRESS NOTES
DATE OF SERVICE:  04/09/2019    Patient was seen for followup visit.  The patient will be discharged soon.    She talked about her home program what she would like to accomplish on a   day-to-day basis to continue her rehab efforts.       ____________________________________     SHUN LUCIA, PHD    IVANNA / MAXIMINO    DD:  04/14/2019 15:41:38  DT:  04/15/2019 05:49:06    D#:  [id number]  Job#:  [id number]

## 2019-04-15 NOTE — PROGRESS NOTES
DATE OF SERVICE:  04/10/2019    The patient was spoken to briefly.  More time was spent talking to her    about his concerns about patient returning home.  He said he is very worried   about her risk for falling.  He said she is not always as careful as she needs   to be.  He said he recognizes the importance of her doing her home program,   but whether she will follow through consistently said he does not know.    Patient's  said he would do his best to help her adhere to her program,   but he said she has a very independent mind and may not always do what he   wants her to do.       ____________________________________     SHUN LUCIA, PHD    IVANNA / MAXIMINO    DD:  04/14/2019 16:16:10  DT:  04/15/2019 12:50:11    D#:  [id number]  Job#:  [id number]

## 2019-04-16 ENCOUNTER — HOME CARE VISIT (OUTPATIENT)
Dept: HOME HEALTH SERVICES | Facility: HOME HEALTHCARE | Age: 79
End: 2019-04-16
Payer: MEDICARE

## 2019-04-16 VITALS
HEART RATE: 75 BPM | DIASTOLIC BLOOD PRESSURE: 62 MMHG | TEMPERATURE: 98.5 F | OXYGEN SATURATION: 98 % | SYSTOLIC BLOOD PRESSURE: 126 MMHG | RESPIRATION RATE: 18 BRPM

## 2019-04-16 PROCEDURE — G0299 HHS/HOSPICE OF RN EA 15 MIN: HCPCS

## 2019-04-16 ASSESSMENT — ENCOUNTER SYMPTOMS
SHORTNESS OF BREATH: F
NAUSEA: DENIES
VOMITING: DENIES

## 2019-04-17 ENCOUNTER — HOME CARE VISIT (OUTPATIENT)
Dept: HOME HEALTH SERVICES | Facility: HOME HEALTHCARE | Age: 79
End: 2019-04-17
Payer: MEDICARE

## 2019-04-18 ENCOUNTER — HOME CARE VISIT (OUTPATIENT)
Dept: HOME HEALTH SERVICES | Facility: HOME HEALTHCARE | Age: 79
End: 2019-04-18
Payer: MEDICARE

## 2019-04-19 ENCOUNTER — HOME CARE VISIT (OUTPATIENT)
Dept: HOME HEALTH SERVICES | Facility: HOME HEALTHCARE | Age: 79
End: 2019-04-19
Payer: MEDICARE

## 2019-04-21 ENCOUNTER — HOME CARE VISIT (OUTPATIENT)
Dept: HOME HEALTH SERVICES | Facility: HOME HEALTHCARE | Age: 79
End: 2019-04-21
Payer: MEDICARE

## 2019-04-22 ENCOUNTER — APPOINTMENT (OUTPATIENT)
Dept: RADIOLOGY | Facility: MEDICAL CENTER | Age: 79
DRG: 310 | End: 2019-04-22
Attending: EMERGENCY MEDICINE
Payer: MEDICARE

## 2019-04-22 ENCOUNTER — HOME CARE VISIT (OUTPATIENT)
Dept: HOME HEALTH SERVICES | Facility: HOME HEALTHCARE | Age: 79
End: 2019-04-22
Payer: MEDICARE

## 2019-04-22 ENCOUNTER — APPOINTMENT (OUTPATIENT)
Dept: RADIOLOGY | Facility: MEDICAL CENTER | Age: 79
DRG: 310 | End: 2019-04-22
Attending: FAMILY MEDICINE
Payer: MEDICARE

## 2019-04-22 ENCOUNTER — HOSPITAL ENCOUNTER (INPATIENT)
Facility: MEDICAL CENTER | Age: 79
LOS: 2 days | DRG: 310 | End: 2019-04-28
Attending: EMERGENCY MEDICINE | Admitting: HOSPITALIST
Payer: MEDICARE

## 2019-04-22 VITALS
HEART RATE: 73 BPM | TEMPERATURE: 99.3 F | OXYGEN SATURATION: 98 % | SYSTOLIC BLOOD PRESSURE: 140 MMHG | RESPIRATION RATE: 20 BRPM | DIASTOLIC BLOOD PRESSURE: 70 MMHG

## 2019-04-22 DIAGNOSIS — H25.812 COMBINED FORM OF SENILE CATARACT OF LEFT EYE: ICD-10-CM

## 2019-04-22 DIAGNOSIS — R29.6 FREQUENT FALLS: ICD-10-CM

## 2019-04-22 DIAGNOSIS — R41.0 CONFUSION: ICD-10-CM

## 2019-04-22 DIAGNOSIS — M25.511 ACUTE PAIN OF RIGHT SHOULDER: ICD-10-CM

## 2019-04-22 DIAGNOSIS — Z87.81 HISTORY OF COMPRESSION FRACTURE OF SPINE: ICD-10-CM
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
## 2019-04-22 DIAGNOSIS — M54.50 ACUTE MIDLINE LOW BACK PAIN WITHOUT SCIATICA: ICD-10-CM

## 2019-04-22 PROBLEM — E53.8 VITAMIN B12 DEFICIENCY: Status: ACTIVE | Noted: 2019-04-22

## 2019-04-22 PROBLEM — G89.29 CHRONIC RIGHT SHOULDER PAIN: Status: ACTIVE | Noted: 2019-04-22

## 2019-04-22 LAB
ALBUMIN SERPL BCP-MCNC: 3.8 G/DL (ref 3.2–4.9)
ALBUMIN/GLOB SERPL: 1.6 G/DL
ALP SERPL-CCNC: 58 U/L (ref 30–99)
ALT SERPL-CCNC: 7 U/L (ref 2–50)
ANION GAP SERPL CALC-SCNC: 8 MMOL/L (ref 0–11.9)
AST SERPL-CCNC: 13 U/L (ref 12–45)
BASOPHILS # BLD AUTO: 0.6 % (ref 0–1.8)
BASOPHILS # BLD: 0.04 K/UL (ref 0–0.12)
BILIRUB SERPL-MCNC: 0.7 MG/DL (ref 0.1–1.5)
BUN SERPL-MCNC: 12 MG/DL (ref 8–22)
CALCIUM SERPL-MCNC: 8.9 MG/DL (ref 8.5–10.5)
CHLORIDE SERPL-SCNC: 104 MMOL/L (ref 96–112)
CO2 SERPL-SCNC: 24 MMOL/L (ref 20–33)
CREAT SERPL-MCNC: 0.58 MG/DL (ref 0.5–1.4)
EOSINOPHIL # BLD AUTO: 0.2 K/UL (ref 0–0.51)
EOSINOPHIL NFR BLD: 3 % (ref 0–6.9)
ERYTHROCYTE [DISTWIDTH] IN BLOOD BY AUTOMATED COUNT: 46.9 FL (ref 35.9–50)
GLOBULIN SER CALC-MCNC: 2.4 G/DL (ref 1.9–3.5)
GLUCOSE SERPL-MCNC: 100 MG/DL (ref 65–99)
HCT VFR BLD AUTO: 37.1 % (ref 37–47)
HGB BLD-MCNC: 12.3 G/DL (ref 12–16)
IMM GRANULOCYTES # BLD AUTO: 0.03 K/UL (ref 0–0.11)
IMM GRANULOCYTES NFR BLD AUTO: 0.5 % (ref 0–0.9)
LYMPHOCYTES # BLD AUTO: 1.56 K/UL (ref 1–4.8)
LYMPHOCYTES NFR BLD: 23.5 % (ref 22–41)
MCH RBC QN AUTO: 28.8 PG (ref 27–33)
MCHC RBC AUTO-ENTMCNC: 33.2 G/DL (ref 33.6–35)
MCV RBC AUTO: 86.9 FL (ref 81.4–97.8)
MONOCYTES # BLD AUTO: 0.44 K/UL (ref 0–0.85)
MONOCYTES NFR BLD AUTO: 6.6 % (ref 0–13.4)
NEUTROPHILS # BLD AUTO: 4.36 K/UL (ref 2–7.15)
NEUTROPHILS NFR BLD: 65.8 % (ref 44–72)
NRBC # BLD AUTO: 0 K/UL
NRBC BLD-RTO: 0 /100 WBC
PLATELET # BLD AUTO: 173 K/UL (ref 164–446)
PMV BLD AUTO: 10.3 FL (ref 9–12.9)
POTASSIUM SERPL-SCNC: 4.4 MMOL/L (ref 3.6–5.5)
PROT SERPL-MCNC: 6.2 G/DL (ref 6–8.2)
RBC # BLD AUTO: 4.27 M/UL (ref 4.2–5.4)
SODIUM SERPL-SCNC: 136 MMOL/L (ref 135–145)
WBC # BLD AUTO: 6.6 K/UL (ref 4.8–10.8)

## 2019-04-22 PROCEDURE — 72128 CT CHEST SPINE W/O DYE: CPT

## 2019-04-22 PROCEDURE — 72131 CT LUMBAR SPINE W/O DYE: CPT

## 2019-04-22 PROCEDURE — G0378 HOSPITAL OBSERVATION PER HR: HCPCS

## 2019-04-22 PROCEDURE — 96372 THER/PROPH/DIAG INJ SC/IM: CPT

## 2019-04-22 PROCEDURE — 80053 COMPREHEN METABOLIC PANEL: CPT

## 2019-04-22 PROCEDURE — 96376 TX/PRO/DX INJ SAME DRUG ADON: CPT

## 2019-04-22 PROCEDURE — 700111 HCHG RX REV CODE 636 W/ 250 OVERRIDE (IP): Performed by: EMERGENCY MEDICINE

## 2019-04-22 PROCEDURE — 700102 HCHG RX REV CODE 250 W/ 637 OVERRIDE(OP): Performed by: FAMILY MEDICINE

## 2019-04-22 PROCEDURE — 96374 THER/PROPH/DIAG INJ IV PUSH: CPT

## 2019-04-22 PROCEDURE — 96375 TX/PRO/DX INJ NEW DRUG ADDON: CPT

## 2019-04-22 PROCEDURE — 85025 COMPLETE CBC W/AUTO DIFF WBC: CPT

## 2019-04-22 PROCEDURE — 99220 PR INITIAL OBSERVATION CARE,LEVL III: CPT | Performed by: FAMILY MEDICINE

## 2019-04-22 PROCEDURE — G0152 HHCP-SERV OF OT,EA 15 MIN: HCPCS

## 2019-04-22 PROCEDURE — 700111 HCHG RX REV CODE 636 W/ 250 OVERRIDE (IP): Performed by: FAMILY MEDICINE

## 2019-04-22 PROCEDURE — 36415 COLL VENOUS BLD VENIPUNCTURE: CPT

## 2019-04-22 PROCEDURE — 99285 EMERGENCY DEPT VISIT HI MDM: CPT

## 2019-04-22 PROCEDURE — 73030 X-RAY EXAM OF SHOULDER: CPT | Mod: RT

## 2019-04-22 PROCEDURE — 72220 X-RAY EXAM SACRUM TAILBONE: CPT

## 2019-04-22 PROCEDURE — A9270 NON-COVERED ITEM OR SERVICE: HCPCS | Performed by: FAMILY MEDICINE

## 2019-04-22 PROCEDURE — 71046 X-RAY EXAM CHEST 2 VIEWS: CPT

## 2019-04-22 RX ORDER — HYDRALAZINE HYDROCHLORIDE 20 MG/ML
10 INJECTION INTRAMUSCULAR; INTRAVENOUS EVERY 4 HOURS PRN
Status: DISCONTINUED | OUTPATIENT
Start: 2019-04-22 | End: 2019-04-28 | Stop reason: HOSPADM

## 2019-04-22 RX ORDER — ONDANSETRON 2 MG/ML
4 INJECTION INTRAMUSCULAR; INTRAVENOUS EVERY 4 HOURS PRN
Status: DISCONTINUED | OUTPATIENT
Start: 2019-04-22 | End: 2019-04-28 | Stop reason: HOSPADM

## 2019-04-22 RX ORDER — BISACODYL 10 MG
10 SUPPOSITORY, RECTAL RECTAL
Status: DISCONTINUED | OUTPATIENT
Start: 2019-04-22 | End: 2019-04-28 | Stop reason: HOSPADM

## 2019-04-22 RX ORDER — AMOXICILLIN 250 MG
2 CAPSULE ORAL 2 TIMES DAILY
Status: DISCONTINUED | OUTPATIENT
Start: 2019-04-22 | End: 2019-04-28 | Stop reason: HOSPADM

## 2019-04-22 RX ORDER — ONDANSETRON 4 MG/1
4 TABLET, ORALLY DISINTEGRATING ORAL EVERY 4 HOURS PRN
Status: DISCONTINUED | OUTPATIENT
Start: 2019-04-22 | End: 2019-04-28 | Stop reason: HOSPADM

## 2019-04-22 RX ORDER — DULOXETIN HYDROCHLORIDE 60 MG/1
60 CAPSULE, DELAYED RELEASE ORAL DAILY
Status: DISCONTINUED | OUTPATIENT
Start: 2019-04-23 | End: 2019-04-28 | Stop reason: HOSPADM

## 2019-04-22 RX ORDER — OXYCODONE HYDROCHLORIDE 5 MG/1
5 TABLET ORAL
Status: DISCONTINUED | OUTPATIENT
Start: 2019-04-22 | End: 2019-04-28 | Stop reason: HOSPADM

## 2019-04-22 RX ORDER — PRAMIPEXOLE DIHYDROCHLORIDE 0.5 MG/1
0.5 TABLET ORAL
Status: DISCONTINUED | OUTPATIENT
Start: 2019-04-22 | End: 2019-04-28 | Stop reason: HOSPADM

## 2019-04-22 RX ORDER — POLYETHYLENE GLYCOL 3350 17 G/17G
1 POWDER, FOR SOLUTION ORAL
Status: DISCONTINUED | OUTPATIENT
Start: 2019-04-22 | End: 2019-04-28 | Stop reason: HOSPADM

## 2019-04-22 RX ORDER — OXYCODONE HYDROCHLORIDE 5 MG/1
2.5 TABLET ORAL
Status: DISCONTINUED | OUTPATIENT
Start: 2019-04-22 | End: 2019-04-28 | Stop reason: HOSPADM

## 2019-04-22 RX ORDER — MORPHINE SULFATE 4 MG/ML
2 INJECTION, SOLUTION INTRAMUSCULAR; INTRAVENOUS
Status: DISCONTINUED | OUTPATIENT
Start: 2019-04-22 | End: 2019-04-28 | Stop reason: HOSPADM

## 2019-04-22 RX ORDER — ACETAMINOPHEN 325 MG/1
650 TABLET ORAL EVERY 6 HOURS PRN
Status: DISCONTINUED | OUTPATIENT
Start: 2019-04-22 | End: 2019-04-28 | Stop reason: HOSPADM

## 2019-04-22 RX ORDER — ONDANSETRON 2 MG/ML
4 INJECTION INTRAMUSCULAR; INTRAVENOUS ONCE
Status: COMPLETED | OUTPATIENT
Start: 2019-04-22 | End: 2019-04-22

## 2019-04-22 RX ORDER — OMEPRAZOLE 20 MG/1
40 CAPSULE, DELAYED RELEASE ORAL
Status: DISCONTINUED | OUTPATIENT
Start: 2019-04-23 | End: 2019-04-28 | Stop reason: HOSPADM

## 2019-04-22 RX ADMIN — FENTANYL CITRATE 50 MCG: 50 INJECTION INTRAMUSCULAR; INTRAVENOUS at 12:55

## 2019-04-22 RX ADMIN — ACETAMINOPHEN 650 MG: 325 TABLET, FILM COATED ORAL at 16:52

## 2019-04-22 RX ADMIN — ENOXAPARIN SODIUM 40 MG: 100 INJECTION SUBCUTANEOUS at 17:43

## 2019-04-22 RX ADMIN — OXYCODONE HYDROCHLORIDE 5 MG: 5 TABLET ORAL at 16:51

## 2019-04-22 RX ADMIN — PRAMIPEXOLE DIHYDROCHLORIDE 0.5 MG: 0.5 TABLET ORAL at 20:35

## 2019-04-22 RX ADMIN — FENTANYL CITRATE 50 MCG: 50 INJECTION INTRAMUSCULAR; INTRAVENOUS at 14:14

## 2019-04-22 RX ADMIN — MORPHINE SULFATE 2 MG: 4 INJECTION INTRAVENOUS at 17:43

## 2019-04-22 RX ADMIN — ONDANSETRON 4 MG: 2 INJECTION INTRAMUSCULAR; INTRAVENOUS at 12:54

## 2019-04-22 ASSESSMENT — COGNITIVE AND FUNCTIONAL STATUS - GENERAL
DRESSING REGULAR LOWER BODY CLOTHING: A LITTLE
MOBILITY SCORE: 19
CLIMB 3 TO 5 STEPS WITH RAILING: A LITTLE
SUGGESTED CMS G CODE MODIFIER MOBILITY: CK
PERSONAL GROOMING: A LITTLE
WALKING IN HOSPITAL ROOM: A LITTLE
SUGGESTED CMS G CODE MODIFIER DAILY ACTIVITY: CK
DRESSING REGULAR UPPER BODY CLOTHING: A LITTLE
MOVING FROM LYING ON BACK TO SITTING ON SIDE OF FLAT BED: A LITTLE
DAILY ACTIVITIY SCORE: 19
MOVING TO AND FROM BED TO CHAIR: A LITTLE
HELP NEEDED FOR BATHING: A LITTLE
STANDING UP FROM CHAIR USING ARMS: A LITTLE
TOILETING: A LITTLE

## 2019-04-22 ASSESSMENT — ENCOUNTER SYMPTOMS
SPEECH CHANGE: 0
SORE THROAT: 0
FOCAL WEAKNESS: 0
HEARTBURN: 0
DEBILITATING PAIN: 1
DIFFICULTY THINKING: 1
NECK PAIN: 0
WEAKNESS: 0
SHORTNESS OF BREATH: 0
ABDOMINAL PAIN: 0
DIZZINESS: 0
FEVER: 0
POOR JUDGMENT: 1
WHEEZING: 0
HEADACHES: 0
VOMITING: 0
SENSORY CHANGE: 0
BACK PAIN: 1
BLURRED VISION: 0
NAUSEA: 1
COUGH: 0
NERVOUS/ANXIOUS: 0
DIARRHEA: 0
CHILLS: 0
PALPITATIONS: 0

## 2019-04-22 ASSESSMENT — LIFESTYLE VARIABLES
DO YOU DRINK ALCOHOL: NO
EVER_SMOKED: NEVER

## 2019-04-22 NOTE — ED TRIAGE NOTES
Pt arrived via EMS after suffering a ground level fall at home. Pt states she fell from her bed to the floor landing on her coccyx. Pt reports discomfort in lower back. Pt had recently been seen at [facility] followed by a stay at the rehab facility for a similar event. EMS provided 4 Zofran, 50mcg Fentanyl, 0.5mg Versed with relief of patients pain. Pt has hx of falls, hypertension, and stork w/o deficits.

## 2019-04-22 NOTE — ED PROVIDER NOTES
ED Provider Note    Scribed for Valentino Sosa M.D. by Abbey Peck. 4/22/2019  12:37 PM    Primary care provider: Lenny RENTERIA M.D.  Means of arrival: Ambulance  History obtained from: Patient  History limited by: None    CHIEF COMPLAINT  •  Fall  •  Back Pain      HPI  Lindsay Vargas is an 78 y.o. female who presents to the Emergency Department by ambulance for a fall and back pain with an onset of 2 days. She has a history of chronic pain to her low back, neck and right shoulder. She was recently discharged home from a rehab facility approximately 1.5 weeks ago. She has been doing well since returning home but states she experienced a ground level fall two days ago. She was ambulating with her walker, fell and reportedly landed on her right side and rolled onto her back. After the fall, she began to experience sacral pain and exacerbated chronic pain to her low back, right shoulder and right lateral neck. Her pain significantly worsened yesterday evening and reports minimal improvement with Acetaminophen. She is unable to stand or ambulate without assistance secondary to lower extremity weakness and chronic numbness to her legs. Negative for head injury, chest pain, shortness of breath, abdominal pain or urinary symptoms.      REVIEW OF SYSTEMS  Pertinent positives include chronic exacerbated pain to low back, neck and right shoulder, sacral pain, ground level fall, lower extremity weakness and chronic numbness to bilateral legs. Pertinent negatives include no head injury, chest pain, shortness of breath, abdominal pain or urinary symptoms.  All other systems reviewed and negative. See HPI for further details. C.       PAST MEDICAL HISTORY  Patient has a past medical history of Anesthesia; Arthritis; Cataract; Chronic pain; Hiatus hernia syndrome; Hypertension; Lymphedema (2014); Migraine headache; Pain; and Pneumonia. She has a history of chronic pain to her low back, neck and right shoulder.  "      SURGICAL HISTORY  Patient has a past surgical history that includes hysterectomy laparoscopy; chely by laparoscopy; other abdominal surgery; other abdominal surgery; other; other orthopedic surgery; cataract phaco with iol (Left, 2/4/2016); cataract phaco with iol (Right, 2/18/2016); inj lumbar/sacral,w/wo cntrst (Right, 9/29/2016); fluorscopic guidance spinal injection (9/29/2016); inj lumbar/sacral,w/wo cntrst (N/A, 11/10/2016); and fluorscopic guidance spinal injection (11/10/2016).      SOCIAL HISTORY  Social History   Substance Use Topics   • Smoking status: Never Smoker   • Smokeless tobacco: Never Used   • Alcohol use No      Comment: rarely      History   Drug Use No   Patient is accompanied by her spouse.      FAMILY HISTORY  Family History   Problem Relation Age of Onset   • Heart Disease Mother    • Heart Disease Father        CURRENT MEDICATIONS  Home Medications     Reviewed by Luzmaria Barnes R.N. (Registered Nurse) on 04/22/19 at 1149  Med List Status: Complete   Medication Last Dose Status   acetaminophen (TYLENOL) 325 MG Tab  Active   DULoxetine (CYMBALTA) 60 MG Cap DR Particles delayed-release capsule  Active   omeprazole (PRILOSEC) 40 MG delayed-release capsule  Active   oxyCODONE-acetaminophen (PERCOCET) 5-325 MG Tab  Active   pramipexole (MIRAPEX) 0.5 MG Tab  Active   propranolol CR (INDERAL LA) 160 MG CAPSULE SR 24 HR capsule  Active   senna-docusate (PERICOLACE OR SENOKOT S) 8.6-50 MG Tab  Active   vitamin D (VITAMIND D3) 1000 UNIT Tab  Active                ALLERGIES  None      PHYSICAL EXAM  VITAL SIGNS: /80   Pulse 82   Temp 36.7 °C (98 °F) (Temporal)   Resp 14   Ht 1.715 m (5' 7.5\")   Wt 80.1 kg (176 lb 9.4 oz)   SpO2 98%   BMI 27.25 kg/m²     Constitutional: Well developed, Well nourished, No distress, Non-toxic appearance.   HENT: Normocephalic, Atraumatic, Bilateral external ears normal, Oropharynx moist, No oral exudates.   Eyes: PERRLA, EOMI, Conjunctiva normal, No " discharge.   Neck: No tenderness, Supple, No stridor.   Lymphatic: No lymphadenopathy noted.   Cardiovascular: Normal heart rate, Normal rhythm.   Thorax & Lungs: Clear to auscultation bilaterally, No respiratory distress, No wheezing, No crackles.   Abdomen: Soft, No tenderness, No masses, No pulsatile masses.   Skin: Warm, Dry, No erythema, No rash.   Extremities:, No edema No cyanosis.   Musculoskeletal:  Intact distal pulses. Tenderness with palpation of right shoulder. Decreased ROM to right shoulder. Tenderness with palpation of the lateral aspect of the right neck. Tenderness around sacrum/coccyx area with no ecchymosis or soft tissue swelling.  Neurologic: Awake, alert. Moves all extremities spontaneously.  Psychiatric: Affect normal, Judgment normal, Mood normal.       LABS  Labs Reviewed   COMP METABOLIC PANEL - Abnormal; Notable for the following:        Result Value    Glucose 100 (*)     All other components within normal limits   CBC WITH DIFFERENTIAL - Abnormal; Notable for the following:     MCHC 33.2 (*)     All other components within normal limits   ESTIMATED GFR   URINALYSIS,CULTURE IF INDICATED     All labs reviewed by me.      RADIOLOGY  DX-CHEST-2 VIEWS   Final Result      Hypoinflation and lung base atelectasis.      DX-SHOULDER 2+ RIGHT   Final Result      No acute fracture identified.      DX-SACRUM AND COCCYX 2+    (Results Pending)     The radiologist's interpretation of all radiological studies have been reviewed by me.      COURSE & MEDICAL DECISION MAKING  Pertinent Labs & Imaging studies reviewed. (See chart for details)    Differential Diagnoses include but are not limited to: fracture, metabolic, acute chronic pain.    12:40 PM Obtained and reviewed patient's electronic medical records which indicate a discharge summary dated 04/11/19 for debility, falls and cognitive deficits. Admitted for inpatient rehab and transferred to [facility].    12:48 PM Patient seen  and examined at bedside. Patient presents for a fall and back pain.     Initial orders in the Emergency Department included XR chest, XR sacrum and coccyx, XR right shoulder and laboratory testing: CMP, CBC with differential, estimated GFR and UA.  Initial treatment in the Emergency Department included 4 mg of Zofran IV and 50 mg of Fentanyl IV.       Decision Making:  Status post fall, the patient has chronic falls, fell and injured her right shoulder, low back, she has acute on chronic pain to those areas, unable to care for self, discussed the case with Dr. Saunders for admission to the hospital.        DISPOSITION  Patient will be admitted to hospitalist in guarded condition.       DIAGNOSIS  1. Fall, initial encounter    2. Acute pain of right shoulder    3. Acute midline low back pain without sciatica             Abbey JACOB (Scribe), am scribing for, and in the presence of, Valentino Sosa M.D.    Electronically signed by: Abbey Peck (Scribe), 4/22/2019    IValentino M.D. personally performed the services described in this documentation, as scribed by Abbey Peck in my presence, and it is both accurate and complete. C.    The note accurately reflects work and decisions made by me.  Valentino Sosa  4/22/2019  2:16 PM

## 2019-04-22 NOTE — ED NOTES
Med rec updated and complete.  Allergies reviewed. Met with pt/family at bedside and  Dicussed current medications and last  Taken. No   oral antibiotic use in last 30 days.

## 2019-04-22 NOTE — ASSESSMENT & PLAN NOTE
Propranolol was held on admission secondary to soft blood pressures however I suspect that she is having a rebound tachycardia therefore propranolol has been restarted

## 2019-04-22 NOTE — H&P
Hospital Medicine History & Physical Note    Date of Service  4/22/2019    Primary Care Physician  Lenny RENTERIA M.D.    Consultants  None    Code Status  Full    Chief Complaint  fall    History of Presenting Illness  78 y.o. female who presented 4/22/2019 with which happened yesterday when she was trying to get out of bed.  She apparently just rolled to the floor from her bed.  She is having right shoulder pain and back pain, which she admits is chronic to her.  However she feels that her tailbone pain is worse. Right shoulder x-ray and sacral x-ray are both negative.  Patient states for her shoulder pain she was recently diagnosed with rotator cuff tendinitis was advised surgery.  Patient has had frequent falls, she is somewhat of a poor historian including her spouse, she is unable to see if she has had any dizziness or lightheadedness, she denies chest pain palpitation shortness of breath, she denies any fever or chills.  She has some chronic nasal congestion but denies any cough, denies any abdominal pain or vomiting, she has had some nausea.  She was admitted for falls twice in the past month she was initially discharged to skilled nursing facility then to rehab.  She just got out of rehab.    Review of Systems  Review of Systems   Constitutional: Negative for chills, fever and malaise/fatigue.   HENT: Negative for hearing loss and sore throat.    Eyes: Negative for blurred vision.   Respiratory: Negative for cough, shortness of breath and wheezing.    Cardiovascular: Negative for chest pain, palpitations and leg swelling.   Gastrointestinal: Positive for nausea. Negative for abdominal pain, diarrhea, heartburn and vomiting.   Genitourinary: Negative for dysuria.   Musculoskeletal: Positive for back pain and joint pain. Negative for neck pain.   Skin: Negative for rash.   Neurological: Negative for dizziness, sensory change, speech change, focal weakness, weakness and headaches.    Psychiatric/Behavioral: The patient is not nervous/anxious.        Past Medical History   has a past medical history of Anesthesia; Arthritis; Cataract; Chronic pain; Hiatus hernia syndrome; Hypertension; Lymphedema (2014); Migraine headache; Pain; and Pneumonia.    Surgical History   has a past surgical history that includes hysterectomy laparoscopy; chely by laparoscopy; other abdominal surgery; other abdominal surgery; other; other orthopedic surgery; cataract phaco with iol (Left, 2/4/2016); cataract phaco with iol (Right, 2/18/2016); pr inj lumbar/sacral,w/wo cntrst (Right, 9/29/2016); pr fluorscopic guidance spinal injection (9/29/2016); pr inj lumbar/sacral,w/wo cntrst (N/A, 11/10/2016); and pr fluorscopic guidance spinal injection (11/10/2016).     Family History  family history includes Heart Disease in her father and mother.     Social History   reports that she has never smoked. She has never used smokeless tobacco. She reports that she does not drink alcohol or use drugs.    Allergies  No Known Allergies    Medications  Prior to Admission Medications   Prescriptions Last Dose Informant Patient Reported? Taking?   DULoxetine (CYMBALTA) 60 MG Cap DR Particles delayed-release capsule   No No   Sig: Take 1 Cap by mouth every day.   acetaminophen (TYLENOL) 325 MG Tab   No No   Sig: Take 2 Tabs by mouth every 6 hours as needed (Mild Pain; (Pain scale 1-3); Temp greater than 100.5 F).   omeprazole (PRILOSEC) 40 MG delayed-release capsule   No No   Sig: TAKE 1 CAPSULE BY MOUTH ONCE A DAY 30 MINUTES BEFORE BREAKFAST MEAL   oxyCODONE-acetaminophen (PERCOCET) 5-325 MG Tab   Yes No   Sig: Take 1 Tab by mouth every 8 hours as needed for Severe Pain. Prescribed by Dr. Mustafa, spinal specialist. Pt is currently taking for her right neck pain. Pt reports will finish remaining pills if needed.   pramipexole (MIRAPEX) 0.5 MG Tab   No No   Sig: Take 1 Tab by mouth every bedtime.   propranolol CR (INDERAL LA) 160 MG  CAPSULE SR 24 HR capsule   No No   Sig: Take 1 Cap by mouth every day.   senna-docusate (PERICOLACE OR SENOKOT S) 8.6-50 MG Tab   Yes No   Sig: Take 2 Tabs by mouth 2 Times a Day.   vitamin D (VITAMIND D3) 1000 UNIT Tab   Yes No   Sig: Take 1 Tab by mouth every day.      Facility-Administered Medications: None       Physical Exam  Temp:  [36.7 °C (98 °F)] 36.7 °C (98 °F)  Pulse:  [71-82] 74  Resp:  [14] 14  BP: (160)/(80) 160/80  SpO2:  [96 %-99 %] 99 %    Physical Exam   Constitutional: She is oriented to person, place, and time. She appears well-developed and well-nourished.   HENT:   Head: Normocephalic and atraumatic.   Eyes: Pupils are equal, round, and reactive to light. Conjunctivae are normal.   Neck: No tracheal deviation present. No thyromegaly present.   Cardiovascular: Normal rate and regular rhythm.    Pulmonary/Chest: Effort normal and breath sounds normal.   Abdominal: Soft. Bowel sounds are normal. She exhibits no distension. There is no tenderness.   Musculoskeletal: She exhibits edema.   Tenderness at right shoulder   Lymphadenopathy:     She has no cervical adenopathy.   Neurological: She is alert and oriented to person, place, and time. No cranial nerve deficit.   MMT 5/5   Skin: Skin is warm and dry.   Nursing note and vitals reviewed.      Laboratory:  Recent Labs      04/22/19   1226   WBC  6.6   RBC  4.27   HEMOGLOBIN  12.3   HEMATOCRIT  37.1   MCV  86.9   MCH  28.8   MCHC  33.2*   RDW  46.9   PLATELETCT  173   MPV  10.3     Recent Labs      04/22/19   1226   SODIUM  136   POTASSIUM  4.4   CHLORIDE  104   CO2  24   GLUCOSE  100*   BUN  12   CREATININE  0.58   CALCIUM  8.9     Recent Labs      04/22/19   1226   ALTSGPT  7   ASTSGOT  13   ALKPHOSPHAT  58   TBILIRUBIN  0.7   GLUCOSE  100*                 No results for input(s): TROPONINI in the last 72 hours.    Urinalysis:    No results found     Imaging:  DX-SACRUM AND COCCYX 2+   Final Result      No acute fracture of the sacrum and coccyx  identified. If symptoms are persistent, would recommend CT for further evaluation.      DX-CHEST-2 VIEWS   Final Result      Hypoinflation and lung base atelectasis.      DX-SHOULDER 2+ RIGHT   Final Result      No acute fracture identified.      CT-TSPINE W/O PLUS RECONS    (Results Pending)   CT-LSPINE W/O PLUS RECONS    (Results Pending)         Assessment/Plan:  I anticipate this patient is appropriate for observation status at this time.    Frequent falls- (present on admission)   Assessment & Plan    PT and OT evaluations  Check Orthostatics  Check CT T and L spine     History of compression fracture of spine- (present on admission)   Assessment & Plan    Vit d replacement     Vitamin B12 deficiency- (present on admission)   Assessment & Plan    Check level     Chronic right shoulder pain- (present on admission)   Assessment & Plan    PT and OT     Essential hypertension- (present on admission)   Assessment & Plan    Hold Propranolol     Chronic back pain- (present on admission)   Assessment & Plan    pain control     Restless legs syndrome- (present on admission)   Assessment & Plan    Pramipexole     GERD (gastroesophageal reflux disease)- (present on admission)   Assessment & Plan    Omeprazole         VTE prophylaxis: Lovenox

## 2019-04-23 ENCOUNTER — HOME CARE VISIT (OUTPATIENT)
Dept: HOME HEALTH SERVICES | Facility: HOME HEALTHCARE | Age: 79
End: 2019-04-23
Payer: MEDICARE

## 2019-04-23 ENCOUNTER — APPOINTMENT (OUTPATIENT)
Dept: RADIOLOGY | Facility: MEDICAL CENTER | Age: 79
DRG: 310 | End: 2019-04-23
Attending: HOSPITALIST
Payer: MEDICARE

## 2019-04-23 PROBLEM — R41.0 CONFUSION: Status: ACTIVE | Noted: 2019-04-23

## 2019-04-23 LAB
25(OH)D3 SERPL-MCNC: 14 NG/ML (ref 30–100)
AMMONIA PLAS-SCNC: 41 UMOL/L (ref 11–45)
LACTATE BLD-SCNC: 0.8 MMOL/L (ref 0.5–2)
LACTATE BLD-SCNC: 1.4 MMOL/L (ref 0.5–2)
LACTATE BLD-SCNC: 1.5 MMOL/L (ref 0.5–2)
VIT B12 SERPL-MCNC: 94 PG/ML (ref 211–911)

## 2019-04-23 PROCEDURE — 97166 OT EVAL MOD COMPLEX 45 MIN: CPT

## 2019-04-23 PROCEDURE — 700111 HCHG RX REV CODE 636 W/ 250 OVERRIDE (IP): Performed by: FAMILY MEDICINE

## 2019-04-23 PROCEDURE — 83605 ASSAY OF LACTIC ACID: CPT | Mod: 91

## 2019-04-23 PROCEDURE — A9270 NON-COVERED ITEM OR SERVICE: HCPCS | Performed by: HOSPITALIST

## 2019-04-23 PROCEDURE — A9270 NON-COVERED ITEM OR SERVICE: HCPCS | Performed by: FAMILY MEDICINE

## 2019-04-23 PROCEDURE — 82306 VITAMIN D 25 HYDROXY: CPT

## 2019-04-23 PROCEDURE — 82140 ASSAY OF AMMONIA: CPT

## 2019-04-23 PROCEDURE — 700102 HCHG RX REV CODE 250 W/ 637 OVERRIDE(OP): Performed by: HOSPITALIST

## 2019-04-23 PROCEDURE — 97162 PT EVAL MOD COMPLEX 30 MIN: CPT

## 2019-04-23 PROCEDURE — 82607 VITAMIN B-12: CPT

## 2019-04-23 PROCEDURE — 36415 COLL VENOUS BLD VENIPUNCTURE: CPT

## 2019-04-23 PROCEDURE — 700102 HCHG RX REV CODE 250 W/ 637 OVERRIDE(OP): Performed by: FAMILY MEDICINE

## 2019-04-23 PROCEDURE — 96372 THER/PROPH/DIAG INJ SC/IM: CPT

## 2019-04-23 PROCEDURE — 70450 CT HEAD/BRAIN W/O DYE: CPT

## 2019-04-23 PROCEDURE — G0378 HOSPITAL OBSERVATION PER HR: HCPCS

## 2019-04-23 PROCEDURE — 99226 PR SUBSEQUENT OBSERVATION CARE,LEVEL III: CPT | Performed by: HOSPITALIST

## 2019-04-23 RX ORDER — CHOLECALCIFEROL (VITAMIN D3) 125 MCG
1000 CAPSULE ORAL DAILY
Status: DISCONTINUED | OUTPATIENT
Start: 2019-04-23 | End: 2019-04-28 | Stop reason: HOSPADM

## 2019-04-23 RX ADMIN — CYANOCOBALAMIN TAB 500 MCG 1000 MCG: 500 TAB at 16:54

## 2019-04-23 RX ADMIN — DULOXETINE HYDROCHLORIDE 60 MG: 60 CAPSULE, DELAYED RELEASE ORAL at 06:19

## 2019-04-23 RX ADMIN — VITAMIN D, TAB 1000IU (100/BT) 2000 UNITS: 25 TAB at 06:19

## 2019-04-23 RX ADMIN — OXYCODONE HYDROCHLORIDE 5 MG: 5 TABLET ORAL at 20:33

## 2019-04-23 RX ADMIN — ENOXAPARIN SODIUM 40 MG: 100 INJECTION SUBCUTANEOUS at 06:20

## 2019-04-23 RX ADMIN — PRAMIPEXOLE DIHYDROCHLORIDE 0.5 MG: 0.5 TABLET ORAL at 20:32

## 2019-04-23 RX ADMIN — SENNOSIDES,DOCUSATE SODIUM 2 TABLET: 8.6; 5 TABLET, FILM COATED ORAL at 06:20

## 2019-04-23 RX ADMIN — OMEPRAZOLE 40 MG: 20 CAPSULE, DELAYED RELEASE ORAL at 06:19

## 2019-04-23 RX ADMIN — OXYCODONE HYDROCHLORIDE 5 MG: 5 TABLET ORAL at 08:20

## 2019-04-23 ASSESSMENT — GAIT ASSESSMENTS
GAIT LEVEL OF ASSIST: MINIMAL ASSIST
DISTANCE (FEET): 350
ASSISTIVE DEVICE: FRONT WHEEL WALKER

## 2019-04-23 ASSESSMENT — COGNITIVE AND FUNCTIONAL STATUS - GENERAL
DRESSING REGULAR LOWER BODY CLOTHING: A LITTLE
HELP NEEDED FOR BATHING: A LITTLE
STANDING UP FROM CHAIR USING ARMS: A LITTLE
DRESSING REGULAR UPPER BODY CLOTHING: A LITTLE
SUGGESTED CMS G CODE MODIFIER DAILY ACTIVITY: CK
SUGGESTED CMS G CODE MODIFIER MOBILITY: CK
CLIMB 3 TO 5 STEPS WITH RAILING: A LOT
PERSONAL GROOMING: A LITTLE
WALKING IN HOSPITAL ROOM: A LITTLE
MOBILITY SCORE: 17
TOILETING: A LITTLE
MOVING FROM LYING ON BACK TO SITTING ON SIDE OF FLAT BED: A LITTLE
TURNING FROM BACK TO SIDE WHILE IN FLAT BAD: A LITTLE
MOVING TO AND FROM BED TO CHAIR: A LITTLE
DAILY ACTIVITIY SCORE: 19

## 2019-04-23 ASSESSMENT — ACTIVITIES OF DAILY LIVING (ADL): TOILETING: REQUIRES ASSIST

## 2019-04-23 NOTE — PROGRESS NOTES
Pt arrived to T716-1 via gurney. Transferred with slideboard to new bed. Pt awake, laying in bed. A/Ox4, VSS. C/o pain in neck and jaw; pt is tearful. Pt oriented to unit and call light system. POC reviewed and white board updated. Tele box on. Call light in reach. Bed locked in lowest position with 2 upper bed rails up. Bed alarm plugged in and placement under pt was checked. Pt  at bedside. Warm packs and emotional support provided.

## 2019-04-23 NOTE — PROGRESS NOTES
[facility] Medicine Daily Progress Note    Date of Service  4/23/2019    Chief Complaint  78 y.o. female admitted 4/22/2019 with fall    Hospital Course    78 y.o. female who presented 4/22/2019 with which happened yesterday when she was trying to get out of bed.  She apparently just rolled to the floor from her bed.  She is having right shoulder pain and back pain, which she admits is chronic to her.  However she feels that her tailbone pain is worse. Right shoulder x-ray and sacral x-ray are both negative.  Patient states for her shoulder pain she was recently diagnosed with rotator cuff tendinitis was advised surgery.  Patient has had frequent falls, she is somewhat of a poor historian including her spouse, she is unable to see if she has had any dizziness or lightheadedness, she denies chest pain palpitation shortness of breath, she denies any fever or chills.  She has some chronic nasal congestion but denies any cough, denies any abdominal pain or vomiting, she has had some nausea.  She was admitted for falls twice in the past month she was initially discharged to skilled nursing facility then to rehab.  She just got out of rehab.         Interval Problem Update  Seen and examined patient doing well, feeling weak.  She continues to have right shoulder pain and back pain.  Patient continues to ramble on her  is at bedside and says this is new for her.  She at times is not making any sense.  No focal neurologic deficits noted.    Consultants/Specialty  None    Code Status  Full    Disposition  Snf ordered    Review of Systems  Review of Systems   Unable to perform ROS: Acuity of condition        Physical Exam  Temp:  [36.2 °C (97.1 °F)-36.8 °C (98.2 °F)] 36.2 °C (97.1 °F)  Pulse:  [] 102  Resp:  [16-18] 18  BP: (107-147)/(45-87) 147/73  SpO2:  [93 %-99 %] 94 %    Physical Exam   Constitutional: She appears well-developed and well-nourished. No distress.   HENT:   Head: Normocephalic and atraumatic.    Mouth/Throat: No oropharyngeal exudate.   Eyes: Pupils are equal, round, and reactive to light. Conjunctivae and EOM are normal. No scleral icterus.   Neck: Normal range of motion. Neck supple. No JVD present. No thyromegaly present.   Cardiovascular: Normal rate, regular rhythm and intact distal pulses.    No murmur heard.  Pulmonary/Chest: Effort normal and breath sounds normal. No respiratory distress. She has no wheezes.   Abdominal: Soft. Bowel sounds are normal. She exhibits no distension. There is no tenderness.   Musculoskeletal: She exhibits tenderness. She exhibits no edema or deformity.   Limited right shoulder range of motion secondary to pain   Neurological: She is alert. She exhibits normal muscle tone.   Skin: Skin is warm and dry. No rash noted. No erythema.   Psychiatric:   Patient is rambling and not making sense at times.       Fluids    Intake/Output Summary (Last 24 hours) at 04/23/19 1519  Last data filed at 04/23/19 0900   Gross per 24 hour   Intake              120 ml   Output              700 ml   Net             -580 ml       Laboratory  Recent Labs      04/22/19   1226   WBC  6.6   RBC  4.27   HEMOGLOBIN  12.3   HEMATOCRIT  37.1   MCV  86.9   MCH  28.8   MCHC  33.2*   RDW  46.9   PLATELETCT  173   MPV  10.3     Recent Labs      04/22/19   1226   SODIUM  136   POTASSIUM  4.4   CHLORIDE  104   CO2  24   GLUCOSE  100*   BUN  12   CREATININE  0.58   CALCIUM  8.9                   Imaging  CT-HEAD W/O   Final Result      No acute intracranial abnormality is identified.      There are periventricular and subcortical white matter changes present.  This finding is nonspecific and could be from previous small vessel ischemia, demyelination, or gliosis.      Atrophy         CT-LSPINE W/O PLUS RECONS   Final Result         1. No acute fracture or malalignment appreciated in the lumbar spine      2. Severe degenerative change of the lumbar spine, worse than prior.      3. Mild anterolisthesis of  L4-5, similar to prior.         CT-TSPINE W/O PLUS RECONS   Final Result         1. No acute fracture or malalignment appreciated in the thoracic spine         DX-SACRUM AND COCCYX 2+   Final Result      No acute fracture of the sacrum and coccyx identified. If symptoms are persistent, would recommend CT for further evaluation.      DX-CHEST-2 VIEWS   Final Result      Hypoinflation and lung base atelectasis.      DX-SHOULDER 2+ RIGHT   Final Result      No acute fracture identified.      MR-BRAIN-W/O    (Results Pending)        Assessment/Plan  * Frequent falls- (present on admission)   Assessment & Plan    Physical therapy recommended postacute rehab I will place order for skilled nursing facility.  Check Orthostatics  CT T and L spine:Severe degenerative change of the lumbar spine, worse than prior.     Confusion   Assessment & Plan    Unclear etiology at this time   at bedside and states this is not her baseline mentation.  Patient is rambling and at times does not make any sense.  Head CT is negative for any acute abnormalities.    I will obtain an MRI however a low likelihood for CVA given neuro normal neurologic exam  Rule out infectious causes at this time patient with normal vital signs, no white count.  UA is negative  I will also obtain ammonia levels, lactic acid, obtain blood cultures       History of compression fracture of spine- (present on admission)   Assessment & Plan    Vit d replacement     Vitamin B12 deficiency- (present on admission)   Assessment & Plan    Vitamin B level low  Start supplementation at this time     Chronic right shoulder pain- (present on admission)   Assessment & Plan    PT and OT     Essential hypertension- (present on admission)   Assessment & Plan    Hold Propranolol     Chronic back pain- (present on admission)   Assessment & Plan    pain control     Restless legs syndrome- (present on admission)   Assessment & Plan    Pramipexole     GERD (gastroesophageal reflux  disease)- (present on admission)   Assessment & Plan    Omeprazole          VTE prophylaxis: Lovenox

## 2019-04-23 NOTE — PROGRESS NOTES
Bedside report received. Patient sitting up in bed. RN assessed pain; patient stated pain 10/10, RN to provided pain intervention per MAR. Patient educated to call for assistance before ambulating; call light within reach. Need for bed alarm assessed; patient is at low risk for falls, bed alarm on given patients history of multiple falls, all other precautions in place as appropriate.

## 2019-04-23 NOTE — THERAPY
"Occupational Therapy Evaluation completed.   Functional Status:    77 y/o female admitted after falling when trying to get out of bed, c/o R shoulder and back pain. Of note, pt recently at RR after a fall. Today pt is emotionally labile throughout session, fairly difficult to redirect. She completed bed mobility with Min A and cuing. Pt able to adjust her socks with increased time and effort. Sit><stand with Min A, multiple cues needed to instruct pt on correct usage of FWW. Pt completed standing G/H with CGA. Pt's spouse present and reports that he assists her with much of her ADL and IADL at home. Pt presents with cognitive deficits during session, likely consistent with baseline. Will continue working with pt in this setting.     Orthostatics:  Supine: 114/78  EOB (emotional) : 155/92  Standing 112/91  Sitting after ambulatin/94    Plan of Care: Will benefit from Occupational Therapy 3 times per week  Discharge Recommendations:  Equipment: Will Continue to Assess for Equipment Needs. Post-acute therapy Recommend inpatient transitional care services for continued occupational therapy services. -- May be able to D/C home with HH pending spouse's ability to provide sufficient support and assist.       See \"Rehab Therapy-Acute\" Patient Summary Report for complete documentation.    "

## 2019-04-23 NOTE — PROGRESS NOTES
RN attempted to call  to fill out MRI screening form, no answer. RN left a message for call back.

## 2019-04-23 NOTE — THERAPY
"Physical Therapy Evaluation completed.   Bed Mobility:  Supine to Sit: Minimal Assist  Transfers: Sit to Stand: Minimal Assist  Gait: Level Of Assist: Minimal Assist with Front-Wheel Walker       Plan of Care: Will benefit from Physical Therapy 3 times per week  Discharge Recommendations: Equipment: Will Continue to Assess for Equipment Needs. Post-acute therapy: see below.    See \"Rehab Therapy-Acute\" Patient Summary Report for complete documentation.    Patient is an 77 YO female that was admitted following GLF out of bed. Patient with recent hospitalization and rehab stay and recently returned home. PMHx significant for chronic pain and HTN. She presented to PT with apparent impaired cognition, impaired insight and safety awareness, pain, impaired balance, and decreased activity tolerance. She performed mobility and min A level, ambulated with FWW and demonstrated minimal LOB with challenges to gait. Patient was hyperverbose and tangential. Patient appears to be close to baseline functional mobility, question whether  able to provide level of assist required long term. Currently recommend post acute placement; will continue to follow.  "

## 2019-04-23 NOTE — PROGRESS NOTES
Patient resting in bed for majority of morning. MD was able to round with patient early on. Plan of care discussed. RN provided patient with pain intervention early on. Patient declines any new onsets of pain at this time. Patient declines any additional needs at this time. Call light within reach. Fall precautions in place. Will continue with hourly rounding.

## 2019-04-23 NOTE — CARE PLAN
Problem: Knowledge Deficit  Goal: Knowledge of disease process/condition, treatment plan, diagnostic tests, and medications will improve    Intervention: Assess knowledge level of disease process/condition, treatment plan, diagnostic tests, and medications  Rn will provided education to patient regarding disease process, treatment plan, diagnostic tests and medications pertinent to the patient's condition. RN will answer questions that the patient has related to their condition.         Problem: Discharge Barriers/Planning  Goal: Patient's continuum of care needs will be met    Intervention: Assess potential discharge barriers on admission and throughout hospital stay  RN will assess potential discharge barriers throughout patient's hospital stay.         Problem: Pain Management  Goal: Pain level will decrease to patient's comfort goal    Intervention: Follow pain managment plan developed in collaboration with patient and Interdisciplinary Team  RN will assess patient's pain level and implement pain strategies according to MAR. Rn will also educate patient regarding both nonpharmacologic and pharmacologic pain strategies.

## 2019-04-23 NOTE — ASSESSMENT & PLAN NOTE
Infectious work-up have been ruled out    head CT has been negative  Patient refused MRI secondary to being claustrophobic  Patient is back to baseline

## 2019-04-23 NOTE — CARE PLAN
Problem: Communication  Goal: The ability to communicate needs accurately and effectively will improve  Outcome: PROGRESSING AS EXPECTED    Intervention: [place] patient and significant other/support system to call light to alert staff of needs  Completed        Problem: Safety  Goal: Will remain free from injury  Outcome: PROGRESSING AS EXPECTED    Intervention: Provide assistance with mobility  Bed alarm set, 1 person assist

## 2019-04-24 ENCOUNTER — APPOINTMENT (OUTPATIENT)
Dept: RADIOLOGY | Facility: MEDICAL CENTER | Age: 79
DRG: 310 | End: 2019-04-24
Attending: HOSPITALIST
Payer: MEDICARE

## 2019-04-24 LAB — LACTATE BLD-SCNC: 1 MMOL/L (ref 0.5–2)

## 2019-04-24 PROCEDURE — G0378 HOSPITAL OBSERVATION PER HR: HCPCS

## 2019-04-24 PROCEDURE — 700102 HCHG RX REV CODE 250 W/ 637 OVERRIDE(OP): Performed by: FAMILY MEDICINE

## 2019-04-24 PROCEDURE — 700111 HCHG RX REV CODE 636 W/ 250 OVERRIDE (IP): Performed by: FAMILY MEDICINE

## 2019-04-24 PROCEDURE — A9270 NON-COVERED ITEM OR SERVICE: HCPCS | Performed by: FAMILY MEDICINE

## 2019-04-24 PROCEDURE — 96372 THER/PROPH/DIAG INJ SC/IM: CPT

## 2019-04-24 PROCEDURE — 83605 ASSAY OF LACTIC ACID: CPT

## 2019-04-24 PROCEDURE — 700102 HCHG RX REV CODE 250 W/ 637 OVERRIDE(OP): Performed by: HOSPITALIST

## 2019-04-24 PROCEDURE — 36415 COLL VENOUS BLD VENIPUNCTURE: CPT

## 2019-04-24 PROCEDURE — 99225 PR SUBSEQUENT OBSERVATION CARE,LEVEL II: CPT | Performed by: HOSPITALIST

## 2019-04-24 PROCEDURE — A9270 NON-COVERED ITEM OR SERVICE: HCPCS | Performed by: HOSPITALIST

## 2019-04-24 RX ADMIN — OMEPRAZOLE 40 MG: 20 CAPSULE, DELAYED RELEASE ORAL at 05:32

## 2019-04-24 RX ADMIN — SENNOSIDES,DOCUSATE SODIUM 2 TABLET: 8.6; 5 TABLET, FILM COATED ORAL at 04:19

## 2019-04-24 RX ADMIN — DULOXETINE HYDROCHLORIDE 60 MG: 60 CAPSULE, DELAYED RELEASE ORAL at 04:19

## 2019-04-24 RX ADMIN — ENOXAPARIN SODIUM 40 MG: 100 INJECTION SUBCUTANEOUS at 04:19

## 2019-04-24 RX ADMIN — OXYCODONE HYDROCHLORIDE 5 MG: 5 TABLET ORAL at 12:08

## 2019-04-24 RX ADMIN — CYANOCOBALAMIN TAB 500 MCG 1000 MCG: 500 TAB at 04:19

## 2019-04-24 RX ADMIN — PRAMIPEXOLE DIHYDROCHLORIDE 0.5 MG: 0.5 TABLET ORAL at 21:22

## 2019-04-24 RX ADMIN — VITAMIN D, TAB 1000IU (100/BT) 2000 UNITS: 25 TAB at 04:19

## 2019-04-24 ASSESSMENT — ENCOUNTER SYMPTOMS
MYALGIAS: 1
DIZZINESS: 0
TINGLING: 0
DOUBLE VISION: 0
FEVER: 0
COUGH: 0
SINUS PAIN: 0
SHORTNESS OF BREATH: 0
NAUSEA: 0
DEPRESSION: 0
FALLS: 1
BACK PAIN: 1
STRIDOR: 0
DIARRHEA: 0
WEAKNESS: 0
PALPITATIONS: 0
BLURRED VISION: 0
HEARTBURN: 0
ABDOMINAL PAIN: 0
PHOTOPHOBIA: 0
FOCAL WEAKNESS: 0
NECK PAIN: 0
NERVOUS/ANXIOUS: 0
HEMOPTYSIS: 0

## 2019-04-24 ASSESSMENT — LIFESTYLE VARIABLES: SUBSTANCE_ABUSE: 0

## 2019-04-24 NOTE — DISCHARGE PLANNING
· RPG ordered receives from Dr. Hallie Espino.    · [facility] is listed as  Insurance provider.    · Presented to ED after GLF out of bed.  PMHx sig for chronic pain/HTN.   · This referral will not be sent to Physiatry as there is not a new dx to support toleration/participation for in patient rehab at this time.  Pt needs may be met at a skilled nursing facility.   · PT was at [facility] from 3/29 to 4/11.    · Thank you and I appreciate referral.

## 2019-04-24 NOTE — DISCHARGE PLANNING
RUBEN spoke with Dr. Haywood in rounds this afternoon and reviewed chart. Per chart, pt is unable to be accepted to [facility]. Dr. Haywood states that she will order SNF. RUBEN met with pt at bedside and provided SNF choice form. She states that she will look this over with her spouse this evening. She has been to [facility] in the past and does not want to go back there. CM also called pt's spouse and updated him to the above.   Care Transition Team Assessment    Information Source  Orientation : Disoriented to Time  Information Given By: Patient  Who is responsible for making decisions for patient? : Patient         Elopement Risk  Legal Hold: No  Ambulatory or Self Mobile in Wheelchair: Yes  Disoriented: No  Psychiatric Symptoms: None  History of Wandering: No  Elopement this Admit: No  Vocalizing Wanting to Leave: No  Displays Behaviors, Body Language Wanting to Leave: No-Not at Risk for Elopement  Elopement Risk: Not at Risk for Elopement    Interdisciplinary Discharge Planning  Does Admitting Nurse Feel This Could be a Complex Discharge?: No  Primary Care Physician: Dr. Darby  Lives with - Patient's Self Care Capacity: Spouse  Patient or legal guardian wants to designate a caregiver (see row info): No  Support Systems: Family Member(s)  Housing / Facility: 1 Story House  Do You Take your Prescribed Medications Regularly: Yes  Able to Return to Previous ADL's: Future Time w/Therapy  Mobility Issues: Yes  Prior Services: Continuous (24 Hour) Care Giving Family, Intermittent Physical Support for ADL Per Family  Patient Expects to be Discharged to:: rehab  Assistance Needed: Yes  Durable Medical Equipment: Not Applicable    Discharge Preparedness  What is your plan after discharge?: Home health care, Skilled nursing facility  What are your discharge supports?: Spouse  Prior Functional Level: Ambulatory, Independent with Activities of Daily Living, Independent with Medication Management, Uses Walker, Uses  Wheelchair    Functional Assesment  Prior Functional Level: Ambulatory, Independent with Activities of Daily Living, Independent with Medication Management, Uses Walker, Uses Wheelchair    Finances  Financial Barriers to Discharge: No  Prescription Coverage: Yes    Vision / Hearing Impairment  Vision Impairment : No  Hearing Impairment : Yes  Hearing Impairment: Both Ears  Does Pt Need Special Equipment for the Hearing Impaired?: No    Values / Beliefs / Concerns  Values / Beliefs Concerns : No         Domestic Abuse  Have you ever been the victim of abuse or violence?: No  Physical Abuse or Sexual Abuse: No  Verbal Abuse or Emotional Abuse: No  Possible Abuse Reported to:: Not Applicable    Psychological Assessment  History of Psychiatric Problems: No  Non-compliant with Treatment: No  Newly Diagnosed Illness: Yes    Discharge Risks or Barriers  Discharge risks or barriers?: No    Anticipated Discharge Information  Anticipated discharge disposition: HHC, SNF

## 2019-04-24 NOTE — PROGRESS NOTES
[facility] Medicine Daily Progress Note    Date of Service  4/24/2019    Chief Complaint  78 y.o. female admitted 4/22/2019 with fall    Hospital Course    78 y.o. female who presented 4/22/2019 with which happened yesterday when she was trying to get out of bed.  She apparently just rolled to the floor from her bed.  She is having right shoulder pain and back pain, which she admits is chronic to her.  However she feels that her tailbone pain is worse. Right shoulder x-ray and sacral x-ray are both negative.  Patient states for her shoulder pain she was recently diagnosed with rotator cuff tendinitis was advised surgery.  Patient has had frequent falls, she is somewhat of a poor historian including her spouse, she is unable to see if she has had any dizziness or lightheadedness, she denies chest pain palpitation shortness of breath, she denies any fever or chills.  She has some chronic nasal congestion but denies any cough, denies any abdominal pain or vomiting, she has had some nausea.  She was admitted for falls twice in the past month she was initially discharged to skilled nursing facility then to rehab.  She just got out of rehab.         Interval Problem Update  Seen and examined patient doing well, feeling weak.  She continues to have right shoulder pain and back pain.  Patient continues to ramble on her  is at bedside and says this is new for her.  She at times is not making any sense.  No focal neurologic deficits noted.      4/24: Seen and examined this morning patient is alert and oriented x3 she is complaining of back pain especially with movement.  No  at bedside.  PT recommends skilled nursing facility I have placed a referral  No other complaints or concerns.  Vital signs are stable.  Patient refusing MRI secondary to being claustrophobic.  Consultants/Specialty  None    Code Status  Full    Disposition  Snf ordered    Review of Systems  Review of Systems   Unable to perform ROS: Acuity of  condition   Constitutional: Negative for fever and malaise/fatigue.   HENT: Positive for hearing loss. Negative for congestion, nosebleeds and sinus pain.    Eyes: Negative for blurred vision, double vision and photophobia.   Respiratory: Negative for cough, hemoptysis, shortness of breath and stridor.    Cardiovascular: Negative for chest pain, palpitations and leg swelling.   Gastrointestinal: Negative for abdominal pain, diarrhea, heartburn and nausea.   Genitourinary: Negative for dysuria, frequency and urgency.   Musculoskeletal: Positive for back pain, falls, joint pain and myalgias. Negative for neck pain.   Skin: Negative for itching and rash.   Neurological: Negative for dizziness, tingling, focal weakness and weakness.   Psychiatric/Behavioral: Negative for depression and substance abuse. The patient is not nervous/anxious.         Physical Exam  Temp:  [36.2 °C (97.2 °F)-36.8 °C (98.3 °F)] 36.3 °C (97.4 °F)  Pulse:  [] 80  Resp:  [18-20] 18  BP: (130-155)/(64-83) 155/70  SpO2:  [91 %-95 %] 92 %    Physical Exam   Constitutional: She is oriented to person, place, and time. She appears well-developed and well-nourished. No distress.   HENT:   Head: Normocephalic and atraumatic.   Mouth/Throat: No oropharyngeal exudate.   Eyes: Pupils are equal, round, and reactive to light. Conjunctivae and EOM are normal. No scleral icterus.   Neck: Normal range of motion. Neck supple. No JVD present. No thyromegaly present.   Cardiovascular: Normal rate, regular rhythm and intact distal pulses.    No murmur heard.  Pulmonary/Chest: Effort normal and breath sounds normal. No respiratory distress. She has no wheezes.   Abdominal: Soft. Bowel sounds are normal. She exhibits no distension. There is no tenderness.   Musculoskeletal: She exhibits tenderness. She exhibits no edema or deformity.   Limited right shoulder range of motion secondary to pain   Neurological: She is alert and oriented to person, place, and time.  She exhibits normal muscle tone.   Skin: Skin is warm and dry. No rash noted. No erythema.   Psychiatric: She has a normal mood and affect. Her behavior is normal.   Patient is alert and oriented x3.       Fluids    Intake/Output Summary (Last 24 hours) at 04/24/19 1407  Last data filed at 04/24/19 0800   Gross per 24 hour   Intake              360 ml   Output                0 ml   Net              360 ml       Laboratory  Recent Labs      04/22/19   1226   WBC  6.6   RBC  4.27   HEMOGLOBIN  12.3   HEMATOCRIT  37.1   MCV  86.9   MCH  28.8   MCHC  33.2*   RDW  46.9   PLATELETCT  173   MPV  10.3     Recent Labs      04/22/19   1226   SODIUM  136   POTASSIUM  4.4   CHLORIDE  104   CO2  24   GLUCOSE  100*   BUN  12   CREATININE  0.58   CALCIUM  8.9                   Imaging  CT-HEAD W/O   Final Result      No acute intracranial abnormality is identified.      There are periventricular and subcortical white matter changes present.  This finding is nonspecific and could be from previous small vessel ischemia, demyelination, or gliosis.      Atrophy         CT-LSPINE W/O PLUS RECONS   Final Result         1. No acute fracture or malalignment appreciated in the lumbar spine      2. Severe degenerative change of the lumbar spine, worse than prior.      3. Mild anterolisthesis of L4-5, similar to prior.         CT-TSPINE W/O PLUS RECONS   Final Result         1. No acute fracture or malalignment appreciated in the thoracic spine         DX-SACRUM AND COCCYX 2+   Final Result      No acute fracture of the sacrum and coccyx identified. If symptoms are persistent, would recommend CT for further evaluation.      DX-CHEST-2 VIEWS   Final Result      Hypoinflation and lung base atelectasis.      DX-SHOULDER 2+ RIGHT   Final Result      No acute fracture identified.           Assessment/Plan  * Frequent falls- (present on admission)   Assessment & Plan    Physical therapy recommended postacute rehab I will place order for skilled  nursing facility.  Orthostatics noted  CT T and L spine:Severe degenerative change of the lumbar spine, worse than prior.     Confusion   Assessment & Plan    Unclear etiology at this time   at bedside and states this is not her baseline mentation.  Patient is rambling and at times does not make any sense.  Head CT is negative for any acute abnormalities.    Patient refusing MRI secondary to being claustrophobic  Rule out infectious causes at this time patient with normal vital signs, no white count.  UA is negative  40 ammonia levels, lactic acid normal, obtain blood cultures negative thus far       History of compression fracture of spine- (present on admission)   Assessment & Plan    Vit d replacement     Vitamin B12 deficiency- (present on admission)   Assessment & Plan    Vitamin B level low  Continue supplementation at this time     Chronic right shoulder pain- (present on admission)   Assessment & Plan    PT and OT     Essential hypertension- (present on admission)   Assessment & Plan    Hold Propranolol     Chronic back pain- (present on admission)   Assessment & Plan    pain control     Restless legs syndrome- (present on admission)   Assessment & Plan    Pramipexole     GERD (gastroesophageal reflux disease)- (present on admission)   Assessment & Plan    Omeprazole        Discussed plan of care with patient, nursing,  at this time patient will likely benefit from continued skilled nursing facility I have placed an order and acceptance is pending  VTE prophylaxis: Lovenox

## 2019-04-24 NOTE — DISCHARGE PLANNING
Transitional Care Navigator:    Per chart review patient has been identified as a candidate for SNF based on her medical history, therapy notes and LACE+ of 66 indicating a high risk of readmission. Please consider a referral to SNF prior to a DC of home.

## 2019-04-25 ENCOUNTER — APPOINTMENT (OUTPATIENT)
Dept: RADIOLOGY | Facility: MEDICAL CENTER | Age: 79
DRG: 310 | End: 2019-04-25
Attending: HOSPITALIST
Payer: MEDICARE

## 2019-04-25 LAB
ANION GAP SERPL CALC-SCNC: 11 MMOL/L (ref 0–11.9)
BNP SERPL-MCNC: 27 PG/ML (ref 0–100)
BUN SERPL-MCNC: 14 MG/DL (ref 8–22)
CALCIUM SERPL-MCNC: 9.7 MG/DL (ref 8.5–10.5)
CHLORIDE SERPL-SCNC: 103 MMOL/L (ref 96–112)
CO2 SERPL-SCNC: 22 MMOL/L (ref 20–33)
CREAT SERPL-MCNC: 0.76 MG/DL (ref 0.5–1.4)
GLUCOSE SERPL-MCNC: 115 MG/DL (ref 65–99)
POTASSIUM SERPL-SCNC: 4.3 MMOL/L (ref 3.6–5.5)
SODIUM SERPL-SCNC: 136 MMOL/L (ref 135–145)
TROPONIN I SERPL-MCNC: <0.01 NG/ML (ref 0–0.04)

## 2019-04-25 PROCEDURE — 700102 HCHG RX REV CODE 250 W/ 637 OVERRIDE(OP): Performed by: FAMILY MEDICINE

## 2019-04-25 PROCEDURE — 71045 X-RAY EXAM CHEST 1 VIEW: CPT

## 2019-04-25 PROCEDURE — G0378 HOSPITAL OBSERVATION PER HR: HCPCS

## 2019-04-25 PROCEDURE — 83880 ASSAY OF NATRIURETIC PEPTIDE: CPT

## 2019-04-25 PROCEDURE — A9270 NON-COVERED ITEM OR SERVICE: HCPCS | Performed by: HOSPITALIST

## 2019-04-25 PROCEDURE — 97530 THERAPEUTIC ACTIVITIES: CPT

## 2019-04-25 PROCEDURE — 700111 HCHG RX REV CODE 636 W/ 250 OVERRIDE (IP): Performed by: FAMILY MEDICINE

## 2019-04-25 PROCEDURE — 36415 COLL VENOUS BLD VENIPUNCTURE: CPT

## 2019-04-25 PROCEDURE — 96372 THER/PROPH/DIAG INJ SC/IM: CPT

## 2019-04-25 PROCEDURE — 84484 ASSAY OF TROPONIN QUANT: CPT

## 2019-04-25 PROCEDURE — 80048 BASIC METABOLIC PNL TOTAL CA: CPT

## 2019-04-25 PROCEDURE — 93010 ELECTROCARDIOGRAM REPORT: CPT | Performed by: INTERNAL MEDICINE

## 2019-04-25 PROCEDURE — 99226 PR SUBSEQUENT OBSERVATION CARE,LEVEL III: CPT | Performed by: HOSPITALIST

## 2019-04-25 PROCEDURE — 93005 ELECTROCARDIOGRAM TRACING: CPT | Performed by: HOSPITALIST

## 2019-04-25 PROCEDURE — 700102 HCHG RX REV CODE 250 W/ 637 OVERRIDE(OP): Performed by: HOSPITALIST

## 2019-04-25 PROCEDURE — A9270 NON-COVERED ITEM OR SERVICE: HCPCS | Performed by: FAMILY MEDICINE

## 2019-04-25 PROCEDURE — 97116 GAIT TRAINING THERAPY: CPT

## 2019-04-25 PROCEDURE — 97535 SELF CARE MNGMENT TRAINING: CPT

## 2019-04-25 RX ORDER — NITROGLYCERIN 0.4 MG/1
TABLET SUBLINGUAL
Status: ACTIVE
Start: 2019-04-25 | End: 2019-04-26

## 2019-04-25 RX ADMIN — ENOXAPARIN SODIUM 40 MG: 100 INJECTION SUBCUTANEOUS at 05:24

## 2019-04-25 RX ADMIN — OXYCODONE HYDROCHLORIDE 5 MG: 5 TABLET ORAL at 03:53

## 2019-04-25 RX ADMIN — PRAMIPEXOLE DIHYDROCHLORIDE 0.5 MG: 0.5 TABLET ORAL at 20:31

## 2019-04-25 RX ADMIN — OMEPRAZOLE 40 MG: 20 CAPSULE, DELAYED RELEASE ORAL at 05:56

## 2019-04-25 RX ADMIN — OXYCODONE HYDROCHLORIDE 5 MG: 5 TABLET ORAL at 11:47

## 2019-04-25 RX ADMIN — VITAMIN D, TAB 1000IU (100/BT) 2000 UNITS: 25 TAB at 05:23

## 2019-04-25 RX ADMIN — CYANOCOBALAMIN TAB 500 MCG 1000 MCG: 500 TAB at 06:00

## 2019-04-25 RX ADMIN — SENNOSIDES,DOCUSATE SODIUM 2 TABLET: 8.6; 5 TABLET, FILM COATED ORAL at 05:24

## 2019-04-25 RX ADMIN — DULOXETINE HYDROCHLORIDE 60 MG: 60 CAPSULE, DELAYED RELEASE ORAL at 05:24

## 2019-04-25 ASSESSMENT — COGNITIVE AND FUNCTIONAL STATUS - GENERAL
MOBILITY SCORE: 18
TURNING FROM BACK TO SIDE WHILE IN FLAT BAD: A LITTLE
SUGGESTED CMS G CODE MODIFIER MOBILITY: CK
SUGGESTED CMS G CODE MODIFIER DAILY ACTIVITY: CJ
TOILETING: A LITTLE
DRESSING REGULAR UPPER BODY CLOTHING: A LITTLE
DAILY ACTIVITIY SCORE: 21
MOVING TO AND FROM BED TO CHAIR: A LITTLE
WALKING IN HOSPITAL ROOM: A LITTLE
STANDING UP FROM CHAIR USING ARMS: A LITTLE
HELP NEEDED FOR BATHING: A LITTLE
MOVING FROM LYING ON BACK TO SITTING ON SIDE OF FLAT BED: A LITTLE
CLIMB 3 TO 5 STEPS WITH RAILING: A LITTLE

## 2019-04-25 ASSESSMENT — ENCOUNTER SYMPTOMS
STRIDOR: 0
DIZZINESS: 0
DIARRHEA: 0
COUGH: 0
DOUBLE VISION: 0
NERVOUS/ANXIOUS: 0
SINUS PAIN: 0
FEVER: 0
PALPITATIONS: 0
BLURRED VISION: 0
NAUSEA: 0
HEMOPTYSIS: 0
SHORTNESS OF BREATH: 0
PHOTOPHOBIA: 0
FALLS: 1
NECK PAIN: 0
TINGLING: 0
ABDOMINAL PAIN: 0
MYALGIAS: 1
BACK PAIN: 1
WEAKNESS: 0
HEARTBURN: 0
DEPRESSION: 0
FOCAL WEAKNESS: 0

## 2019-04-25 ASSESSMENT — GAIT ASSESSMENTS
GAIT LEVEL OF ASSIST: MINIMAL ASSIST
ASSISTIVE DEVICE: FRONT WHEEL WALKER
DISTANCE (FEET): 120

## 2019-04-25 ASSESSMENT — LIFESTYLE VARIABLES: SUBSTANCE_ABUSE: 0

## 2019-04-25 NOTE — DISCHARGE PLANNING
Anticipated Discharge Disposition: Home with HH    Action: CM reviewed chart and Lowell has left a note that he left a message for pts spouse Abdulaziz that pt does not meet criteria to come to [facility].   HH has been ordered and pt and her spouse would like to choose [facility]. CM completed HH choice form and sent to [facility].    Barriers to Discharge:     Plan: Monitor for acceptance to [facility].

## 2019-04-25 NOTE — DISCHARGE PLANNING
Anticipated Discharge Disposition: SNF vs Home with HH    Action: RUBEN called pt's spouse Abdulaziz this am to see if they made a decision on SNF. He states that pt wants to go home with [facility] if she cant go to [facility], but they believe if they talk to [facility] with rehab pt will be able to go.  RUBEN [facility] and left a message requesting he call this CM and pts spouse.     Barriers to Discharge:     Plan: [facility] vs Home with HH.

## 2019-04-25 NOTE — PROGRESS NOTES
Bedside report received from day shift RN, assumed pt care. Pt assessment complete. Pt alert and oriented, disoriented to time. Reviewed plan of care with pt. Tele box on and rhythm verified.  Chart and labs reviewed.  Bed in lowest position, call light within reach. Hourly rounding in place. Educated about calling for assistance.

## 2019-04-25 NOTE — FACE TO FACE
Face to Face Supporting Documentation - Home Health    The encounter with this patient was in whole or in part the primary reason for home health admission.    Date of encounter:   Patient:                    MRN:                       YOB: 2021  Lindsay Vargas  [id number]  1940     Home health to see patient for:  Skilled Nursing care for assessment, interventions & education, Physical Therapy evaluation and treatment, Occupational therapy evaluation and treatment and Speech Language Pathology evaluation and treatment    Skilled need for:  Exacerbation of Chronic Disease State debility    Skilled nursing interventions to include:  Venous access care and Comment: ptot    Homebound status evidenced by:  Need the aid of supportive devices such as crutches, canes, wheelchairs or walkers, Require the use of special transportation, Needs the assistance of another person in order to leave the home or Have a condition such that leaving his or her home is medically contraindicated. Leaving home requires a considerable and taxing effort. There is a normal inability to leave the home.    Community Physician to provide follow up care: Lenny RENTERIA M.D.     Optional Interventions? No      I certify the face to face encounter for this home health care referral meets the CMS requirements and the encounter/clinical assessment with the patient was, in whole, or in part, for the medical condition(s) listed above, which is the primary reason for home health care. Based on my clinical findings: the service(s) are medically necessary, support the need for home health care, and the homebound criteria are met.  I certify that this patient has had a face to face encounter by myself.  Hallie Haywood M.D. - NPI: [id number]

## 2019-04-25 NOTE — DISCHARGE PLANNING
Spoke with Lowell, he asked that Abdulaziz call him at [phone]. RUBEN called and updated Abdulaziz and he will call Lowell.

## 2019-04-25 NOTE — THERAPY
"Physical Therapy Treatment completed.   Bed Mobility:  Supine to Sit: Modified Independent  Transfers: Sit to Stand: Contact Guard Assist  Gait: Level Of Assist: Minimal Assist (CGA/min A) with Front-Wheel Walker       Plan of Care: Will benefit from Physical Therapy 3 times per week  Discharge Recommendations: Equipment: Will Continue to Assess for Equipment Needs. See below    Pt progressing as expected given co-morbidities and PLOF. She was able to demonstrate hallway ambulation with Radha/CGA with FWW. She requires consistent cueing and occasional placement of FWW for JULIET/mechanics to assist with fall risk reduction. Anticipate pt is a high fall risk without close CGA/min A currently. Her cognitive deficits are also exacerbating her risk for falls and fucntional impairements. She may be close to functional baseline (though pt reports she was better post rehab stay, though as prior query baseline cog). If spouse unable/unwilling to provide min A/CGA with OOB activity, would recommend continued skilled PT/placement for optimal functional recovery/higher level balance training to reduce risk for fall and reduce caregiver burden upon eventual dc to home. If spouse able to provide assist as aforementioned, she may be functionally capable of dc to home with continued PT after dc to home with 24/7 caregiver assist from spouse/caregivers.    See \"Rehab Therapy-Acute\" Patient Summary Report for complete documentation.       "

## 2019-04-25 NOTE — PROGRESS NOTES
[facility] Medicine Daily Progress Note    Date of Service  4/25/2019    Chief Complaint  78 y.o. female admitted 4/22/2019 with fall    Hospital Course    78 y.o. female who presented 4/22/2019 with which happened yesterday when she was trying to get out of bed.  She apparently just rolled to the floor from her bed.  She is having right shoulder pain and back pain, which she admits is chronic to her.  However she feels that her tailbone pain is worse. Right shoulder x-ray and sacral x-ray are both negative.  Patient states for her shoulder pain she was recently diagnosed with rotator cuff tendinitis was advised surgery.  Patient has had frequent falls, she is somewhat of a poor historian including her spouse, she is unable to see if she has had any dizziness or lightheadedness, she denies chest pain palpitation shortness of breath, she denies any fever or chills.  She has some chronic nasal congestion but denies any cough, denies any abdominal pain or vomiting, she has had some nausea.  She was admitted for falls twice in the past month she was initially discharged to skilled nursing facility then to rehab.  She just got out of rehab.         Interval Problem Update  Seen and examined patient doing well, feeling weak.  She continues to have right shoulder pain and back pain.  Patient continues to ramble on her  is at bedside and says this is new for her.  She at times is not making any sense.  No focal neurologic deficits noted.      4/24: Seen and examined this morning patient is alert and oriented x3 she is complaining of back pain especially with movement.  No  at bedside.  PT recommends skilled nursing facility I have placed a referral  No other complaints or concerns.  Vital signs are stable.  Patient refusing MRI secondary to being claustrophobic.    4/25: Patient seen and examined this morning, has been at bedside.  Patient complaining of some back pain.  She states this is chronic however laying  in bed in the hospital is making it worse.  I encouraged patient to walk around with physical therapy occupational therapy and nursing.  PT recommended 24 7 assistance and post acute rehab however patient refusing to go to a skilled facility and states she will go home and her  will take care of her.  I will order home health for her.  Patient is coming from acute inpatient rehab and her  is adamant that he wants to get her back there and he was getting in touch with Lowell [facility] for that currently.  Consultants/Specialty  None    Code Status  Full    Disposition  hh orderd  Pending IRF call from [employer]    Review of Systems  Review of Systems   Unable to perform ROS: Acuity of condition   Constitutional: Negative for fever and malaise/fatigue.   HENT: Positive for hearing loss. Negative for congestion, nosebleeds and sinus pain.    Eyes: Negative for blurred vision, double vision and photophobia.   Respiratory: Negative for cough, hemoptysis, shortness of breath and stridor.    Cardiovascular: Negative for chest pain, palpitations and leg swelling.   Gastrointestinal: Negative for abdominal pain, diarrhea, heartburn and nausea.   Genitourinary: Negative for dysuria, frequency and urgency.   Musculoskeletal: Positive for back pain, falls, joint pain and myalgias. Negative for neck pain.   Skin: Negative for itching and rash.   Neurological: Negative for dizziness, tingling, focal weakness and weakness.   Psychiatric/Behavioral: Negative for depression and substance abuse. The patient is not nervous/anxious.         Physical Exam  Temp:  [36.1 °C (97 °F)-36.6 °C (97.8 °F)] 36.2 °C (97.2 °F)  Pulse:  [] 95  Resp:  [17-18] 18  BP: (105-145)/(63-77) 126/63  SpO2:  [91 %-96 %] 96 %    Physical Exam   Constitutional: She is oriented to person, place, and time. She appears well-developed and well-nourished. No distress.   HENT:   Head: Normocephalic and atraumatic.   Mouth/Throat: No oropharyngeal  exudate.   Eyes: Pupils are equal, round, and reactive to light. Conjunctivae and EOM are normal. No scleral icterus.   Neck: Normal range of motion. Neck supple. No JVD present. No thyromegaly present.   Cardiovascular: Normal rate, regular rhythm and intact distal pulses.    No murmur heard.  Pulmonary/Chest: Effort normal and breath sounds normal. No respiratory distress. She has no wheezes.   Abdominal: Soft. Bowel sounds are normal. She exhibits no distension. There is no tenderness.   Musculoskeletal: She exhibits no edema, tenderness or deformity.   Limited right shoulder range of motion secondary to pain   Neurological: She is alert and oriented to person, place, and time. No cranial nerve deficit. She exhibits normal muscle tone.   Skin: Skin is warm and dry. No rash noted. No erythema.   Psychiatric: She has a normal mood and affect. Her behavior is normal.   Patient is alert and oriented x3.       Fluids  No intake or output data in the 24 hours ending 04/25/19 1529    Laboratory                        Imaging  CT-HEAD W/O   Final Result      No acute intracranial abnormality is identified.      There are periventricular and subcortical white matter changes present.  This finding is nonspecific and could be from previous small vessel ischemia, demyelination, or gliosis.      Atrophy         CT-LSPINE W/O PLUS RECONS   Final Result         1. No acute fracture or malalignment appreciated in the lumbar spine      2. Severe degenerative change of the lumbar spine, worse than prior.      3. Mild anterolisthesis of L4-5, similar to prior.         CT-TSPINE W/O PLUS RECONS   Final Result         1. No acute fracture or malalignment appreciated in the thoracic spine         DX-SACRUM AND COCCYX 2+   Final Result      No acute fracture of the sacrum and coccyx identified. If symptoms are persistent, would recommend CT for further evaluation.      DX-CHEST-2 VIEWS   Final Result      Hypoinflation and lung base  atelectasis.      DX-SHOULDER 2+ RIGHT   Final Result      No acute fracture identified.           Assessment/Plan  * Frequent falls- (present on admission)   Assessment & Plan    Physical therapy recommended postacute rehab however patient is refusing to go to a rehab facility except for acute inpatient rehab which she came from.  Her  is trying to get in touch with [facility] to get her back in there  In the meantime I will order home health which the patient and her  have agreed to if she cannot go to acute inpatient rehab orthostatics noted  CT T and L spine:Severe degenerative change of the lumbar spine, worse than prior.     Confusion   Assessment & Plan    Infectious work-up have been ruled out    head CT has been negative  Patient refused MRI secondary to being claustrophobic  Patient is back to baseline       History of compression fracture of spine- (present on admission)   Assessment & Plan    Vit d replacement     Vitamin B12 deficiency- (present on admission)   Assessment & Plan    Vitamin B level low  Continue supplementation at this time     Chronic right shoulder pain- (present on admission)   Assessment & Plan    PT and OT     Essential hypertension- (present on admission)   Assessment & Plan    Hold Propranolol     Chronic back pain- (present on admission)   Assessment & Plan    pain control     Restless legs syndrome- (present on admission)   Assessment & Plan    Pramipexole     GERD (gastroesophageal reflux disease)- (present on admission)   Assessment & Plan    Omeprazole        I spent a total of 45 minutes during this clinical encounter of which > 50% was devoted to counseling and coordinating care including review of records, pertinent lab data and studies, as well as discussing diagnostic evaluation and work up, planned therapeutic interventions and future disposition of care. Where indicated, the assessment and plan reflect discussion of patient with consultants, other  healthcare providers, family members, and additional research needed to obtain further information in formulating the plan of care of this patient.     VTE prophylaxis: Lovenox

## 2019-04-25 NOTE — PROGRESS NOTES
This RN took patient for two long walks this am and afternoon. Pt does great with a front wheel assist, and a contact guard, as she is a bit unsteady and will shuffle occasionally.

## 2019-04-25 NOTE — DISCHARGE PLANNING
Msg left for Abdulaziz, spouse regarding Lindsay not meeting [facility].

## 2019-04-25 NOTE — THERAPY
"Occupational Therapy Treatment completed with focus on ADLs and ADL transfers.  Functional Status:    Pt getting to EOB with PT in preparation for walk when received by OT. Once she returned, she came to the sink with FWW and completed standing G/H with SBA. She then walked back to the chair, requiring cues to grab onto walker and maintain in through mobility. She sat down with CGA. She then doffed her socks and donned a new pair, which she was able to do without assist, despite requiring extra time + effort, and initially asking for help. Pt still presents with safety awareness deficits, however her cognition seems to have improved and she is more appropriate. Will continue working with pt in this setting.     Plan of Care: Will benefit from Occupational Therapy 3 times per week  Discharge Recommendations:  Equipment Will Continue to Assess for Equipment Needs. Post-acute therapy Recommend home health or outpatient transitional care services for continued occupational therapy services      See \"Rehab Therapy-Acute\" Patient Summary Report for complete documentation.   "

## 2019-04-26 ENCOUNTER — HOME CARE VISIT (OUTPATIENT)
Dept: HOME HEALTH SERVICES | Facility: HOME HEALTHCARE | Age: 79
End: 2019-04-26
Payer: MEDICARE

## 2019-04-26 ENCOUNTER — APPOINTMENT (OUTPATIENT)
Dept: RADIOLOGY | Facility: MEDICAL CENTER | Age: 79
DRG: 310 | End: 2019-04-26
Attending: HOSPITALIST
Payer: MEDICARE

## 2019-04-26 PROBLEM — I47.10 SVT (SUPRAVENTRICULAR TACHYCARDIA) (HCC): Status: ACTIVE | Noted: 2019-04-26

## 2019-04-26 PROBLEM — I27.20 PULMONARY HYPERTENSION (HCC): Status: ACTIVE | Noted: 2019-04-26

## 2019-04-26 PROBLEM — I95.9 HYPOTENSION: Status: ACTIVE | Noted: 2019-04-26

## 2019-04-26 LAB
ALBUMIN SERPL BCP-MCNC: 4 G/DL (ref 3.2–4.9)
ALBUMIN/GLOB SERPL: 1.8 G/DL
ALP SERPL-CCNC: 63 U/L (ref 30–99)
ALT SERPL-CCNC: 9 U/L (ref 2–50)
ANION GAP SERPL CALC-SCNC: 13 MMOL/L (ref 0–11.9)
AST SERPL-CCNC: 13 U/L (ref 12–45)
BASOPHILS # BLD AUTO: 0.6 % (ref 0–1.8)
BASOPHILS # BLD: 0.06 K/UL (ref 0–0.12)
BILIRUB SERPL-MCNC: 0.6 MG/DL (ref 0.1–1.5)
BUN SERPL-MCNC: 12 MG/DL (ref 8–22)
CALCIUM SERPL-MCNC: 9.1 MG/DL (ref 8.5–10.5)
CHLORIDE SERPL-SCNC: 107 MMOL/L (ref 96–112)
CO2 SERPL-SCNC: 19 MMOL/L (ref 20–33)
CREAT SERPL-MCNC: 0.66 MG/DL (ref 0.5–1.4)
EKG IMPRESSION: NORMAL
EOSINOPHIL # BLD AUTO: 0.21 K/UL (ref 0–0.51)
EOSINOPHIL NFR BLD: 2.1 % (ref 0–6.9)
ERYTHROCYTE [DISTWIDTH] IN BLOOD BY AUTOMATED COUNT: 47.2 FL (ref 35.9–50)
GLOBULIN SER CALC-MCNC: 2.2 G/DL (ref 1.9–3.5)
GLUCOSE SERPL-MCNC: 130 MG/DL (ref 65–99)
HCT VFR BLD AUTO: 41.1 % (ref 37–47)
HGB BLD-MCNC: 13.6 G/DL (ref 12–16)
IMM GRANULOCYTES # BLD AUTO: 0.04 K/UL (ref 0–0.11)
IMM GRANULOCYTES NFR BLD AUTO: 0.4 % (ref 0–0.9)
LYMPHOCYTES # BLD AUTO: 2.44 K/UL (ref 1–4.8)
LYMPHOCYTES NFR BLD: 24.6 % (ref 22–41)
MAGNESIUM SERPL-MCNC: 1.6 MG/DL (ref 1.5–2.5)
MCH RBC QN AUTO: 28.6 PG (ref 27–33)
MCHC RBC AUTO-ENTMCNC: 33.1 G/DL (ref 33.6–35)
MCV RBC AUTO: 86.5 FL (ref 81.4–97.8)
MONOCYTES # BLD AUTO: 0.63 K/UL (ref 0–0.85)
MONOCYTES NFR BLD AUTO: 6.3 % (ref 0–13.4)
NEUTROPHILS # BLD AUTO: 6.55 K/UL (ref 2–7.15)
NEUTROPHILS NFR BLD: 66 % (ref 44–72)
NRBC # BLD AUTO: 0 K/UL
NRBC BLD-RTO: 0 /100 WBC
PHOSPHATE SERPL-MCNC: 4.2 MG/DL (ref 2.5–4.5)
PLATELET # BLD AUTO: 256 K/UL (ref 164–446)
PMV BLD AUTO: 10.3 FL (ref 9–12.9)
POTASSIUM SERPL-SCNC: 3.9 MMOL/L (ref 3.6–5.5)
PROT SERPL-MCNC: 6.2 G/DL (ref 6–8.2)
RBC # BLD AUTO: 4.75 M/UL (ref 4.2–5.4)
SODIUM SERPL-SCNC: 139 MMOL/L (ref 135–145)
TSH SERPL DL<=0.005 MIU/L-ACNC: 2.33 UIU/ML (ref 0.38–5.33)
WBC # BLD AUTO: 9.9 K/UL (ref 4.8–10.8)

## 2019-04-26 PROCEDURE — 700111 HCHG RX REV CODE 636 W/ 250 OVERRIDE (IP): Performed by: HOSPITALIST

## 2019-04-26 PROCEDURE — A9270 NON-COVERED ITEM OR SERVICE: HCPCS | Performed by: HOSPITALIST

## 2019-04-26 PROCEDURE — 700111 HCHG RX REV CODE 636 W/ 250 OVERRIDE (IP): Performed by: INTERNAL MEDICINE

## 2019-04-26 PROCEDURE — 700102 HCHG RX REV CODE 250 W/ 637 OVERRIDE(OP): Performed by: FAMILY MEDICINE

## 2019-04-26 PROCEDURE — 700111 HCHG RX REV CODE 636 W/ 250 OVERRIDE (IP): Performed by: FAMILY MEDICINE

## 2019-04-26 PROCEDURE — 92960 CARDIOVERSION ELECTRIC EXT: CPT | Performed by: HOSPITALIST

## 2019-04-26 PROCEDURE — 93010 ELECTROCARDIOGRAM REPORT: CPT | Mod: 59 | Performed by: INTERNAL MEDICINE

## 2019-04-26 PROCEDURE — 83735 ASSAY OF MAGNESIUM: CPT

## 2019-04-26 PROCEDURE — 5A2204Z RESTORATION OF CARDIAC RHYTHM, SINGLE: ICD-10-PCS | Performed by: HOSPITALIST

## 2019-04-26 PROCEDURE — 303746 HCHG EXTERNAL PACEMAKER PAD: Performed by: HOSPITALIST

## 2019-04-26 PROCEDURE — 99222 1ST HOSP IP/OBS MODERATE 55: CPT | Performed by: INTERNAL MEDICINE

## 2019-04-26 PROCEDURE — 700105 HCHG RX REV CODE 258: Performed by: HOSPITALIST

## 2019-04-26 PROCEDURE — 700102 HCHG RX REV CODE 250 W/ 637 OVERRIDE(OP): Performed by: HOSPITALIST

## 2019-04-26 PROCEDURE — 770020 HCHG ROOM/CARE - TELE (206)

## 2019-04-26 PROCEDURE — 99222 1ST HOSP IP/OBS MODERATE 55: CPT | Mod: GC,25 | Performed by: INTERNAL MEDICINE

## 2019-04-26 PROCEDURE — 84443 ASSAY THYROID STIM HORMONE: CPT

## 2019-04-26 PROCEDURE — 36415 COLL VENOUS BLD VENIPUNCTURE: CPT

## 2019-04-26 PROCEDURE — A9270 NON-COVERED ITEM OR SERVICE: HCPCS | Performed by: FAMILY MEDICINE

## 2019-04-26 PROCEDURE — 96372 THER/PROPH/DIAG INJ SC/IM: CPT

## 2019-04-26 PROCEDURE — 84100 ASSAY OF PHOSPHORUS: CPT

## 2019-04-26 PROCEDURE — 80053 COMPREHEN METABOLIC PANEL: CPT

## 2019-04-26 PROCEDURE — 700105 HCHG RX REV CODE 258

## 2019-04-26 PROCEDURE — 94762 N-INVAS EAR/PLS OXIMTRY CONT: CPT

## 2019-04-26 PROCEDURE — 99291 CRITICAL CARE FIRST HOUR: CPT | Performed by: HOSPITALIST

## 2019-04-26 PROCEDURE — 85025 COMPLETE CBC W/AUTO DIFF WBC: CPT

## 2019-04-26 PROCEDURE — 93005 ELECTROCARDIOGRAM TRACING: CPT | Performed by: HOSPITALIST

## 2019-04-26 PROCEDURE — 96376 TX/PRO/DX INJ SAME DRUG ADON: CPT

## 2019-04-26 PROCEDURE — 700111 HCHG RX REV CODE 636 W/ 250 OVERRIDE (IP)

## 2019-04-26 RX ORDER — DIGOXIN 125 MCG
125 TABLET ORAL DAILY
Status: DISCONTINUED | OUTPATIENT
Start: 2019-04-27 | End: 2019-04-28 | Stop reason: HOSPADM

## 2019-04-26 RX ORDER — METOPROLOL TARTRATE 1 MG/ML
INJECTION, SOLUTION INTRAVENOUS
Status: ACTIVE
Start: 2019-04-26 | End: 2019-04-26

## 2019-04-26 RX ORDER — SODIUM CHLORIDE 9 MG/ML
INJECTION, SOLUTION INTRAVENOUS
Status: ACTIVE
Start: 2019-04-26 | End: 2019-04-26

## 2019-04-26 RX ORDER — SODIUM CHLORIDE 9 MG/ML
500 INJECTION, SOLUTION INTRAVENOUS ONCE
Status: ACTIVE | OUTPATIENT
Start: 2019-04-26 | End: 2019-04-27

## 2019-04-26 RX ORDER — PROPRANOLOL HYDROCHLORIDE 1 MG/ML
1 INJECTION, SOLUTION INTRAVENOUS ONCE
Status: COMPLETED | OUTPATIENT
Start: 2019-04-26 | End: 2019-04-26

## 2019-04-26 RX ORDER — DIGOXIN 125 MCG
500 TABLET ORAL EVERY 6 HOURS
Status: DISCONTINUED | OUTPATIENT
Start: 2019-04-26 | End: 2019-04-26

## 2019-04-26 RX ORDER — PROPRANOLOL HYDROCHLORIDE 80 MG/1
160 CAPSULE, EXTENDED RELEASE ORAL DAILY
Status: DISCONTINUED | OUTPATIENT
Start: 2019-04-26 | End: 2019-04-26

## 2019-04-26 RX ORDER — SODIUM CHLORIDE 9 MG/ML
1000 INJECTION, SOLUTION INTRAVENOUS ONCE
Status: COMPLETED | OUTPATIENT
Start: 2019-04-26 | End: 2019-04-26

## 2019-04-26 RX ORDER — DIGOXIN 0.25 MG/ML
500 INJECTION INTRAMUSCULAR; INTRAVENOUS EVERY 6 HOURS
Status: COMPLETED | OUTPATIENT
Start: 2019-04-26 | End: 2019-04-26

## 2019-04-26 RX ORDER — ALPRAZOLAM 0.25 MG/1
0.25 TABLET ORAL 4 TIMES DAILY PRN
Status: DISCONTINUED | OUTPATIENT
Start: 2019-04-26 | End: 2019-04-28 | Stop reason: HOSPADM

## 2019-04-26 RX ORDER — METOPROLOL TARTRATE 1 MG/ML
5 INJECTION, SOLUTION INTRAVENOUS ONCE
Status: ACTIVE | OUTPATIENT
Start: 2019-04-26 | End: 2019-04-27

## 2019-04-26 RX ORDER — METOPROLOL TARTRATE 1 MG/ML
5 INJECTION, SOLUTION INTRAVENOUS ONCE
Status: DISCONTINUED | OUTPATIENT
Start: 2019-04-26 | End: 2019-04-26

## 2019-04-26 RX ORDER — SODIUM CHLORIDE 9 MG/ML
INJECTION, SOLUTION INTRAVENOUS
Status: COMPLETED
Start: 2019-04-26 | End: 2019-04-26

## 2019-04-26 RX ORDER — SODIUM CHLORIDE 9 MG/ML
500 INJECTION, SOLUTION INTRAVENOUS ONCE
Status: COMPLETED | OUTPATIENT
Start: 2019-04-26 | End: 2019-04-26

## 2019-04-26 RX ADMIN — VITAMIN D, TAB 1000IU (100/BT) 2000 UNITS: 25 TAB at 06:40

## 2019-04-26 RX ADMIN — PROPRANOLOL HYDROCHLORIDE 1 MG: 1 INJECTION INTRAVENOUS at 04:07

## 2019-04-26 RX ADMIN — PRAMIPEXOLE DIHYDROCHLORIDE 0.5 MG: 0.5 TABLET ORAL at 20:26

## 2019-04-26 RX ADMIN — PROPRANOLOL HYDROCHLORIDE 1 MG: 1 INJECTION INTRAVENOUS at 06:33

## 2019-04-26 RX ADMIN — PROPRANOLOL HYDROCHLORIDE 1 MG: 1 INJECTION INTRAVENOUS at 05:26

## 2019-04-26 RX ADMIN — SODIUM CHLORIDE 500 ML: 9 INJECTION, SOLUTION INTRAVENOUS at 05:39

## 2019-04-26 RX ADMIN — PROPRANOLOL HYDROCHLORIDE 160 MG: 80 CAPSULE, EXTENDED RELEASE ORAL at 07:19

## 2019-04-26 RX ADMIN — OMEPRAZOLE 40 MG: 20 CAPSULE, DELAYED RELEASE ORAL at 06:42

## 2019-04-26 RX ADMIN — FENTANYL CITRATE 25 MCG: 50 INJECTION, SOLUTION INTRAMUSCULAR; INTRAVENOUS at 08:13

## 2019-04-26 RX ADMIN — ENOXAPARIN SODIUM 40 MG: 100 INJECTION SUBCUTANEOUS at 06:48

## 2019-04-26 RX ADMIN — CYANOCOBALAMIN TAB 500 MCG 1000 MCG: 500 TAB at 06:41

## 2019-04-26 RX ADMIN — SODIUM CHLORIDE 1000 ML: 9 INJECTION, SOLUTION INTRAVENOUS at 07:40

## 2019-04-26 RX ADMIN — SODIUM CHLORIDE 500 ML: 9 INJECTION, SOLUTION INTRAVENOUS at 04:15

## 2019-04-26 RX ADMIN — DIGOXIN 500 MCG: 0.25 INJECTION INTRAMUSCULAR; INTRAVENOUS at 13:15

## 2019-04-26 RX ADMIN — DIGOXIN 500 MCG: 0.25 INJECTION INTRAMUSCULAR; INTRAVENOUS at 18:40

## 2019-04-26 RX ADMIN — DULOXETINE HYDROCHLORIDE 60 MG: 60 CAPSULE, DELAYED RELEASE ORAL at 06:42

## 2019-04-26 ASSESSMENT — ENCOUNTER SYMPTOMS
EYE PAIN: 0
DIARRHEA: 0
ORTHOPNEA: 0
DIZZINESS: 1
NECK PAIN: 0
SPEECH CHANGE: 0
NAUSEA: 0
FOCAL WEAKNESS: 0
NERVOUS/ANXIOUS: 1
FALLS: 1
HEARTBURN: 0
SORE THROAT: 0
TINGLING: 0
PALPITATIONS: 0
CHILLS: 0
BACK PAIN: 1
BLURRED VISION: 0
BRUISES/BLEEDS EASILY: 0
PALPITATIONS: 1
MYALGIAS: 1
SEIZURES: 0
HEADACHES: 1
WEIGHT LOSS: 0
FATIGUE: 1
SHORTNESS OF BREATH: 0
VOMITING: 0
DOUBLE VISION: 0
WHEEZING: 0
COUGH: 0
PHOTOPHOBIA: 0
SINUS PAIN: 0
DEPRESSION: 0
STRIDOR: 0
SHORTNESS OF BREATH: 1
HEMOPTYSIS: 0
WEAKNESS: 0
FEVER: 0
DIZZINESS: 0
NERVOUS/ANXIOUS: 0
ABDOMINAL PAIN: 0
SPUTUM PRODUCTION: 0
LOSS OF CONSCIOUSNESS: 0
EYE DISCHARGE: 0
MYALGIAS: 0
CHEST TIGHTNESS: 0

## 2019-04-26 ASSESSMENT — LIFESTYLE VARIABLES: SUBSTANCE_ABUSE: 0

## 2019-04-26 NOTE — PROGRESS NOTES
Change in patient condition due to: Cardiac  Rapid Response called at: 0716  Physician Dr. Haywood notified at 0716    See Code Blue timeline for rapid response events. Patient Remained on Unit

## 2019-04-26 NOTE — CARE PLAN
Problem: Safety  Goal: Will remain free from injury  Outcome: PROGRESSING AS EXPECTED  Pt calls appropriately, call light within reach. Bed in lowest and locked position, with hourly rounding in place. Bed alarm on and treaded slipper socks on. Mobility assessed with appropriate signs in place. Hourly rounding in place.      Intervention: Provide assistance with mobility   04/25/19 2000   Mobility   Assistance Standby Assist   Ambulation Tolerance Tolerates Well     Intervention: Collaborate with Interdisciplinary Team for safe transfer and mobilization techniques   04/25/19 0526   OTHER   Assistive Devices Walker - front wheel         Problem: Infection  Goal: Will remain free from infection  Outcome: PROGRESSING AS EXPECTED  Standard precautions in place. Hand hygiene completed before entering room and exiting room.

## 2019-04-26 NOTE — CONSULTS
CARDIOLOGY CONSULT NOTE:    Patient ID:   Name:             Lindsay Vargas   YOB: 1940  Age:                 78 y.o.  female   MRN:               [id number]    Reason for consult:       Paroxysmal supraventricular tachycardia     Impression:      This is an 77 yo female with recurrent falls in the setting of palpitation and presyncopal symptoms that are most likely related to underlying paroxysmal SVT based on outpatient results of the event monitor. She was taking propranolol as outpatient with ongoing recurrent episodes of SVTs. Case was discussed with EP team for possible ablation but there is no open slot in the schedule this week. We will attempt to control her rhythm with medical therapy and see how she does over the weekend.    Assessment:      - Paroxysmal supraventricular tachycardia     Plan:                 - Start loading of IV digoxin 500 mcg x2   - Start digoxin 125 mcg daily tomorrow   - Will start patient on higher dose of propranolol tomorrow   - Will review tele rhythm over the weekend and decide if the patients needs an ablation    - Will continue to follow       Thank you for allowing us to participate in the care of this patient, and please do not hesitate to call or page if any clarification of our recommendation would be useful.      History of Present Illness:    Mr. Vargas is a 78 y.o. female with a past medical history significant for hypertension, GERD, and recurrent falls, who was admitted on 4/22/2019 after a GLF. Patient had multiple ER visits and was admitted back in 3/2019 for complaints of general debility, dizziness, and recurrent falls. Patient was seen by Dr. Paredes in 12/2018 for palpitation and had an event monitor that showed brief episodes of symptomatic SVTs. She was supposed to seen during a follow up visit on 3/27/2019 but she could not make it as she was admitted during that time.     Patient complains of recurrent episodes GLFs in the setting of  dizziness and presyncope associated with palpitation and sometimes chest pressure. She denies dyspnea, leg swelling, orthopnea, syncope, or significant cardiac history. She takes propranolol as outpatient for hypertension that was not resumed on admission. Patient had an episodes of SVT around 1700 on 4/25 that was successfully treated with valsalva maneuver. A rapid response was called around 0400 earlier today for another episode of SVT with a HR in 160-190s. Patient received IV propranolol 1 mg x3 without any improvement. She was successfully cardioverted back into sinus rhythm.    Review of Systems   Constitutional: Negative for chills and fever.   HENT: Negative for sore throat.    Eyes: Negative for blurred vision.   Respiratory: Negative for cough, sputum production, shortness of breath and stridor.    Cardiovascular: Negative for chest pain, palpitations, orthopnea and leg swelling.   Gastrointestinal: Negative for abdominal pain, nausea and vomiting.   Genitourinary: Negative for dysuria and urgency.   Musculoskeletal: Positive for falls and joint pain.   Neurological: Positive for headaches. Negative for dizziness, seizures and loss of consciousness.       Social history  Lives with her  in [place]. Her daughter and family also live in [facility].   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 02/04/2016   • 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 02/18/2016   • Lumbar radiculitis 09/29/2016   • Palpitations 12/26/2018   • Localized edema 12/26/2018   • Hypokalemia 03/18/2019   • Hyponatremia 03/18/2019   • Dehydration 03/18/2019   • SIRS (systemic inflammatory response syndrome) ([facility]) 03/18/2019   • Acute metabolic encephalopathy 03/18/2019   • NSTEMI (non-ST elevated myocardial infarction) ([facility]) 03/18/2019   • Syncope 03/18/2019   • Frequent falls  "03/26/2019   • GERD (gastroesophageal reflux disease) 03/26/2019   • Mood disorder (HCC) 03/26/2019   • Debility 03/29/2019   • Restless legs syndrome 03/29/2019   • Chronic back pain 03/29/2019   • Right ear pain 04/07/2019   • Rash 04/07/2019   • Cognitive deficits 04/11/2019   • Essential hypertension 04/11/2019     Resolved Ambulatory Problems     Diagnosis Date Noted   • No Resolved Ambulatory Problems     Past Medical History:   Diagnosis Date   • Anesthesia    • Arthritis    • Cataract    • Chronic pain    • Hiatus hernia syndrome    • Hypertension    • Lymphedema 2014   • Migraine headache    • Pain    • Pneumonia        PHYSICAL EXAM  Vitals:   Weight/BMI: Body mass index is 24.38 kg/m².  /58   Pulse 89   Temp 36.7 °C (98.1 °F)   Resp 16   Ht 1.702 m (5' 7\")   Wt 70.6 kg (155 lb 10.3 oz)   SpO2 98%   Vitals:    04/26/19 0808 04/26/19 0810 04/26/19 0812 04/26/19 0820   BP: (!) 95/63  (!) 161/95 117/58   Pulse: (!) 154 (!) 109 92 89   Resp: 16  20 16   Temp:       TempSrc:       SpO2: 99%  98% 98%   Weight:       Height:         Oxygen Therapy:  Pulse Oximetry: 98 %, O2 (LPM): 0, O2 Delivery: None (Room Air)    Physical Exam   Constitutional: She is oriented to person, place, and time and well-developed, well-nourished, and in no distress. No distress.   Eyes: Pupils are equal, round, and reactive to light. No scleral icterus.   Neck: JVD present.   Cardiovascular: Normal rate, regular rhythm, normal heart sounds and intact distal pulses.    No murmur heard.  Pulmonary/Chest: Effort normal and breath sounds normal. No stridor. No respiratory distress. She has no wheezes.   Abdominal: Soft. Bowel sounds are normal. There is no tenderness.   Musculoskeletal: She exhibits no edema or tenderness.   Neurological: She is alert and oriented to person, place, and time. GCS score is 15.   Skin: Skin is warm and dry. She is not diaphoretic.   Psychiatric: Mood and affect normal.       LABS: Reviewed  "   IMAGING: Reviewed

## 2019-04-26 NOTE — PROGRESS NOTES
[facility] Medicine Daily Progress Note    Date of Service  4/26/2019    Chief Complaint  78 y.o. female admitted 4/22/2019 with fall    Hospital Course    78 y.o. female who presented 4/22/2019 with which happened yesterday when she was trying to get out of bed.  She apparently just rolled to the floor from her bed.  She is having right shoulder pain and back pain, which she admits is chronic to her.  However she feels that her tailbone pain is worse. Right shoulder x-ray and sacral x-ray are both negative.  Patient states for her shoulder pain she was recently diagnosed with rotator cuff tendinitis was advised surgery.  Patient has had frequent falls, she is somewhat of a poor historian including her spouse, she is unable to see if she has had any dizziness or lightheadedness, she denies chest pain palpitation shortness of breath, she denies any fever or chills.  She has some chronic nasal congestion but denies any cough, denies any abdominal pain or vomiting, she has had some nausea.  She was admitted for falls twice in the past month she was initially discharged to skilled nursing facility then to rehab.  She just got out of rehab.         Interval Problem Update  Seen and examined patient doing well, feeling weak.  She continues to have right shoulder pain and back pain.  Patient continues to ramble on her  is at bedside and says this is new for her.  She at times is not making any sense.  No focal neurologic deficits noted.      4/24: Seen and examined this morning patient is alert and oriented x3 she is complaining of back pain especially with movement.  No  at bedside.  PT recommends skilled nursing facility I have placed a referral  No other complaints or concerns.  Vital signs are stable.  Patient refusing MRI secondary to being claustrophobic.    4/25: Patient seen and examined this morning, has been at bedside.  Patient complaining of some back pain.  She states this is chronic however laying  in bed in the hospital is making it worse.  I encouraged patient to walk around with physical therapy occupational therapy and nursing.  PT recommended 24 7 assistance and post acute rehab however patient refusing to go to a skilled facility and states she will go home and her  will take care of her.  I will order home health for her.  Patient is coming from acute inpatient rehab and her  is adamant that he wants to get her back there and he was getting in touch with [facility] for that currently.    4/26: Rapid response called earlier this morning, patient with hypotension and tachycardia with heart rates over 160s sustaining.  Patient was laying in bed prior to this event happening.  Upon my examination patient appears to be anxious hypotensive and tachycardic.  Vagal maneuvers were tried however heart rate still sustaining in 170s.  She was given propranolol early this morning at 4 and then at 5 AM however her heart rate is still elevated.  Stat EKG showing SVT.  Blood pressure continues to drop.  At this time I will give her a stat 1 L IV bolus, Xanax for anxiety.  I did order a stat chest CT to rule out a PE given hypotension and tachycardia however patient's blood pressure was unstable therefore decision was to make to cardiovert her emergently at bedside.      Consultants/Specialty  cardiology    Code Status  Full    Disposition  hh accepted    Review of Systems  Review of Systems   Unable to perform ROS: Acuity of condition   Constitutional: Negative for fever and malaise/fatigue.   HENT: Positive for hearing loss. Negative for congestion, nosebleeds and sinus pain.    Eyes: Negative for blurred vision, double vision and photophobia.   Respiratory: Positive for shortness of breath. Negative for cough, hemoptysis and stridor.    Cardiovascular: Positive for chest pain. Negative for palpitations and leg swelling.   Gastrointestinal: Negative for abdominal pain, diarrhea, heartburn and  nausea.   Genitourinary: Negative for dysuria, frequency and urgency.   Musculoskeletal: Positive for back pain, falls, joint pain and myalgias. Negative for neck pain.   Skin: Negative for itching and rash.   Neurological: Negative for dizziness, tingling, focal weakness and weakness.   Psychiatric/Behavioral: Negative for depression and substance abuse. The patient is not nervous/anxious.         Physical Exam  Temp:  [35.7 °C (96.3 °F)-37.3 °C (99.1 °F)] 36.7 °C (98.1 °F)  Pulse:  [] 89  Resp:  [16-28] 16  BP: ()/(5-106) 117/58  SpO2:  [93 %-100 %] 98 %    Physical Exam   Constitutional: She is oriented to person, place, and time. She appears well-developed and well-nourished. No distress.   Patient is hypotensive   HENT:   Head: Normocephalic and atraumatic.   Mouth/Throat: No oropharyngeal exudate.   Eyes: Pupils are equal, round, and reactive to light. Conjunctivae and EOM are normal. Left eye exhibits no discharge.   Neck: Normal range of motion. Neck supple. No JVD present. No thyromegaly present.   Cardiovascular: Regular rhythm and intact distal pulses.    No murmur heard.  Tachycardia   Pulmonary/Chest: Effort normal. No respiratory distress. She has no wheezes.   Diminished bilaterally   Abdominal: Soft. Bowel sounds are normal. She exhibits no distension. There is no tenderness.   Musculoskeletal: She exhibits no edema, tenderness or deformity.   Limited right shoulder range of motion secondary to pain   Neurological: She is alert and oriented to person, place, and time. No cranial nerve deficit. She exhibits normal muscle tone.   Skin: Skin is warm and dry. No rash noted. No erythema.   Psychiatric:   Patient is alert and oriented x3.       Fluids  No intake or output data in the 24 hours ending 04/26/19 1219    Laboratory  Recent Labs      04/26/19   0741   WBC  9.9   RBC  4.75   HEMOGLOBIN  13.6   HEMATOCRIT  41.1   MCV  86.5   MCH  28.6   MCHC  33.1*   RDW  47.2   PLATELETCT  256   MPV   10.3     Recent Labs      04/25/19   1718  04/26/19   0741   SODIUM  136  139   POTASSIUM  4.3  3.9   CHLORIDE  103  107   CO2  22  19*   GLUCOSE  115*  130*   BUN  14  12   CREATININE  0.76  0.66   CALCIUM  9.7  9.1         Recent Labs      04/25/19   1718   BNPBTYPENAT  27           Imaging  DX-CHEST-PORTABLE (1 VIEW)   Final Result      No acute cardiac or pulmonary abnormalities are identified.      CT-HEAD W/O   Final Result      No acute intracranial abnormality is identified.      There are periventricular and subcortical white matter changes present.  This finding is nonspecific and could be from previous small vessel ischemia, demyelination, or gliosis.      Atrophy         CT-LSPINE W/O PLUS RECONS   Final Result         1. No acute fracture or malalignment appreciated in the lumbar spine      2. Severe degenerative change of the lumbar spine, worse than prior.      3. Mild anterolisthesis of L4-5, similar to prior.         CT-TSPINE W/O PLUS RECONS   Final Result         1. No acute fracture or malalignment appreciated in the thoracic spine         DX-SACRUM AND COCCYX 2+   Final Result      No acute fracture of the sacrum and coccyx identified. If symptoms are persistent, would recommend CT for further evaluation.      DX-CHEST-2 VIEWS   Final Result      Hypoinflation and lung base atelectasis.      DX-SHOULDER 2+ RIGHT   Final Result      No acute fracture identified.      CT-CTA CHEST PULMONARY ARTERY W/ RECONS    (Results Pending)        Assessment/Plan  * SVT (supraventricular tachycardia) (HCC)   Assessment & Plan    Patient with elevated heart rate in the 170s sustaining with low blood pressures, I obtained a twelve-lead EKG which showed SVT.  Vagal maneuvers were tried however unsuccessful in controlling the heart rate.  IV fluid 1 L bolus was ordered.  CBC, BMP, magnesium, phosphate, troponin ordered stat  Stat chest CT PE protocol ordered to rule out a PE  However patient unstable at this time  with hypotension and SVT therefore decision was made to cardiovert patient.  Cardioversion was successful she was given fentanyl 25 mg and tolerated the procedure fairly okay.  Patient is now normal sinus sinus tachycardia and blood pressures have improved    Since this was her third rapid response in the last 24 hours I have consulted cardiology to further evaluate patient's cardiac status.  EP has been consulted by cardiology  Inderal + digoxin.  Started     Confusion   Assessment & Plan    Infectious work-up have been ruled out    head CT has been negative  Patient refused MRI secondary to being claustrophobic  Patient is back to baseline       Frequent falls- (present on admission)   Assessment & Plan    Patient accepted by home health     History of compression fracture of spine- (present on admission)   Assessment & Plan    Vit d replacement     Vitamin B12 deficiency- (present on admission)   Assessment & Plan    Vitamin B level low  Continue supplementation at this time     Chronic right shoulder pain- (present on admission)   Assessment & Plan    PT and OT-home health accepted     Essential hypertension- (present on admission)   Assessment & Plan    Propranolol was held on admission secondary to soft blood pressures however I suspect that she is having a rebound tachycardia therefore propranolol has been restarted     Chronic back pain- (present on admission)   Assessment & Plan    pain control     Restless legs syndrome- (present on admission)   Assessment & Plan    Pramipexole     Pulmonary hypertension (HCC)   Assessment & Plan    As noted on recent echocardiogram done on March 2019  Continue to monitor  Cardiology consulted     Hypotension   Assessment & Plan    Likely secondary to SVT  1 L bolus stat ordered  Cardioversion was successful and blood pressures are now stable     GERD (gastroesophageal reflux disease)- (present on admission)   Assessment & Plan    Omeprazole        Patient is critically  ill.   The patient continues to have: SVT  The vital organ system that is affected is the: cardiopulmonary  If untreated there is a high chance of deterioration into: cardiac arrest  And eventually death.   The critical care that I am providing today is: fluid resuscitation, cardioversion  The critical that has been undertaken is medically complex.   There has been no overlap in critical care time.   Critical Care Time not including procedures: 45mins      VTE prophylaxis: Lovenox

## 2019-04-26 NOTE — NON-PROVIDER
APRN Student Cardiology History and Physical  Note Author: Priyanka Cody, Student   Supervising attending: Dr. Narendra Thompson   Date and time of exam: 2019 at 1015    Name Lindsay Vargas 1940   Age/Sex 78 y.o. female   MRN [id number]   Code Status Full      Chief Complaint:  Fall     HPI: This is an 78 year old female with a history of multiple falls, NSTEMI (2019), GERD, and hypertension who was admitted on 2019 after sustaining a fall while trying to get out of bed. She reportedly rolled onto the floor from her bed and was not able to get up and her  called for EMS. She reported right shoulder and back pain which is chronic in nature in addition to sacral pain. Xrays of her shoulder and sacrum were both negative for fractures.   On chart review, it is noted that recently she has been admitted due to falls, was discharged to skilled nursing facility then then a subsequent rehabilitation center from which she was recently released. Additionally, per the patient and chart review, in the past, she has had episodes of dizziness with associated palpitations for which she saw a Dr. Paredes.  She states when she has experienced episodes of dizziness, she notices that her heart is pounding and is beating fast. She wore a Zio Patch in 2019 for 3 weeks which, per EPIC, was significant for brief episodes of symptomatic SVT and was schedule to follow up 3/27/2019, however was hospitalized at that time.     During her current admission on 2019 in the 1700 hour, a rapid response was called for patient having sustained symptomatic SVT which resolved with vagal maneuvers. CXR, BNP, troponin, and EKG was done at that time.   At 0345, she had recurrent episodes of SVT unresolved with vagal maneuvers and received propanolol injection 1mg IV x2 (since her her home propanolol was held on this admission).   In the 0700 hour, another rapid response was called for SVT, HR in 160s-180s and SBP in  80s-90s after three total doses of propanolol 1mg IV. The patient was electrically cardioverted with 150J and returned to sinus rhythm rate in 90s.  Since then, she denies dyspnea, chest pressure or discomfort, leg swelling, dizziness, or orthopnea.     Review of Systems   Constitutional: Positive for malaise/fatigue. Negative for chills, fever and weight loss.   Eyes: Negative for blurred vision and double vision.   Respiratory: Negative for cough, hemoptysis and shortness of breath.    Cardiovascular: Negative for chest pain, palpitations, orthopnea and leg swelling.   Gastrointestinal: Negative for abdominal pain, nausea and vomiting.   Musculoskeletal:        Positive for chronic back pain and shoulder pain, multiple falls. Negative for joint deformity   Skin: Negative for itching and rash.   Neurological:        Positive decreased sensation in bilateral lower extremities, weakness. Negative for dizziness, tingling, loss of consciousness and headaches.        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 02/04/2016   • 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 02/18/2016   • Lumbar radiculitis 09/29/2016   • Palpitations 12/26/2018   • Localized edema 12/26/2018   • Hypokalemia 03/18/2019   • Hyponatremia 03/18/2019   • Dehydration 03/18/2019   • SIRS (systemic inflammatory response syndrome) ([facility]) 03/18/2019   • Acute metabolic encephalopathy 03/18/2019   • NSTEMI (non-ST elevated myocardial infarction) ([facility]) 03/18/2019   • Syncope 03/18/2019   • Frequent falls 03/26/2019   • GERD (gastroesophageal reflux disease) 03/26/2019   • Mood disorder ([facility]) 03/26/2019   • Debility 03/29/2019   • Restless legs syndrome 03/29/2019   • Chronic back pain 03/29/2019   • Right ear pain 04/07/2019   • Rash 04/07/2019   • Cognitive deficits 04/11/2019        Essential hypertension     Hiatal hernia     Pneumonia      Lymphedema         Past Surgical History: limited history due to patient being a poor historian but includes cholecystectomy, hysterectomy, cataract surgery    Medication Allergy/Sensitivities:  No known allergies.     Family History:  Both mother and father had diabetes, mother  at age 87 and father  at 89. No other reported history of cardiac disease.     Social History:  Smoking: Denies current or past use  Alcohol: Denies current or past use  Illictis: Denies current or past use  Living situation: recently discharged from rehabilitation center and is currently living in a home with  locally.     Medications  NS (BOLUS) infusion 500 mL 500 mL by Intravenous route Once.   SODIUM CHLORIDE 0.9 % IV SOLN    Metoprolol Tartrate (LOPRESSOR) injection 5 mg 5 mL by Intravenous route Once.   ALPRAZolam (XANAX) tablet 0.25 mg Take 1 Tab by mouth 4 times a day as needed for Anxiety.   cyanocobalamin (VITAMIN B-12) tablet 1,000 mcg Take 2 Tabs by mouth every day.   fentaNYL (SUBLIMAZE) injection 50 mcg 1 mL by Intravenous route every 30 minutes as needed for Moderate Pain: NRS Pain Scale 4-6, CPOT 3-5.   DULoxetine (CYMBALTA) capsule 60 mg Take 1 Cap by mouth every day.   omeprazole (PRILOSEC) capsule 40 mg Take 2 Caps by mouth every morning before breakfast.   pramipexole (MIRAPEX) tablet 0.5 mg Take 1 Tab by mouth every bedtime.   senna-docusate (PERICOLACE or SENOKOT S) 8.6-50 MG per tablet 2 Tab Take 2 Tabs by mouth 2 Times a Day.   polyethylene glycol/lytes (MIRALAX) PACKET 1 Packet Take 1 Packet by mouth 1 time daily as needed for Constipation (if sennosides and docusate ineffective after 24 hours).   magnesium hydroxide (MILK OF MAGNESIA) suspension 30 mL Take 30 mL by mouth 1 time daily as needed for Constipation (if polyethylene glycol ineffective after 24 hours).   bisacodyl (DULCOLAX) suppository 10 mg Insert 1 Suppository in rectum 1 time daily as needed for Constipation (if magnesium hydroxide  "ineffective after 24 hours).   enoxaparin (LOVENOX) inj 40 mg Inject 0.4 mL as instructed every day.   acetaminophen (TYLENOL) tablet 650 mg Take 2 Tabs by mouth every 6 hours as needed (Mild Pain; (Pain scale 1-3); Temp greater than 100.5 F).   oxyCODONE immediate-release (ROXICODONE) tablet 2.5 mg Take 0.5 Tabs by mouth every 3 hours as needed for Moderate Pain: NRS Pain Scale 4-6, CPOT 3-5.   oxyCODONE immediate-release (ROXICODONE) tablet 5 mg Take 1 Tab by mouth every 3 hours as needed for Severe Pain: NRS Pain Scale 7-10, CPOT 6-8.   morphine (pf) 4 mg/ml injection 2 mg 0.5 mL by Intravenous route every 3 hours as needed (If pain persists one hour post oral opiate dose, patient NPO, or patient inability to use PCA).   hydrALAZINE (APRESOLINE) injection 10 mg 0.5 mL by Intravenous route every four hours as needed for Elevated blood pressure (See admin instructions for parameters).   ondansetron (ZOFRAN) syringe/vial injection 4 mg 2 mL by Intravenous route every four hours as needed for Nausea.   ondansetron (ZOFRAN ODT) dispertab 4 mg Take 1 Tab by mouth every four hours as needed for Nausea (give PO if IV route is unavailable.).   Propanolol (Inderal) Injection 1mg/1ml 1mg IV once, repeat x1 PRN   vitamin D (cholecalciferol) tablet 2,000 Units Take 2 Tabs by mouth every day       Physical Exam   /58   Pulse 89   Temp 36.7 °C (98.1 °F)   Resp 16   Ht 1.702 m (5' 7\")   Wt 70.6 kg (155 lb 10.3 oz)   SpO2 98%   Breastfeeding? No   BMI 24.38 kg/m²   O2 therapy: Pulse Oximetry: 98 %, O2 (LPM): 0, O2 Delivery: None (Room Air)    Physical Exam   Constitutional: She is oriented to person, place, and time and well-developed, well-nourished, and in no distress.   HENT:   Head: Normocephalic and atraumatic.   Dry mucous membranes, poor dentition, uvula midline   Eyes: Pupils are equal, round, and reactive to light. Conjunctivae and EOM are normal. No scleral icterus.   Neck: Neck supple. JVD present. No " tracheal deviation present.   Cardiovascular: Normal rate, regular rhythm, normal heart sounds and intact distal pulses.  Exam reveals no gallop and no friction rub.    No murmur heard.  Pulmonary/Chest:   Respirations even and unlabored. Able to speak multiple multi-worded sentences without distress. Lungs clear to auscultation throughout. No chest wall tenderness.    Abdominal: Soft. Bowel sounds are normal. She exhibits no distension. There is no tenderness.   Musculoskeletal: She exhibits no edema or tenderness.   Neurological: She is alert and oriented to person, place, and time. No cranial nerve deficit.   Skin: Skin is warm and dry. She is not diaphoretic.   Psychiatric: Mood and affect normal.     Diagnostics     4/22/2019 12:26 4/26/2019 07:41   WBC 6.6 9.9   RBC 4.27 4.75   Hemoglobin 12.3 13.6   Hematocrit 37.1 41.1   MCV 86.9 86.5   MCH 28.8 28.6   MCHC 33.2 (L) 33.1 (L)   RDW 46.9 47.2   Platelet Count 173 256   MPV 10.3 10.3   Neutrophils-Polys 65.80 66.00   Neutrophils (Absolute) 4.36 6.55   Lymphocytes 23.50 24.60   Lymphs (Absolute) 1.56 2.44   Monocytes 6.60 6.30   Monos (Absolute) 0.44 0.63   Eosinophils 3.00 2.10   Eos (Absolute) 0.20 0.21   Basophils 0.60 0.60   Baso (Absolute) 0.04 0.06   Immature Granulocytes 0.50 0.40   Immature Granulocytes (abs) 0.03 0.04   Nucleated RBC 0.00 0.00   NRBC (Absolute) 0.00 0.00      4/25/2019 17:18 4/26/2019 07:41   Sodium 136 139   Potassium 4.3 3.9   Chloride 103 107   Co2 22 19 (L)   Anion Gap 11.0 13.0 (H)   Glucose 115 (H) 130 (H)   Bun 14 12   Creatinine 0.76 0.66   GFR If African American >60 >60   GFR If Non African American >60 >60   Calcium 9.7 9.1   AST(SGOT)  13   ALT(SGPT)  9   Alkaline Phosphatase  63   Total Bilirubin  0.6   Albumin  4.0   Total Protein  6.2   Globulin  2.2   A-G Ratio  1.8   Phosphorus  4.2   Magnesium  1.6        4/25/2019 17:18   Troponin I <0.01   B Natriuretic Peptide 27     CT T-spine, L-spine, and head on admission  identified degenerative bone and white matter atrophy but were negative for acute injury.     Chest xray 4/25/2019 at 1732 identified no acute cardiac or pulmonary abnormalities     EKGs done on 4/25/019 and 4/26/2019 were significant for SVT with subsequent conversion to SR/ST    Assessment/Plan   Paroxysmal SVT  Recurrent despite propanolol  Dr. Thompson spoke with EP who is not able to do ablation due to full schedule today.   Will attempt pharmacological rhythm control with loading dose of digoxin 500mcg IV every 6 hours x2,  then digoxin 125mcg PO daily.   D/c current Inderall dose and will evaluate for higher dose of long acting Inderall tomorrow.   Monitor patient over the weekend for recurrent episodes of SVT. If unable to resolve with pharmacological rhythm control, may consider ablation with EP next week.

## 2019-04-26 NOTE — DISCHARGE PLANNING
Received Choice form at 0815  Agency/Facility Name: [facility]  Referral sent per Choice form @ 0815

## 2019-04-26 NOTE — CODE DOCUMENTATION
Per Dr. Haywood okay to cancel CTA rule out PE at this point in time. If patient has increasing oxygen needs Dr. Haywood will reassess need for CTA-rule out PE.

## 2019-04-26 NOTE — PROGRESS NOTES
Rapid response called, team at bedside. Dr. Haywood paged and assessed pt at bedside. Orders to override 1L bolus of NS, metoprolol. Pt VS sustaining between SBP of 80s and 90s, HR sustaining between 160s-180s at rest. Metoprolol held at this time, pt's VS continue to sustain. Ordered CTA cancelled. Orders to cardiovert at bedside. Verbal consent received from pt. Pt given fentanyl s/p cardioversion for pain management. EKG at bedside, pt successfully converted to SR 90s.

## 2019-04-26 NOTE — CODE DOCUMENTATION
Unable to give IV metoprolol due to low blood pressure. Blood pressures decreasing Dr. To bedside.

## 2019-04-26 NOTE — PROGRESS NOTES
Called a rapid response for pt. Monitor room called me to say that she was in the 170's dropped down into the 160's sustaining at 0345. Went into pts room and she was complaining of 8/10 chest pain. Tried to have pt bear down but it did not work. HR was still sustaining in the 160s-170's and called a rapid response. Gave 1 mg of propranolol to help with pts HR. Dropped down to 160's. Then gave 500cc bolus to bring pts BP up greater than 110 systolic. Rechecked after bolus and BP was 132/81.   Dr. Love gave ok to repeat second miligram of IV propranolol. after second 1 mg of propranolol. bP 117/60. Second 500 cc bolus completed.     BP  after 500 cc bolus 107/71 Will page Dr. Love. HR Still 150

## 2019-04-26 NOTE — PROGRESS NOTES
Cardiology Follow Up Progress Note    Date of Service  4/26/2019    Attending Physician  Hallie Haywood M.D.    Chief Complaint   Falls  SVT    HPI  Lindsay Vargas is a 78 y.o. female admitted 4/22/2019 with fall.  78 y.o. female who presented 4/22/2019 with which happened yesterday when she was trying to get out of bed.  She apparently just rolled to the floor from her bed.  She is having right shoulder pain and back pain, which she admits is chronic to her.  However she feels that her tailbone pain is worse. Right shoulder x-ray and sacral x-ray are both negative.  Patient states for her shoulder pain she was recently diagnosed with rotator cuff tendinitis was advised surgery.  Patient has had frequent falls, she is somewhat of a poor historian including her spouse, she is unable to see if she has had any dizziness or lightheadedness, she denies chest pain palpitation shortness of breath, she denies any fever or chills.  She has some chronic nasal congestion but denies any cough, denies any abdominal pain or vomiting, she has had some nausea.  She was admitted for falls twice in the past month she was initially discharged to skilled nursing facility then to rehab.  She just got out of rehab.  .  Interim Events  Rhythms reviewed patient had a sustained SVT yesterday from 1644 to 1700 hours.   Today SVT with rates up to 157 bpm starting at 0322 until DCCV at 0800 this AM due to hypotension.  Review of strips reveal what appears to be an APC preceeding the tachycardia suggesting AVNRT.  Patient has not been receiving her Inderal at home and therefore review of this with Dr Thompson and residents they will resume the medication and add digoxin and monitor over the weekend.  Review of Systems  Review of Systems   Constitutional: Positive for fatigue.   Respiratory: Negative for chest tightness.    Cardiovascular: Positive for chest pain (With tachycardia) and palpitations.   Psychiatric/Behavioral: The patient is  nervous/anxious.        Vital signs in last 24 hours  Temp:  [35.7 °C (96.3 °F)-37.3 °C (99.1 °F)] 36.7 °C (98.1 °F)  Pulse:  [] 89  Resp:  [16-28] 16  BP: ()/(5-106) 117/58  SpO2:  [93 %-100 %] 98 %    Physical Exam  Physical Exam   Constitutional: She appears well-developed and well-nourished.   HENT:   Head: Normocephalic and atraumatic.   Eyes: Pupils are equal, round, and reactive to light.   Neck: Normal range of motion. Neck supple. No thyromegaly present.   Cardiovascular: Normal rate, regular rhythm, normal heart sounds and intact distal pulses.  Exam reveals no gallop and no friction rub.    No murmur heard.  Pulmonary/Chest: Effort normal and breath sounds normal. No respiratory distress. She has no wheezes. She has no rales. She exhibits no tenderness.   Abdominal: Soft. Bowel sounds are normal. She exhibits no distension. There is no tenderness. There is no guarding.   Musculoskeletal: Normal range of motion. She exhibits no edema.   Neurological: She is alert.   Oriented to place    Skin: Skin is warm and dry.   Psychiatric: She has a normal mood and affect.       Lab Review  Lab Results   Component Value Date/Time    WBC 9.9 04/26/2019 07:41 AM    RBC 4.75 04/26/2019 07:41 AM    HEMOGLOBIN 13.6 04/26/2019 07:41 AM    HEMATOCRIT 41.1 04/26/2019 07:41 AM    MCV 86.5 04/26/2019 07:41 AM    MCH 28.6 04/26/2019 07:41 AM    MCHC 33.1 (L) 04/26/2019 07:41 AM    MPV 10.3 04/26/2019 07:41 AM      Lab Results   Component Value Date/Time    SODIUM 139 04/26/2019 07:41 AM    POTASSIUM 3.9 04/26/2019 07:41 AM    CHLORIDE 107 04/26/2019 07:41 AM    CO2 19 (L) 04/26/2019 07:41 AM    GLUCOSE 130 (H) 04/26/2019 07:41 AM    BUN 12 04/26/2019 07:41 AM    CREATININE 0.66 04/26/2019 07:41 AM    CREATININE 0.9 12/29/2008 10:00 AM      Lab Results   Component Value Date/Time    ASTSGOT 13 04/26/2019 07:41 AM    ALTSGPT 9 04/26/2019 07:41 AM     Lab Results   Component Value Date/Time    CHOLSTRLTOT 197  01/04/2018 10:41 AM     (H) 01/04/2018 10:41 AM    HDL 62 01/04/2018 10:41 AM    TRIGLYCERIDE 128 01/04/2018 10:41 AM    TROPONINI <0.01 04/25/2019 05:18 PM       Recent Labs      04/25/19   1718   BNPBTYPENAT  27     /19 3.82  Cardiac Imaging and Procedures Review  EKG:  My personal interpretation of the EKG dated 4/26/19 Supraventircular tachycardia  Upright P waves in Ld II preceding R wave with short NJ interval .  Preceding rhythm APC noted at onset.    Echocardiogram:   3/18/19    CONCLUSIONS  Normal left ventricular systolic function.  Left ventricular ejection fraction is visually estimated to be 70%.  Right heart pressures are consistent with mild pulmonary hypertension.  No significant valve abnormalities.   Imaging  Chest X-Ray:    No acute cardiac or pulmonary abnormalities are identified.      Assessment/Plan  1. SVT recurrent patient cardioverted this AM.  [facility] on case consulting Dr Dhillon from EPS for review of arrhythmias.  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  2. Medical therapy will be started if recurrence then consideration for ablation.  Inderal + digoxin.  Plan discussed with patient and her  and R cardiology team/  Thank you for allowing me to participate in the care of this patient.      Please contact me with any questions.    ERVIN Bridges.   Cardiologist, [facility]  [phone]

## 2019-04-26 NOTE — PROCEDURES
Patient hypotensive and sustaining SVT with a heart rate of 170. verbal consent received from pt. decision was made to cardiovert patient .explained the procedure and discomfort from cardioversion, patient aware and agreed to go ahead with the cardioversion.  Patient blood pressure continues to drop.  Cardioverted at 150 J .pt given fentanyl s/p cardioversion for pain management. EKG at bedside, pt successfully converted to SR 90s.

## 2019-04-26 NOTE — CARE PLAN
Problem: Safety  Goal: Will remain free from falls  Outcome: PROGRESSING AS EXPECTED  Pt mobility assessed at beginning of shift. Pt is standby assist. Fall precautions in place. Non-slip socks on. Bed in lowest locked position. Bed alarm on. Call light within reach. Pt educated to call for assistance and verbalizes understanding.    Problem: Knowledge Deficit  Goal: Knowledge of the prescribed therapeutic regimen will improve  Outcome: PROGRESSING AS EXPECTED  Pt educated about disease process and reason for Rapid Response/cardioversion. Reason why medications are taken. And informed about treatment plan.

## 2019-04-26 NOTE — ASSESSMENT & PLAN NOTE
Cardiology consulted  Monitor on tele  -loading dose of digoxin 500 mcg x 2 given yesterday  -digoxin 125 mcg Qd begun this morning  - today will begin 220 mg propranolol LA  - possible ablation on Monday, will consider discontinuing digoxin once ablation is scheduled.

## 2019-04-26 NOTE — PROGRESS NOTES
RRT called for pt due to a monitor check from the monitor room. Upon initial assessment, pt was breathing very heavily, heart rate was 160-170 sustaining, but vital sign were stable. She was very anxious and nervous, and stated she had chest pain 9/10. Rapid response team aware and entered room; nasal cannula at 2L was put on, pt asked to bear down, EKG ordered. Pt sustained this 9/10 pain for 5-10mins. Dr. Espino aware. RRT nurses asked pt to bear down; after about 2-3mins, this worked, and pt's rate went from 160s to 100 sustaining, without chest pain. Dr. Haywood ordered CXR, BNP, Tropes, and 12 lead. 12 lead repeated for ST rhythm. Will continue to monitor now that pt's CP has resolved.

## 2019-04-26 NOTE — PROGRESS NOTES
Presented to bedside for rapid response called secondary to sinus tachycardia.  Initially was thought to be atrial fibrillation but I reviewed her EKG and by my read appears to be sinus tachycardia.  This patient was initially admitted for fall from her bed with shoulder and back pain.  I reviewed the medical records and the patient was previously on propranolol at home.  It appears that this medication was held at the time of admission.  Abrupt discontinuation of propanolol may cause ventricular tachyarrhythmias.  I have restarted her home propranolol for the morning.  During the rapid response, we gave her 1 mg of IV propranolol and she had good immediate response.  We will continue to monitor and if her blood pressure tolerates, we will repeat a second milligram of IV propranolol.  In the meantime, the patient will be is receiving IV fluid hydration.  Labs from earlier yesterday were unrevealing, no electrolyte imbalances noted.

## 2019-04-27 PROBLEM — F41.9 ANXIETY: Status: ACTIVE | Noted: 2019-04-27

## 2019-04-27 PROBLEM — M62.838 MUSCLE SPASMS OF NECK: Status: ACTIVE | Noted: 2019-04-27

## 2019-04-27 PROCEDURE — 700102 HCHG RX REV CODE 250 W/ 637 OVERRIDE(OP): Performed by: HOSPITALIST

## 2019-04-27 PROCEDURE — A9270 NON-COVERED ITEM OR SERVICE: HCPCS | Performed by: HOSPITALIST

## 2019-04-27 PROCEDURE — 94762 N-INVAS EAR/PLS OXIMTRY CONT: CPT

## 2019-04-27 PROCEDURE — 770020 HCHG ROOM/CARE - TELE (206)

## 2019-04-27 PROCEDURE — A9270 NON-COVERED ITEM OR SERVICE: HCPCS | Performed by: INTERNAL MEDICINE

## 2019-04-27 PROCEDURE — 99233 SBSQ HOSP IP/OBS HIGH 50: CPT | Performed by: HOSPITALIST

## 2019-04-27 PROCEDURE — 700111 HCHG RX REV CODE 636 W/ 250 OVERRIDE (IP): Performed by: FAMILY MEDICINE

## 2019-04-27 PROCEDURE — A9270 NON-COVERED ITEM OR SERVICE: HCPCS | Performed by: FAMILY MEDICINE

## 2019-04-27 PROCEDURE — A9270 NON-COVERED ITEM OR SERVICE: HCPCS | Performed by: STUDENT IN AN ORGANIZED HEALTH CARE EDUCATION/TRAINING PROGRAM

## 2019-04-27 PROCEDURE — 700102 HCHG RX REV CODE 250 W/ 637 OVERRIDE(OP): Performed by: STUDENT IN AN ORGANIZED HEALTH CARE EDUCATION/TRAINING PROGRAM

## 2019-04-27 PROCEDURE — 700102 HCHG RX REV CODE 250 W/ 637 OVERRIDE(OP): Performed by: FAMILY MEDICINE

## 2019-04-27 PROCEDURE — 700102 HCHG RX REV CODE 250 W/ 637 OVERRIDE(OP): Performed by: INTERNAL MEDICINE

## 2019-04-27 RX ORDER — PROPRANOLOL HYDROCHLORIDE 80 MG/1
160 CAPSULE, EXTENDED RELEASE ORAL DAILY
Status: DISCONTINUED | OUTPATIENT
Start: 2019-04-27 | End: 2019-04-27

## 2019-04-27 RX ORDER — PROPRANOLOL HYDROCHLORIDE 20 MG/1
60 TABLET ORAL 3 TIMES DAILY
Status: DISCONTINUED | OUTPATIENT
Start: 2019-04-27 | End: 2019-04-27

## 2019-04-27 RX ORDER — CYCLOBENZAPRINE HCL 10 MG
5 TABLET ORAL
Status: DISCONTINUED | OUTPATIENT
Start: 2019-04-27 | End: 2019-04-28 | Stop reason: HOSPADM

## 2019-04-27 RX ADMIN — CYCLOBENZAPRINE HYDROCHLORIDE 5 MG: 10 TABLET, FILM COATED ORAL at 22:03

## 2019-04-27 RX ADMIN — CYCLOBENZAPRINE HYDROCHLORIDE 5 MG: 10 TABLET, FILM COATED ORAL at 13:03

## 2019-04-27 RX ADMIN — ALPRAZOLAM 0.25 MG: 0.25 TABLET ORAL at 10:55

## 2019-04-27 RX ADMIN — VITAMIN D, TAB 1000IU (100/BT) 2000 UNITS: 25 TAB at 05:17

## 2019-04-27 RX ADMIN — DIGOXIN 125 MCG: 125 TABLET ORAL at 17:31

## 2019-04-27 RX ADMIN — PROPRANOLOL HYDROCHLORIDE 220 MG: 60 CAPSULE, EXTENDED RELEASE ORAL at 12:17

## 2019-04-27 RX ADMIN — DULOXETINE HYDROCHLORIDE 60 MG: 60 CAPSULE, DELAYED RELEASE ORAL at 05:17

## 2019-04-27 RX ADMIN — OMEPRAZOLE 40 MG: 20 CAPSULE, DELAYED RELEASE ORAL at 05:17

## 2019-04-27 RX ADMIN — ENOXAPARIN SODIUM 40 MG: 100 INJECTION SUBCUTANEOUS at 05:18

## 2019-04-27 RX ADMIN — PRAMIPEXOLE DIHYDROCHLORIDE 0.5 MG: 0.5 TABLET ORAL at 22:03

## 2019-04-27 RX ADMIN — CYANOCOBALAMIN TAB 500 MCG 1000 MCG: 500 TAB at 05:18

## 2019-04-27 RX ADMIN — HYDRALAZINE HYDROCHLORIDE 10 MG: 20 INJECTION INTRAMUSCULAR; INTRAVENOUS at 08:32

## 2019-04-27 RX ADMIN — OXYCODONE HYDROCHLORIDE 5 MG: 5 TABLET ORAL at 05:23

## 2019-04-27 ASSESSMENT — ENCOUNTER SYMPTOMS
CONSTITUTIONAL NEGATIVE: 1
PALPITATIONS: 0
CARDIOVASCULAR NEGATIVE: 1
DIZZINESS: 0
FOCAL WEAKNESS: 0
DEPRESSION: 0
DIARRHEA: 0
NAUSEA: 0
TINGLING: 0
WEAKNESS: 0
GASTROINTESTINAL NEGATIVE: 1
NEUROLOGICAL NEGATIVE: 1
STRIDOR: 0
BLURRED VISION: 0
PHOTOPHOBIA: 0
HEMOPTYSIS: 0
FEVER: 0
NERVOUS/ANXIOUS: 1
DOUBLE VISION: 0
ABDOMINAL PAIN: 0
MYALGIAS: 1
NECK PAIN: 1
FALLS: 1
RESPIRATORY NEGATIVE: 1
SINUS PAIN: 0
HEARTBURN: 0
BACK PAIN: 1
SHORTNESS OF BREATH: 1

## 2019-04-27 ASSESSMENT — LIFESTYLE VARIABLES: SUBSTANCE_ABUSE: 0

## 2019-04-27 NOTE — PROGRESS NOTES
Cardiology Interval Note  Note Author: Melecio Palacios    Reason for interval visit  (Principal Problem)   Ms. Vargas is an 78F with paroxysmal SVT (rate observed up to 160s-190s inpatient) likely associated with presyncopal symptoms and recurrent falls per an outpatient event monitor. She was taking propranolol outpatient for HTN but had ongoing recurrent SVT. Dr. Dhillon (EP) has suggested possible ablation Monday. This morning,  digoxin loading dose at 125 mcg was begun. Review of tele strips this morning show no SVT, few PVC's.. There are no new labs this morning. Overnight her BPs have averaged 140s-150s/60s, HR mostly maintaining in 60s-70s.     Interval Problem Daily Status Update  (24 hours, problem oriented, brief subjective history, new lab/imaging data pertinent to that problem)     She reports feeling well, no acute complaints.     Patient was hypertensive and received hydralazine.     Review of Systems   Constitutional: Negative.    Respiratory: Negative.    Cardiovascular: Negative.    Gastrointestinal: Negative.    Neurological: Negative.       All systems reviewed and negative except as noted in HPI.  Quality-Core Measures        Physical Exam       Vitals:    04/26/19 2155 04/27/19 0000 04/27/19 0210 04/27/19 0400   BP:  145/58  144/72   Pulse:  67 68 71   Resp:  16  16   Temp:  36.1 °C (97 °F)  36.4 °C (97.5 °F)   TempSrc:  Temporal  Temporal   SpO2: 97% 96% 94% 97%   Weight:       Height:         Body mass index is 24.55 kg/m². Weight: 71.1 kg (156 lb 12 oz)  Oxygen Therapy:  Pulse Oximetry: 97 %, O2 (LPM): 0, O2 Delivery: None (Room Air)    Physical Exam  Constitutional: She is oriented to person, place, and time and well-developed, well-nourished, and in no distress. No distress.   Eyes: Pupils are equal, round, and reactive to light. No scleral icterus.   Neck: JVD present.   Cardiovascular: Normal rate, regular rhythm, normal heart sounds and intact distal pulses.    No murmur  heard.  Pulmonary/Chest: Effort normal and breath sounds normal. No stridor. No respiratory distress. She has no wheezes.   Abdominal: Soft. Bowel sounds are normal. There is no tenderness.   Musculoskeletal: She exhibits no edema or tenderness.   Neurological: She is alert and oriented to person, place, and time. GCS score is 15.   Skin: Skin is warm and dry. She is not diaphoretic.   Psychiatric: Mood and affect normal.         Assessment/Plan   Recurrent SVT  -loading dose of digoxin 500 mcg x 2 given yesterday  -digoxin 125 mcg Qd begun this morning  -today will begin 220 mg propranolol LA  -possible ablation on Monday, will consider discontinuing digoxin once ablation is scheduled.

## 2019-04-27 NOTE — PROGRESS NOTES
Bedside report received, pt care assumed, tele box on.  Pt is A&Ox4, resting in bed, and denies any additional needs at this time. Bed in lowest position, bed alarm on pt educated on fall risk and verbalized understanding, call light within reach.

## 2019-04-27 NOTE — RESPIRATORY CARE
Nocturnal Oximetry    Setup Time: 2155    O2 (LPM): 0    RN Notified: Yes    Study End Time: 0445     Report placed in pt chart.

## 2019-04-27 NOTE — ASSESSMENT & PLAN NOTE
Patient with history of muscular spasms in the past  She does have trapezius and sternocleidomastoid muscle spasms  I will check her electrolytes again  I will place her on Flexeril 5 mg twice daily

## 2019-04-27 NOTE — ASSESSMENT & PLAN NOTE
Patient was super anxious and tearful this morning  is not at bedside.  Apparently cardiology team was in right before I walked in and she is very anxious about the cardiac ambulation which was discussed with cardiology.  At this time she has been quite anxious for the last couple of days and has been going through a lot therefore I will put her on some Xanax prn

## 2019-04-27 NOTE — CARE PLAN
Problem: Safety  Goal: Will remain free from falls  Outcome: PROGRESSING AS EXPECTED  Pt mobility assessed at beginning of shift. Pt is assist 1. Fall precautions in place. Non-slip socks on. Bed in lowest locked position. Bed alarm on. Call light within reach. Pt educated to call for assistance and verbalizes understanding.    Problem: Knowledge Deficit  Goal: Knowledge of the prescribed therapeutic regimen will improve  Outcome: PROGRESSING AS EXPECTED  Pt educated about disease process. Reason why medications are taken. And informed about treatment plan.

## 2019-04-27 NOTE — CONSULTS
Electrophysiology consult    Requested by Dr. Thompson      REASON FOR CONSULT: Recurrent SVT    HPI: Pleasant 78-year-old white female was not the best historian.  History is obtained from the patient and the patient's .  Admitted with falls and rapid tachycardia.  Has had recurrence of rapid tachycardia despite long-term use of Inderal for blood pressure.  Does not smoke denies alcohol use.  Had rapid SVT this morning that required cardioversion.  Previous SVT was broken with vagal maneuver.  I do not see use of adenosine anytime.  Recent thallium that was unremarkable and echocardiogram unremarkable.  Currently being treated with digoxin.  Falls appear to be possibly related to SVT.  She does get dizzy spells and occasional palpitations per her .  Multiple ER visits for falls in the past 2 years and including dizziness.  ZIO Patch performed by Dr. Paredes in March showed multiple SVTs longest approximate 40 minutes.  Abrupt onset and offset.  Still difficult to determine whether atrial tachycardia versus AV king dependent.    MEDICATIONS:      Current Facility-Administered Medications:   •  NS (BOLUS) infusion 500 mL, 500 mL, Intravenous, Once, Karon Love M.D.  •  SODIUM CHLORIDE 0.9 % IV SOLN, , , ,   •  Metoprolol Tartrate (LOPRESSOR) injection 5 mg, 5 mg, Intravenous, Once, Hallie Haywood M.D., Stopped at 04/26/19 0818  •  METOPROLOL TARTRATE 5 MG/5ML IV SOLN, , , ,   •  ALPRAZolam (XANAX) tablet 0.25 mg, 0.25 mg, Oral, 4X/DAY PRN, Hallie Haywood M.D.  •  [START ON 4/27/2019] digoxin (LANOXIN) tablet 125 mcg, 125 mcg, Oral, DAILY AT 1800, Enio Sharif M.D.  •  digoxin (LANOXIN) injection 500 mcg, 500 mcg, Intravenous, Q6HRS, Enio Sharif M.D., 500 mcg at 04/26/19 1315  •  cyanocobalamin (VITAMIN B-12) tablet 1,000 mcg, 1,000 mcg, Oral, DAILY, Hallie Haywood M.D., 1,000 mcg at 04/26/19 0641  •  fentaNYL (SUBLIMAZE) injection 50 mcg, 50 mcg, Intravenous, Q30 MIN PRN, Valentino  CANDICE Sosa, 50 mcg at 04/22/19 1414  •  DULoxetine (CYMBALTA) capsule 60 mg, 60 mg, Oral, DAILY, Vinh Saunders M.D., 60 mg at 04/26/19 0642  •  omeprazole (PRILOSEC) capsule 40 mg, 40 mg, Oral, QAM AC, Vinh Saunders M.D., 40 mg at 04/26/19 0642  •  pramipexole (MIRAPEX) tablet 0.5 mg, 0.5 mg, Oral, QHS, Vinh Saunders M.D., 0.5 mg at 04/25/19 2031  •  senna-docusate (PERICOLACE or SENOKOT S) 8.6-50 MG per tablet 2 Tab, 2 Tab, Oral, BID, 2 Tab at 04/25/19 0524 **AND** polyethylene glycol/lytes (MIRALAX) PACKET 1 Packet, 1 Packet, Oral, QDAY PRN **AND** magnesium hydroxide (MILK OF MAGNESIA) suspension 30 mL, 30 mL, Oral, QDAY PRN **AND** bisacodyl (DULCOLAX) suppository 10 mg, 10 mg, Rectal, QDAY PRN, Vinh Saunders M.D.  •  enoxaparin (LOVENOX) inj 40 mg, 40 mg, Subcutaneous, DAILY, Vinh Saunders M.D., 40 mg at 04/26/19 0648  •  acetaminophen (TYLENOL) tablet 650 mg, 650 mg, Oral, Q6HRS PRN, Vinh Saunders M.D., 650 mg at 04/22/19 1652  •  Notify provider if pain remains uncontrolled, , , CONTINUOUS **AND** Use the numeric rating scale (NRS-11) on regular floors and Critical-Care Pain Observation Tool (CPOT) on ICUs/Trauma to assess pain, , , CONTINUOUS **AND** Pulse Ox (Oximetry), , , CONTINUOUS **AND** [DISCONTINUED] Pharmacy Consult Request ...Pain Management Review 1 Each, 1 Each, Other, PHARMACY TO DOSE **AND** If patient difficult to arouse and/or has respiratory depression, stop any opiates that are currently infusing and call a Rapid Response., , , CONTINUOUS **AND** oxyCODONE immediate-release (ROXICODONE) tablet 2.5 mg, 2.5 mg, Oral, Q3HRS PRN **AND** oxyCODONE immediate-release (ROXICODONE) tablet 5 mg, 5 mg, Oral, Q3HRS PRN, 5 mg at 04/25/19 1147 **AND** morphine (pf) 4 mg/ml injection 2 mg, 2 mg, Intravenous, Q3HRS PRN, Vinh Saunders M.D., 2 mg at 04/22/19 1743  •  hydrALAZINE (APRESOLINE) injection 10 mg, 10 mg, Intravenous, Q4HRS PRN, Vinh Saunders M.D.  •  ondansetron  (ZOFRAN) syringe/vial injection 4 mg, 4 mg, Intravenous, Q4HRS PRN, Vinh Saunders M.D.  •  ondansetron (ZOFRAN ODT) dispertab 4 mg, 4 mg, Oral, Q4HRS PRN, Vinh Saunders M.D.  •  vitamin D (cholecalciferol) tablet 2,000 Units, 2,000 Units, Oral, DAILY, Vinh Saunders M.D., 2,000 Units at 04/26/19 0640    ALLERGIES:   Ms. Vargas  has No Known Allergies.     SURGERIES:  Ms. Vargas   has a past surgical history that includes hysterectomy laparoscopy; chely by laparoscopy; other abdominal surgery; other abdominal surgery; other; other orthopedic surgery; cataract phaco with iol (Left, 2/4/2016); cataract phaco with iol (Right, 2/18/2016); pr inj lumbar/sacral,w/wo cntrst (Right, 9/29/2016); pr fluorscopic guidance spinal injection (9/29/2016); pr inj lumbar/sacral,w/wo cntrst (N/A, 11/10/2016); and pr fluorscopic guidance spinal injection (11/10/2016).     MEDICAL ILLNESSES:  Ms. Vargas   has a past medical history of Anesthesia; Arthritis; Cataract; Chronic pain; Hiatus hernia syndrome; Hypertension; Lymphedema (2014); Migraine headache; Pain; and Pneumonia.     SOCIAL HISTORY:  Ms. Vargas   reports that she has never smoked. She has never used smokeless tobacco. She reports that she does not drink alcohol or use drugs.     FAMILY HISTORY:  Ms.'s Vargas  family history includes Heart Disease in her father and mother.      ROS:  Review of Systems   Constitutional: Negative for chills and fever.   HENT: Negative for congestion.    Eyes: Negative for blurred vision, pain and discharge.   Respiratory: Negative for cough, hemoptysis and wheezing.    Cardiovascular: Positive for palpitations. Negative for chest pain.   Gastrointestinal: Negative for abdominal pain, nausea and vomiting.   Musculoskeletal: Negative for joint pain and myalgias.   Skin: Negative for itching and rash.   Neurological: Positive for dizziness. Negative for speech change and loss of consciousness.   Endo/Heme/Allergies: Does not  "bruise/bleed easily.   Psychiatric/Behavioral: Negative for depression. The patient is not nervous/anxious.    All other systems reviewed and are negative.    In addition to the above, a complete review of system was performed and all other organs systems were normal    PHYSICAL EXAM:  Physical Exam   Constitutional: She is oriented to person, place, and time.   Somewhat elderly   HENT:   Head: Normocephalic and atraumatic.   Eyes: Pupils are equal, round, and reactive to light. EOM are normal.   Neck: Normal range of motion. Neck supple.   Cardiovascular: Normal rate and regular rhythm.  Exam reveals no gallop and no friction rub.    Pulmonary/Chest: Effort normal and breath sounds normal.   Abdominal: Soft. Bowel sounds are normal.   Musculoskeletal: Normal range of motion. She exhibits no edema, tenderness or deformity.   Neurological: She is alert and oriented to person, place, and time.   Skin: Skin is dry.   Psychiatric: Mood, memory, affect and judgment normal.       /62   Pulse 69   Temp 36.7 °C (98.1 °F) (Temporal)   Resp 17   Ht 1.702 m (5' 7\")   Wt 70.6 kg (155 lb 10.3 oz)   SpO2 93%   Breastfeeding? No   BMI 24.38 kg/m²      Lab Results   Component Value Date/Time    CHOLSTRLTOT 197 01/04/2018 10:41 AM     (H) 01/04/2018 10:41 AM    HDL 62 01/04/2018 10:41 AM    TRIGLYCERIDE 128 01/04/2018 10:41 AM       Lab Results   Component Value Date/Time    SODIUM 139 04/26/2019 07:41 AM    POTASSIUM 3.9 04/26/2019 07:41 AM    CHLORIDE 107 04/26/2019 07:41 AM    CO2 19 (L) 04/26/2019 07:41 AM    GLUCOSE 130 (H) 04/26/2019 07:41 AM    BUN 12 04/26/2019 07:41 AM    CREATININE 0.66 04/26/2019 07:41 AM    CREATININE 0.9 12/29/2008 10:00 AM     Lab Results   Component Value Date/Time    ALKPHOSPHAT 63 04/26/2019 07:41 AM    ASTSGOT 13 04/26/2019 07:41 AM    ALTSGPT 9 04/26/2019 07:41 AM    TBILIRUBIN 0.6 04/26/2019 07:41 AM      Lab Results   Component Value Date/Time    BNPBTYPENAT 27 04/25/2019 " 05:18 PM     Recent Labs      19   1718   TROPONINI  <0.01   BNPBTYPENAT  27     Recent Labs      19   0741   WBC  9.9   RBC  4.75   HEMOGLOBIN  13.6   HEMATOCRIT  41.1   MCV  86.5   MCH  28.6   MCHC  33.1*   RDW  47.2   PLATELETCT  256   MPV  10.3   Echocardiogram from 3/18/2019  CONCLUSIONS  Normal left ventricular systolic function.  Left ventricular ejection fraction is visually estimated to be 70%.  Right heart pressures are consistent with mild pulmonary hypertension.  No significant valve abnormalities.     Thallium study from 3/20/2019     NUCLEAR IMAGING INTERPRETATION   No evidence of significant jeopardized viable myocardium or prior myocardial    infarction.   Normal left ventricular size, ejection fraction, and wall motion.   ECG INTERPRETATION   Negative stress ECG for ischemia    EKG: During sinus rhythm patient has sinus rhythm at 90 beats per minute no preexcitation fairly normal EKG.  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      Results for orders placed or performed during the hospital encounter of 19   EKG   Result Value Ref Range    Report       [facility]    Test Date:  2019  Pt Name:    DWIGHT VILLA              Department: 171  MRN:        [id number]                      Room:       T716  Gender:     Female                       Technician: [facility]  :        1940                   Requested By:LUCIE BLOOM  Order #:    [id number]                    Reading MD: Esteban Frye MD    Measurements  Intervals                                Axis  Rate:       164                          P:          0  NM:         68                           QRS:        61  QRSD:       66                           T:          263  QT:         244  QTc:        403    Interpretive Statements  SUPRAVENTRICULAR TACHYCARDIA  REPOLARIZATION ABNORMALITY, PROB RATE RELATED  ARTIFACT IN LEAD(S)  I,II,aVR,aVF,V1,V2,V3,V4,V5,V6  Compared to ECG 2019 11:21:16  Early repolarization now present  SUPRAVENTRICULAR TACHYCARDIA IS NOW PRESENT  Electronically Signed On 2019 0:30:37 PDT by Esteban Frye MD     EKG   Result Value Ref Range    Report       [facility]    Test Date:  2019  Pt Name:    DWIGHT VILLA              Department: 171  MRN:        [id number]                      Room:       T716  Gender:     Female                       Technician: [facility]  :        1940                   Requested By:LUCIE BLOOM  Order #:    [id number]                    Reading MD: Esteban Frye MD    Measurements  Intervals                                Axis  Rate:       111                          P:          48  FL:         168                          QRS:        40  QRSD:       76                           T:          46  QT:         344  QTc:        468    Interpretive Statements  SINUS TACHYCARDIA  Compared to ECG 2019 16:56:38  Supraventricular tachycardia no longer present  Early repolarization no longer present    Electronically Signed On 2019 0:59:20 PDT by Esteban Frye MD     EKG   Result Value Ref Range    Report       [facility]    Test Date:  2019  Pt Name:    DWIGHT VILLA              Department: 171  MRN:        [id number]                      Room:       T716  Gender:     Female                       Technician: [facility]  :        1940                   Requested By:LUCIE BLOOM  Order #:    [id number]                    Reading MD: Matias Aviles MD    Measurements  Intervals                                Axis  Rate:       170                          P:          180  FL:         114                          QRS:        52  QRSD:       82                           T:          -55  QT:         234  QTc:        394    Interpretive Statements  SUPRAVENTRICULAR TACHYCARDIA  ABNORMAL R-WAVE PROGRESSION, EARLY TRANSITION  REPOLARIZATION ABNORMALITY,  PROB RATE RELATED  ARTIFACT IN LEAD(S) I,II,III,aVR,aVL,aVF,V2,V3,V4,V5,V6  Compared to ECG 2019 17:18:13  Early repolarization now present  Sinus tachycardia no longer present    Electronically Signed On 2019 9:20:03 PDT by  Matias Aviles MD     EKG   Result Value Ref Range    Report       [facility]    Test Date:  2019  Pt Name:    DWIGHT VILLA              Department: 171  MRN:        [id number]                      Room:       T716  Gender:     Female                       Technician: [facility]  :        1940                   Requested By:LUCIE BLOOM  Order #:    [id number]                    Reading MD: Matias Aviles MD    Measurements  Intervals                                Axis  Rate:       157                          P:          0  AK:         132                          QRS:        33  QRSD:       82                           T:          9  QT:         288  QTc:        466    Interpretive Statements  SUPRAVENTRICULAR TACHYCARDIA  PROBABLE POSTERIOR INFARCT  ARTIFACT IN LEAD(S) I,II,III,aVR,aVL,aVF,V1,V2,V3,V4,V5,V6  Compared to ECG 2019 03:35:45  Early repolarization no longer present    Electronically Signed On 2019 9:20:45 PDT by Matias Aviles MD     EKG   Result Value Ref Range    Report       [facility]    Test Date:  2019  Pt Name:    DWIGHT VILLA              Department: 171  MRN:        [id number]                      Room:       T716  Gender:     Female                       Technician: [facility]  :        1940                   Requested By:LUCIE BLOOM  Order #:    [id number]                    Reading MD: Matias Aviles MD    Measurements  Intervals                                Axis  Rate:       90                           P:          2  AK:         176                          QRS:        43  QRSD:       76                           T:          19  QT:         368  QTc:        451    Interpretive Statements  SINUS  RHYTHM  EARLY PRECORDIAL R/S TRANSITION  ARTIFACT IN LEAD(S) II,III,aVR,aVL,aVF,V1,V3,V4,V6  Compared to ECG 04/26/2019 07:30:47  Supraventricular tachycardia no longer present      Electronically Signed On 4- 9:21:41 PDT by Matias Aviles MD         IMAGING:   DX-CHEST-PORTABLE (1 VIEW)   Final Result      No acute cardiac or pulmonary abnormalities are identified.      CT-HEAD W/O   Final Result      No acute intracranial abnormality is identified.      There are periventricular and subcortical white matter changes present.  This finding is nonspecific and could be from previous small vessel ischemia, demyelination, or gliosis.      Atrophy         CT-LSPINE W/O PLUS RECONS   Final Result         1. No acute fracture or malalignment appreciated in the lumbar spine      2. Severe degenerative change of the lumbar spine, worse than prior.      3. Mild anterolisthesis of L4-5, similar to prior.         CT-TSPINE W/O PLUS RECONS   Final Result         1. No acute fracture or malalignment appreciated in the thoracic spine         DX-SACRUM AND COCCYX 2+   Final Result      No acute fracture of the sacrum and coccyx identified. If symptoms are persistent, would recommend CT for further evaluation.      DX-CHEST-2 VIEWS   Final Result      Hypoinflation and lung base atelectasis.      DX-SHOULDER 2+ RIGHT   Final Result      No acute fracture identified.            Patient Active Problem List    Diagnosis Date Noted   • Confusion 04/23/2019     Priority: High   • Frequent falls 03/26/2019     Priority: High   • Chronic right shoulder pain 04/22/2019     Priority: Medium   • Vitamin B12 deficiency 04/22/2019     Priority: Medium   • History of compression fracture of spine 04/22/2019     Priority: Medium   • Essential hypertension 04/11/2019     Priority: Medium   • Restless legs syndrome 03/29/2019     Priority: Medium   • Chronic back pain 03/29/2019     Priority: Medium   • GERD (gastroesophageal reflux  disease) 03/26/2019     Priority: Low   • SVT (supraventricular tachycardia) ([facility]) 04/26/2019   • Hypotension 04/26/2019   • Pulmonary hypertension ([facility]) 04/26/2019   • Cognitive deficits 04/11/2019   • Right ear pain 04/07/2019   • Rash 04/07/2019   • Debility 03/29/2019   • Mood disorder ([facility]) 03/26/2019   • Hypokalemia 03/18/2019   • Hyponatremia 03/18/2019   • Dehydration 03/18/2019   • SIRS (systemic inflammatory response syndrome) ([facility]) 03/18/2019   • Acute metabolic encephalopathy 03/18/2019   • NSTEMI (non-ST elevated myocardial infarction) ([facility]) 03/18/2019   • Syncope 03/18/2019   • Palpitations 12/26/2018   • Localized edema 12/26/2018   • Lumbar radiculitis 09/29/2016   • 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  Recurrent SVT possible as an etiology of her dizzy spells and falls.  Currently is on Inderal and patient is appropriately being loaded with digoxin.  Long-term would benefit from catheter ablation most likely have to be scheduled as an outpatient.  2.  Recent falls as above  3.  Chronic back pain.  4.  Hopefully we get control with the combination of beta-blockers and digoxin.  If unable to get controlled procedure may have to be performed as an inpatient.

## 2019-04-27 NOTE — CARE PLAN
Problem: Infection  Goal: Will remain free from infection    Intervention: Implement standard precautions and perform hand washing before and after patient contact  Assess for signs and symptoms of infection. Monitor vital signs and lab values. Implement standard precautions and hand washing before and after pt contact.

## 2019-04-27 NOTE — PROGRESS NOTES
Assumed care of PT A&O 4. Pt sleeping with no signs of labored breathing. On RA. Tele monitor in place, cardiac rhythm being monitored. Call light within reach, bed in lowest position, upper bed rails up. Pt was updated on plan of care for the day. Will continue to monitor.

## 2019-04-27 NOTE — PROGRESS NOTES
[facility] Medicine Daily Progress Note    Date of Service  4/27/2019    Chief Complaint  78 y.o. female admitted 4/22/2019 with fall    Hospital Course    78 y.o. female who presented 4/22/2019 with which happened yesterday when she was trying to get out of bed.  She apparently just rolled to the floor from her bed.  She is having right shoulder pain and back pain, which she admits is chronic to her.  However she feels that her tailbone pain is worse. Right shoulder x-ray and sacral x-ray are both negative.  Patient states for her shoulder pain she was recently diagnosed with rotator cuff tendinitis was advised surgery.  Patient has had frequent falls, she is somewhat of a poor historian including her spouse, she is unable to see if she has had any dizziness or lightheadedness, she denies chest pain palpitation shortness of breath, she denies any fever or chills.  She has some chronic nasal congestion but denies any cough, denies any abdominal pain or vomiting, she has had some nausea.  She was admitted for falls twice in the past month she was initially discharged to skilled nursing facility then to rehab.  She just got out of rehab.         Interval Problem Update  Seen and examined patient doing well, feeling weak.  She continues to have right shoulder pain and back pain.  Patient continues to ramble on her  is at bedside and says this is new for her.  She at times is not making any sense.  No focal neurologic deficits noted.      4/24: Seen and examined this morning patient is alert and oriented x3 she is complaining of back pain especially with movement.  No  at bedside.  PT recommends skilled nursing facility I have placed a referral  No other complaints or concerns.  Vital signs are stable.  Patient refusing MRI secondary to being claustrophobic.    4/25: Patient seen and examined this morning, has been at bedside.  Patient complaining of some back pain.  She states this is chronic however laying  in bed in the hospital is making it worse.  I encouraged patient to walk around with physical therapy occupational therapy and nursing.  PT recommended 24 7 assistance and post acute rehab however patient refusing to go to a skilled facility and states she will go home and her  will take care of her.  I will order home health for her.  Patient is coming from acute inpatient rehab and her  is adamant that he wants to get her back there and he was getting in touch with [facility] for that currently.    4/26: Rapid response called earlier this morning, patient with hypotension and tachycardia with heart rates over 160s sustaining.  Patient was laying in bed prior to this event happening.  Upon my examination patient appears to be anxious hypotensive and tachycardic.  Vagal maneuvers were tried however heart rate still sustaining in 170s.  She was given propranolol early this morning at 4 and then at 5 AM however her heart rate is still elevated.  Stat EKG showing SVT.  Blood pressure continues to drop.  At this time I will give her a stat 1 L IV bolus, Xanax for anxiety.  I did order a stat chest CT to rule out a PE given hypotension and tachycardia however patient's blood pressure was unstable therefore decision was to make to cardiovert her emergently at bedside.    4/27: Patient seen and examined this morning she is very tearful and terrified of the cardiac ablation that was discussed with her with the cardiology team earlier this morning.  Her  is not at bedside and she would like us to call her .  She has history of anxiety however not on any current meds she is also complaining of neck spasms and pain.  Consultants/Specialty  cardiology    Code Status  Full    Disposition  hh accepted    Review of Systems  Review of Systems   Unable to perform ROS: Acuity of condition   Constitutional: Negative for fever and malaise/fatigue.   HENT: Positive for hearing loss. Negative for  congestion, nosebleeds and sinus pain.    Eyes: Negative for blurred vision, double vision and photophobia.   Respiratory: Positive for shortness of breath. Negative for hemoptysis and stridor.    Cardiovascular: Positive for chest pain. Negative for palpitations and leg swelling.   Gastrointestinal: Negative for abdominal pain, diarrhea, heartburn and nausea.   Genitourinary: Negative for dysuria, frequency and urgency.   Musculoskeletal: Positive for back pain, falls, joint pain, myalgias and neck pain.   Skin: Negative for itching and rash.   Neurological: Negative for dizziness, tingling, focal weakness and weakness.   Psychiatric/Behavioral: Negative for depression and substance abuse. The patient is nervous/anxious.         Physical Exam  Temp:  [36.1 °C (96.9 °F)-36.7 °C (98.1 °F)] 36.3 °C (97.4 °F)  Pulse:  [66-76] 76  Resp:  [16-18] 18  BP: (126-166)/(51-73) 126/73  SpO2:  [92 %-97 %] 96 %    Physical Exam   Constitutional: She is oriented to person, place, and time. She appears well-developed and well-nourished. No distress.   HENT:   Head: Normocephalic and atraumatic.   Mouth/Throat: No oropharyngeal exudate.   Eyes: Pupils are equal, round, and reactive to light. Conjunctivae and EOM are normal. Left eye exhibits no discharge.   Neck: Normal range of motion. Neck supple. No JVD present. No thyromegaly present.   Cardiovascular: Normal rate, regular rhythm and intact distal pulses.    No murmur heard.       Pulmonary/Chest: Effort normal. No respiratory distress. She has no wheezes.   Diminished bilaterally   Abdominal: Soft. Bowel sounds are normal. She exhibits no distension. There is no tenderness.   Musculoskeletal: She exhibits no edema, tenderness or deformity.   Limited right shoulder range of motion secondary to pain   Neurological: She is alert and oriented to person, place, and time. No cranial nerve deficit. She exhibits normal muscle tone.   Skin: Skin is warm and dry. No rash noted. No  erythema.   Psychiatric:   Tearful and very anxious       Fluids    Intake/Output Summary (Last 24 hours) at 04/27/19 1410  Last data filed at 04/27/19 0800   Gross per 24 hour   Intake              240 ml   Output                0 ml   Net              240 ml       Laboratory  Recent Labs      04/26/19   0741   WBC  9.9   RBC  4.75   HEMOGLOBIN  13.6   HEMATOCRIT  41.1   MCV  86.5   MCH  28.6   MCHC  33.1*   RDW  47.2   PLATELETCT  256   MPV  10.3     Recent Labs      04/25/19   1718  04/26/19   0741   SODIUM  136  139   POTASSIUM  4.3  3.9   CHLORIDE  103  107   CO2  22  19*   GLUCOSE  115*  130*   BUN  14  12   CREATININE  0.76  0.66   CALCIUM  9.7  9.1         Recent Labs      04/25/19   1718   BNPBTYPENAT  27           Imaging  DX-CHEST-PORTABLE (1 VIEW)   Final Result      No acute cardiac or pulmonary abnormalities are identified.      CT-HEAD W/O   Final Result      No acute intracranial abnormality is identified.      There are periventricular and subcortical white matter changes present.  This finding is nonspecific and could be from previous small vessel ischemia, demyelination, or gliosis.      Atrophy         CT-LSPINE W/O PLUS RECONS   Final Result         1. No acute fracture or malalignment appreciated in the lumbar spine      2. Severe degenerative change of the lumbar spine, worse than prior.      3. Mild anterolisthesis of L4-5, similar to prior.         CT-TSPINE W/O PLUS RECONS   Final Result         1. No acute fracture or malalignment appreciated in the thoracic spine         DX-SACRUM AND COCCYX 2+   Final Result      No acute fracture of the sacrum and coccyx identified. If symptoms are persistent, would recommend CT for further evaluation.      DX-CHEST-2 VIEWS   Final Result      Hypoinflation and lung base atelectasis.      DX-SHOULDER 2+ RIGHT   Final Result      No acute fracture identified.           Assessment/Plan  * SVT (supraventricular tachycardia) ([facility])   Assessment & Plan     Cardiology consulted  Monitor on tele  -loading dose of digoxin 500 mcg x 2 given yesterday  -digoxin 125 mcg Qd begun this morning  - today will begin 220 mg propranolol LA  - possible ablation on Monday, will consider discontinuing digoxin once ablation is scheduled.         Confusion   Assessment & Plan    Infectious work-up have been ruled out    head CT has been negative  Patient refused MRI secondary to being claustrophobic  Patient is back to baseline       Frequent falls- (present on admission)   Assessment & Plan    Patient accepted by home health     History of compression fracture of spine- (present on admission)   Assessment & Plan    Vit d replacement     Vitamin B12 deficiency- (present on admission)   Assessment & Plan    Vitamin B level low  Continue supplementation at this time     Chronic right shoulder pain- (present on admission)   Assessment & Plan    PT and OT-home health accepted     Essential hypertension- (present on admission)   Assessment & Plan    Propranolol was held on admission secondary to soft blood pressures however I suspect that she is having a rebound tachycardia therefore propranolol has been restarted     Chronic back pain- (present on admission)   Assessment & Plan    pain control     Restless legs syndrome- (present on admission)   Assessment & Plan    Pramipexole     Muscle spasms of neck   Assessment & Plan    Patient with history of muscular spasms in the past  She does have trapezius and sternocleidomastoid muscle spasms  I will check her electrolytes again  I will place her on Flexeril 5 mg twice daily     Anxiety   Assessment & Plan    Patient was super anxious and tearful this morning  is not at bedside.  Apparently cardiology team was in right before I walked in and she is very anxious about the cardiac ambulation which was discussed with cardiology.  At this time she has been quite anxious for the last couple of days and has been going through a lot therefore  I will put her on some Xanax prn       Pulmonary hypertension (HCC)   Assessment & Plan    As noted on recent echocardiogram done on March 2019  Continue to monitor  Cardiology consulted     Hypotension   Assessment & Plan    Resolved  stable     GERD (gastroesophageal reflux disease)- (present on admission)   Assessment & Plan    Omeprazole        I spent a total of 45 minutes during this clinical encounter of which > 50% was devoted to counseling and coordinating care including review of records, pertinent lab data and studies, as well as discussing diagnostic evaluation and work up, planned therapeutic interventions and future disposition of care.  Patient is very anxious almost having an anxiety attack thinking about the cardiac ablation that was discussed with cardiology earlier today.  States everything seems to be going wrong with her however I tried to calm her down and explained to her that we are trying to do everything that we can to help her.  Also discussed with her that I think she would benefit from an antianxiety medication while in the hospital which will need to be addressed at discharge as well.  I will call her  she is requesting him to come down to be with her.  Where indicated, the assessment and plan reflect discussion of patient with consultants, other healthcare providers, family members, and additional research needed to obtain further information in formulating the plan of care of this patient.       VTE prophylaxis: Lovenox

## 2019-04-27 NOTE — CARE PLAN
Problem: Safety  Goal: Will remain free from falls    Intervention: Implement fall precautions  Fall precautions in place. Treaded socks on pt. Appropriate signs on doorway. IV pole on same side as bathroom. Bedrails up. Bed in lowest position and locked.  Call light and phone within reach. Patient educated on importance of calling nurses before getting out of bed, verbalizes understanding. Bed alarm on.

## 2019-04-28 ENCOUNTER — PATIENT OUTREACH (OUTPATIENT)
Dept: HEALTH INFORMATION MANAGEMENT | Facility: OTHER | Age: 79
End: 2019-04-28

## 2019-04-28 VITALS
DIASTOLIC BLOOD PRESSURE: 64 MMHG | HEIGHT: 67 IN | BODY MASS INDEX: 23.77 KG/M2 | OXYGEN SATURATION: 95 % | TEMPERATURE: 97.5 F | RESPIRATION RATE: 19 BRPM | HEART RATE: 62 BPM | SYSTOLIC BLOOD PRESSURE: 135 MMHG | WEIGHT: 151.46 LBS

## 2019-04-28 PROBLEM — G89.29 CHRONIC RIGHT SHOULDER PAIN: Status: RESOLVED | Noted: 2019-04-22 | Resolved: 2019-04-28

## 2019-04-28 PROBLEM — I95.9 HYPOTENSION: Status: RESOLVED | Noted: 2019-04-26 | Resolved: 2019-04-28

## 2019-04-28 PROBLEM — R41.0 CONFUSION: Status: RESOLVED | Noted: 2019-04-23 | Resolved: 2019-04-28

## 2019-04-28 PROBLEM — M25.511 CHRONIC RIGHT SHOULDER PAIN: Status: RESOLVED | Noted: 2019-04-22 | Resolved: 2019-04-28

## 2019-04-28 PROBLEM — I47.10 SVT (SUPRAVENTRICULAR TACHYCARDIA) (HCC): Status: RESOLVED | Noted: 2019-04-26 | Resolved: 2019-04-28

## 2019-04-28 LAB — DIGOXIN SERPL-MCNC: 1.4 NG/ML (ref 0.8–2)

## 2019-04-28 PROCEDURE — 700111 HCHG RX REV CODE 636 W/ 250 OVERRIDE (IP): Performed by: FAMILY MEDICINE

## 2019-04-28 PROCEDURE — 700102 HCHG RX REV CODE 250 W/ 637 OVERRIDE(OP): Performed by: FAMILY MEDICINE

## 2019-04-28 PROCEDURE — 99239 HOSP IP/OBS DSCHRG MGMT >30: CPT | Performed by: HOSPITALIST

## 2019-04-28 PROCEDURE — A9270 NON-COVERED ITEM OR SERVICE: HCPCS | Performed by: HOSPITALIST

## 2019-04-28 PROCEDURE — A9270 NON-COVERED ITEM OR SERVICE: HCPCS | Performed by: STUDENT IN AN ORGANIZED HEALTH CARE EDUCATION/TRAINING PROGRAM

## 2019-04-28 PROCEDURE — A9270 NON-COVERED ITEM OR SERVICE: HCPCS | Performed by: FAMILY MEDICINE

## 2019-04-28 PROCEDURE — 99232 SBSQ HOSP IP/OBS MODERATE 35: CPT | Mod: GC | Performed by: INTERNAL MEDICINE

## 2019-04-28 PROCEDURE — 80162 ASSAY OF DIGOXIN TOTAL: CPT

## 2019-04-28 PROCEDURE — 700102 HCHG RX REV CODE 250 W/ 637 OVERRIDE(OP): Performed by: STUDENT IN AN ORGANIZED HEALTH CARE EDUCATION/TRAINING PROGRAM

## 2019-04-28 PROCEDURE — 700102 HCHG RX REV CODE 250 W/ 637 OVERRIDE(OP): Performed by: HOSPITALIST

## 2019-04-28 RX ORDER — AMLODIPINE BESYLATE 5 MG/1
5 TABLET ORAL DAILY
Qty: 30 TAB | Refills: 0 | Status: SHIPPED | OUTPATIENT
Start: 2019-04-28 | End: 2019-12-26

## 2019-04-28 RX ORDER — CYCLOBENZAPRINE HCL 5 MG
5 TABLET ORAL
Qty: 30 TAB | Refills: 0 | Status: SHIPPED | OUTPATIENT
Start: 2019-04-28 | End: 2021-09-15

## 2019-04-28 RX ORDER — DIGOXIN 125 MCG
125 TABLET ORAL DAILY
Qty: 30 TAB | Refills: 0 | Status: ON HOLD | OUTPATIENT
Start: 2019-04-28 | End: 2021-09-20

## 2019-04-28 RX ORDER — HYDROXYZINE HYDROCHLORIDE 25 MG/1
25 TABLET, FILM COATED ORAL 2 TIMES DAILY PRN
Qty: 20 TAB | Refills: 0 | Status: SHIPPED | OUTPATIENT
Start: 2019-04-28 | End: 2019-05-08

## 2019-04-28 RX ORDER — PROPRANOLOL HYDROCHLORIDE 120 MG/1
220 CAPSULE, EXTENDED RELEASE ORAL DAILY
Qty: 30 CAP | Refills: 11 | Status: SHIPPED | OUTPATIENT
Start: 2019-04-29 | End: 2019-12-26

## 2019-04-28 RX ORDER — AMLODIPINE BESYLATE 5 MG/1
5 TABLET ORAL DAILY
Status: DISCONTINUED | OUTPATIENT
Start: 2019-04-28 | End: 2019-04-28 | Stop reason: HOSPADM

## 2019-04-28 RX ADMIN — PROPRANOLOL HYDROCHLORIDE 220 MG: 60 CAPSULE, EXTENDED RELEASE ORAL at 05:50

## 2019-04-28 RX ADMIN — CYCLOBENZAPRINE HYDROCHLORIDE 5 MG: 10 TABLET, FILM COATED ORAL at 08:20

## 2019-04-28 RX ADMIN — CYANOCOBALAMIN TAB 500 MCG 1000 MCG: 500 TAB at 05:50

## 2019-04-28 RX ADMIN — OMEPRAZOLE 40 MG: 20 CAPSULE, DELAYED RELEASE ORAL at 05:50

## 2019-04-28 RX ADMIN — ENOXAPARIN SODIUM 40 MG: 100 INJECTION SUBCUTANEOUS at 05:49

## 2019-04-28 RX ADMIN — DULOXETINE HYDROCHLORIDE 60 MG: 60 CAPSULE, DELAYED RELEASE ORAL at 05:50

## 2019-04-28 RX ADMIN — OXYCODONE HYDROCHLORIDE 5 MG: 5 TABLET ORAL at 08:20

## 2019-04-28 RX ADMIN — VITAMIN D, TAB 1000IU (100/BT) 2000 UNITS: 25 TAB at 05:50

## 2019-04-28 NOTE — PROGRESS NOTES
CARDIOLOGY PROGRESS NOTE:    Patient ID:   Name:             Lindsay Vargas   YOB: 1940  Age:                 78 y.o.  female   MRN:               [id number]    ID:       Ms. Vargas is an 78F with paroxysmal SVT (rate observed up to 160s-190s inpatient) likely associated with presyncopal symptoms and recurrent falls per an outpatient event monitor. She was taking propranolol outpatient for HTN but had ongoing recurrent SVT. She was loaded with digoxin and her propranolol dose was increased to 220 mg during current hospitalization without observed recurrent SVT.     Interval update:      No acute events overnight, tele showed SR in 60-70s with some PVCs, no reported chest discomfort, palpitations, or dizziness. SBP in 140-160s range     Plan:                 - Start amlodipine 10 mg QD for HTN  - Continue digoxin 125 mcg and propranolol 220 mg   - Outpatient follow up with cardiology NP in a couple of weeks for further evaluation for an ablation procedure in the near future   - Patient can be discharged from a cardiology standpoint   - We will sign off     Thank you for allowing us to participate in the care of this patient, and please do not hesitate to call or page if any clarification of our recommendation would be useful.    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 02/04/2016   • 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 02/18/2016   • Lumbar radiculitis 09/29/2016   • Palpitations 12/26/2018   • Localized edema 12/26/2018   • Hypokalemia 03/18/2019   • Hyponatremia 03/18/2019   • Dehydration 03/18/2019   • SIRS (systemic inflammatory response syndrome) ([facility]) 03/18/2019   • Acute metabolic encephalopathy 03/18/2019   • NSTEMI (non-ST elevated myocardial infarction) ([facility]) 03/18/2019   • Syncope 03/18/2019   • Frequent falls 03/26/2019   • GERD (gastroesophageal reflux disease) 03/26/2019   • Mood disorder ([facility]) 03/26/2019   •  "Debility 03/29/2019   • Restless legs syndrome 03/29/2019   • Chronic back pain 03/29/2019   • Right ear pain 04/07/2019   • Rash 04/07/2019   • Cognitive deficits 04/11/2019   • Essential hypertension 04/11/2019     Resolved Ambulatory Problems     Diagnosis Date Noted   • No Resolved Ambulatory Problems     Past Medical History:   Diagnosis Date   • Anesthesia    • Arthritis    • Cataract    • Chronic pain    • Hiatus hernia syndrome    • Hypertension    • Lymphedema 2014   • Migraine headache    • Pain    • Pneumonia        PHYSICAL EXAM  Vitals:   Weight/BMI: Body mass index is 23.72 kg/m².  /61   Pulse 63   Temp 36.6 °C (97.9 °F) (Temporal)   Resp 18   Ht 1.702 m (5' 7\")   Wt 68.7 kg (151 lb 7.3 oz)   SpO2 94%   Vitals:    04/27/19 2100 04/28/19 0025 04/28/19 0506 04/28/19 0745   BP:  143/66 140/65 141/61   Pulse:  65 67 63   Resp:  16 18 18   Temp:  36.7 °C (98 °F) 36.4 °C (97.5 °F) 36.6 °C (97.9 °F)   TempSrc:  Temporal Temporal Temporal   SpO2:  97% 97% 94%   Weight: 68.7 kg (151 lb 7.3 oz)      Height:         Oxygen Therapy:  Pulse Oximetry: 94 %, O2 (LPM): 0, O2 Delivery: None (Room Air)    Physical Exam   Constitutional: She is oriented to person, place, and time and well-developed, well-nourished, and in no distress. No distress.   Eyes: Pupils are equal, round, and reactive to light.   Neck: No JVD present.   Cardiovascular: Normal rate, regular rhythm and normal heart sounds.    Pulmonary/Chest: Effort normal and breath sounds normal. No stridor. No respiratory distress. She has no wheezes.   Abdominal: Soft. Bowel sounds are normal. There is no tenderness.   Musculoskeletal: She exhibits no edema.   Neurological: She is alert and oriented to person, place, and time. GCS score is 15.   Skin: She is not diaphoretic.       LABS: Reviewed   IMAGING: Reviewed   "

## 2019-04-28 NOTE — PROGRESS NOTES
Bedside report received, pt care assumed, tele box on.  Pt is A&Ox4, denies any pain or additional needs at this time. Bed in lowest position, bed alarm on pt educated on fall risk and verbalized understanding, call light within reach.

## 2019-04-28 NOTE — DISCHARGE INSTRUCTIONS
Discharge Instructions    Discharged to home by car with relative. Discharged via wheelchair, hospital escort: Yes.  Special equipment needed: Not Applicable    Be sure to schedule a follow-up appointment with your primary care doctor or any specialists as instructed.     Discharge Plan:   Diet Plan: Discussed  Activity Level: Discussed  Confirmed Follow up Appointment: Appointment Scheduled  Confirmed Symptoms Management: Discussed  Medication Reconciliation Updated: Yes  Influenza Vaccine Indication: Not indicated: Previously immunized this influenza season and > 8 years of age    I understand that a diet low in cholesterol, fat, and sodium is recommended for good health. Unless I have been given specific instructions below for another diet, I accept this instruction as my diet prescription.   Other diet: Cardiac, low sodium diet    Special Instructions: None    · Is patient discharged on Warfarin / Coumadin?   No     Depression / Suicide Risk    As you are discharged from this [facility] facility, it is important to learn how to keep safe from harming yourself.    Recognize the warning signs:  · Abrupt changes in personality, positive or negative- including increase in energy   · Giving away possessions  · Change in eating patterns- significant weight changes-  positive or negative  · Change in sleeping patterns- unable to sleep or sleeping all the time   · Unwillingness or inability to communicate  · Depression  · Unusual sadness, discouragement and loneliness  · Talk of wanting to die  · Neglect of personal appearance   · Rebelliousness- reckless behavior  · Withdrawal from people/activities they love  · Confusion- inability to concentrate     If you or a loved one observes any of these behaviors or has concerns about self-harm, here's what you can do:  · Talk about it- your feelings and reasons for harming yourself  · Remove any means that you might use to hurt yourself (examples: pills, rope, extension  cords, firearm)  · Get professional help from the community (Mental Health, Substance Abuse, psychological counseling)  · Do not be alone:Call your Safe Contact- someone whom you trust who will be there for you.  · Call your local CRISIS HOTLINE 358-4703 or 836-927-1416  · Call your local Children's Mobile Crisis Response Team [place] (077) 477-8748 or www.(In)Touch Network  · Call the toll free National Suicide Prevention Hotlines   · National Suicide Prevention Lifeline 324-888-JVDW (2979)  · [employer] Line Network 800-SUICIDE (519-2114)        Supraventricular Tachycardia, Adult  Supraventricular tachycardia (SVT) is a kind of abnormal heartbeat. It makes your heart beat very fast and then beat at a normal speed.  A normal heart beats  times a minute. This condition can make your heart beat more than 150 times a minute. Times of having a fast heartbeat (episodes) can be scary, but they are usually not dangerous. They can lead to problems if:  · They happen often.  · They last a long time.  Symptoms of this condition include:  · A pounding heart.  · A feeling that your heart is skipping beats (palpitations).  · Weakness.  · Trouble getting enough air (shortness of breath).  · Pain or tightness in your chest.  · Feeling like you are going to pass out (light-headedness).  · Feeling worried or nervous (anxiety).  · Dizziness.  · Sweating.  · Feeling sick to your stomach (nausea).  · Passing out (fainting).  · Tiredness.  Sometimes, there are no symptoms.  Follow these instructions at home:  Stress  · Avoid things that make you feel stressed.  · Find out what helps you feel less stressed. Try:  ¨ Doing a relaxing activity, like yoga, meditation, or being out in nature.  ¨ Listening to relaxing music.  ¨ Doing relaxation techniques, like deep breathing.  ¨ Taking steps to be healthy. These include getting lots of sleep, exercising, and eating a balanced diet.  ¨ Talking with a mental health  "doctor.  Sleep  · Try to get at least 7 hours of sleep each night.  Tobacco and nicotine  · Do not use anything that has nicotine or tobacco, such as cigarettes and e-cigarettes. If you need help quitting, ask your doctor.  Alcohol  · If alcohol gives you a fast heartbeat, do not drink alcohol.  · If alcohol does not seem to give you a fast heartbeat, limit your alcohol. For nonpregnant women, this means no more than 1 drink a day. For men, this means no more than 2 drinks a day. \"One drink\" means one of these:  ¨ 12 oz of beer.  ¨ 5 oz of wine.  ¨ 1½ oz of hard liquor.  Caffeine  · If caffeine gives you a fast heartbeat, do not eat, drink, or use anything with caffeine in it.  · If caffeine does not seem to give you a fast heartbeat, limit how much caffeine you eat, drink, or use.  Stimulant drugs  · Do not use stimulant drugs. These are drugs like cocaine or methamphetamine. If you need help quitting, ask your doctor.  General instructions  · Stay at a healthy weight.  · Exercise regularly. Ask your doctor to suggest some good activities for you. Try one of these options:  ¨ 150 minutes a week of gentle exercise, like walking or yoga.  ¨ 75 minutes a week of exercise that is very active, like running or swimming.  ¨ A combination of gentle exercise and very active exercise.  · Do home treatments to slow down your heartbeat as told by your doctor.  · Take over-the-counter and prescription medicines only as told by your doctor.  Contact a doctor if:  · You have a fast heartbeat more often.  · Times of having a fast heartbeat last longer than before.  · Your home treatments to slow down your heartbeat do not help.  · You have new symptoms.  Get help right away if:  · You have chest pain.  · Your symptoms get worse.  · You have trouble breathing.  · Your heart beats very fast for more than 20 minutes.  · You pass out (faint).  These symptoms may be an emergency. Do not wait to see if the symptoms will go away. Get " medical help right away. Call your local emergency services (911 in the [place] Do not drive yourself to the hospital.   This information is not intended to replace advice given to you by your health care provider. Make sure you discuss any questions you have with your health care provider.  Document Released: 12/18/2006 Document Revised: 08/24/2017 Document Reviewed: 08/24/2017  Elsevier Interactive Patient Education © 2017 [employer]

## 2019-04-28 NOTE — DISCHARGE PLANNING
Anticipated Discharge Disposition: D/C to Home with HH    Action: LSW completed chart review given pt has a d/c order placed. [facility] referral was sent and within system it shows pt was accepted. Anticipate pt will d/c home.     Barriers to Discharge: None.    Plan: No further d/c planning needs known at this time.

## 2019-04-28 NOTE — PROGRESS NOTES
Received bedside report from RN, pt care assumed, VSS, pt assessment complete. Pt AAOx4, pt c/o neck pain at this time. No signs of acute distress noted at this time. POC discussed with pt and verbalizes no questions. Pt denies any additional needs at this time. Bed in lowest position, bed alarm on, pt educated on fall risk and verbalized understanding, call light within reach, hourly rounding initiated.

## 2019-04-28 NOTE — PROGRESS NOTES
Pt discharged home. Discharge instructions given and explained to pt. She verbalized understanding. IV and telemetry monitor taken off. All belongings with pt. Pt's  taking pt home. Staff transported pt via wheelchair to car.

## 2019-04-29 ENCOUNTER — PATIENT OUTREACH (OUTPATIENT)
Dept: HEALTH INFORMATION MANAGEMENT | Facility: OTHER | Age: 79
End: 2019-04-29

## 2019-04-29 ENCOUNTER — HOME CARE VISIT (OUTPATIENT)
Dept: HOME HEALTH SERVICES | Facility: HOME HEALTHCARE | Age: 79
End: 2019-04-29
Payer: MEDICARE

## 2019-04-29 NOTE — PROGRESS NOTES
SCP post discharge med rec completed. Spoke to patient's  who manages her medications. He states he has not been able to make it to the pharmacy as patient had diarrhea this morning and he was tending to her needs. States he will  this afternoon. Provided my contact info should any barriers to access arise.  No clinically significant medication issues noted. Discussed risks associated with cyclobenzaprine and hydroxyzine. Patient denies any side effects or trouble with adherence. Discussed the rational for the dose administration of digoxin at 6pm. Also discussed the importance of adherence. Advised patient to move dose to a more convenient time if 6pm dose schedule causes problems with adherence. He agrees.    Recommended Imodium OTC as ER med rec states no antibiotic use in last 30 days. Advised patient to increase fluids to stay hydrated while patient is experiencing episodes of diarrhea.

## 2019-04-30 ENCOUNTER — HOME CARE VISIT (OUTPATIENT)
Dept: HOME HEALTH SERVICES | Facility: HOME HEALTHCARE | Age: 79
End: 2019-04-30
Payer: MEDICARE

## 2019-04-30 SDOH — ECONOMIC STABILITY: HOUSING INSECURITY

## 2019-04-30 NOTE — DISCHARGE SUMMARY
Discharge Summary    CHIEF COMPLAINT ON ADMISSION  Chief Complaint   Patient presents with   • T-5000 GLF       Reason for Admission  EMS     Admission Date  4/22/2019    CODE STATUS  Prior    HPI & HOSPITAL COURSE  78 y.o. female who presented 4/22/2019 with fall when she was trying to get out of bed.  She apparently just rolled to the floor from her bed.  She is having right shoulder pain and back pain, which she admits is chronic to her.  However she feels that her tailbone pain is worse. Right shoulder x-ray and sacral x-ray are both negative.  Patient states for her shoulder pain she was recently diagnosed with rotator cuff tendinitis was advised surgery.  Patient has had frequent falls, she is somewhat of a poor historian including her spouse, she is unable to see if she has had any dizziness or lightheadedness, she denies chest pain palpitation shortness of breath, she denies any fever or chills.    Patient was admitted for further management.  She was monitored on the telemetry floor for any dysrhythmias.  Orthostatics were checked and were negative.  She had a CT of the thoracic and lumbar spine which showed no acute fractures.  She also had vitamin B12 deficiency and she was started on supplementation.  Patient was intermittently confused during the hospitalization and I head CT and an MRI was obtained which was negative for CVA.  Infection was ruled out.  Blood cultures were negative.  Vitamin D was replaced as well.  During the hospitalization patient did have elevated heart rate and SVT which was uncontrolled.  Patient did become unstable with hypotension and SVT and had to be electrical we cardioverted which was successful and patient reverted back to normal sinus rhythm.  Cardiology was consulted.  She was started on digoxin.  Cardiology did discuss cardiac ablation with patient however she was very anxious and would like to have it done outpatient.  At this point patient is medically stable for  discharge onInderal 220 mg/day, digoxin 0.125 mg/day, and amlodipine 5 mg/day for the continuing hypertension.  She will follow-up with cardiology in 1 to 2 weeks.  Suspect her frequent falls have been secondary to SVT which has now been diagnosed and medications have been provided.  At this point Home health has been set up for patient and she is medically stable for discharge.  Patient understood and agreed with the above plan all questions were answered.  Therefore, she is discharged in fair and stable condition to home with organized home healthcare and close outpatient follow-up.    The patient met 2-midnight criteria for an inpatient stay at the time of discharge.    Discharge Date  4/28/2019    FOLLOW UP ITEMS POST DISCHARGE  PCP  Cardiology    DISCHARGE DIAGNOSES  Principal Problem (Resolved):    SVT (supraventricular tachycardia) (HCC) POA: Unknown  Active Problems:    Frequent falls POA: Yes    Restless legs syndrome POA: Yes    Chronic back pain POA: Yes    Essential hypertension POA: Yes    Vitamin B12 deficiency POA: Yes    History of compression fracture of spine POA: Yes    GERD (gastroesophageal reflux disease) POA: Yes    Pulmonary hypertension (HCC) POA: Unknown    Anxiety POA: Unknown    Muscle spasms of neck POA: Unknown  Resolved Problems:    Confusion POA: Unknown    Chronic right shoulder pain POA: Yes    Hypotension POA: Unknown      FOLLOW UP  Future Appointments  Date Time Provider Department Center   4/30/2019 To Be Determined Anthony Anderson R.N. [facility] None   5/3/2019 1:15 PM DOROTEO Roberson [facility] None     Lenny RENTERIA M.D.  [id number] [facility] [id number] [phone]      Please call to schedule your appointment. Thank you       MEDICATIONS ON DISCHARGE     Medication List      START taking these medications      Instructions   amLODIPine 5 MG Tabs  Commonly known as:  NORVASC   Take 1 Tab by mouth every day.  Dose:  5 mg     cyanocobalamin 1000 MCG  Tabs  Commonly known as:  VITAMIN B12   Take 1 Tab by mouth every day.  Dose:  1000 mcg     cyclobenzaprine 5 MG tablet  Commonly known as:  FLEXERIL   Take 1 Tab by mouth two times a day may change times on alt/days.  Dose:  5 mg     digoxin 125 MCG Tabs  Commonly known as:  LANOXIN   Take 1 Tab by mouth every day at 6 PM.  Dose:  125 mcg     hydrOXYzine HCl 25 MG Tabs  Commonly known as:  ATARAX   Take 1 Tab by mouth 2 times a day as needed for Itching for up to 10 days.  Dose:  25 mg        CHANGE how you take these medications      Instructions   propranolol  MG Cp24  What changed:  · medication strength  · how much to take  Commonly known as:  INDERAL LA   Take 2 Caps by mouth every day.  Dose:  220 mg        CONTINUE taking these medications      Instructions   acetaminophen 325 MG Tabs  Commonly known as:  TYLENOL   Take 2 Tabs by mouth every 6 hours as needed (Mild Pain; (Pain scale 1-3); Temp greater than 100.5 F).  Dose:  650 mg     Cholecalciferol 1000 UNIT Tabs  Commonly known as:  VITAMIND D3   Take 1 Tab by mouth every day.  Dose:  1 Tab     DULoxetine 60 MG Cpep delayed-release capsule  Commonly known as:  CYMBALTA   Take 1 Cap by mouth every day.  Dose:  60 mg     omeprazole 40 MG delayed-release capsule  Commonly known as:  PRILOSEC   TAKE 1 CAPSULE BY MOUTH ONCE A DAY 30 MINUTES BEFORE BREAKFAST MEAL     oxyCODONE-acetaminophen 5-325 MG Tabs  Commonly known as:  PERCOCET   Take 1 Tab by mouth every 8 hours as needed for Severe Pain. Prescribed by Dr. Mustafa, spinal specialist. Pt is currently taking for her right neck pain. Pt reports will finish remaining pills if needed.  Dose:  1 Tab     pramipexole 0.5 MG Tabs  Commonly known as:  MIRAPEX   Take 1 Tab by mouth every bedtime.  Dose:  0.5 mg            Allergies  No Known Allergies    DIET  No orders of the defined types were placed in this encounter.      ACTIVITY  As tolerated.  Weight bearing as  tolerated    CONSULTATIONS  Cardiology    PROCEDURES  Cardioversion electric    LABORATORY  Lab Results   Component Value Date    SODIUM 139 04/26/2019    POTASSIUM 3.9 04/26/2019    CHLORIDE 107 04/26/2019    CO2 19 (L) 04/26/2019    GLUCOSE 130 (H) 04/26/2019    BUN 12 04/26/2019    CREATININE 0.66 04/26/2019    CREATININE 0.9 12/29/2008        Lab Results   Component Value Date    WBC 9.9 04/26/2019    HEMOGLOBIN 13.6 04/26/2019    HEMATOCRIT 41.1 04/26/2019    PLATELETCT 256 04/26/2019        Total time of the discharge process exceeds 45 minutes.

## 2019-05-01 ENCOUNTER — PATIENT OUTREACH (OUTPATIENT)
Dept: HEALTH INFORMATION MANAGEMENT | Facility: OTHER | Age: 79
End: 2019-05-01

## 2019-05-03 ENCOUNTER — HOME CARE VISIT (OUTPATIENT)
Dept: HOME HEALTH SERVICES | Facility: HOME HEALTHCARE | Age: 79
End: 2019-05-03
Payer: MEDICARE

## 2019-05-07 ENCOUNTER — HOME CARE VISIT (OUTPATIENT)
Dept: HOME HEALTH SERVICES | Facility: HOME HEALTHCARE | Age: 79
End: 2019-05-07
Payer: MEDICARE

## 2019-05-10 ENCOUNTER — HOME CARE VISIT (OUTPATIENT)
Dept: HOME HEALTH SERVICES | Facility: HOME HEALTHCARE | Age: 79
End: 2019-05-10
Payer: MEDICARE

## 2019-05-29 ENCOUNTER — PATIENT OUTREACH (OUTPATIENT)
Dept: HEALTH INFORMATION MANAGEMENT | Facility: OTHER | Age: 79
End: 2019-05-29

## 2019-06-13 ENCOUNTER — OFFICE VISIT (OUTPATIENT)
Dept: CARDIOLOGY | Facility: MEDICAL CENTER | Age: 79
End: 2019-06-13
Payer: MEDICARE

## 2019-06-13 VITALS
OXYGEN SATURATION: 96 % | BODY MASS INDEX: 24.92 KG/M2 | WEIGHT: 158.8 LBS | HEART RATE: 70 BPM | DIASTOLIC BLOOD PRESSURE: 74 MMHG | HEIGHT: 67 IN | SYSTOLIC BLOOD PRESSURE: 126 MMHG

## 2019-06-13 DIAGNOSIS — I89.0 LYMPHEDEMA: ICD-10-CM

## 2019-06-13 DIAGNOSIS — M54.2 NECK PAIN: ICD-10-CM

## 2019-06-13 DIAGNOSIS — M62.838 MUSCLE SPASMS OF NECK: ICD-10-CM

## 2019-06-13 DIAGNOSIS — I47.19 AVNRT (AV NODAL RE-ENTRY TACHYCARDIA): ICD-10-CM

## 2019-06-13 DIAGNOSIS — I10 ESSENTIAL HYPERTENSION, BENIGN: ICD-10-CM

## 2019-06-13 PROBLEM — R60.0 LOCALIZED EDEMA: Status: RESOLVED | Noted: 2018-12-26 | Resolved: 2019-06-13

## 2019-06-13 PROBLEM — H92.01 RIGHT EAR PAIN: Status: RESOLVED | Noted: 2019-04-07 | Resolved: 2019-06-13

## 2019-06-13 PROBLEM — R21 RASH: Status: RESOLVED | Noted: 2019-04-07 | Resolved: 2019-06-13

## 2019-06-13 PROBLEM — R65.10 SIRS (SYSTEMIC INFLAMMATORY RESPONSE SYNDROME) (HCC): Status: RESOLVED | Noted: 2019-03-18 | Resolved: 2019-06-13

## 2019-06-13 PROCEDURE — 99214 OFFICE O/P EST MOD 30 MIN: CPT | Performed by: NURSE PRACTITIONER

## 2019-06-13 RX ORDER — AMLODIPINE BESYLATE 5 MG/1
TABLET ORAL
Refills: 5 | COMMUNITY
Start: 2019-05-30 | End: 2019-06-13

## 2019-06-13 RX ORDER — AMLODIPINE BESYLATE 5 MG/1
TABLET ORAL
COMMUNITY
Start: 2019-04-28 | End: 2019-06-13

## 2019-06-13 RX ORDER — AMLODIPINE BESYLATE 5 MG/1
TABLET ORAL
COMMUNITY
Start: 2019-05-30 | End: 2019-06-13

## 2019-06-13 RX ORDER — AMLODIPINE BESYLATE 5 MG/1
5 TABLET ORAL DAILY
Refills: 0 | COMMUNITY
Start: 2019-04-28

## 2019-06-13 ASSESSMENT — ENCOUNTER SYMPTOMS
CLAUDICATION: 0
WEIGHT LOSS: 0
PALPITATIONS: 0
WEAKNESS: 0
NECK PAIN: 1
DIZZINESS: 0
SHORTNESS OF BREATH: 0
ORTHOPNEA: 0
FALLS: 1
PND: 0

## 2019-06-13 NOTE — PROGRESS NOTES
"Chief Complaint   Patient presents with   • Palpitations       Subjective:   Lindsay Vargas is a 78 y.o. female who presents today for hospital follow-up.    Patient was admitted on 4/22/2019 after experiencing a fall while trying to get out of bed, she is experiencing significant right shoulder and sacral pain which x-rays were both negative for fracture.  While in the hospital it was observed that patient had episodes of sustained SVT, when reviewed by our EP team it was felt to be AVNRT.  It was decided to medically treat patient with digoxin and consider outpatient ablation if she experience recurrence.      Past medical history significant for hypertension, lymphedema and arthritis.    Today patient's primary complaint is significant right-sided neck pain with intermittent swelling at times.  States that this has been going on for some time, however became worse after she sustained a fall at home which cause hyperextension of her neck.  She is under the care of Dr. Mustafa for her chronic pain and neck issues.  She tells me that she has not had a fall in approximately 6 weeks.  Denies chest pain, palpitations, lower extremity edema, shortness of breath or syncope.    Past Medical History:   Diagnosis Date   • Anesthesia     confused/hard to wake up-15 years ago   • Arthritis    • Cataract     Bilat IOL   • Chronic pain    • Hiatus hernia syndrome     surgically repaired   • Hypertension    • Lymphedema 2014   • Migraine headache    • Pain     back   • Pneumonia     \"younger\"     Past Surgical History:   Procedure Laterality Date   • PB INJ LUMBAR/SACRAL,W/WO CNTRST N/A 11/10/2016    Procedure: INJ EPI NON NEUROLYTIC L/S -MIDLINE L4-L5;  Surgeon: Eduardo Mustafa M.D.;  Location: SURGERY SURGICAL [facility];  Service: Pain Management   • PB FLUORSCOPIC GUIDANCE SPINAL INJECTION  11/10/2016    Procedure: FLUOROGUIDE FOR SPINAL INJ;  Surgeon: Eduardo Mustafa M.D.;  Location: [facility];  " Service: Pain Management   • PB INJ LUMBAR/SACRAL,W/WO CNTRST Right 9/29/2016    Procedure: INJ EPI NON NEUROLYTIC L/S - L5-S1;  Surgeon: Eduardo Mustafa M.D.;  Location: SURGERY SURG[facility];  Service: Pain Management   • PB FLUORSCOPIC GUIDANCE SPINAL INJECTION  9/29/2016    Procedure: FLUOROGUIDE FOR SPINAL INJ;  Surgeon: Eduardo Mustafa M.D.;  Location: SURGERY SURGICAL [facility];  Service: Pain Management   • CATARACT PHACO WITH IOL Right 2/18/2016    Procedure: CATARACT PHACO WITH IOL;  Surgeon: Rodrigo Smyth M.D.;  Location: SURGERY SURGICAL [facility];  Service:    • CATARACT PHACO WITH IOL Left 2/4/2016    Procedure: CATARACT PHACO WITH IOL;  Surgeon: Rodrigo Smyth M.D.;  Location: SURGERY [facility] [place] [place];  Service:    • JUANI BY LAPAROSCOPY     • HYSTERECTOMY LAPAROSCOPY     • OTHER      HIATAL HERNIA REPAIR   • OTHER ABDOMINAL SURGERY      LAP JUANI   • OTHER ABDOMINAL SURGERY      APPENDECOMTY   • OTHER ORTHOPEDIC SURGERY      LEFT KNEE SCOPE     Family History   Problem Relation Age of Onset   • Heart Disease Mother    • Heart Disease Father      Social History     Social History   • Marital status:      Spouse name: N/A   • Number of children: N/A   • Years of education: N/A     Occupational History   • Not on file.     Social History Main Topics   • Smoking status: Never Smoker   • Smokeless tobacco: Never Used   • Alcohol use No      Comment: rarely   • Drug use: No   • Sexual activity: Not on file     Other Topics Concern   • Not on file     Social History Narrative   • No narrative on file     No Known Allergies  Outpatient Encounter Prescriptions as of 6/13/2019   Medication Sig Dispense Refill   • Pseudoephedrine-APAP  MG Tab      • amLODIPine (NORVASC) 5 MG Tab TAKE 1 TABLET BY MOUTH EVERY DAY  0   • amLODIPine (NORVASC) 5 MG Tab Take 1 Tab by mouth every day. 30 Tab 0   • digoxin (LANOXIN) 125 MCG Tab Take 1 Tab by mouth every day at 6 PM. 30 Tab 0   • cyanocobalamin  (VITAMIN B12) 1000 MCG Tab Take 1 Tab by mouth every day. 30 Tab 3   • cyclobenzaprine (FLEXERIL) 5 MG tablet Take 1 Tab by mouth two times a day may change times on alt/days. 30 Tab 0   • oxyCODONE-acetaminophen (PERCOCET) 5-325 MG Tab Take 1 Tab by mouth every 8 hours as needed for Severe Pain. Prescribed by Dr. Mustafa, spinal specialist. Pt is currently taking for her right neck pain. Pt reports will finish remaining pills if needed.     • DULoxetine (CYMBALTA) 60 MG Cap DR Particles delayed-release capsule Take 1 Cap by mouth every day. 30 Cap 0   • omeprazole (PRILOSEC) 40 MG delayed-release capsule TAKE 1 CAPSULE BY MOUTH ONCE A DAY 30 MINUTES BEFORE BREAKFAST MEAL 30 Cap 0   • vitamin D (VITAMIND D3) 1000 UNIT Tab Take 1 Tab by mouth every day. 60 Tab    • [DISCONTINUED] amLODIPine (NORVASC) 5 MG Tab      • [DISCONTINUED] Pseudoephedrine-APAP  MG Tab TAKE 2 TABS BY MOUTH EVERY 6 HOURS AS NEEDED (MILD PAIN (PAIN SCALE 1-3) TEMP GREATER THAN 100.5 F).  0   • [DISCONTINUED] amLODIPine (NORVASC) 5 MG Tab      • [DISCONTINUED] amLODIPine (NORVASC) 5 MG Tab TAKE 1 TABLET BY MOUTH EVERY DAY  5   • propranolol LA (INDERAL LA) 120 MG CAPSULE SR 24 HR Take 2 Caps by mouth every day. (Patient not taking: Reported on 6/13/2019) 30 Cap 11   • acetaminophen (TYLENOL) 325 MG Tab Take 2 Tabs by mouth every 6 hours as needed (Mild Pain; (Pain scale 1-3); Temp greater than 100.5 F). 30 Tab 0   • [DISCONTINUED] pramipexole (MIRAPEX) 0.5 MG Tab Take 1 Tab by mouth every bedtime. (Patient not taking: Reported on 6/13/2019) 30 Tab 0     No facility-administered encounter medications on file as of 6/13/2019.      Review of Systems   Constitutional: Positive for malaise/fatigue. Negative for weight loss.   Respiratory: Negative for shortness of breath.    Cardiovascular: Negative for chest pain, palpitations, orthopnea, claudication, leg swelling and PND.   Musculoskeletal: Positive for falls and neck pain.   Neurological:  "Negative for dizziness and weakness.   All other systems reviewed and are negative.       Objective:   /74 (BP Location: Left arm, Patient Position: Sitting, BP Cuff Size: Adult)   Pulse 70   Ht 1.702 m (5' 7\")   Wt 72 kg (158 lb 12.8 oz)   SpO2 96%   BMI 24.87 kg/m²     Physical Exam   Constitutional: She is oriented to person, place, and time. She appears well-developed and well-nourished. No distress.   Uses walker for assistance with ambulation.    HENT:   Head: Normocephalic.   Eyes: EOM are normal.   Neck: No JVD present.   Cardiovascular: Normal rate, regular rhythm and normal heart sounds.    Pulmonary/Chest: Breath sounds normal. No respiratory distress.   Abdominal: Soft. There is no tenderness.   Musculoskeletal: She exhibits edema (Trace).   Right side of neck does appear slightly swollen.   Neurological: She is alert and oriented to person, place, and time.   Skin: Skin is warm and dry.   Psychiatric: She has a normal mood and affect. Her behavior is normal.        4/26/19:  Sodium 139  135 - 145 mmol/L Final   Potassium 3.9  3.6 - 5.5 mmol/L Final   Chloride 107  96 - 112 mmol/L Final   Co2 19   20 - 33 mmol/L Final   Anion Gap 13.0   0.0 - 11.9 Final   Glucose 130   65 - 99 mg/dL Final   Bun 12  8 - 22 mg/dL Final   Creatinine 0.66  0.50 - 1.40 mg/dL Final   Calcium 9.1  8.5 - 10.5 mg/dL Final   AST(SGOT) 13  12 - 45 U/L Final   ALT(SGPT) 9  2 - 50 U/L Final   Alkaline Phosphatase 63  30 - 99 U/L Final   Total Bilirubin 0.6  0.1 - 1.5 mg/dL Final   Albumin 4.0  3.2 - 4.9 g/dL Final   Total Protein 6.2  6.0 - 8.2 g/dL Final   Globulin 2.2  1.9 - 3.5 g/dL Final   A-G Ratio 1.8  g/dL Final     GFR If African American >60  >60 mL/min/1.73 m 2 Final   GFR If Non African American >60  >60 mL/min/1.73 m 2 Final           Assessment:     1. AVNRT (AV king re-entry tachycardia) ([facility])  RIH HM / Event Monitor    Basic Metabolic Panel    DIGOXIN   2. Muscle spasms of neck     3. Lymphedema     4. " Essential hypertension, benign     5. Neck pain       TTE 3/18/19:  Normal left ventricular systolic function.  Left ventricular ejection fraction is visually estimated to be 70%.  Right heart pressures are consistent with mild pulmonary hypertension.  No significant valve abnormalities.     NM-Cardiac Stress Test 3/20/19:  NUCLEAR IMAGING INTERPRETATION  No evidence of significant jeopardized viable myocardium or prior myocardial  infarction.  Normal left ventricular size, ejection fraction, and wall motion.  ECG INTERPRETATION  Negative stress ECG for ischemia.    Medical Decision Making:  Today's Assessment / Status / Plan:     AVNRT:  -Tolerating digoxin and propranolol well, denies palpitations or recent falls.   -Stable renal function on 4/26/19.  -Normal TTE on 3/8/19.  -Low risk MPI on 3/20/19.   -14 day Zio cardiac event monitor while on digoxin to evaluate digoxin's effectiveness in suppressing her AVNRT.  -BMP and digoxin level.     HTN:  -Stable.   -Continue Norvasc 5 mg daily.     Neck Pain:  -Thorough discussion with patient and her  regarding the importance of following up with Dr. Mustafa as soon as possible regarding her neck swelling and pain.  Patient verbalizes understanding states she will give his office a call today.    Lymphedema:  -Currently stable, trace swelling.    Patient will follow-up with Dr. Paredes in 3 months or earlier if needed.  Encouraged patient and  to contact office should any questions or concerns arise in the meantime.  Patient has been understand and agree with plan of care.    Future Appointments  Date Time Provider Department Center   9/5/2019 7:40 AM Narendra Paredes M.D. [facility] None     Collaborating Provider: Dr. Maryann Frye       Please note that this dictation was created using voice recognition software. I have made every reasonable attempt to correct obvious errors, but I expect that there are errors of grammar and possibly content I did not  discover before finalizing the note.

## 2019-06-18 ENCOUNTER — TELEPHONE (OUTPATIENT)
Dept: CARDIOLOGY | Facility: MEDICAL CENTER | Age: 79
End: 2019-06-18

## 2019-06-18 NOTE — TELEPHONE ENCOUNTER
"  ----- Message -----   From: Alicia Abrams   Sent: 6/17/2019   4:49 PM   To: Elisabeth James R.N.   Subject: calling to inform she's following instructio*     KEREN/Keeley     Pt is calling to inform JS she's following instructions on plan of care as discussed during visit 6/13. She would please like a call back to discuss further at [phone]      Spoke with pt regarding above. She states that since being in the hospital she still does not feel well. She states her legs \"lock up and it makes it hard to get around and the other day when I went to get a card for my granddaughter and I got totally sick and lightheaded\". Pt states BP and HR are all WNL. But since leaving rehab has had multiple falls, one where she hit her head on the dryer. She has appt with her PCP today, advised to talk with PCP regarding this and issues she is having with legs. She did states that during rehab with PT she did great and now that she is back home she is having issues again. Advised to talk with PCP regarding possible home health for continued physical therapy. Also advised to go to ER if she has any more falls.     To JS---FYI unless you have anything else to add.   "

## 2019-06-19 NOTE — TELEPHONE ENCOUNTER
DOROTEO Holt R.N.   Caller: Unspecified (Yesterday,  9:15 AM)             She is supposed to have a Zio completed to see if the digoxin adequately suppressing her AVNRT. When you have a moment would you please see if we can get her scheduled ASAP?      Pt is scheduled on 6/28/19 for Zio.

## 2019-06-20 NOTE — PROGRESS NOTES
Patient Sonu Vargas was admitted to [facility] on 3/18/19 for dizziness.  The patient was discharged to [facility] on 3/22/19. The patient was readmitted to [facility] on 3/26/19 for frequent falls and was discharged to [facility] on 3/29/19 and was ultimately discharged to home on 4/11/19 and instructed to follow up with her PCP. The patient successfully filled her discharge medications and [facility] began service on 4/12/19. The patient declined assistance scheduling a follow up with her PCP and no appointment was scheduled. On 4/22/19 the patient presented to [facility] and was subsequently readmitted for SVT. The patient was discharged on 4/28/19 and instructed to follow up with her PCP and Cardiology. The patient successfully filled her discharge medications and [facility] resumed service on 4/30/19.  The patient declined assistance scheduling a follow up with her PCP and no appointment was scheduled. The patient rescheduled with Cardiology on 5/3/19, 5/8/19, 5/15/19 and 5/23/19 and was ultimately seen on 6/13/19. The patient is scheduled for 1 future appointment with Cardiology on 9/5/19. PPS 60%.

## 2019-06-28 ENCOUNTER — HOSPITAL ENCOUNTER (OUTPATIENT)
Dept: LAB | Facility: MEDICAL CENTER | Age: 79
End: 2019-06-28
Attending: FAMILY MEDICINE
Payer: MEDICARE

## 2019-06-28 ENCOUNTER — HOSPITAL ENCOUNTER (OUTPATIENT)
Dept: LAB | Facility: MEDICAL CENTER | Age: 79
End: 2019-06-28
Attending: NURSE PRACTITIONER
Payer: MEDICARE

## 2019-06-28 ENCOUNTER — TELEPHONE (OUTPATIENT)
Dept: CARDIOLOGY | Facility: MEDICAL CENTER | Age: 79
End: 2019-06-28

## 2019-06-28 ENCOUNTER — NON-PROVIDER VISIT (OUTPATIENT)
Dept: CARDIOLOGY | Facility: MEDICAL CENTER | Age: 79
End: 2019-06-28
Payer: MEDICARE

## 2019-06-28 DIAGNOSIS — I47.19 AVNRT (AV NODAL RE-ENTRY TACHYCARDIA): ICD-10-CM

## 2019-06-28 DIAGNOSIS — I47.10 SVT (SUPRAVENTRICULAR TACHYCARDIA): ICD-10-CM

## 2019-06-28 PROCEDURE — 36415 COLL VENOUS BLD VENIPUNCTURE: CPT

## 2019-06-28 PROCEDURE — 80048 BASIC METABOLIC PNL TOTAL CA: CPT

## 2019-06-28 PROCEDURE — 80048 BASIC METABOLIC PNL TOTAL CA: CPT | Mod: 91

## 2019-06-28 PROCEDURE — 80162 ASSAY OF DIGOXIN TOTAL: CPT

## 2019-06-29 LAB
ANION GAP SERPL CALC-SCNC: 13 MMOL/L (ref 0–11.9)
ANION GAP SERPL CALC-SCNC: 14 MMOL/L (ref 0–11.9)
BUN SERPL-MCNC: 24 MG/DL (ref 8–22)
BUN SERPL-MCNC: 25 MG/DL (ref 8–22)
CALCIUM SERPL-MCNC: 9.4 MG/DL (ref 8.5–10.5)
CALCIUM SERPL-MCNC: 9.5 MG/DL (ref 8.5–10.5)
CHLORIDE SERPL-SCNC: 107 MMOL/L (ref 96–112)
CHLORIDE SERPL-SCNC: 108 MMOL/L (ref 96–112)
CO2 SERPL-SCNC: 19 MMOL/L (ref 20–33)
CO2 SERPL-SCNC: 20 MMOL/L (ref 20–33)
CREAT SERPL-MCNC: 0.88 MG/DL (ref 0.5–1.4)
CREAT SERPL-MCNC: 0.91 MG/DL (ref 0.5–1.4)
DIGOXIN SERPL-MCNC: 0.5 NG/ML (ref 0.8–2)
GLUCOSE SERPL-MCNC: 106 MG/DL (ref 65–99)
GLUCOSE SERPL-MCNC: 106 MG/DL (ref 65–99)
POTASSIUM SERPL-SCNC: 4 MMOL/L (ref 3.6–5.5)
POTASSIUM SERPL-SCNC: 4.1 MMOL/L (ref 3.6–5.5)
SODIUM SERPL-SCNC: 140 MMOL/L (ref 135–145)
SODIUM SERPL-SCNC: 141 MMOL/L (ref 135–145)

## 2019-07-01 ENCOUNTER — APPOINTMENT (OUTPATIENT)
Dept: RADIOLOGY | Facility: MEDICAL CENTER | Age: 79
End: 2019-07-01
Attending: FAMILY MEDICINE
Payer: MEDICARE

## 2019-07-19 PROCEDURE — 0298T PR EXT ECG > 48HR TO 21 DAY REVIEW AND INTERPRETATN: CPT | Performed by: INTERNAL MEDICINE

## 2019-07-19 PROCEDURE — 0296T PR EXT ECG > 48HR TO 21 DAY RCRD W/CONECT INTL RCRD: CPT | Performed by: INTERNAL MEDICINE

## 2019-07-25 ENCOUNTER — TELEPHONE (OUTPATIENT)
Dept: CARDIOLOGY | Facility: MEDICAL CENTER | Age: 79
End: 2019-07-25

## 2019-07-26 NOTE — TELEPHONE ENCOUNTER
----- Message from DOROTEO Holt sent at 7/24/2019 10:43 AM PDT -----    Event monitor showed only 8 episodes of non-sustained SVT with longest of 15 beats, improved with the digoxin.     Dig level is ok.    BMP showing elevated Anion gap and fasting glucose level, would like patient to discuss further with her PCP.

## 2019-12-26 ENCOUNTER — OFFICE VISIT (OUTPATIENT)
Dept: CARDIOLOGY | Facility: MEDICAL CENTER | Age: 79
End: 2019-12-26
Payer: MEDICARE

## 2019-12-26 VITALS
OXYGEN SATURATION: 97 % | DIASTOLIC BLOOD PRESSURE: 60 MMHG | BODY MASS INDEX: 24.25 KG/M2 | WEIGHT: 160 LBS | HEART RATE: 78 BPM | SYSTOLIC BLOOD PRESSURE: 100 MMHG | HEIGHT: 68 IN

## 2019-12-26 DIAGNOSIS — I47.10 PSVT (PAROXYSMAL SUPRAVENTRICULAR TACHYCARDIA): ICD-10-CM

## 2019-12-26 DIAGNOSIS — R00.2 PALPITATIONS: ICD-10-CM

## 2019-12-26 PROCEDURE — 99214 OFFICE O/P EST MOD 30 MIN: CPT | Performed by: INTERNAL MEDICINE

## 2019-12-26 RX ORDER — HYDROCHLOROTHIAZIDE 25 MG/1
25 TABLET ORAL 2 TIMES DAILY
Status: ON HOLD | COMMUNITY
End: 2021-09-20

## 2019-12-26 RX ORDER — HYDROXYZINE HYDROCHLORIDE 25 MG/1
25 TABLET, FILM COATED ORAL 3 TIMES DAILY PRN
COMMUNITY
End: 2021-09-15

## 2019-12-26 RX ORDER — METOPROLOL SUCCINATE 25 MG/1
25 TABLET, EXTENDED RELEASE ORAL DAILY
Qty: 90 TAB | Refills: 3 | Status: SHIPPED | OUTPATIENT
Start: 2019-12-26 | End: 2021-02-05

## 2019-12-26 ASSESSMENT — ENCOUNTER SYMPTOMS
PALPITATIONS: 0
HEARTBURN: 1
DOUBLE VISION: 1
NECK PAIN: 1
BRUISES/BLEEDS EASILY: 0
SHORTNESS OF BREATH: 0
BACK PAIN: 1
WEIGHT LOSS: 0
RESPIRATORY NEGATIVE: 1
DEPRESSION: 1
NEUROLOGICAL NEGATIVE: 1
CONSTITUTIONAL NEGATIVE: 1

## 2019-12-26 NOTE — PROGRESS NOTES
"No chief complaint on file.      Subjective:   Lindsay Vargas is a 79 y.o. female who presents today is seen for follow-up of PSVT.  She was seen initially a year ago for evaluation of forceful rapid heart beating.  Monitor revealed periods of SVT and digoxin was added to her regimen.  Repeat monitoring showed significant improvement.  The patient has been hospitalized recently because of falls.  She is no longer taking propranolol.  Apparently, the prescription ran out.  Denies any sense of palpitations or edema.  The patient is not very physically active at all.  To review  Past Medical History:   Diagnosis Date   • Anesthesia     confused/hard to wake up-15 years ago   • Arthritis    • Cataract     Bilat IOL   • Chronic pain    • Hiatus hernia syndrome     surgically repaired   • Hypertension    • Lymphedema 2014   • Migraine headache    • Pain     back   • Pneumonia     \"younger\"     Past Surgical History:   Procedure Laterality Date   • PB INJ LUMBAR/SACRAL,W/WO CNTRST N/A 11/10/2016    Procedure: INJ EPI NON NEUROLYTIC L/S -MIDLINE L4-L5;  Surgeon: Eduardo Mustafa M.D.;  Location: [facility];  Service: Pain Management   • PB FLUORSCOPIC GUIDANCE SPINAL INJECTION  11/10/2016    Procedure: FLUOROGUIDE FOR SPINAL INJ;  Surgeon: Eduardo Mustafa M.D.;  Location: [facility];  Service: Pain Management   • PB INJ LUMBAR/SACRAL,W/WO CNTRST Right 9/29/2016    Procedure: INJ EPI NON NEUROLYTIC L/S - L5-S1;  Surgeon: Eduardo Mustafa M.D.;  Location: [facility];  Service: Pain Management   • PB FLUORSCOPIC GUIDANCE SPINAL INJECTION  9/29/2016    Procedure: FLUOROGUIDE FOR SPINAL INJ;  Surgeon: Eduardo Mustafa M.D.;  Location: [facility];  Service: Pain Management   • CATARACT PHACO WITH IOL Right 2/18/2016    Procedure: CATARACT PHACO WITH IOL;  Surgeon: Rodrigo Smyth M.D.;  Location: [facility];  Service:    • CATARACT PHACO WITH IOL " Left 2/4/2016    Procedure: CATARACT PHACO WITH IOL;  Surgeon: Rodrigo Smyth M.D.;  Location: [facility];  Service:    • JUANI BY LAPAROSCOPY     • HYSTERECTOMY LAPAROSCOPY     • OTHER      HIATAL HERNIA REPAIR   • OTHER ABDOMINAL SURGERY      LAP JUANI   • OTHER ABDOMINAL SURGERY      APPENDECOMTY   • OTHER ORTHOPEDIC SURGERY      LEFT KNEE SCOPE     Family History   Problem Relation Age of Onset   • Heart Disease Mother    • Heart Disease Father      Social History     Socioeconomic History   • Marital status:      Spouse name: Not on file   • Number of children: Not on file   • Years of education: Not on file   • Highest education level: Not on file   Occupational History   • Not on file   Social Needs   • Financial resource strain: Not on file   • Food insecurity:     Worry: Not on file     Inability: Not on file   • Transportation needs:     Medical: Not on file     Non-medical: Not on file   Tobacco Use   • Smoking status: Never Smoker   • Smokeless tobacco: Never Used   Substance and Sexual Activity   • Alcohol use: No     Comment: rarely   • Drug use: No   • Sexual activity: Not on file   Lifestyle   • Physical activity:     Days per week: Not on file     Minutes per session: Not on file   • Stress: Not on file   Relationships   • Social connections:     Talks on phone: Not on file     Gets together: Not on file     Attends Restorationism service: Not on file     Active member of club or organization: Not on file     Attends meetings of clubs or organizations: Not on file     Relationship status: Not on file   • Intimate partner violence:     Fear of current or ex partner: Not on file     Emotionally abused: Not on file     Physically abused: Not on file     Forced sexual activity: Not on file   Other Topics Concern   • Not on file   Social History Narrative   • Not on file     No Known Allergies  Outpatient Encounter Medications as of 12/26/2019   Medication Sig Dispense Refill   •  hydrOXYzine HCl (ATARAX) 25 MG Tab Take 25 mg by mouth 3 times a day as needed for Itching.     • metoprolol SR (TOPROL XL) 25 MG TABLET SR 24 HR Take 1 Tab by mouth every day. 90 Tab 3   • digoxin (LANOXIN) 125 MCG Tab Take 1 Tab by mouth every day at 6 PM. 30 Tab 0   • cyanocobalamin (VITAMIN B12) 1000 MCG Tab Take 1 Tab by mouth every day. 30 Tab 3   • cyclobenzaprine (FLEXERIL) 5 MG tablet Take 1 Tab by mouth two times a day may change times on alt/days. 30 Tab 0   • acetaminophen (TYLENOL) 325 MG Tab Take 2 Tabs by mouth every 6 hours as needed (Mild Pain; (Pain scale 1-3); Temp greater than 100.5 F). 30 Tab 0   • DULoxetine (CYMBALTA) 60 MG Cap DR Particles delayed-release capsule Take 1 Cap by mouth every day. 30 Cap 0   • omeprazole (PRILOSEC) 40 MG delayed-release capsule TAKE 1 CAPSULE BY MOUTH ONCE A DAY 30 MINUTES BEFORE BREAKFAST MEAL 30 Cap 0   • vitamin D (VITAMIND D3) 1000 UNIT Tab Take 1 Tab by mouth every day. 60 Tab    • hydroCHLOROthiazide (HYDRODIURIL) 25 MG Tab hydrochlorothiazide 25 mg tablet     • Pseudoephedrine-APAP  MG Tab      • amLODIPine (NORVASC) 5 MG Tab TAKE 1 TABLET BY MOUTH EVERY DAY  0   • [DISCONTINUED] propranolol LA (INDERAL LA) 120 MG CAPSULE SR 24 HR Take 2 Caps by mouth every day. 30 Cap 11   • [DISCONTINUED] amLODIPine (NORVASC) 5 MG Tab Take 1 Tab by mouth every day. 30 Tab 0   • oxyCODONE-acetaminophen (PERCOCET) 5-325 MG Tab Take 1 Tab by mouth every 8 hours as needed for Severe Pain. Prescribed by Dr. Mustafa, spinal specialist. Pt is currently taking for her right neck pain. Pt reports will finish remaining pills if needed.       No facility-administered encounter medications on file as of 12/26/2019.      Review of Systems   Constitutional: Negative.  Negative for weight loss.   HENT: Positive for hearing loss.    Eyes: Positive for double vision.   Respiratory: Negative.  Negative for shortness of breath.    Cardiovascular: Positive for leg swelling. Negative  "for chest pain and palpitations.   Gastrointestinal: Positive for heartburn.   Musculoskeletal: Positive for back pain, joint pain and neck pain.   Skin: Negative.    Neurological: Negative.    Endo/Heme/Allergies: Negative.  Does not bruise/bleed easily.   Psychiatric/Behavioral: Positive for depression.        Objective:   /60 (BP Location: Right arm, Patient Position: Sitting)   Pulse 78   Ht 1.727 m (5' 8\")   Wt 72.6 kg (160 lb)   SpO2 97%   BMI 24.33 kg/m²     Physical Exam   Constitutional: She is oriented to person, place, and time. No distress.   The patient is in a wheelchair today.   HENT:   Head: Normocephalic and atraumatic.   Eyes: Pupils are equal, round, and reactive to light. No scleral icterus.   Neck: No JVD present.   Cardiovascular: Normal rate and regular rhythm.   No murmur heard.  Pulmonary/Chest: Breath sounds normal. No respiratory distress.   Abdominal: She exhibits no distension. There is no tenderness.   Musculoskeletal:         General: No edema.   Neurological: She is alert and oriented to person, place, and time.   Skin: Skin is warm and dry.   Psychiatric: She has a normal mood and affect.       Assessment:     1. Palpitations     2. PSVT (paroxysmal supraventricular tachycardia) ([facility])         Medical Decision Making:  Today's Assessment / Status / Plan:   PSVT: Documented on monitoring and improved with addition of digoxin.  She is no longer on propranolol as apparently she ran out.  We will take this opportunity to start her on metoprolol low-dose daily.  Return in about 3 to 4 months for reevaluation.  "

## 2020-01-02 ENCOUNTER — TELEPHONE (OUTPATIENT)
Dept: CARDIOLOGY | Facility: MEDICAL CENTER | Age: 80
End: 2020-01-02

## 2020-01-02 NOTE — TELEPHONE ENCOUNTER
RS    Pt's  Abdulaziz left voicemail. He is taking pt to ER as she is having problems with her breathing, blood pressure and heart rate. Abdulaziz can be reached at [phone].

## 2020-02-07 ENCOUNTER — HOSPITAL ENCOUNTER (OUTPATIENT)
Dept: RADIOLOGY | Facility: MEDICAL CENTER | Age: 80
End: 2020-02-07
Attending: PAIN MEDICINE
Payer: MEDICARE

## 2020-02-07 DIAGNOSIS — M96.1 POSTLAMINECTOMY SYNDROME, CERVICAL REGION: ICD-10-CM

## 2020-02-07 DIAGNOSIS — M51.26 DISPLACEMENT OF LUMBAR INTERVERTEBRAL DISC WITHOUT MYELOPATHY: ICD-10-CM

## 2020-02-07 PROCEDURE — 72100 X-RAY EXAM L-S SPINE 2/3 VWS: CPT

## 2020-02-07 PROCEDURE — 72040 X-RAY EXAM NECK SPINE 2-3 VW: CPT

## 2020-04-21 ENCOUNTER — OFFICE VISIT (OUTPATIENT)
Dept: CARDIOLOGY | Facility: MEDICAL CENTER | Age: 80
End: 2020-04-21
Payer: MEDICARE

## 2020-04-21 VITALS
DIASTOLIC BLOOD PRESSURE: 82 MMHG | BODY MASS INDEX: 24.25 KG/M2 | SYSTOLIC BLOOD PRESSURE: 138 MMHG | HEART RATE: 108 BPM | OXYGEN SATURATION: 97 % | HEIGHT: 68 IN | WEIGHT: 160 LBS

## 2020-04-21 DIAGNOSIS — I10 ESSENTIAL HYPERTENSION, BENIGN: ICD-10-CM

## 2020-04-21 DIAGNOSIS — I47.10 PAROXYSMAL SUPRAVENTRICULAR TACHYCARDIA (HCC): ICD-10-CM

## 2020-04-21 DIAGNOSIS — R00.2 PALPITATIONS: ICD-10-CM

## 2020-04-21 PROCEDURE — 99213 OFFICE O/P EST LOW 20 MIN: CPT | Performed by: INTERNAL MEDICINE

## 2020-04-21 ASSESSMENT — ENCOUNTER SYMPTOMS
BACK PAIN: 1
CONSTITUTIONAL NEGATIVE: 1
BRUISES/BLEEDS EASILY: 0
PALPITATIONS: 0
RESPIRATORY NEGATIVE: 1
NECK PAIN: 1
DOUBLE VISION: 1
SHORTNESS OF BREATH: 0
NEUROLOGICAL NEGATIVE: 1
HEARTBURN: 1
WEIGHT LOSS: 0
DEPRESSION: 1

## 2020-04-21 ASSESSMENT — FIBROSIS 4 INDEX: FIB4 SCORE: 1.37

## 2020-04-21 NOTE — PROGRESS NOTES
"Chief Complaint   Patient presents with   • Palpitations   • Dizziness       Subjective:   Lindsay Vargas is a 79 y.o. female who presents today for follow-up of hypertension and PSVT.  She has a history of localized nonexertional chest pain in the left parasternal region.  A myocardial perfusion scan was normal a year ago.  The patient has had no difficulty with palpitations.she had numerous other complaints including joint pain, worry about COVID virus, etc. [place]    Past Medical History:   Diagnosis Date   • Anesthesia     confused/hard to wake up-15 years ago   • Arthritis    • Cataract     Bilat IOL   • Chronic pain    • Hiatus hernia syndrome     surgically repaired   • Hypertension    • Lymphedema 2014   • Migraine headache    • Pain     back   • Pneumonia     \"younger\"     Past Surgical History:   Procedure Laterality Date   • PB INJ LUMBAR/SACRAL,W/WO CNTRST N/A 11/10/2016    Procedure: INJ EPI NON NEUROLYTIC L/S -MIDLINE L4-L5;  Surgeon: Eduardo Mustafa M.D.;  Location: [facility];  Service: Pain Management   • PB FLUORSCOPIC GUIDANCE SPINAL INJECTION  11/10/2016    Procedure: FLUOROGUIDE FOR SPINAL INJ;  Surgeon: Eduardo Mustafa M.D.;  Location: [facility];  Service: Pain Management   • PB INJ LUMBAR/SACRAL,W/WO CNTRST Right 9/29/2016    Procedure: INJ EPI NON NEUROLYTIC L/S - L5-S1;  Surgeon: Eduardo Mustafa M.D.;  Location: [facility];  Service: Pain Management   • PB FLUORSCOPIC GUIDANCE SPINAL INJECTION  9/29/2016    Procedure: FLUOROGUIDE FOR SPINAL INJ;  Surgeon: Eduardo Mustafa M.D.;  Location: [facility];  Service: Pain Management   • CATARACT PHACO WITH IOL Right 2/18/2016    Procedure: CATARACT PHACO WITH IOL;  Surgeon: Rodrigo Smyth M.D.;  Location: [facility];  Service:    • CATARACT PHACO WITH IOL Left 2/4/2016    Procedure: CATARACT PHACO WITH IOL;  Surgeon: Rodrigo Smyth M.D.;  Location: [facility];  Service:    • JUANI BY LAPAROSCOPY     • HYSTERECTOMY LAPAROSCOPY     • OTHER      HIATAL HERNIA REPAIR   • OTHER ABDOMINAL SURGERY      LAP JUANI   • OTHER ABDOMINAL SURGERY      APPENDECOMTY   • OTHER ORTHOPEDIC SURGERY      LEFT KNEE SCOPE     Family History   Problem Relation Age of Onset   • Heart Disease Mother    • Heart Disease Father      Social History     Socioeconomic History   • Marital status:      Spouse name: Not on file   • Number of children: Not on file   • Years of education: Not on file   • Highest education level: Not on file   Occupational History   • Not on file   Social Needs   • Financial resource strain: Not on file   • Food insecurity     Worry: Not on file     Inability: Not on file   • Transportation needs     Medical: Not on file     Non-medical: Not on file   Tobacco Use   • Smoking status: Never Smoker   • Smokeless tobacco: Never Used   Substance and Sexual Activity   • Alcohol use: No     Comment: rarely   • Drug use: No   • Sexual activity: Not on file   Lifestyle   • Physical activity     Days per week: Not on file     Minutes per session: Not on file   • Stress: Not on file   Relationships   • Social connections     Talks on phone: Not on file     Gets together: Not on file     Attends Scientology service: Not on file     Active member of club or organization: Not on file     Attends meetings of clubs or organizations: Not on file     Relationship status: Not on file   • Intimate partner violence     Fear of current or ex partner: Not on file     Emotionally abused: Not on file     Physically abused: Not on file     Forced sexual activity: Not on file   Other Topics Concern   • Not on file   Social History Narrative   • Not on file     No Known Allergies  Outpatient Encounter Medications as of 4/21/2020   Medication Sig Dispense Refill   • hydroCHLOROthiazide (HYDRODIURIL) 25 MG Tab hydrochlorothiazide 25 mg tablet     • hydrOXYzine HCl (ATARAX) 25 MG  Tab Take 25 mg by mouth 3 times a day as needed for Itching.     • metoprolol SR (TOPROL XL) 25 MG TABLET SR 24 HR Take 1 Tab by mouth every day. 90 Tab 3   • amLODIPine (NORVASC) 5 MG Tab TAKE 1 TABLET BY MOUTH EVERY DAY  0   • digoxin (LANOXIN) 125 MCG Tab Take 1 Tab by mouth every day at 6 PM. 30 Tab 0   • cyanocobalamin (VITAMIN B12) 1000 MCG Tab Take 1 Tab by mouth every day. 30 Tab 3   • cyclobenzaprine (FLEXERIL) 5 MG tablet Take 1 Tab by mouth two times a day may change times on alt/days. 30 Tab 0   • oxyCODONE-acetaminophen (PERCOCET) 5-325 MG Tab Take 1 Tab by mouth every 8 hours as needed for Severe Pain. Prescribed by Dr. Mustafa, spinal specialist. Pt is currently taking for her right neck pain. Pt reports will finish remaining pills if needed.     • acetaminophen (TYLENOL) 325 MG Tab Take 2 Tabs by mouth every 6 hours as needed (Mild Pain; (Pain scale 1-3); Temp greater than 100.5 F). 30 Tab 0   • DULoxetine (CYMBALTA) 60 MG Cap DR Particles delayed-release capsule Take 1 Cap by mouth every day. 30 Cap 0   • omeprazole (PRILOSEC) 40 MG delayed-release capsule TAKE 1 CAPSULE BY MOUTH ONCE A DAY 30 MINUTES BEFORE BREAKFAST MEAL 30 Cap 0   • vitamin D (VITAMIND D3) 1000 UNIT Tab Take 1 Tab by mouth every day. 60 Tab    • Pseudoephedrine-APAP  MG Tab        No facility-administered encounter medications on file as of 4/21/2020.      Review of Systems   Constitutional: Negative.  Negative for weight loss.   HENT: Positive for hearing loss.    Eyes: Positive for double vision.   Respiratory: Negative.  Negative for shortness of breath.    Cardiovascular: Positive for chest pain and leg swelling. Negative for palpitations.        Localized and nonexertional left upper chest pain that is parasternal in location   Gastrointestinal: Positive for heartburn.   Musculoskeletal: Positive for back pain, joint pain and neck pain.   Skin: Negative.    Neurological: Negative.    Endo/Heme/Allergies: Negative.  Does  "not bruise/bleed easily.   Psychiatric/Behavioral: Positive for depression.        Objective:   /82 (BP Location: Left arm, Patient Position: Sitting, BP Cuff Size: Adult)   Pulse (!) 108   Ht 1.727 m (5' 8\")   Wt 72.6 kg (160 lb)   SpO2 97%   BMI 24.33 kg/m²     Physical Exam    Assessment:     1. Essential hypertension, benign     2. Palpitations     3. PSVT (paroxysmal supraventricular tachycardia) ([facility])         Medical Decision Making:  Today's Assessment / Status / Plan:   PSVT: Controlled on current medications.  Palpitations: She has had no significant palpitations at all on her current medical regimen.    Hypertension: Controlled on current medications.  By my reading her blood pressure is 130/80.    Chest wall pain: Patient has localized nonexertional parasternal pain on the left that is reproduced by palpation.  She is strongly reassured that this is not heart pain.  Myocardial perfusion scan last year was normal.  Return in 6 to 12 months.  "

## 2021-01-11 DIAGNOSIS — Z23 NEED FOR VACCINATION: ICD-10-CM

## 2021-02-05 DIAGNOSIS — I10 ESSENTIAL HYPERTENSION, BENIGN: ICD-10-CM

## 2021-02-05 RX ORDER — METOPROLOL SUCCINATE 25 MG/1
TABLET, EXTENDED RELEASE ORAL
Qty: 90 TAB | Refills: 0 | Status: SHIPPED | OUTPATIENT
Start: 2021-02-05 | End: 2021-09-15

## 2021-02-05 NOTE — TELEPHONE ENCOUNTER
Scheduled pt. for a follow up in April, declined earlier & virtual visits.  Pt. expresses concerns of leaving her house, she states she has been quarantining for almost a year. She denies any heart related symptoms at this time and states she will call if she needs an earlier appointment. I encouraged pt. To sign up for [employer] to have access to schedule a vaccination appt, I am mailing information to activate [employer] per her request.

## 2021-05-19 ENCOUNTER — PATIENT OUTREACH (OUTPATIENT)
Dept: HEALTH INFORMATION MANAGEMENT | Facility: OTHER | Age: 81
End: 2021-05-19

## 2021-06-04 ENCOUNTER — TELEPHONE (OUTPATIENT)
Dept: CARDIOLOGY | Facility: MEDICAL CENTER | Age: 81
End: 2021-06-04

## 2021-06-04 NOTE — TELEPHONE ENCOUNTER
RS    Patient is unable to keep her appt before RS leaves and she would like to know who he recommends her to see. please call and let her know. She also wanted to tell RS thank you for all your care and time spent with her. She will miss you.    Thank you,    Nancy LEE

## 2021-06-17 NOTE — PROGRESS NOTES
Received report from Linda SIDDIQI. Plan of care discussed. Safety precautions in place. Patient resting comfortably in bed.    Nsaids Counseling: NSAID Counseling: I discussed with the patient that NSAIDs should be taken with food. Prolonged use of NSAIDs can result in the development of stomach ulcers.  Patient advised to stop taking NSAIDs if abdominal pain occurs.  The patient verbalized understanding of the proper use and possible adverse effects of NSAIDs.  All of the patient's questions and concerns were addressed.

## 2021-06-26 ENCOUNTER — APPOINTMENT (OUTPATIENT)
Dept: URGENT CARE | Facility: CLINIC | Age: 81
End: 2021-06-26
Payer: MEDICARE

## 2021-07-29 ENCOUNTER — HOME HEALTH ADMISSION (OUTPATIENT)
Dept: HOME HEALTH SERVICES | Facility: HOME HEALTHCARE | Age: 81
End: 2021-07-29
Payer: MEDICARE

## 2021-08-02 ENCOUNTER — HOSPITAL ENCOUNTER (OUTPATIENT)
Dept: LAB | Facility: MEDICAL CENTER | Age: 81
End: 2021-08-02
Attending: FAMILY MEDICINE
Payer: MEDICARE

## 2021-08-02 ENCOUNTER — HOSPITAL ENCOUNTER (OUTPATIENT)
Dept: RADIOLOGY | Facility: MEDICAL CENTER | Age: 81
End: 2021-08-02
Attending: FAMILY MEDICINE
Payer: MEDICARE

## 2021-08-02 DIAGNOSIS — R42 DIZZINESS AND GIDDINESS: ICD-10-CM

## 2021-08-02 LAB
ALBUMIN SERPL BCP-MCNC: 3.9 G/DL (ref 3.2–4.9)
ALBUMIN/GLOB SERPL: 1.3 G/DL
ALP SERPL-CCNC: 102 U/L (ref 30–99)
ALT SERPL-CCNC: 12 U/L (ref 2–50)
ANION GAP SERPL CALC-SCNC: 10 MMOL/L (ref 7–16)
AST SERPL-CCNC: 18 U/L (ref 12–45)
BASOPHILS # BLD AUTO: 0.6 % (ref 0–1.8)
BASOPHILS # BLD: 0.04 K/UL (ref 0–0.12)
BILIRUB SERPL-MCNC: 0.3 MG/DL (ref 0.1–1.5)
BUN SERPL-MCNC: 23 MG/DL (ref 8–22)
CALCIUM SERPL-MCNC: 9.1 MG/DL (ref 8.4–10.2)
CHLORIDE SERPL-SCNC: 106 MMOL/L (ref 96–112)
CO2 SERPL-SCNC: 23 MMOL/L (ref 20–33)
CREAT SERPL-MCNC: 0.71 MG/DL (ref 0.5–1.4)
DIGOXIN SERPL-MCNC: 0.9 NG/ML (ref 0.8–2)
EOSINOPHIL # BLD AUTO: 0.2 K/UL (ref 0–0.51)
EOSINOPHIL NFR BLD: 3.1 % (ref 0–6.9)
ERYTHROCYTE [DISTWIDTH] IN BLOOD BY AUTOMATED COUNT: 43.3 FL (ref 35.9–50)
FASTING STATUS PATIENT QL REPORTED: NORMAL
GLOBULIN SER CALC-MCNC: 3 G/DL (ref 1.9–3.5)
GLUCOSE SERPL-MCNC: 108 MG/DL (ref 65–99)
HCT VFR BLD AUTO: 36.7 % (ref 37–47)
HGB BLD-MCNC: 11.9 G/DL (ref 12–16)
IMM GRANULOCYTES # BLD AUTO: 0.02 K/UL (ref 0–0.11)
IMM GRANULOCYTES NFR BLD AUTO: 0.3 % (ref 0–0.9)
LYMPHOCYTES # BLD AUTO: 1.12 K/UL (ref 1–4.8)
LYMPHOCYTES NFR BLD: 17.6 % (ref 22–41)
MCH RBC QN AUTO: 25.4 PG (ref 27–33)
MCHC RBC AUTO-ENTMCNC: 32.4 G/DL (ref 33.6–35)
MCV RBC AUTO: 78.4 FL (ref 81.4–97.8)
MONOCYTES # BLD AUTO: 0.34 K/UL (ref 0–0.85)
MONOCYTES NFR BLD AUTO: 5.3 % (ref 0–13.4)
NEUTROPHILS # BLD AUTO: 4.64 K/UL (ref 2–7.15)
NEUTROPHILS NFR BLD: 73.1 % (ref 44–72)
NRBC # BLD AUTO: 0 K/UL
NRBC BLD-RTO: 0 /100 WBC
NT-PROBNP SERPL IA-MCNC: 100 PG/ML (ref 0–125)
PLATELET # BLD AUTO: 140 K/UL (ref 164–446)
PMV BLD AUTO: 10.2 FL (ref 9–12.9)
POTASSIUM SERPL-SCNC: 3.6 MMOL/L (ref 3.6–5.5)
PROT SERPL-MCNC: 6.9 G/DL (ref 6–8.2)
RBC # BLD AUTO: 4.68 M/UL (ref 4.2–5.4)
SODIUM SERPL-SCNC: 139 MMOL/L (ref 135–145)
WBC # BLD AUTO: 6.4 K/UL (ref 4.8–10.8)

## 2021-08-02 PROCEDURE — 80053 COMPREHEN METABOLIC PANEL: CPT

## 2021-08-02 PROCEDURE — 85025 COMPLETE CBC W/AUTO DIFF WBC: CPT

## 2021-08-02 PROCEDURE — 70450 CT HEAD/BRAIN W/O DYE: CPT | Mod: MH

## 2021-08-02 PROCEDURE — 83880 ASSAY OF NATRIURETIC PEPTIDE: CPT

## 2021-08-02 PROCEDURE — 80162 ASSAY OF DIGOXIN TOTAL: CPT

## 2021-08-02 PROCEDURE — 36415 COLL VENOUS BLD VENIPUNCTURE: CPT

## 2021-09-15 ENCOUNTER — HOSPITAL ENCOUNTER (INPATIENT)
Facility: MEDICAL CENTER | Age: 81
LOS: 4 days | DRG: 871 | End: 2021-09-20
Attending: EMERGENCY MEDICINE | Admitting: INTERNAL MEDICINE
Payer: MEDICARE

## 2021-09-15 ENCOUNTER — APPOINTMENT (OUTPATIENT)
Dept: RADIOLOGY | Facility: MEDICAL CENTER | Age: 81
DRG: 871 | End: 2021-09-15
Attending: EMERGENCY MEDICINE
Payer: MEDICARE

## 2021-09-15 DIAGNOSIS — R78.81 BACTEREMIA DUE TO GRAM-NEGATIVE BACTERIA: ICD-10-CM

## 2021-09-15 DIAGNOSIS — R07.9 CHEST PAIN, UNSPECIFIED TYPE: ICD-10-CM

## 2021-09-15 DIAGNOSIS — R53.1 WEAKNESS: ICD-10-CM

## 2021-09-15 PROBLEM — F32.A DEPRESSION: Status: ACTIVE | Noted: 2019-03-26

## 2021-09-15 PROBLEM — R79.89 ELEVATED TROPONIN: Status: ACTIVE | Noted: 2021-09-15

## 2021-09-15 PROBLEM — G89.4 CHRONIC PAIN SYNDROME: Status: ACTIVE | Noted: 2021-09-15

## 2021-09-15 LAB
ALBUMIN SERPL BCP-MCNC: 4.2 G/DL (ref 3.2–4.9)
ALBUMIN/GLOB SERPL: 1.7 G/DL
ALP SERPL-CCNC: 75 U/L (ref 30–99)
ALT SERPL-CCNC: 8 U/L (ref 2–50)
ANION GAP SERPL CALC-SCNC: 15 MMOL/L (ref 7–16)
ANION GAP SERPL CALC-SCNC: 16 MMOL/L (ref 7–16)
AST SERPL-CCNC: 15 U/L (ref 12–45)
BASOPHILS # BLD AUTO: 0.2 % (ref 0–1.8)
BASOPHILS # BLD: 0.02 K/UL (ref 0–0.12)
BILIRUB SERPL-MCNC: 0.9 MG/DL (ref 0.1–1.5)
BUN SERPL-MCNC: 25 MG/DL (ref 8–22)
BUN SERPL-MCNC: 26 MG/DL (ref 8–22)
CALCIUM SERPL-MCNC: 7.1 MG/DL (ref 8.4–10.2)
CALCIUM SERPL-MCNC: 8.5 MG/DL (ref 8.4–10.2)
CHLORIDE SERPL-SCNC: 102 MMOL/L (ref 96–112)
CHLORIDE SERPL-SCNC: 95 MMOL/L (ref 96–112)
CO2 SERPL-SCNC: 22 MMOL/L (ref 20–33)
CO2 SERPL-SCNC: 25 MMOL/L (ref 20–33)
CREAT SERPL-MCNC: 1.03 MG/DL (ref 0.5–1.4)
CREAT SERPL-MCNC: 1.32 MG/DL (ref 0.5–1.4)
DIGOXIN SERPL-MCNC: 1.3 NG/ML (ref 0.8–2)
EKG IMPRESSION: NORMAL
EOSINOPHIL # BLD AUTO: 0.01 K/UL (ref 0–0.51)
EOSINOPHIL NFR BLD: 0.1 % (ref 0–6.9)
ERYTHROCYTE [DISTWIDTH] IN BLOOD BY AUTOMATED COUNT: 44.5 FL (ref 35.9–50)
GLOBULIN SER CALC-MCNC: 2.5 G/DL (ref 1.9–3.5)
GLUCOSE SERPL-MCNC: 108 MG/DL (ref 65–99)
GLUCOSE SERPL-MCNC: 121 MG/DL (ref 65–99)
HCT VFR BLD AUTO: 36.2 % (ref 37–47)
HGB BLD-MCNC: 12.1 G/DL (ref 12–16)
IMM GRANULOCYTES # BLD AUTO: 0.06 K/UL (ref 0–0.11)
IMM GRANULOCYTES NFR BLD AUTO: 0.6 % (ref 0–0.9)
LYMPHOCYTES # BLD AUTO: 1.07 K/UL (ref 1–4.8)
LYMPHOCYTES NFR BLD: 10.7 % (ref 22–41)
MAGNESIUM SERPL-MCNC: 0.8 MG/DL (ref 1.5–2.5)
MCH RBC QN AUTO: 25.9 PG (ref 27–33)
MCHC RBC AUTO-ENTMCNC: 33.4 G/DL (ref 33.6–35)
MCV RBC AUTO: 77.5 FL (ref 81.4–97.8)
MONOCYTES # BLD AUTO: 0.52 K/UL (ref 0–0.85)
MONOCYTES NFR BLD AUTO: 5.2 % (ref 0–13.4)
NEUTROPHILS # BLD AUTO: 8.28 K/UL (ref 2–7.15)
NEUTROPHILS NFR BLD: 83.2 % (ref 44–72)
NRBC # BLD AUTO: 0 K/UL
NRBC BLD-RTO: 0 /100 WBC
PLATELET # BLD AUTO: 101 K/UL (ref 164–446)
PMV BLD AUTO: 10.1 FL (ref 9–12.9)
POTASSIUM SERPL-SCNC: 2.5 MMOL/L (ref 3.6–5.5)
POTASSIUM SERPL-SCNC: 2.7 MMOL/L (ref 3.6–5.5)
PROT SERPL-MCNC: 6.7 G/DL (ref 6–8.2)
RBC # BLD AUTO: 4.67 M/UL (ref 4.2–5.4)
SODIUM SERPL-SCNC: 136 MMOL/L (ref 135–145)
SODIUM SERPL-SCNC: 139 MMOL/L (ref 135–145)
TROPONIN T SERPL-MCNC: 57 NG/L (ref 6–19)
TROPONIN T SERPL-MCNC: 60 NG/L (ref 6–19)
TROPONIN T SERPL-MCNC: 68 NG/L (ref 6–19)
WBC # BLD AUTO: 10 K/UL (ref 4.8–10.8)

## 2021-09-15 PROCEDURE — 83735 ASSAY OF MAGNESIUM: CPT

## 2021-09-15 PROCEDURE — 80048 BASIC METABOLIC PNL TOTAL CA: CPT

## 2021-09-15 PROCEDURE — 80162 ASSAY OF DIGOXIN TOTAL: CPT

## 2021-09-15 PROCEDURE — 85025 COMPLETE CBC W/AUTO DIFF WBC: CPT

## 2021-09-15 PROCEDURE — 96367 TX/PROPH/DG ADDL SEQ IV INF: CPT

## 2021-09-15 PROCEDURE — 700102 HCHG RX REV CODE 250 W/ 637 OVERRIDE(OP): Performed by: INTERNAL MEDICINE

## 2021-09-15 PROCEDURE — A9270 NON-COVERED ITEM OR SERVICE: HCPCS | Performed by: EMERGENCY MEDICINE

## 2021-09-15 PROCEDURE — G0378 HOSPITAL OBSERVATION PER HR: HCPCS

## 2021-09-15 PROCEDURE — 99285 EMERGENCY DEPT VISIT HI MDM: CPT

## 2021-09-15 PROCEDURE — 80053 COMPREHEN METABOLIC PANEL: CPT

## 2021-09-15 PROCEDURE — A9270 NON-COVERED ITEM OR SERVICE: HCPCS | Performed by: INTERNAL MEDICINE

## 2021-09-15 PROCEDURE — 94760 N-INVAS EAR/PLS OXIMETRY 1: CPT

## 2021-09-15 PROCEDURE — 96365 THER/PROPH/DIAG IV INF INIT: CPT

## 2021-09-15 PROCEDURE — 93005 ELECTROCARDIOGRAM TRACING: CPT | Performed by: EMERGENCY MEDICINE

## 2021-09-15 PROCEDURE — 700111 HCHG RX REV CODE 636 W/ 250 OVERRIDE (IP): Performed by: EMERGENCY MEDICINE

## 2021-09-15 PROCEDURE — 700102 HCHG RX REV CODE 250 W/ 637 OVERRIDE(OP): Performed by: EMERGENCY MEDICINE

## 2021-09-15 PROCEDURE — 96375 TX/PRO/DX INJ NEW DRUG ADDON: CPT

## 2021-09-15 PROCEDURE — 700111 HCHG RX REV CODE 636 W/ 250 OVERRIDE (IP): Performed by: INTERNAL MEDICINE

## 2021-09-15 PROCEDURE — 71045 X-RAY EXAM CHEST 1 VIEW: CPT

## 2021-09-15 PROCEDURE — 84484 ASSAY OF TROPONIN QUANT: CPT

## 2021-09-15 PROCEDURE — 96366 THER/PROPH/DIAG IV INF ADDON: CPT

## 2021-09-15 PROCEDURE — 99219 PR INITIAL OBSERVATION CARE,LEVL II: CPT | Performed by: INTERNAL MEDICINE

## 2021-09-15 RX ORDER — HYDROCHLOROTHIAZIDE 25 MG/1
25 TABLET ORAL 2 TIMES DAILY
Status: DISCONTINUED | OUTPATIENT
Start: 2021-09-16 | End: 2021-09-16

## 2021-09-15 RX ORDER — POTASSIUM CHLORIDE 20 MEQ/1
40 TABLET, EXTENDED RELEASE ORAL ONCE
Status: COMPLETED | OUTPATIENT
Start: 2021-09-15 | End: 2021-09-15

## 2021-09-15 RX ORDER — AMOXICILLIN 250 MG
2 CAPSULE ORAL 2 TIMES DAILY
Status: DISCONTINUED | OUTPATIENT
Start: 2021-09-15 | End: 2021-09-17

## 2021-09-15 RX ORDER — POTASSIUM CHLORIDE 7.45 MG/ML
10 INJECTION INTRAVENOUS ONCE
Status: COMPLETED | OUTPATIENT
Start: 2021-09-15 | End: 2021-09-15

## 2021-09-15 RX ORDER — MORPHINE SULFATE 4 MG/ML
2 INJECTION, SOLUTION INTRAMUSCULAR; INTRAVENOUS ONCE
Status: COMPLETED | OUTPATIENT
Start: 2021-09-15 | End: 2021-09-15

## 2021-09-15 RX ORDER — DULOXETIN HYDROCHLORIDE 30 MG/1
60 CAPSULE, DELAYED RELEASE ORAL DAILY
Status: DISCONTINUED | OUTPATIENT
Start: 2021-09-16 | End: 2021-09-20 | Stop reason: HOSPADM

## 2021-09-15 RX ORDER — POTASSIUM CHLORIDE 7.45 MG/ML
10 INJECTION INTRAVENOUS
Status: DISPENSED | OUTPATIENT
Start: 2021-09-15 | End: 2021-09-15

## 2021-09-15 RX ORDER — LORATADINE 10 MG/1
10 TABLET ORAL DAILY
Status: DISCONTINUED | OUTPATIENT
Start: 2021-09-16 | End: 2021-09-20 | Stop reason: HOSPADM

## 2021-09-15 RX ORDER — OMEPRAZOLE 20 MG/1
40 CAPSULE, DELAYED RELEASE ORAL DAILY
Status: DISCONTINUED | OUTPATIENT
Start: 2021-09-16 | End: 2021-09-20 | Stop reason: HOSPADM

## 2021-09-15 RX ORDER — AMLODIPINE BESYLATE 5 MG/1
5 TABLET ORAL DAILY
Status: DISCONTINUED | OUTPATIENT
Start: 2021-09-16 | End: 2021-09-16

## 2021-09-15 RX ORDER — MAGNESIUM SULFATE HEPTAHYDRATE 40 MG/ML
2 INJECTION, SOLUTION INTRAVENOUS ONCE
Status: COMPLETED | OUTPATIENT
Start: 2021-09-15 | End: 2021-09-15

## 2021-09-15 RX ORDER — POLYETHYLENE GLYCOL 3350 17 G/17G
1 POWDER, FOR SOLUTION ORAL
Status: DISCONTINUED | OUTPATIENT
Start: 2021-09-15 | End: 2021-09-20 | Stop reason: HOSPADM

## 2021-09-15 RX ORDER — ACETAMINOPHEN 325 MG/1
650 TABLET ORAL EVERY 6 HOURS PRN
Status: DISCONTINUED | OUTPATIENT
Start: 2021-09-15 | End: 2021-09-20 | Stop reason: HOSPADM

## 2021-09-15 RX ORDER — GABAPENTIN 100 MG/1
100 CAPSULE ORAL 3 TIMES DAILY
Status: DISCONTINUED | OUTPATIENT
Start: 2021-09-15 | End: 2021-09-17

## 2021-09-15 RX ORDER — BISACODYL 10 MG
10 SUPPOSITORY, RECTAL RECTAL
Status: DISCONTINUED | OUTPATIENT
Start: 2021-09-15 | End: 2021-09-20 | Stop reason: HOSPADM

## 2021-09-15 RX ORDER — GABAPENTIN 100 MG/1
100 CAPSULE ORAL 3 TIMES DAILY
COMMUNITY
End: 2021-10-14 | Stop reason: SDUPTHER

## 2021-09-15 RX ORDER — AMOXICILLIN AND CLAVULANATE POTASSIUM 875; 125 MG/1; MG/1
1 TABLET, FILM COATED ORAL 2 TIMES DAILY
Status: ON HOLD | COMMUNITY
End: 2021-09-20

## 2021-09-15 RX ORDER — LORATADINE 10 MG/1
10 TABLET ORAL
COMMUNITY
Start: 2021-07-27

## 2021-09-15 RX ORDER — DIGOXIN 125 MCG
125 TABLET ORAL DAILY
Status: DISCONTINUED | OUTPATIENT
Start: 2021-09-15 | End: 2021-09-17

## 2021-09-15 RX ADMIN — POTASSIUM CHLORIDE 10 MEQ: 7.45 INJECTION INTRAVENOUS at 15:56

## 2021-09-15 RX ADMIN — MORPHINE SULFATE 2 MG: 4 INJECTION INTRAVENOUS at 10:07

## 2021-09-15 RX ADMIN — POTASSIUM CHLORIDE 10 MEQ: 7.45 INJECTION INTRAVENOUS at 18:25

## 2021-09-15 RX ADMIN — POTASSIUM CHLORIDE 10 MEQ: 7.45 INJECTION INTRAVENOUS at 17:03

## 2021-09-15 RX ADMIN — DOCUSATE SODIUM 50 MG AND SENNOSIDES 8.6 MG 2 TABLET: 8.6; 5 TABLET, FILM COATED ORAL at 17:39

## 2021-09-15 RX ADMIN — POTASSIUM CHLORIDE 40 MEQ: 1500 TABLET, EXTENDED RELEASE ORAL at 10:07

## 2021-09-15 RX ADMIN — GABAPENTIN 100 MG: 100 CAPSULE ORAL at 17:40

## 2021-09-15 RX ADMIN — MAGNESIUM SULFATE HEPTAHYDRATE 2 G: 40 INJECTION, SOLUTION INTRAVENOUS at 16:15

## 2021-09-15 RX ADMIN — DIGOXIN 125 MCG: 125 TABLET ORAL at 17:40

## 2021-09-15 RX ADMIN — POTASSIUM CHLORIDE 10 MEQ: 7.45 INJECTION INTRAVENOUS at 14:29

## 2021-09-15 RX ADMIN — ACETAMINOPHEN 650 MG: 325 TABLET, FILM COATED ORAL at 23:02

## 2021-09-15 ASSESSMENT — COGNITIVE AND FUNCTIONAL STATUS - GENERAL
DRESSING REGULAR UPPER BODY CLOTHING: A LOT
WALKING IN HOSPITAL ROOM: TOTAL
PERSONAL GROOMING: A LOT
DAILY ACTIVITIY SCORE: 9
MOVING FROM LYING ON BACK TO SITTING ON SIDE OF FLAT BED: UNABLE
SUGGESTED CMS G CODE MODIFIER MOBILITY: CM
TOILETING: TOTAL
HELP NEEDED FOR BATHING: TOTAL
MOVING TO AND FROM BED TO CHAIR: UNABLE
STANDING UP FROM CHAIR USING ARMS: TOTAL
SUGGESTED CMS G CODE MODIFIER DAILY ACTIVITY: CL
TURNING FROM BACK TO SIDE WHILE IN FLAT BAD: A LOT
CLIMB 3 TO 5 STEPS WITH RAILING: TOTAL
MOBILITY SCORE: 7
EATING MEALS: A LOT
DRESSING REGULAR LOWER BODY CLOTHING: TOTAL

## 2021-09-15 ASSESSMENT — FIBROSIS 4 INDEX
FIB4 SCORE: 3.08
FIB4 SCORE: 4.36
FIB4 SCORE: 4.36

## 2021-09-15 ASSESSMENT — ENCOUNTER SYMPTOMS
CARDIOVASCULAR NEGATIVE: 1
NEUROLOGICAL NEGATIVE: 1
MYALGIAS: 1
PSYCHIATRIC NEGATIVE: 1
GASTROINTESTINAL NEGATIVE: 1
RESPIRATORY NEGATIVE: 1
EYES NEGATIVE: 1

## 2021-09-15 ASSESSMENT — LIFESTYLE VARIABLES
HOW MANY TIMES IN THE PAST YEAR HAVE YOU HAD 5 OR MORE DRINKS IN A DAY: 0
TOTAL SCORE: 0
ALCOHOL_USE: NO
HAVE YOU EVER FELT YOU SHOULD CUT DOWN ON YOUR DRINKING: NO
EVER FELT BAD OR GUILTY ABOUT YOUR DRINKING: NO
CONSUMPTION TOTAL: NEGATIVE
ON A TYPICAL DAY WHEN YOU DRINK ALCOHOL HOW MANY DRINKS DO YOU HAVE: 0
AVERAGE NUMBER OF DAYS PER WEEK YOU HAVE A DRINK CONTAINING ALCOHOL: 0
TOTAL SCORE: 0
EVER HAD A DRINK FIRST THING IN THE MORNING TO STEADY YOUR NERVES TO GET RID OF A HANGOVER: NO
HAVE PEOPLE ANNOYED YOU BY CRITICIZING YOUR DRINKING: NO
TOTAL SCORE: 0

## 2021-09-15 ASSESSMENT — PAIN DESCRIPTION - PAIN TYPE
TYPE: ACUTE PAIN
TYPE: ACUTE PAIN

## 2021-09-15 NOTE — PROGRESS NOTES
Pt arrived to unit via gurney. Pivoted from gurney to bed, x2 assist. Tele monitor applied, vitals taken. Pt assessed. A&O x4, very hard of hearing. Admit profile and med rec complete. Discussed POC with pt, including fall risk/precautions, IVF, electrolyte imbalance/replacement. Welcome folder provided and discussed. Communication board filled out. Questions and concerns addressed, verbalized understanding. Fall precautions in place. Pt demonstrates ability to use call light appropriately. Pt left in lowest position. Bed locked and low, bed alarm on.

## 2021-09-15 NOTE — ASSESSMENT & PLAN NOTE
- Pt was on very high dose HCTZ which likely contributed, held for now  - Will d/c NS w/K+ as potassium has now normalized.   - Continue to monitor AM BMP

## 2021-09-15 NOTE — ED NOTES
Med rec updated and complete  Allergies reviewed  Interviewed pt with daughter at bedside with permission from pt.  Pts daughter is not sure what medications that pt takes, but thinks she finished her antibiotic on 9/10/2021.  Pt lives alone and takes care of her own medications.   Called [facility] [phone], to verify all medications.  Per pharmacy had  HCTZ 25MG 1 tablet BID.  Pt reports that she thinks she took all her medications yesterday @ 1200  Per daughter does not think pt takes anything over the counter, pt is not sure if she takes anything over the counter.    No current facility-administered medications on file prior to encounter.     Current Outpatient Medications on File Prior to Encounter   Medication Sig Dispense Refill   • gabapentin (NEURONTIN) 100 MG Cap Take 100 mg by mouth 3 times a day.     • loratadine (CLARITIN) 10 MG Tab Take 10 mg by mouth every day.     • amoxicillin-clavulanate (AUGMENTIN) 875-125 MG Tab Take 1 Tablet by mouth 2 times a day. Pt started on 9/1/2021 for 10 day course     • hydroCHLOROthiazide (HYDRODIURIL) 25 MG Tab Take 25 mg by mouth 2 times a day. Per pharmacy reports BID     • [DISCONTINUED] hydrOXYzine HCl (ATARAX) 25 MG Tab Take 25 mg by mouth 3 times a day as needed for Itching.     • amLODIPine (NORVASC) 5 MG Tab Take 5 mg by mouth every day.  0   • digoxin (LANOXIN) 125 MCG Tab Take 1 Tab by mouth every day at 6 PM. 30 Tab 0   • [DISCONTINUED] cyanocobalamin (VITAMIN B12) 1000 MCG Tab Take 1 Tab by mouth every day. 30 Tab 3   • [DISCONTINUED] cyclobenzaprine (FLEXERIL) 5 MG tablet Take 1 Tab by mouth two times a day may change times on alt/days. 30 Tab 0   • DULoxetine (CYMBALTA) 60 MG Cap DR Particles delayed-release capsule Take 1 Cap by mouth every day. 30 Cap 0   • omeprazole (PRILOSEC) 40 MG delayed-release capsule TAKE 1 CAPSULE BY MOUTH ONCE A DAY 30 MINUTES BEFORE BREAKFAST MEAL (Patient taking differently: Take 40 mg by mouth every day. TAKE 1 CAPSULE BY MOUTH  ONCE A DAY 30 MINUTES BEFORE BREAKFAST MEAL) 30 Cap 0

## 2021-09-15 NOTE — PROGRESS NOTES
Tele strip printed at [id number]     Rhythm: SR   HR: 90  Measurements: 0.16/0.08/0.32        Telemetry Shift Summary     Rhythm: SR  HR Range: 80's-90's  Ectopy: None     Telemetry monitoring strips placed in pt's chart.

## 2021-09-15 NOTE — ED NOTES
K 2.5 mg 0.6 labs read to Dr. Balderrama. Awaiting orders. Dr. Balderrama aware that KCL infusion initiated after blood draw was done. Dr. Balderrama aware of pt. C/o neck pain as well, awaiting orders for PRN pain medication.

## 2021-09-15 NOTE — ASSESSMENT & PLAN NOTE
Likely multifactorial.  Patient's   a few weeks ago, and she states she needs help at home.  Unclear if she has been eating well.  Monitor and replace electrolytes as needed.    - PT/OT consulted, recommend post -acute placement for continued rehabilitation.   - SNF referral placed, [facility] willing to accept. Anticipate tomorrow

## 2021-09-15 NOTE — ASSESSMENT & PLAN NOTE
Patient has a history of CAD/MI.  Noted to have slight elevation in troponin with repolarization abnormalities on admission, which has been seen in previous EKGs.  She denies any chest pain or shortness of breath

## 2021-09-15 NOTE — H&P
Hospital Medicine History & Physical Note    Date of Service  9/15/2021    Primary Care Physician  Lenny RENTERIA M.D.    Consultants  None    Code Status  Full Code    Chief Complaint  Chief Complaint   Patient presents with   • Weakness     Leg weakness and difficulty walking   • Failure to Thrive     Per family pt is declining over the last 3 weeks after the death of her    • Abrasion     Leg bruising and abrasions, pt states due to being stung by bugs from her plum tree.        History of Presenting Illness  Lindsay Vargas is a 81 y.o. female who presented 9/15/2021 with vague complaints of weakness and intermittent episodes of chest heaviness. Her  passed away about 3 weeks ago, since then she has been feeling more fatigued.  She states that her legs have been bothering her due to insect bites from working on her tree outside.  She has essential hypertension, depression, GERD, and chronic pain.    On admission, EKG showed sinus rhythm with abnormal R wave progression, and nonspecific repolarization abnormality.  Potassium was 2.7, troponin was 68 (has a history of elevated troponin levels).    I discussed the plan of care with patient and bedside RN.    Review of Systems  Review of Systems   Constitutional: Positive for malaise/fatigue.   HENT: Negative.    Eyes: Negative.    Respiratory: Negative.    Cardiovascular: Negative.  Negative for chest pain.   Gastrointestinal: Negative.    Genitourinary: Negative.    Musculoskeletal: Positive for myalgias.   Skin:        Skin lesions on lower legs from insect bites   Neurological: Negative.    Endo/Heme/Allergies: Negative.    Psychiatric/Behavioral: Negative.        Past Medical History   has a past medical history of Anesthesia, Arthritis, Cataract, Chronic pain, Hiatus hernia syndrome, Hypertension, Lymphedema (2014), Migraine headache, Pain, and Pneumonia.    Surgical History   has a past surgical history that includes hysterectomy  laparoscopy; chely by laparoscopy; other abdominal surgery; other abdominal surgery; other; other orthopedic surgery; cataract phaco with iol (Left, 2/4/2016); cataract phaco with iol (Right, 2/18/2016); pr inj lumbar/sacral,w/wo cntrst (Right, 9/29/2016); pr fluorscopic guidance spinal injection (9/29/2016); pr inj lumbar/sacral,w/wo cntrst (N/A, 11/10/2016); and pr fluorscopic guidance spinal injection (11/10/2016).     Family History  family history includes Heart Disease in her father and mother.   Family history reviewed with patient. There is no family history that is pertinent to the chief complaint.     Social History   reports that she has never smoked. She has never used smokeless tobacco. She reports that she does not drink alcohol and does not use drugs.    Allergies  No Known Allergies    Medications  Prior to Admission Medications   Prescriptions Last Dose Informant Patient Reported? Taking?   DULoxetine (CYMBALTA) 60 MG Cap DR Particles delayed-release capsule 9/14/2021 at 1200 Patient's Home Pharmacy No No   Sig: Take 1 Cap by mouth every day.   amLODIPine (NORVASC) 5 MG Tab 9/14/2021 at 1200 Patient's Home Pharmacy Yes No   Sig: Take 5 mg by mouth every day.   amoxicillin-clavulanate (AUGMENTIN) 875-125 MG Tab 9/1/2021 at FINISHED Patient's Home Pharmacy Yes No   Sig: Take 1 Tablet by mouth 2 times a day. Pt started on 9/1/2021 for 10 day course   digoxin (LANOXIN) 125 MCG Tab 9/14/2021 at 1200 Patient's Home Pharmacy No No   Sig: Take 1 Tab by mouth every day at 6 PM.   gabapentin (NEURONTIN) 100 MG Cap 9/14/2021 at 1200 Patient's Home Pharmacy Yes Yes   Sig: Take 100 mg by mouth 3 times a day.   hydroCHLOROthiazide (HYDRODIURIL) 25 MG Tab 9/14/2021 at 1200 Patient's Home Pharmacy Yes No   Sig: Take 25 mg by mouth 2 times a day. Per pharmacy reports BID   loratadine (CLARITIN) 10 MG Tab 9/14/2021 at 1200 Patient's Home Pharmacy Yes No   Sig: Take 10 mg by mouth every day.   omeprazole (PRILOSEC) 40  MG delayed-release capsule 9/14/2021 at 1200 Patient's Home Pharmacy No No   Sig: TAKE 1 CAPSULE BY MOUTH ONCE A DAY 30 MINUTES BEFORE BREAKFAST MEAL   Patient taking differently: Take 40 mg by mouth every day. TAKE 1 CAPSULE BY MOUTH ONCE A DAY 30 MINUTES BEFORE BREAKFAST MEAL      Facility-Administered Medications: None       Physical Exam  Temp:  [37.3 °C (99.1 °F)] 37.3 °C (99.1 °F)  Pulse:  [87-95] 88  Resp:  [19-20] 19  BP: (115-154)/(50-57) 154/57  SpO2:  [98 %-100 %] 100 %  Blood Pressure : 140/53   Temperature: 37.3 °C (99.1 °F)   Pulse: 95   Respiration: 20   Pulse Oximetry: 98 %       Physical Exam  Constitutional:       General: She is not in acute distress.     Appearance: She is not ill-appearing.   HENT:      Head: Normocephalic.      Nose: Nose normal.      Mouth/Throat:      Mouth: Mucous membranes are moist.   Eyes:      Pupils: Pupils are equal, round, and reactive to light.   Cardiovascular:      Rate and Rhythm: Normal rate.   Pulmonary:      Effort: Pulmonary effort is normal.   Abdominal:      Palpations: Abdomen is soft.   Musculoskeletal:      Cervical back: Normal range of motion.      Right lower leg: No edema.      Left lower leg: No edema.   Skin:     Comments: Healing lesions on left lower leg   Neurological:      General: No focal deficit present.      Mental Status: She is alert.   Psychiatric:         Mood and Affect: Mood normal.         Laboratory:  Recent Labs     09/15/21  0823   WBC 10.0   RBC 4.67   HEMOGLOBIN 12.1   HEMATOCRIT 36.2*   MCV 77.5*   MCH 25.9*   MCHC 33.4*   RDW 44.5   PLATELETCT 101*   MPV 10.1     Recent Labs     09/15/21  0823   SODIUM 136   POTASSIUM 2.7*   CHLORIDE 95*   CO2 25   GLUCOSE 121*   BUN 26*   CREATININE 1.32   CALCIUM 8.5     Recent Labs     09/15/21  0823   ALTSGPT 8   ASTSGOT 15   ALKPHOSPHAT 75   TBILIRUBIN 0.9   GLUCOSE 121*         No results for input(s): NTPROBNP in the last 72 hours.      Recent Labs     09/15/21  0823   TROPONINT 68*        Imaging:  DX-CHEST-PORTABLE (1 VIEW)   Final Result      No evidence of acute cardiopulmonary process.          EKG:  I have personally reviewed the images and compared with prior images.    Assessment/Plan:  I anticipate this patient is appropriate for observation status at this time.    * Weakness  Assessment & Plan  Likely multifactorial.  Patient's   a few weeks ago, and she states she needs help at home.  Unclear if she has been eating well.  Potassium was low.  Monitor and replace electrolytes as needed.  PT/OT consulted.    Elevated troponin  Assessment & Plan  Patient has a history of CAD/MI.  Noted to have slight elevation in troponin with repolarization abnormalities on admission, which has been seen in previous EKGs.  Monitor on telemetry overnight.  Follow troponin levels.  Monitor and replace electrolytes as needed. She denies any chest pain or shortness of breath    Hypokalemia- (present on admission)  Assessment & Plan  Monitor and replace electrolytes as needed.  Magnesium level ordered    Chronic pain syndrome  Assessment & Plan  Continue home gabapentin.  As needed Tylenol added    Essential hypertension  Assessment & Plan  History of CAD and essential hypertension.  Digoxin level ordered. Continue home hydrochlorothiazide, Norvasc, and digoxin.  Monitor    Depression  Assessment & Plan  History of mood disorder. Continue home Cymbalta.  Monitor    Gastroesophageal reflux disease without esophagitis  Assessment & Plan  Stable.  Continue home omeprazole      VTE prophylaxis: SCDs/TEDs and enoxaparin ppx

## 2021-09-15 NOTE — ED TRIAGE NOTES
"Chief Complaint   Patient presents with   • Weakness     Leg weakness and difficulty walking   • Failure to Thrive     Per family pt is declining over the last 3 weeks after the death of her    • Abrasion     Leg bruising and abrasions, pt states due to being stung by bugs from her plum tree.      /50   Pulse 91   Temp 37.3 °C (99.1 °F) (Temporal)   Resp 20   Ht 1.727 m (5' 8\")   Wt 72 kg (158 lb 11.7 oz)   SpO2 100%   BMI 24.14 kg/m²     "

## 2021-09-15 NOTE — ASSESSMENT & PLAN NOTE
History of CAD and essential hypertension.   Continue Norvasc/Metoprolol   Continue to hold high dose HCTZ with her electrolyte abnormalities  Digoxin level 1.5

## 2021-09-15 NOTE — ED PROVIDER NOTES
ED Provider Note    CHIEF COMPLAINT  Chief Complaint   Patient presents with   • Weakness     Leg weakness and difficulty walking   • Failure to Thrive     Per family pt is declining over the last 3 weeks after the death of her    • Abrasion     Leg bruising and abrasions, pt states due to being stung by bugs from her plum tree.        HPI  Lindsay Vargas is a 81 y.o. female here for evaluation of general leg weakness and intermittent chest heaviness.   Patient states that she has been having some weakness and fatigue since the passing of her .  He passed away 3 weeks ago.  The patient also has some redness to the right lower extremity, from scratching her leg.  She has no fever/chills.  no sob, no abdominal pain, no back pain, no headache.     ROS;  Please see HPI  O/W negative     PAST MEDICAL HISTORY   has a past medical history of Anesthesia, Arthritis, Cataract, Chronic pain, Hiatus hernia syndrome, Hypertension, Lymphedema (2014), Migraine headache, Pain, and Pneumonia.    SOCIAL HISTORY  Social History     Tobacco Use   • Smoking status: Never Smoker   • Smokeless tobacco: Never Used   Substance and Sexual Activity   • Alcohol use: No     Comment: rarely   • Drug use: No   • Sexual activity: Not on file       SURGICAL HISTORY   has a past surgical history that includes hysterectomy laparoscopy; chely by laparoscopy; other abdominal surgery; other abdominal surgery; other; other orthopedic surgery; cataract phaco with iol (Left, 2/4/2016); cataract phaco with iol (Right, 2/18/2016); inj lumbar/sacral,w/wo cntrst (Right, 9/29/2016); fluorscopic guidance spinal injection (9/29/2016); inj lumbar/sacral,w/wo cntrst (N/A, 11/10/2016); and fluorscopic guidance spinal injection (11/10/2016).    CURRENT MEDICATIONS  Home Medications    **Home medications have not yet been reviewed for this encounter**         ALLERGIES  No Known Allergies    REVIEW OF SYSTEMS  See HPI for further details. Review of  "systems as above, otherwise all other systems are negative.     PHYSICAL EXAM  VITAL SIGNS: /50   Pulse 91   Temp 37.3 °C (99.1 °F) (Temporal)   Resp 20   Ht 1.727 m (5' 8\")   Wt 72 kg (158 lb 11.7 oz)   SpO2 100%   BMI 24.14 kg/m²     Constitutional: Well developed, well nourished. No acute distress.  HEENT: Normocephalic, atraumatic. MMM  Neck: Supple, Full range of motion   Chest/Pulmonary:  No respiratory distress.  Equal expansion   Musculoskeletal: No deformity, no edema, neurovascular intact.   Neuro: Clear speech, appropriate, cooperative, cranial nerves II-XII grossly intact.  Psych: anxious mood and affect  Skin;  Warm, dry.  Mild erythema to the right lower mid leg. Not circumferential.       Results for orders placed or performed during the hospital encounter of 09/15/21   CBC WITH DIFFERENTIAL   Result Value Ref Range    WBC 10.0 4.8 - 10.8 K/uL    RBC 4.67 4.20 - 5.40 M/uL    Hemoglobin 12.1 12.0 - 16.0 g/dL    Hematocrit 36.2 (L) 37.0 - 47.0 %    MCV 77.5 (L) 81.4 - 97.8 fL    MCH 25.9 (L) 27.0 - 33.0 pg    MCHC 33.4 (L) 33.6 - 35.0 g/dL    RDW 44.5 35.9 - 50.0 fL    Platelet Count 101 (L) 164 - 446 K/uL    MPV 10.1 9.0 - 12.9 fL    Neutrophils-Polys 83.20 (H) 44.00 - 72.00 %    Lymphocytes 10.70 (L) 22.00 - 41.00 %    Monocytes 5.20 0.00 - 13.40 %    Eosinophils 0.10 0.00 - 6.90 %    Basophils 0.20 0.00 - 1.80 %    Immature Granulocytes 0.60 0.00 - 0.90 %    Nucleated RBC 0.00 /100 WBC    Neutrophils (Absolute) 8.28 (H) 2.00 - 7.15 K/uL    Lymphs (Absolute) 1.07 1.00 - 4.80 K/uL    Monos (Absolute) 0.52 0.00 - 0.85 K/uL    Eos (Absolute) 0.01 0.00 - 0.51 K/uL    Baso (Absolute) 0.02 0.00 - 0.12 K/uL    Immature Granulocytes (abs) 0.06 0.00 - 0.11 K/uL    NRBC (Absolute) 0.00 K/uL   COMP METABOLIC PANEL   Result Value Ref Range    Sodium 136 135 - 145 mmol/L    Potassium 2.7 (LL) 3.6 - 5.5 mmol/L    Chloride 95 (L) 96 - 112 mmol/L    Co2 25 20 - 33 mmol/L    Anion Gap 16.0 7.0 - 16.0    " Glucose 121 (H) 65 - 99 mg/dL    Bun 26 (H) 8 - 22 mg/dL    Creatinine 1.32 0.50 - 1.40 mg/dL    Calcium 8.5 8.4 - 10.2 mg/dL    AST(SGOT) 15 12 - 45 U/L    ALT(SGPT) 8 2 - 50 U/L    Alkaline Phosphatase 75 30 - 99 U/L    Total Bilirubin 0.9 0.1 - 1.5 mg/dL    Albumin 4.2 3.2 - 4.9 g/dL    Total Protein 6.7 6.0 - 8.2 g/dL    Globulin 2.5 1.9 - 3.5 g/dL    A-G Ratio 1.7 g/dL   TROPONIN   Result Value Ref Range    Troponin T 68 (H) 6 - 19 ng/L   ESTIMATED GFR   Result Value Ref Range    GFR If  47 (A) >60 mL/min/1.73 m 2    GFR If Non  38 (A) >60 mL/min/1.73 m 2   EKG   Result Value Ref Range    Report       [facility] Emergency Dept.    Test Date:  2021-09-15  Pt Name:    DWIGHT VILLA              Department: [facility]  MRN:        [id number]                      [facility]  Gender:     Female                       Technician: [id number]  :        [id number]                   Requested By:LIZA MAURICE  Order #:    [id number]                    Reading MD:    Measurements  Intervals                                Axis  Rate:       90                           P:          -18  MN:         185                          QRS:        42  QRSD:       95                           T:          -5  QT:         408  QTc:        500    Interpretive Statements  Sinus rhythm  Abnormal R-wave progression, early transition  Nonspecific repol abnormality, diffuse leads  Borderline prolonged QT interval  Compared to ECG 2019 08:14:50  Early repolarization now present       DX-CHEST-PORTABLE (1 VIEW)   Final Result      No evidence of acute cardiopulmonary process.        Ekg; nsr 90.  No st elevation, no st depression.  qtc 500.   Compared to ekg from 19.    PROCEDURES     MEDICAL RECORD  I have reviewed patient's medical record and pertinent results are listed.    COURSE & MEDICAL DECISION MAKING  I have reviewed any medical record information, laboratory  studies and radiographic results as noted above.    Heart score of 6    Pt will be admitted to Dr. Balderrama      FINAL IMPRESSION  Chest pain       Electronically signed by: Ridge Leggett D.O., 9/15/2021 8:37 AM

## 2021-09-16 ENCOUNTER — APPOINTMENT (OUTPATIENT)
Dept: RADIOLOGY | Facility: MEDICAL CENTER | Age: 81
DRG: 871 | End: 2021-09-16
Attending: FAMILY MEDICINE
Payer: MEDICARE

## 2021-09-16 ENCOUNTER — APPOINTMENT (OUTPATIENT)
Dept: CARDIOLOGY | Facility: MEDICAL CENTER | Age: 81
DRG: 871 | End: 2021-09-16
Attending: FAMILY MEDICINE
Payer: MEDICARE

## 2021-09-16 PROBLEM — E83.51 HYPOCALCEMIA: Status: ACTIVE | Noted: 2021-09-16

## 2021-09-16 PROBLEM — R01.1 HEART MURMUR: Status: ACTIVE | Noted: 2021-09-16

## 2021-09-16 PROBLEM — R94.31 QT PROLONGATION: Status: ACTIVE | Noted: 2021-09-16

## 2021-09-16 PROBLEM — E83.42 HYPOMAGNESEMIA: Status: ACTIVE | Noted: 2021-09-16

## 2021-09-16 PROBLEM — R50.9 FEVER: Status: ACTIVE | Noted: 2021-09-16

## 2021-09-16 PROBLEM — D69.6 THROMBOCYTOPENIA (HCC): Status: ACTIVE | Noted: 2021-09-16

## 2021-09-16 PROBLEM — J96.00 ACUTE RESPIRATORY FAILURE (HCC): Status: ACTIVE | Noted: 2021-09-16

## 2021-09-16 PROBLEM — I47.19 AVNRT (AV NODAL RE-ENTRY TACHYCARDIA) (HCC): Status: ACTIVE | Noted: 2019-04-26

## 2021-09-16 LAB
25(OH)D3 SERPL-MCNC: 22 NG/ML (ref 30–100)
ALBUMIN SERPL BCP-MCNC: 4 G/DL (ref 3.2–4.9)
ALP SERPL-CCNC: 91 U/L (ref 30–99)
ALT SERPL-CCNC: 11 U/L (ref 2–50)
ANION GAP SERPL CALC-SCNC: 17 MMOL/L (ref 7–16)
AST SERPL-CCNC: 18 U/L (ref 12–45)
BILIRUB CONJ SERPL-MCNC: 0.5 MG/DL (ref 0.1–0.5)
BILIRUB INDIRECT SERPL-MCNC: 1 MG/DL (ref 0–1)
BILIRUB SERPL-MCNC: 1.5 MG/DL (ref 0.1–1.5)
BUN SERPL-MCNC: 25 MG/DL (ref 8–22)
CA-I SERPL-SCNC: 0.92 MMOL/L (ref 1.1–1.3)
CALCIUM SERPL-MCNC: 8.1 MG/DL (ref 8.4–10.2)
CHLORIDE SERPL-SCNC: 92 MMOL/L (ref 96–112)
CO2 SERPL-SCNC: 22 MMOL/L (ref 20–33)
CREAT SERPL-MCNC: 1.04 MG/DL (ref 0.5–1.4)
D DIMER PPP IA.FEU-MCNC: 3.49 UG/ML (FEU) (ref 0–0.5)
ERYTHROCYTE [DISTWIDTH] IN BLOOD BY AUTOMATED COUNT: 48.1 FL (ref 35.9–50)
FLUAV RNA SPEC QL NAA+PROBE: NEGATIVE
FLUBV RNA SPEC QL NAA+PROBE: NEGATIVE
GLUCOSE SERPL-MCNC: 118 MG/DL (ref 65–99)
HCT VFR BLD AUTO: 37.8 % (ref 37–47)
HGB BLD-MCNC: 12 G/DL (ref 12–16)
LACTATE BLD-SCNC: 1.5 MMOL/L (ref 0.5–2)
LACTATE BLD-SCNC: 2 MMOL/L (ref 0.5–2)
LACTATE BLD-SCNC: 2.5 MMOL/L (ref 0.5–2)
LACTATE BLD-SCNC: 3 MMOL/L (ref 0.5–2)
LDH SERPL L TO P-CCNC: 215 U/L (ref 107–266)
LV EJECT FRACT MOD 2C 99903: 64.47
LV EJECT FRACT MOD 4C 99902: 75.82
LV EJECT FRACT MOD BP 99901: 70.67
MAGNESIUM SERPL-MCNC: 1.4 MG/DL (ref 1.5–2.5)
MCH RBC QN AUTO: 25.8 PG (ref 27–33)
MCHC RBC AUTO-ENTMCNC: 31.7 G/DL (ref 33.6–35)
MCV RBC AUTO: 81.1 FL (ref 81.4–97.8)
OSMOLALITY SERPL: 277 MOSM/KG H2O (ref 278–298)
PLATELET # BLD AUTO: 73 K/UL (ref 164–446)
PMV BLD AUTO: 11.5 FL (ref 9–12.9)
POTASSIUM SERPL-SCNC: 2.9 MMOL/L (ref 3.6–5.5)
PROCALCITONIN SERPL-MCNC: 1.63 NG/ML
PROT SERPL-MCNC: 6.8 G/DL (ref 6–8.2)
PTH-INTACT SERPL-MCNC: 74.2 PG/ML (ref 14–72)
RBC # BLD AUTO: 4.66 M/UL (ref 4.2–5.4)
RSV RNA SPEC QL NAA+PROBE: NEGATIVE
SARS-COV-2 RNA RESP QL NAA+PROBE: NOTDETECTED
SODIUM SERPL-SCNC: 131 MMOL/L (ref 135–145)
SPECIMEN SOURCE: NORMAL
WBC # BLD AUTO: 11.1 K/UL (ref 4.8–10.8)

## 2021-09-16 PROCEDURE — 83010 ASSAY OF HAPTOGLOBIN QUANT: CPT

## 2021-09-16 PROCEDURE — 700102 HCHG RX REV CODE 250 W/ 637 OVERRIDE(OP): Performed by: FAMILY MEDICINE

## 2021-09-16 PROCEDURE — 770020 HCHG ROOM/CARE - TELE (206)

## 2021-09-16 PROCEDURE — 87186 SC STD MICRODIL/AGAR DIL: CPT

## 2021-09-16 PROCEDURE — 84145 PROCALCITONIN (PCT): CPT

## 2021-09-16 PROCEDURE — A9270 NON-COVERED ITEM OR SERVICE: HCPCS | Performed by: INTERNAL MEDICINE

## 2021-09-16 PROCEDURE — 83930 ASSAY OF BLOOD OSMOLALITY: CPT

## 2021-09-16 PROCEDURE — 93306 TTE W/DOPPLER COMPLETE: CPT | Mod: 26 | Performed by: INTERNAL MEDICINE

## 2021-09-16 PROCEDURE — 83970 ASSAY OF PARATHORMONE: CPT

## 2021-09-16 PROCEDURE — 700102 HCHG RX REV CODE 250 W/ 637 OVERRIDE(OP): Performed by: INTERNAL MEDICINE

## 2021-09-16 PROCEDURE — 80076 HEPATIC FUNCTION PANEL: CPT

## 2021-09-16 PROCEDURE — 87077 CULTURE AEROBIC IDENTIFY: CPT

## 2021-09-16 PROCEDURE — 97166 OT EVAL MOD COMPLEX 45 MIN: CPT

## 2021-09-16 PROCEDURE — 96372 THER/PROPH/DIAG INJ SC/IM: CPT

## 2021-09-16 PROCEDURE — 97162 PT EVAL MOD COMPLEX 30 MIN: CPT

## 2021-09-16 PROCEDURE — 85027 COMPLETE CBC AUTOMATED: CPT

## 2021-09-16 PROCEDURE — C9803 HOPD COVID-19 SPEC COLLECT: HCPCS | Performed by: FAMILY MEDICINE

## 2021-09-16 PROCEDURE — 85379 FIBRIN DEGRADATION QUANT: CPT

## 2021-09-16 PROCEDURE — 87040 BLOOD CULTURE FOR BACTERIA: CPT | Mod: 91

## 2021-09-16 PROCEDURE — 96366 THER/PROPH/DIAG IV INF ADDON: CPT

## 2021-09-16 PROCEDURE — 0241U HCHG SARS-COV-2 COVID-19 NFCT DS RESP RNA 4 TRGT MIC: CPT

## 2021-09-16 PROCEDURE — 71045 X-RAY EXAM CHEST 1 VIEW: CPT

## 2021-09-16 PROCEDURE — 80048 BASIC METABOLIC PNL TOTAL CA: CPT

## 2021-09-16 PROCEDURE — 83605 ASSAY OF LACTIC ACID: CPT

## 2021-09-16 PROCEDURE — 700111 HCHG RX REV CODE 636 W/ 250 OVERRIDE (IP): Performed by: FAMILY MEDICINE

## 2021-09-16 PROCEDURE — A9270 NON-COVERED ITEM OR SERVICE: HCPCS | Performed by: FAMILY MEDICINE

## 2021-09-16 PROCEDURE — 700111 HCHG RX REV CODE 636 W/ 250 OVERRIDE (IP): Performed by: INTERNAL MEDICINE

## 2021-09-16 PROCEDURE — 74018 RADEX ABDOMEN 1 VIEW: CPT

## 2021-09-16 PROCEDURE — 700101 HCHG RX REV CODE 250: Performed by: FAMILY MEDICINE

## 2021-09-16 PROCEDURE — 83615 LACTATE (LD) (LDH) ENZYME: CPT

## 2021-09-16 PROCEDURE — 85384 FIBRINOGEN ACTIVITY: CPT

## 2021-09-16 PROCEDURE — 700105 HCHG RX REV CODE 258: Performed by: FAMILY MEDICINE

## 2021-09-16 PROCEDURE — 82330 ASSAY OF CALCIUM: CPT

## 2021-09-16 PROCEDURE — 83735 ASSAY OF MAGNESIUM: CPT

## 2021-09-16 PROCEDURE — 93306 TTE W/DOPPLER COMPLETE: CPT

## 2021-09-16 PROCEDURE — 82306 VITAMIN D 25 HYDROXY: CPT

## 2021-09-16 PROCEDURE — 99233 SBSQ HOSP IP/OBS HIGH 50: CPT | Performed by: FAMILY MEDICINE

## 2021-09-16 RX ORDER — MAGNESIUM SULFATE HEPTAHYDRATE 40 MG/ML
4 INJECTION, SOLUTION INTRAVENOUS ONCE
Status: COMPLETED | OUTPATIENT
Start: 2021-09-16 | End: 2021-09-16

## 2021-09-16 RX ORDER — POTASSIUM CHLORIDE 20 MEQ/1
40 TABLET, EXTENDED RELEASE ORAL 2 TIMES DAILY
Status: COMPLETED | OUTPATIENT
Start: 2021-09-16 | End: 2021-09-16

## 2021-09-16 RX ORDER — CALCIUM GLUCONATE 20 MG/ML
1 INJECTION, SOLUTION INTRAVENOUS ONCE
Status: COMPLETED | OUTPATIENT
Start: 2021-09-16 | End: 2021-09-16

## 2021-09-16 RX ORDER — VITAMIN B COMPLEX
1000 TABLET ORAL DAILY
Status: DISCONTINUED | OUTPATIENT
Start: 2021-09-16 | End: 2021-09-20 | Stop reason: HOSPADM

## 2021-09-16 RX ORDER — SODIUM CHLORIDE AND POTASSIUM CHLORIDE 300; 900 MG/100ML; MG/100ML
INJECTION, SOLUTION INTRAVENOUS CONTINUOUS
Status: DISCONTINUED | OUTPATIENT
Start: 2021-09-16 | End: 2021-09-19

## 2021-09-16 RX ORDER — AMLODIPINE BESYLATE 5 MG/1
10 TABLET ORAL DAILY
Status: DISCONTINUED | OUTPATIENT
Start: 2021-09-17 | End: 2021-09-20 | Stop reason: HOSPADM

## 2021-09-16 RX ADMIN — GABAPENTIN 100 MG: 100 CAPSULE ORAL at 05:13

## 2021-09-16 RX ADMIN — ENOXAPARIN SODIUM 40 MG: 40 INJECTION SUBCUTANEOUS at 05:13

## 2021-09-16 RX ADMIN — CEFTRIAXONE SODIUM 1 G: 1 INJECTION, POWDER, FOR SOLUTION INTRAMUSCULAR; INTRAVENOUS at 09:08

## 2021-09-16 RX ADMIN — DIGOXIN 125 MCG: 125 TABLET ORAL at 17:02

## 2021-09-16 RX ADMIN — OMEPRAZOLE 40 MG: 20 CAPSULE, DELAYED RELEASE ORAL at 05:12

## 2021-09-16 RX ADMIN — DULOXETINE 60 MG: 30 CAPSULE, DELAYED RELEASE ORAL at 05:12

## 2021-09-16 RX ADMIN — CHOLECALCIFEROL TAB 25 MCG (1000 UNIT) 1000 UNITS: 25 TAB at 14:54

## 2021-09-16 RX ADMIN — POTASSIUM CHLORIDE 40 MEQ: 1500 TABLET, EXTENDED RELEASE ORAL at 17:02

## 2021-09-16 RX ADMIN — AMLODIPINE BESYLATE 5 MG: 5 TABLET ORAL at 05:13

## 2021-09-16 RX ADMIN — POTASSIUM CHLORIDE AND SODIUM CHLORIDE: 900; 300 INJECTION, SOLUTION INTRAVENOUS at 09:51

## 2021-09-16 RX ADMIN — MAGNESIUM OXIDE TAB 400 MG (241.3 MG ELEMENTAL MG) 400 MG: 400 (241.3 MG) TAB at 17:02

## 2021-09-16 RX ADMIN — MAGNESIUM SULFATE IN WATER 4 G: 40 INJECTION, SOLUTION INTRAVENOUS at 09:08

## 2021-09-16 RX ADMIN — MAGNESIUM OXIDE TAB 400 MG (241.3 MG ELEMENTAL MG) 400 MG: 400 (241.3 MG) TAB at 09:09

## 2021-09-16 RX ADMIN — GABAPENTIN 100 MG: 100 CAPSULE ORAL at 12:11

## 2021-09-16 RX ADMIN — POTASSIUM CHLORIDE AND SODIUM CHLORIDE: 900; 300 INJECTION, SOLUTION INTRAVENOUS at 22:43

## 2021-09-16 RX ADMIN — GABAPENTIN 100 MG: 100 CAPSULE ORAL at 17:02

## 2021-09-16 RX ADMIN — LORATADINE 10 MG: 10 TABLET ORAL at 05:13

## 2021-09-16 RX ADMIN — ACETAMINOPHEN 650 MG: 325 TABLET, FILM COATED ORAL at 21:51

## 2021-09-16 RX ADMIN — CALCIUM GLUCONATE 1 G: 20 INJECTION, SOLUTION INTRAVENOUS at 09:08

## 2021-09-16 RX ADMIN — HYDROCHLOROTHIAZIDE 25 MG: 25 TABLET ORAL at 05:12

## 2021-09-16 RX ADMIN — POTASSIUM CHLORIDE 40 MEQ: 1500 TABLET, EXTENDED RELEASE ORAL at 09:08

## 2021-09-16 ASSESSMENT — ACTIVITIES OF DAILY LIVING (ADL): TOILETING: INDEPENDENT

## 2021-09-16 ASSESSMENT — COGNITIVE AND FUNCTIONAL STATUS - GENERAL
MOVING FROM LYING ON BACK TO SITTING ON SIDE OF FLAT BED: A LOT
DRESSING REGULAR UPPER BODY CLOTHING: A LOT
WALKING IN HOSPITAL ROOM: TOTAL
SUGGESTED CMS G CODE MODIFIER MOBILITY: CM
CLIMB 3 TO 5 STEPS WITH RAILING: TOTAL
TOILETING: TOTAL
TURNING FROM BACK TO SIDE WHILE IN FLAT BAD: A LOT
SUGGESTED CMS G CODE MODIFIER DAILY ACTIVITY: CL
EATING MEALS: A LOT
MOVING TO AND FROM BED TO CHAIR: A LOT
MOBILITY SCORE: 9
DRESSING REGULAR LOWER BODY CLOTHING: TOTAL
DAILY ACTIVITIY SCORE: 9
STANDING UP FROM CHAIR USING ARMS: TOTAL
PERSONAL GROOMING: A LOT
HELP NEEDED FOR BATHING: TOTAL

## 2021-09-16 ASSESSMENT — ENCOUNTER SYMPTOMS
SHORTNESS OF BREATH: 0
ABDOMINAL PAIN: 0
CONSTIPATION: 1
FEVER: 1
VOMITING: 1
NAUSEA: 1
DIARRHEA: 0
COUGH: 0
CHILLS: 0

## 2021-09-16 ASSESSMENT — PAIN DESCRIPTION - PAIN TYPE: TYPE: ACUTE PAIN

## 2021-09-16 ASSESSMENT — GAIT ASSESSMENTS
DISTANCE (FEET): 0
DISTANCE (FEET): 0
GAIT LEVEL OF ASSIST: UNABLE TO PARTICIPATE
ASSISTIVE DEVICE: HAND HELD ASSIST

## 2021-09-16 NOTE — PROGRESS NOTES
Hospital Medicine Daily Progress Note    Date of Service  9/16/2021    Chief Complaint  Lindsay Vargas is a 81 y.o. female admitted 9/15/2021 with generalized weakness    Hospital Course  Lindsay Vargas is a 81 y.o. female who presented 9/15/2021 with vague complaints of weakness and intermittent episodes of chest heaviness. Her  passed away about 3 weeks ago, since then she has been feeling more fatigued.  She states that her legs have been bothering her due to insect bites from working on her tree outside.  She has essential hypertension, depression, GERD, and chronic pain.     On admission, EKG showed sinus rhythm with abnormal R wave progression, and nonspecific repolarization abnormality.  Potassium was 2.7, troponin was 68 (has a history of elevated troponin levels).    Interval Problem Update  9/16 - Pt developed fever to 101.2 last evening with associated sinus tach.  She is on 3L - she states she wears O2 at home but is unable to tell me how much. She denies SOB or cough but states she did have an episode of emesis PTA.  She received a stool softener on admission and had a BM per nursing.  She denies abdominal pain or nausea now.      I have personally seen and examined the patient at bedside. I discussed the plan of care with patient and bedside RN.    Consultants/Specialty  none    Code Status  Full Code    Disposition  Patient is not medically cleared.   Anticipate discharge to to skilled nursing facility.  I have placed the appropriate orders for post-discharge needs.    Review of Systems  Review of Systems   Constitutional: Positive for fever. Negative for chills.   Respiratory: Negative for cough and shortness of breath.    Cardiovascular: Negative for chest pain and leg swelling.   Gastrointestinal: Positive for constipation, nausea and vomiting. Negative for abdominal pain and diarrhea.   Genitourinary: Negative for dysuria.   Skin: Positive for rash.   All other systems reviewed  and are negative.       Physical Exam  Temp:  [36.5 °C (97.7 °F)-38.4 °C (101.2 °F)] 36.5 °C (97.7 °F)  Pulse:  [] 77  Resp:  [18-20] 18  BP: (115-154)/(38-57) 125/52  SpO2:  [96 %-100 %] 100 %    Physical Exam    Fluids  No intake or output data in the 24 hours ending 21 0734    Laboratory  Recent Labs     09/15/21  0823 21  0305   WBC 10.0 11.1*   RBC 4.67 4.66   HEMOGLOBIN 12.1 12.0   HEMATOCRIT 36.2* 37.8   MCV 77.5* 81.1*   MCH 25.9* 25.8*   MCHC 33.4* 31.7*   RDW 44.5 48.1   PLATELETCT 101* 73*   MPV 10.1 11.5     Recent Labs     09/15/21  0823 09/15/21  1342 21  0305   SODIUM 136 139 131*   POTASSIUM 2.7* 2.5* 2.9*   CHLORIDE 95* 102 92*   CO2 25 22 22   GLUCOSE 121* 108* 118*   BUN 26* 25* 25*   CREATININE 1.32 1.03 1.04   CALCIUM 8.5 7.1* 8.1*                   Imaging  DX-CHEST-PORTABLE (1 VIEW)   Final Result      No evidence of acute cardiopulmonary process.      DX-CHEST-2 VIEWS    (Results Pending)   QI-YVBLUEN-5 VIEW    (Results Pending)        Assessment/Plan  * Weakness  Assessment & Plan  Likely multifactorial.  Patient's   a few weeks ago, and she states she needs help at home.  Unclear if she has been eating well.  Potassium and mag both low.  Monitor and replace electrolytes as needed.  PT/OT consulted.    Heart murmur  Assessment & Plan  - No valvular issues noted on previous echo, will repeat     QT prolongation  Assessment & Plan  -  - avoid QT prolonging drugs      Acute respiratory failure (HCC)  Assessment & Plan  - On 3L currently  - Admit CXR normal  - Check D dimer and repeat CXR     Fever  Assessment & Plan  - Check urinalysis, blood cultures, lactic acid and repeat CXR  - Pt largely asymptomatic - denies dysuria, SOB, states she is on O2 at home, just feels generally weak. Did have one episode of emesis prior to arrival   - Bug bites on legs do not appear infected  - Given negative CXR, will start Rocephin with UTI as the presumed infectious  source; if UA is negative, will broaden coverage and check CT abd.   - Check COVID swab       Hypocalcemia  Assessment & Plan  - Check ionized level, Vit D and PTH  - If ionized decreased, will supplement    Hypomagnesemia  Assessment & Plan  - Give 4gm jump today, recheck in the am  - Start oral supplementation     Thrombocytopenia (HCC)  Assessment & Plan  - Secondary to underlying infection?  - Hold lovenox today until platelet count rebounds     Elevated troponin  Assessment & Plan  Patient has a history of CAD/MI.  Noted to have slight elevation in troponin with repolarization abnormalities on admission, which has been seen in previous EKGs.  Monitor on telemetry overnight.  Follow troponin levels.  Monitor and replace electrolytes as needed. She denies any chest pain or shortness of breath    Chronic pain syndrome  Assessment & Plan  Continue home gabapentin.  As needed Tylenol added    AVNRT (AV king re-entry tachycardia) (HCC)  Assessment & Plan  - Pt with history of AVNRT causing weakness, was placed on dig with the intention to ablate if it recurred     Essential hypertension  Assessment & Plan  History of CAD and essential hypertension.  Digoxin level ordered.   Increase Norvasc, stop high dose HCTZ with her electrolyte abnormalities    Depression  Assessment & Plan  History of mood disorder. Continue home Cymbalta.  Monitor    Gastroesophageal reflux disease without esophagitis  Assessment & Plan  Stable.  Continue home omeprazole    Dehydration- (present on admission)  Assessment & Plan  - Dry MM on exam, hypochloremia and elevated BUN. Start fluids.     Hyponatremia- (present on admission)  Assessment & Plan  - Start NS with K+ supplementation   - Check urine lytes    Hypokalemia- (present on admission)  Assessment & Plan  - Monitor and replace electrolytes as needed.  Magnesium level ordered  - Pt on very high dose HCTZ which is likely contributing, will stop   - Place K+ in IVFs  - Add 40 mEQ orally  BID today        VTE prophylaxis: SCDs/TEDs    I have performed a physical exam and reviewed and updated ROS and Plan today (9/16/2021). In review of yesterday's note (9/15/2021), there are no changes except as documented above.

## 2021-09-16 NOTE — ASSESSMENT & PLAN NOTE
- TTE does not reveal valvular abnormalities  - Noted for hyperdynamic LV function, digoxin discontinued and started on metoprolol per cardiology recommendations

## 2021-09-16 NOTE — THERAPY
Physical Therapy   Initial Evaluation     Patient Name: Lindsay Vargas  Age:  81 y.o., Sex:  female  Medical Record #: [id number]  Today's Date: 2021     Precautions  Precautions: (P) Fall Risk    Assessment  Patient is 81 y.o. female with a diagnosis of chest pain,weakness,SOB,failure to thrive.Pt has been living alone at home since her   3 weeks ago and has been declining not eating etc.Acute PT needed to improve bed mob,transfers and ambulation     21 1300   Total Time Spent   Total Time Spent (Mins) 30   Charge Group   PT Evaluation PT Evaluation Mod   Initial Contact Note    Initial Contact Note Order Received and Verified, Physical Therapy Evaluation in Progress with Full Report to Follow.   Precautions   Precautions Fall Risk   Pain 0 - 10 Group   Therapist Pain Assessment 0   Prior Living Situation   Prior Services Home-Independent   Housing / Facility 1 Story House   Steps Into Home 1   Steps In Home 0   Equipment Owned Front-Wheel Walker   Lives with - Patient's Self Care Capacity Alone and Able to Care For Self   Prior Level of Functional Mobility   Bed Mobility Independent   Transfer Status Independent   Ambulation Independent   Distance Ambulation (Feet)   (community amb)   Assistive Devices Used None   Cognition    Cognition / Consciousness X   Passive ROM Lower Body   Passive ROM Lower Body WDL   Active ROM Lower Body    Active ROM Lower Body  WDL   Strength Lower Body   Lower Body Strength  X   Coordination Lower Body    Coordination Lower Body  X   Balance Assessment   Sitting Balance (Static) Poor -   Sitting Balance (Dynamic) Poor -   Standing Balance (Static) Trace +   Standing Balance (Dynamic) Trace +   Weight Shift Sitting Poor   Weight Shift Standing Absent   Gait Analysis   Gait Level Of Assist Unable to Participate   Assistive Device Hand Held Assist   Distance (Feet) 0   Weight Bearing Status full   Bed Mobility    Supine to Sit Moderate Assist   Sit to Supine  Maximal Assist   Scooting Maximal Assist   Functional Mobility   Sit to Stand Moderate Assist   Bed, Chair, Wheelchair Transfer Unable to Participate   Toilet Transfers Unable to Participate   How much difficulty does the patient currently have...   Turning over in bed (including adjusting bedclothes, sheets and blankets)? 2   Sitting down on and standing up from a chair with arms (e.g., wheelchair, bedside commode, etc.) 2   Moving from lying on back to sitting on the side of the bed? 2   How much help from another person does the patient currently need...   Moving to and from a bed to a chair (including a wheelchair)? 1   Need to walk in a hospital room? 1   Climbing 3-5 steps with a railing? 1   6 clicks Mobility Score 9   Activity Tolerance   Sitting Edge of Bed 10   Standing 30 sec   Patient / Family Goals    Patient / Family Goal #1 not stated   Anticipated Discharge Equipment and Recommendations   DC Equipment Recommendations Unable to determine at this time   Discharge Recommendations   (Home V SNF depending on progress)   Interdisciplinary Plan of Care Collaboration   IDT Collaboration with  Nursing   Session Information   Date / Session Number  9/16   Priority 0        Plan    Recommend Physical Therapy 4 times per week until therapy goals are met for the following treatments:  Bed Mobility, Gait Training, Neuro Re-Education / Balance, Therapeutic Activities and Therapeutic Exercises    DC Equipment Recommendations: (P) Unable to determine at this time  Discharge Recommendations: (P)  (Home V SNF depending on progress)

## 2021-09-16 NOTE — PROGRESS NOTES
Bedside report received from DEVANG Navarro. Assumed pt care. Pt is AOx4. Denies any pain or other distress at this time. Discussed POC including electrolyte replacement, fall precautions. Pt verbalized understanding. Hourly rounding in place. Fall precautions in place and call light within reach.

## 2021-09-16 NOTE — ASSESSMENT & PLAN NOTE
- Pt with history of AVNRT causing weakness, was placed on dig with the intention to ablate if it recurred   - Echocardiogram demonstrated increased LV intracavitary gradient. Spoke with cardiology who recommends discontinuing digoxin and starting metoprolol and having her follow up with cardiology outpatient.

## 2021-09-16 NOTE — PROGRESS NOTES
Telemetry Shift Summary     Rhythm: SR/ST  HR:   Ectopy: r PVC, r Coup    Measurements: 0.20/0.08/0.44    Normal Values  Rhythm: SR  HR:   Measurements: 0.12-0.20/0.08-0.10/0.30-0.52

## 2021-09-16 NOTE — ASSESSMENT & PLAN NOTE
- Resolved. Pt appears euvolemic at this time   - BUN/Cr normalized  - Hypochloremia has normalized

## 2021-09-16 NOTE — ASSESSMENT & PLAN NOTE
- Secondary pylonephritis  - Pt largely asymptomatic - denies dysuria, SOB, states she is on O2 at home, just feels generally weak. Did have one episode of emesis prior to arrival   - Bug bites on legs do not appear infected   - COVID negative  - Blood cultures 1 of 2 Positive for klebsiella  - Remains afebrile  - CT Abd/pelvis negative for underlying acute process   - UA significant for moderate bacteria growth, 10-20 WBCs. Culture results negative due to not being obtained 48hrs after abx started  - Continue coverage with Rocephin as it provides coverage

## 2021-09-16 NOTE — CARE PLAN
The patient is Watcher - Medium risk of patient condition declining or worsening    Shift Goals  Clinical Goals: monitor electrolytes, prevent falls  Patient Goals: rest    Progress made toward(s) clinical / shift goals:  patient has received electrolyte replacement, will monitor labs. All fall precautions in place.       Problem: Skin Integrity  Goal: Skin integrity is maintained or improved  Outcome: Progressing     Problem: Fall Risk  Goal: Patient will remain free from falls  Outcome: Progressing     Problem: Pain - Standard  Goal: Alleviation of pain or a reduction in pain to the patient’s comfort goal  Outcome: Progressing       Patient is not progressing towards the following goals:

## 2021-09-16 NOTE — ASSESSMENT & PLAN NOTE
- On 2-3L currently  - Admit CXR normal  - D-dimer 3.49  - Repeat CXR shows no pulmonary infiltrates or consolidations. No pleural effusion. No pneumothorax

## 2021-09-16 NOTE — THERAPY
"Occupational Therapy   Initial Evaluation     Patient Name: Lindsay Vargas  Age:  81 y.o., Sex:  female  Medical Record #: [id number]  Today's Date: 9/16/2021     Precautions  Precautions: Fall Risk    Assessment  Patient is 81 y.o. female with a diagnosis of Chest pain, weakness, SOB, emesis and Failure to Thrive.  Pt is actively grieving the loss of her spouse just 3 weeks ago from what she said was a miserable experience with a brain tumor.  Pt admits she been \"so stressed I think I'm gonna have a stroke\" and that \"I really just haven't been able to eat.\"  Currently pt presents with weakness, overall impaired sitting and standing posture and balance (posterior lean), and confusion.  She is unable to tolerate ADL's at this time.  Once electrolytes are stabilized, hopefully cognition will improve and pt will be able to actively participate in ADL's and mobility with therapy.  At this time she is NOT safe to return home alone, and will benefit from further inpt post acute therapy (SNF) if she doesn't make a significant recovery.  Regardless, she does not appear able to care for herself and if discharged back to the community, she will need to be with family or somewhere she gets supervision and assist with meals, medication mgmt, IADLs and oversight to her general well- being while she is grieving the loss of her spouse.  OT will follow and update recommendations as appropriate.    Plan    Recommend Occupational Therapy 3 times per week until therapy goals are met for the following treatments:  Adaptive Equipment, Neuro Re-Education / Balance, Self Care/Activities of Daily Living, Therapeutic Activities and Therapeutic Exercises.    DC Equipment Recommendations: Unable to determine at this time  Discharge Recommendations: Recommend post-acute placement for additional occupational therapy services prior to discharge home, or home with  therapy ans family assist and supervision if she progresses well and if " family able to take her in or arrange home supervision.        09/16/21 1120   Prior Living Situation   Prior Services Home-Independent   Housing / Facility 1 Story House   Steps Into Home 1   Steps In Home 0   Bathroom Set up Walk In Shower;Shower Chair;Grab Bars   Equipment Owned Front-Wheel Walker;Wheelchair;Grab Bar(s) In Tub / Shower;Tub / Shower Seat   Lives with - Patient's Self Care Capacity Alone and Unable to Care For Self   Comments PT has been living alone since Spouse passed away 3 weeks ago.  Family has been checking in on her as able   Prior Level of ADL Function   Self Feeding Independent   Grooming / Hygiene Independent   Bathing Independent   Dressing Independent   Toileting Independent   Comments Pt questionable historian at this point, states she was independent, but then was perseverating on having a neck injury and having to relearn how to walk   Prior Level of IADL Function   Medication Management Independent   Laundry Independent   Kitchen Mobility Independent   Finances Independent   Home Management Unable To Determine At This Time   Shopping Unable To Determine At This Time   Prior Level Of Mobility Independent With Device in Home   Driving / Transportation Unable To Determine At This Time   Occupation (Pre-Hospital Vocational) Retired Due To Age   Leisure Interests Other (Comments)  (likes to bake)   Precautions   Precautions Fall Risk   Vitals   O2 (LPM) 3   O2 Delivery Device Nasal Cannula   Pain 0 - 10 Group   Location Leg   Location Orientation Right;Left   Description Aching;Sore   Therapist Pain Assessment Prior to Activity;Nurse Notified  (states blankets feel too heavy on legs)   Cognition    Cognition / Consciousness X   Ability To Follow Commands 1 Step   Safety Awareness Impaired;Impulsive   Attention Impaired   Sequencing Impaired   Comments Pt pleasant and cooperative but tangential, easily distractible.  Appears [place], needed questions repeated several times and then still  "often gives inappropriate answers.  She was able to provide bits and pieces of info on home and PLOF but confused about timeline of events.  RN reports that pt os more confused today than yesterday and that her electrolytes are significantly imbalanced.  Right now, pt with impaired safety awareness and insight to limitations   Active ROM Upper Body   Active ROM Upper Body  WDL   Dominant Hand Right   Strength Upper Body   Upper Body Strength  X   Gross Strength Generalized Weakness, Equal Bilaterally.    Coordination Upper Body   Coordination X   Comments generally impaired, slightly ataxic and tremulous   Balance Assessment   Sitting Balance (Static) Trace +   Sitting Balance (Dynamic) Trace   Standing Balance (Static) Trace +   Standing Balance (Dynamic) Dependent   Weight Shift Sitting Poor   Weight Shift Standing Absent   Comments leans/falls posteriorly in sitting and standing   Bed Mobility    Supine to Sit Moderate Assist   Sit to Supine Maximal Assist   Scooting Maximal Assist   ADL Assessment   Upper Body Dressing Moderate Assist   Lower Body Dressing Maximal Assist   Toileting Total Assist  (brief and purewik today)   Comments Pt reports \" I haven't really been eating, I've been so stressed I thought I was gonna have a stroke\"   Functional Mobility   Sit to Stand Moderate Assist  (x2 people)   Bed, Chair, Wheelchair Transfer Unable to Participate   Toilet Transfers Unable to Participate   Mobility stood bedside only   Distance (Feet) 0   Visual Perception   Comments appears intact   Activity Tolerance   Sitting Edge of Bed 8-9 min   Standing 30 sec   Patient / Family Goals   Patient / Family Goal #1 \"get better\"   Short Term Goals   Short Term Goal # 1 Pt will transfer to [facility] for toileting with Radha in 4 visits   Short Term Goal # 2 Pt will tolerate EOB sitting 20 min for grooming supervised in 4 visits   Short Term Goal # 3 Pt will dress UB and LB with Radha from supported sitting in 6 visits             "

## 2021-09-16 NOTE — DISCHARGE PLANNING
Anticipated Discharge Disposition:   TBD, home with HH or placement     Action:   Chart review complete.     Discussed patient's plan of care and plans for discharge during rounds. Per MD, this patient was admitted for sepsis. Workup pending. PT/OT to evaluate this patient--patient has a six clicks of 9. RN CM anticipates patient will need either IRF or SNF.     RN CM to give IMM as patient was roled to inpatient.     1420: First IMM given.     Barriers to Discharge:   Medical Clearance  Possible placement     Plan:   Hospital care management will continue to assist with discharge planning needs.

## 2021-09-17 ENCOUNTER — APPOINTMENT (OUTPATIENT)
Dept: RADIOLOGY | Facility: MEDICAL CENTER | Age: 81
DRG: 871 | End: 2021-09-17
Attending: NURSE PRACTITIONER
Payer: MEDICARE

## 2021-09-17 PROBLEM — A41.9 SEPSIS (HCC): Status: ACTIVE | Noted: 2021-09-16

## 2021-09-17 PROBLEM — E87.20 LACTIC ACIDOSIS: Status: ACTIVE | Noted: 2021-09-17

## 2021-09-17 PROBLEM — J96.10 CHRONIC RESPIRATORY FAILURE (HCC): Status: ACTIVE | Noted: 2021-09-16

## 2021-09-17 PROBLEM — R79.89 POSITIVE D DIMER: Status: ACTIVE | Noted: 2021-09-17

## 2021-09-17 PROBLEM — R78.81 BACTEREMIA DUE TO GRAM-NEGATIVE BACTERIA: Status: ACTIVE | Noted: 2021-09-17

## 2021-09-17 LAB
ALBUMIN SERPL BCP-MCNC: 2.8 G/DL (ref 3.2–4.9)
ALBUMIN/GLOB SERPL: 0.9 G/DL
ALP SERPL-CCNC: 73 U/L (ref 30–99)
ALT SERPL-CCNC: 8 U/L (ref 2–50)
ANION GAP SERPL CALC-SCNC: 14 MMOL/L (ref 7–16)
APPEARANCE UR: ABNORMAL
AST SERPL-CCNC: 15 U/L (ref 12–45)
BACTERIA #/AREA URNS HPF: ABNORMAL /HPF
BASOPHILS # BLD AUTO: 0.2 % (ref 0–1.8)
BASOPHILS # BLD: 0.01 K/UL (ref 0–0.12)
BILIRUB SERPL-MCNC: 0.7 MG/DL (ref 0.1–1.5)
BILIRUB UR QL STRIP.AUTO: NEGATIVE
BUN SERPL-MCNC: 19 MG/DL (ref 8–22)
CA-I SERPL-SCNC: 0.96 MMOL/L (ref 1.1–1.3)
CA-I SERPL-SCNC: 1.01 MMOL/L (ref 1.1–1.3)
CALCIUM SERPL-MCNC: 8.2 MG/DL (ref 8.4–10.2)
CHLORIDE SERPL-SCNC: 95 MMOL/L (ref 96–112)
CO2 SERPL-SCNC: 20 MMOL/L (ref 20–33)
COLOR UR: YELLOW
CREAT SERPL-MCNC: 0.81 MG/DL (ref 0.5–1.4)
DIGOXIN SERPL-MCNC: 1.5 NG/ML (ref 0.8–2)
EOSINOPHIL # BLD AUTO: 0.02 K/UL (ref 0–0.51)
EOSINOPHIL NFR BLD: 0.3 % (ref 0–6.9)
ERYTHROCYTE [DISTWIDTH] IN BLOOD BY AUTOMATED COUNT: 48.1 FL (ref 35.9–50)
FIBRINOGEN PPP-MCNC: 642 MG/DL (ref 215–460)
GLOBULIN SER CALC-MCNC: 3 G/DL (ref 1.9–3.5)
GLUCOSE SERPL-MCNC: 120 MG/DL (ref 65–99)
GLUCOSE UR STRIP.AUTO-MCNC: NEGATIVE MG/DL
GRAN CASTS #/AREA URNS LPF: ABNORMAL /LPF
HCT VFR BLD AUTO: 34.5 % (ref 37–47)
HGB BLD-MCNC: 11.2 G/DL (ref 12–16)
IMM GRANULOCYTES # BLD AUTO: 0.05 K/UL (ref 0–0.11)
IMM GRANULOCYTES NFR BLD AUTO: 0.8 % (ref 0–0.9)
KETONES UR STRIP.AUTO-MCNC: NEGATIVE MG/DL
LACTATE BLD-SCNC: 1.8 MMOL/L (ref 0.5–2)
LACTATE BLD-SCNC: 1.8 MMOL/L (ref 0.5–2)
LACTATE BLD-SCNC: 2.3 MMOL/L (ref 0.5–2)
LACTATE BLD-SCNC: 2.5 MMOL/L (ref 0.5–2)
LEUKOCYTE ESTERASE UR QL STRIP.AUTO: NEGATIVE
LYMPHOCYTES # BLD AUTO: 0.55 K/UL (ref 1–4.8)
LYMPHOCYTES NFR BLD: 8.4 % (ref 22–41)
MAGNESIUM SERPL-MCNC: 2.2 MG/DL (ref 1.5–2.5)
MCH RBC QN AUTO: 26 PG (ref 27–33)
MCHC RBC AUTO-ENTMCNC: 32.5 G/DL (ref 33.6–35)
MCV RBC AUTO: 80.2 FL (ref 81.4–97.8)
MICRO URNS: ABNORMAL
MONOCYTES # BLD AUTO: 0.4 K/UL (ref 0–0.85)
MONOCYTES NFR BLD AUTO: 6.1 % (ref 0–13.4)
MUCOUS THREADS #/AREA URNS HPF: ABNORMAL /HPF
NEUTROPHILS # BLD AUTO: 5.51 K/UL (ref 2–7.15)
NEUTROPHILS NFR BLD: 84.2 % (ref 44–72)
NITRITE UR QL STRIP.AUTO: POSITIVE
NRBC # BLD AUTO: 0 K/UL
NRBC BLD-RTO: 0 /100 WBC
PH UR STRIP.AUTO: 6 [PH] (ref 5–8)
PHOSPHATE SERPL-MCNC: 2.2 MG/DL (ref 2.5–4.5)
PLATELET # BLD AUTO: 51 K/UL (ref 164–446)
PMV BLD AUTO: 11 FL (ref 9–12.9)
POTASSIUM SERPL-SCNC: 3.7 MMOL/L (ref 3.6–5.5)
PROT SERPL-MCNC: 5.8 G/DL (ref 6–8.2)
PROT UR QL STRIP: 30 MG/DL
RBC # BLD AUTO: 4.3 M/UL (ref 4.2–5.4)
RBC # URNS HPF: ABNORMAL /HPF
RBC UR QL AUTO: ABNORMAL
SODIUM SERPL-SCNC: 129 MMOL/L (ref 135–145)
SP GR UR STRIP.AUTO: 1.02
WBC # BLD AUTO: 6.5 K/UL (ref 4.8–10.8)
WBC #/AREA URNS HPF: ABNORMAL /HPF

## 2021-09-17 PROCEDURE — 80053 COMPREHEN METABOLIC PANEL: CPT

## 2021-09-17 PROCEDURE — 94760 N-INVAS EAR/PLS OXIMETRY 1: CPT

## 2021-09-17 PROCEDURE — A9270 NON-COVERED ITEM OR SERVICE: HCPCS | Performed by: FAMILY MEDICINE

## 2021-09-17 PROCEDURE — 84100 ASSAY OF PHOSPHORUS: CPT

## 2021-09-17 PROCEDURE — 700105 HCHG RX REV CODE 258: Performed by: FAMILY MEDICINE

## 2021-09-17 PROCEDURE — 700111 HCHG RX REV CODE 636 W/ 250 OVERRIDE (IP): Performed by: FAMILY MEDICINE

## 2021-09-17 PROCEDURE — 97535 SELF CARE MNGMENT TRAINING: CPT

## 2021-09-17 PROCEDURE — 81001 URINALYSIS AUTO W/SCOPE: CPT

## 2021-09-17 PROCEDURE — 93970 EXTREMITY STUDY: CPT | Mod: 26 | Performed by: INTERNAL MEDICINE

## 2021-09-17 PROCEDURE — 97530 THERAPEUTIC ACTIVITIES: CPT

## 2021-09-17 PROCEDURE — 36415 COLL VENOUS BLD VENIPUNCTURE: CPT

## 2021-09-17 PROCEDURE — 83735 ASSAY OF MAGNESIUM: CPT

## 2021-09-17 PROCEDURE — 700102 HCHG RX REV CODE 250 W/ 637 OVERRIDE(OP): Performed by: NURSE PRACTITIONER

## 2021-09-17 PROCEDURE — 93970 EXTREMITY STUDY: CPT

## 2021-09-17 PROCEDURE — 82330 ASSAY OF CALCIUM: CPT

## 2021-09-17 PROCEDURE — 700102 HCHG RX REV CODE 250 W/ 637 OVERRIDE(OP): Performed by: INTERNAL MEDICINE

## 2021-09-17 PROCEDURE — 83605 ASSAY OF LACTIC ACID: CPT | Mod: 91

## 2021-09-17 PROCEDURE — 85025 COMPLETE CBC W/AUTO DIFF WBC: CPT

## 2021-09-17 PROCEDURE — A9270 NON-COVERED ITEM OR SERVICE: HCPCS | Performed by: INTERNAL MEDICINE

## 2021-09-17 PROCEDURE — 87086 URINE CULTURE/COLONY COUNT: CPT

## 2021-09-17 PROCEDURE — 700101 HCHG RX REV CODE 250: Performed by: FAMILY MEDICINE

## 2021-09-17 PROCEDURE — 97116 GAIT TRAINING THERAPY: CPT

## 2021-09-17 PROCEDURE — 700111 HCHG RX REV CODE 636 W/ 250 OVERRIDE (IP): Performed by: NURSE PRACTITIONER

## 2021-09-17 PROCEDURE — 770020 HCHG ROOM/CARE - TELE (206)

## 2021-09-17 PROCEDURE — A9270 NON-COVERED ITEM OR SERVICE: HCPCS | Performed by: NURSE PRACTITIONER

## 2021-09-17 PROCEDURE — 87186 SC STD MICRODIL/AGAR DIL: CPT

## 2021-09-17 PROCEDURE — 700102 HCHG RX REV CODE 250 W/ 637 OVERRIDE(OP): Performed by: FAMILY MEDICINE

## 2021-09-17 PROCEDURE — 99233 SBSQ HOSP IP/OBS HIGH 50: CPT | Performed by: FAMILY MEDICINE

## 2021-09-17 PROCEDURE — 80162 ASSAY OF DIGOXIN TOTAL: CPT

## 2021-09-17 RX ORDER — GABAPENTIN 250 MG/5ML
100 SOLUTION ORAL 3 TIMES DAILY
Status: DISCONTINUED | OUTPATIENT
Start: 2021-09-17 | End: 2021-09-20 | Stop reason: HOSPADM

## 2021-09-17 RX ORDER — CALCIUM GLUCONATE 20 MG/ML
1 INJECTION, SOLUTION INTRAVENOUS ONCE
Status: COMPLETED | OUTPATIENT
Start: 2021-09-17 | End: 2021-09-17

## 2021-09-17 RX ADMIN — MAGNESIUM OXIDE TAB 400 MG (241.3 MG ELEMENTAL MG) 400 MG: 400 (241.3 MG) TAB at 18:10

## 2021-09-17 RX ADMIN — METOPROLOL TARTRATE 12.5 MG: 25 TABLET, FILM COATED ORAL at 12:02

## 2021-09-17 RX ADMIN — GABAPENTIN 100 MG: 250 SOLUTION ORAL at 12:03

## 2021-09-17 RX ADMIN — POTASSIUM CHLORIDE AND SODIUM CHLORIDE: 900; 300 INJECTION, SOLUTION INTRAVENOUS at 12:01

## 2021-09-17 RX ADMIN — CHOLECALCIFEROL TAB 25 MCG (1000 UNIT) 1000 UNITS: 25 TAB at 05:42

## 2021-09-17 RX ADMIN — AMLODIPINE BESYLATE 10 MG: 5 TABLET ORAL at 05:42

## 2021-09-17 RX ADMIN — ACETAMINOPHEN 650 MG: 325 TABLET, FILM COATED ORAL at 19:55

## 2021-09-17 RX ADMIN — CALCIUM GLUCONATE 1 G: 20 INJECTION, SOLUTION INTRAVENOUS at 08:01

## 2021-09-17 RX ADMIN — METOPROLOL TARTRATE 12.5 MG: 25 TABLET, FILM COATED ORAL at 19:55

## 2021-09-17 RX ADMIN — DULOXETINE 60 MG: 30 CAPSULE, DELAYED RELEASE ORAL at 05:41

## 2021-09-17 RX ADMIN — GABAPENTIN 100 MG: 250 SOLUTION ORAL at 18:11

## 2021-09-17 RX ADMIN — MAGNESIUM OXIDE TAB 400 MG (241.3 MG ELEMENTAL MG) 400 MG: 400 (241.3 MG) TAB at 05:42

## 2021-09-17 RX ADMIN — OMEPRAZOLE 40 MG: 20 CAPSULE, DELAYED RELEASE ORAL at 05:42

## 2021-09-17 RX ADMIN — CEFTRIAXONE SODIUM 1 G: 1 INJECTION, POWDER, FOR SOLUTION INTRAMUSCULAR; INTRAVENOUS at 05:41

## 2021-09-17 RX ADMIN — GABAPENTIN 100 MG: 100 CAPSULE ORAL at 05:41

## 2021-09-17 RX ADMIN — LORATADINE 10 MG: 10 TABLET ORAL at 05:42

## 2021-09-17 ASSESSMENT — ENCOUNTER SYMPTOMS
NAUSEA: 0
CHILLS: 1
SHORTNESS OF BREATH: 0
CONSTIPATION: 0
VOMITING: 0
DIZZINESS: 0
COUGH: 0
FEVER: 0
ABDOMINAL PAIN: 0
DIARRHEA: 0

## 2021-09-17 ASSESSMENT — COGNITIVE AND FUNCTIONAL STATUS - GENERAL
WALKING IN HOSPITAL ROOM: A LOT
MOVING FROM LYING ON BACK TO SITTING ON SIDE OF FLAT BED: A LOT
SUGGESTED CMS G CODE MODIFIER MOBILITY: CL
MOBILITY SCORE: 13
SUGGESTED CMS G CODE MODIFIER DAILY ACTIVITY: CL
STANDING UP FROM CHAIR USING ARMS: A LOT
PERSONAL GROOMING: A LOT
DRESSING REGULAR LOWER BODY CLOTHING: TOTAL
TOILETING: A LOT
EATING MEALS: A LOT
MOVING TO AND FROM BED TO CHAIR: A LITTLE
HELP NEEDED FOR BATHING: TOTAL
DRESSING REGULAR UPPER BODY CLOTHING: A LOT
TURNING FROM BACK TO SIDE WHILE IN FLAT BAD: A LITTLE
DAILY ACTIVITIY SCORE: 10
CLIMB 3 TO 5 STEPS WITH RAILING: TOTAL

## 2021-09-17 ASSESSMENT — PAIN DESCRIPTION - PAIN TYPE
TYPE: CHRONIC PAIN;ACUTE PAIN
TYPE: ACUTE PAIN

## 2021-09-17 ASSESSMENT — GAIT ASSESSMENTS
DISTANCE (FEET): 20
DEVIATION: SHUFFLED GAIT
GAIT LEVEL OF ASSIST: MODERATE ASSIST
ASSISTIVE DEVICE: HAND HELD ASSIST

## 2021-09-17 NOTE — PROGRESS NOTES
Tele strip printed at 1440     Rhythm: SR/ST   HR: 84  Measurements: 0.20/0.08/0.48        Telemetry Shift Summary     Rhythm: SR/ST  HR Range: 80's-100's  Ectopy: None     Telemetry monitoring strips placed in pt's chart.

## 2021-09-17 NOTE — ASSESSMENT & PLAN NOTE
Patient denies shortness of breath. On baseline 3LNC   Appropriate work of breathing, normal sinus rhythm on telemetry  - Likely in setting of sepsis. GNRs growing in blood cultures  - BLE dopplers negative  - Likely urinary source, CTA not indicated at this time

## 2021-09-17 NOTE — THERAPY
Physical Therapy   Daily Treatment     Patient Name: Lindsay Vargas  Age:  81 y.o., Sex:  female  Medical Record #: [id number]  Today's Date: 9/17/2021     Precautions  Precautions: Fall Risk    Assessment    Improved mentation and function today but still very unsteady on feet and tired quickly    Plan    Continue current treatment plan.      09/17/21 0900   Total Time Spent   Total Time Spent (Mins) 40   Charge Group   PT Gait Training 1   PT Therapeutic Activities 2   Pain 0 - 10 Group   Therapist Pain Assessment 0   Supine Lower Body Exercise   Supine Lower Body Exercises Yes   Sitting Lower Body Exercises   Sitting Lower Body Exercises Yes   Balance   Sitting Balance (Static) Fair   Sitting Balance (Dynamic) Fair   Standing Balance (Static) Poor   Standing Balance (Dynamic) Poor   Weight Shift Sitting Poor   Weight Shift Standing Poor   Gait Analysis   Gait Level Of Assist Moderate Assist   Assistive Device Hand Held Assist   Distance (Feet) 20   # of Times Distance was Traveled 1   Deviation Shuffled Gait   # of Stairs Climbed 0   Weight Bearing Status full   Bed Mobility    Supine to Sit Minimal Assist   Sit to Supine Minimal Assist   Scooting Moderate Assist   Functional Mobility   Sit to Stand Moderate Assist   Bed, Chair, Wheelchair Transfer Moderate Assist   Transfer Method Stand Step   How much difficulty does the patient currently have...   Turning over in bed (including adjusting bedclothes, sheets and blankets)? 3   Sitting down on and standing up from a chair with arms (e.g., wheelchair, bedside commode, etc.) 2   Moving from lying on back to sitting on the side of the bed? 3   How much help from another person does the patient currently need...   Moving to and from a bed to a chair (including a wheelchair)? 2   Need to walk in a hospital room? 2   Climbing 3-5 steps with a railing? 1   6 clicks Mobility Score 13   Activity Tolerance   Sitting Edge of Bed 15   Standing 10   Short Term Goals     Short Term Goal # 1 Pt to be I with bed mob in 6 V    Short Term Goal # 2 Pt to be S with transfers in 6 V so can go home   Short Term Goal # 3 Pt to be S with amb x 100 feet in 6 V so can go home   Anticipated Discharge Equipment and Recommendations   Discharge Recommendations Recommend post-acute placement for additional physical therapy services prior to discharge home   Interdisciplinary Plan of Care Collaboration   IDT Collaboration with  Nursing   Session Information   Date / Session Number  9/17-2 2/4 9/22       DC Equipment Recommendations: Unable to determine at this time  Discharge Recommendations: (P) Recommend post-acute placement for additional physical therapy services prior to discharge home

## 2021-09-17 NOTE — PROGRESS NOTES
Hospital Medicine Daily Progress Note    Date of Service  9/17/2021    Chief Complaint  Lindsay Vargas is a 81 y.o. female admitted 9/15/2021 with generalized weakness    Hospital Course  Lindsay Vargas is a 81 y.o. female who presented 9/15/2021 with vague complaints of weakness and intermittent episodes of chest heaviness. Her  passed away about 3 weeks ago, since then she has been feeling more fatigued.  She states that her legs have been bothering her due to insect bites from working on her tree outside.  She has essential hypertension, depression, GERD, and chronic pain.     On admission, EKG showed sinus rhythm with abnormal R wave progression, and nonspecific repolarization abnormality. Potassium was 2.7, troponin was 68 (has a history of elevated troponin levels).    Interval Problem Update  9/16 - Pt developed fever to 101.2 last evening with associated sinus tach.  She is on 3L - she states she wears O2 at home but is unable to tell me how much. She denies SOB or cough but states she did have an episode of emesis PTA.  She received a stool softener on admission and had a BM per nursing.  She denies abdominal pain or nausea now.      9/17- Pt afebrile overnight, Tmax 99.3. Continues on 3LNC, confirmed with family that this is her baseline. She states her legs feel better today. Denies suprapubic pain, or dysuria, and no complaints of abdominal pain. Will obtain a UA due to blood cultures positive for GNRs. Will also obtain BLE dopplers in setting of elevated D-Dimer.      I have personally seen and examined the patient at bedside. I discussed the plan of care with patient and bedside RN.    Consultants/Specialty  none    Code Status  Full Code    Disposition  Patient is not medically cleared.   Anticipate discharge to to skilled nursing facility.  I have placed the appropriate orders for post-discharge needs.    Review of Systems  Review of Systems   Constitutional: Positive for chills.  Negative for fever.   Respiratory: Negative for cough and shortness of breath.    Cardiovascular: Negative for chest pain and leg swelling.   Gastrointestinal: Negative for abdominal pain, constipation, diarrhea, nausea and vomiting.   Genitourinary: Negative for dysuria.   Skin: Positive for rash.   Neurological: Negative for dizziness.   All other systems reviewed and are negative.       Physical Exam  Temp:  [36.1 °C (97 °F)-37.4 °C (99.3 °F)] 36.8 °C (98.3 °F)  Pulse:  [81-99] 81  Resp:  [16-20] 16  BP: (129-150)/(37-61) 142/37  SpO2:  [96 %-97 %] 97 %    Physical Exam  Constitutional:       General: She is not in acute distress.  HENT:      Head: Normocephalic.   Eyes:      Pupils: Pupils are equal, round, and reactive to light.   Cardiovascular:      Rate and Rhythm: Normal rate and regular rhythm.      Pulses: Normal pulses.      Heart sounds: Murmur heard.   No friction rub. No gallop.    Pulmonary:      Effort: Pulmonary effort is normal.      Breath sounds: No wheezing or rhonchi.   Abdominal:      General: Abdomen is flat. Bowel sounds are normal.      Palpations: Abdomen is soft.   Skin:     General: Skin is warm and dry.      Capillary Refill: Capillary refill takes less than 2 seconds.      Findings: Rash present.      Comments: Multiple bug bites to BLE   Neurological:      General: No focal deficit present.      Mental Status: She is alert.   Psychiatric:         Mood and Affect: Mood normal.         Behavior: Behavior normal.         Fluids    Intake/Output Summary (Last 24 hours) at 9/17/2021 0926  Last data filed at 9/17/2021 0611  Gross per 24 hour   Intake 2145 ml   Output --   Net 2145 ml       Laboratory  Recent Labs     09/15/21  0823 09/16/21  0305 09/17/21  0207   WBC 10.0 11.1* 6.5   RBC 4.67 4.66 4.30   HEMOGLOBIN 12.1 12.0 11.2*   HEMATOCRIT 36.2* 37.8 34.5*   MCV 77.5* 81.1* 80.2*   MCH 25.9* 25.8* 26.0*   MCHC 33.4* 31.7* 32.5*   RDW 44.5 48.1 48.1   PLATELETCT 101* 73* 51*   MPV 10.1  11.5 11.0     Recent Labs     09/15/21  1342 21  0305 21  0207   SODIUM 139 131* 129*   POTASSIUM 2.5* 2.9* 3.7   CHLORIDE 102 92* 95*   CO2 22 22 20   GLUCOSE 108* 118* 120*   BUN 25* 25* 19   CREATININE 1.03 1.04 0.81   CALCIUM 7.1* 8.1* 8.2*                   Imaging  EC-ECHOCARDIOGRAM COMPLETE W/O CONT   Final Result      [id number] VIEW   Final Result         No specific finding to suggest small bowel obstruction.      DX-CHEST-PORTABLE (1 VIEW)   Final Result         1. No new pulmonary infiltrates or consolidations are noted.      DX-CHEST-PORTABLE (1 VIEW)   Final Result      No evidence of acute cardiopulmonary process.      US-EXTREMITY VENOUS LOWER BILAT    (Results Pending)        Assessment/Plan  * Weakness  Assessment & Plan  Likely multifactorial.  Patient's   a few weeks ago, and she states she needs help at home.  Unclear if she has been eating well.  Monitor and replace electrolytes as needed.  PT/OT consulted.      Positive D dimer  Assessment & Plan  Patient denies shortness of breath. On baseline 3LNC   Appropriate work of breathing, normal sinus rhythm on telemetry  Likely in setting of sepsis. GNRs growing in blood cultures  Will obtain BLE dopplers. Consider CTA chest if UA is negative    Bacteremia due to Gram-negative bacteria  Assessment & Plan  - 1 of 2 blood cultures positive   - Coverage with ceftriaxone   - Will narrow abx as speciation results    Heart murmur  Assessment & Plan  - TTE does not reveal valvular abnormalities  - Note for hyperdynamic LV function    QT prolongation  Assessment & Plan  -  - avoid QT prolonging drugs      Acute respiratory failure (HCC)  Assessment & Plan  - On 3L currently  - Admit CXR normal  - D-dimer 3.49  - Repeat CXR shows no pulmonary infiltrates or consolidations. No pleural effusion. No pneumothorax  - Will consider CTA    Fever  Assessment & Plan  - Check urinalysis  - Pt largely asymptomatic - denies dysuria,  SOB, states she is on O2 at home, just feels generally weak. Did have one episode of emesis prior to arrival   - Bug bites on legs do not appear infected   - COVID negative  - Blood cultures 1 of 2 Positive for Gram Negative Rods. Awaiting speciation. Repeat CXR unchanged.  - Afebrile overnight, awaiting UA results      Hypocalcemia  Assessment & Plan  - Ionized [place] 0.96, PTH 74.2  - Will supplement    Hypomagnesemia  Assessment & Plan  - Given 4gm yesterday  - Start oral supplementation  - 2.2 today, continue to monitor     Thrombocytopenia (HCC)  Assessment & Plan  - Secondary to underlying infection?  - Trended down overnight, continue to hold lovenox     Elevated troponin  Assessment & Plan  Patient has a history of CAD/MI.  Noted to have slight elevation in troponin with repolarization abnormalities on admission, which has been seen in previous EKGs.  She denies any chest pain or shortness of breath    Chronic pain syndrome  Assessment & Plan  Continue home gabapentin.  As needed Tylenol added    AVNRT (AV king re-entry tachycardia) (HCC)  Assessment & Plan  - Pt with history of AVNRT causing weakness, was placed on dig with the intention to ablate if it recurred   - Echocardiogram demonstrated increased LV intracavitary gradient. Spoke with cardiology who recommends discontinuing digoxin and starting metoprolol and having her follow up with cardiology outpatient.    Essential hypertension  Assessment & Plan  History of CAD and essential hypertension.   Continue Norvasc  Continue to hold high dose HCTZ with her electrolyte abnormalities  Digoxin level 1.5    Depression  Assessment & Plan  History of mood disorder. Continue home Cymbalta.  Monitor    Gastroesophageal reflux disease without esophagitis  Assessment & Plan  Stable.  Continue home omeprazole    Dehydration- (present on admission)  Assessment & Plan  - Improving. BUN normalized with IV fluids  - Hypochloremia is improving     Hyponatremia- (present  on admission)  Assessment & Plan  - Continue NS with K+ supplementation   - Check urine lytes- still not collected, will have nursing obtain straight cath specimen    Hypokalemia- (present on admission)  Assessment & Plan  - Pt was on very high dose HCTZ which likely contributed, held for now  - Improved today, will continue wit IVF w/K today and evaluate AM BMP  - Mag normalized       VTE prophylaxis: SCDs/TEDs    I have performed a physical exam and reviewed and updated ROS and Plan today (9/17/2021). In review of yesterday's note (9/16/2021), there are no changes except as documented above.

## 2021-09-17 NOTE — PROGRESS NOTES
Bedside report received from DEVANG Jain. Assumed pt care. Pt is AOx4, though tangential and very hard of hearing. Denies any pain or other distress at this time. Discussed POC including fall risk/prevention/bed alarms, electrolyte replacement, PT/OT eval. Pt verbalized understanding. Hourly rounding in place. Fall precautions in place and call light within reach.

## 2021-09-17 NOTE — THERAPY
Occupational Therapy  Daily Treatment     Patient Name: Lindsay Vargas  Age:  81 y.o., Sex:  female  Medical Record #: [id number]  Today's Date: 9/17/2021     Precautions: (P) Fall Risk    Assessment    Pt continues to demonstrate poor insight, poor awareness, impaired safety, impaired attention, weakness, and limited activity tolerance. Pt tolerated increased functional mobility today and improved bed mobility (min/mod A for bed mob), but upon ambulation became fatigued and immediately requested a sit break (unclear if experiencing dizziness or other symptoms). D/t poor insight pt has difficulty verbalizing symptoms. Continues to required max A for LB clothing, total A for toileting, and mod A for UB dressing. Will follow for skilled OT.     Plan    Continue current treatment plan.    DC Equipment Recommendations: (P) Unable to determine at this time  Discharge Recommendations: (P) Recommend post-acute placement for additional occupational therapy services prior to discharge home       09/17/21 0943   Precautions   Precautions Fall Risk   Cognition    Comments Pt very pleasant and cooperative, but continues to demonstrate poor safety, limited insight, and impaired attention   Balance   Sitting Balance (Static) Fair   Sitting Balance (Dynamic) Fair   Standing Balance (Static) Poor   Standing Balance (Dynamic) Poor   Weight Shift Sitting Poor   Weight Shift Standing Poor   Skilled Intervention Verbal Cuing;Sequencing;Tactile Cuing;Compensatory Strategies   Comments with HHA   Bed Mobility    Supine to Sit Moderate Assist   Sit to Supine Minimal Assist   Scooting Maximal Assist   Rolling Minimal Assist to Rt.   Skilled Intervention Verbal Cuing;Tactile Cuing   Comments with use of bed features   Activities of Daily Living   Upper Body Dressing Moderate Assist   Lower Body Dressing Maximal Assist   Toileting Total Assist   Skilled Intervention Verbal Cuing;Tactile Cuing   How much help from another person does the  "patient currently need...   Putting on and taking off regular lower body clothing? 1   Bathing (including washing, rinsing, and drying)? 1   Toileting, which includes using a toilet, bedpan, or urinal? 2   Putting on and taking off regular upper body clothing? 2   Taking care of personal grooming such as brushing teeth? 2   Eating meals? 2   6 Clicks Daily Activity Score 10   Functional Mobility   Sit to Stand Moderate Assist   Bed, Chair, Wheelchair Transfer Moderate Assist   Toilet Transfers Unable to Participate   Transfer Method Stand Step   Mobility short room distances, required 1x sit break upon mobility to bathroom   Comments with 2pa HHA   Activity Tolerance   Sitting Edge of Bed 12 min   Standing 8 min   Patient / Family Goals   Patient / Family Goal #1 \"get better\"   Goal #1 Outcome Progressing as expected   Short Term Goals   Short Term Goal # 1 Pt will [place] to [facility] [place] with Radha in 4 visits   Goal Outcome # 1 Progressing as expected   Short Term Goal # 2 Pt will tolerate EOB sitting 20 min for grooming supervised in 4 visits   Goal Outcome # 2 Progressing as expected   Short Term Goal # 3 Pt will dress UB and LB with Radha from supported sitting in 6 visits   Goal Outcome # 3 Progressing as expected   Education Group   Role of Occupational Therapist Patient Response Patient;Acceptance;Explanation;Reinforcement Needed   Anticipated Discharge Equipment and Recommendations   DC Equipment Recommendations Unable to determine at this time   Discharge Recommendations Recommend post-acute placement for additional occupational therapy services prior to discharge home   Interdisciplinary Plan of Care Collaboration   IDT Collaboration with  Nursing   Patient Position at End of Therapy In Bed;Phone within Reach;Tray Table within Reach;Call Light within Reach;Bed Alarm On   Collaboration Comments OT tx and recs    Session Information   Date / Session Number  9/17 2 (2/3, 9/22)   Priority 2         "

## 2021-09-17 NOTE — DISCHARGE PLANNING
Anticipated Discharge Disposition:   SNF then home with HH and caregivers.     Action:   Chart review complete.     Per chart review, PT/OT recommending SNF prior to discharge home. RN CM informed by third  that patient's family is looking into placement for the patient due to patient's spouse passing away about 4 weeks ago.     RN CM to call family to discuss the POC and disposition recommendations.     1210: RN CM called patient's daughter, Tequila. Per Tequila, the long term plan as of right now is for the patient to go home with hh and caregivers while she and her spouse figure out financing for long term placement in a senior facility. Tequila made aware of the current recommendation for SNF. Tequila is agreeable and family is agreeable to that. RN CM to speak with patient for choice.     1220: RN CM attempted to call patient on her phone listed on facesheet. No answer and mailbox full.     1429: Informed that patient would like to discuss SNF choices with daughter. Choice form given to patient with instructions that three choices will be needed.     1531: Patient and family made SNF choices for 1- [facility], 2- [facility], 3- [facility].     Choice form faxed to [facility].    Barriers to Discharge:   Medical Clearance  SNF acceptance    Plan:   Hospital care management will continue to assist with discharge planning needs.

## 2021-09-17 NOTE — DISCHARGE PLANNING
Received Choice form at 3239  Agency/Facility Name: [facility], [place]  Referral sent per Choice form @ [phone]

## 2021-09-17 NOTE — PROGRESS NOTES
Tele strip printed at 1991     Rhythm: SR   HR: 97  Measurements: 0.18/0.08/0.32        Telemetry Shift Summary     Rhythm: SR  HR Range: 80's-90's  Ectopy: Rare PVC     Telemetry monitoring strips placed in pt's chart.

## 2021-09-17 NOTE — ASSESSMENT & PLAN NOTE
- 1 of 2 blood cultures positive 9/17, now with klebsiella pneumoniae  - Continue with ceftriaxone course now that sensitivities have resulted

## 2021-09-18 ENCOUNTER — APPOINTMENT (OUTPATIENT)
Dept: RADIOLOGY | Facility: MEDICAL CENTER | Age: 81
DRG: 871 | End: 2021-09-18
Attending: NURSE PRACTITIONER
Payer: MEDICARE

## 2021-09-18 PROBLEM — R10.32 LLQ PAIN: Status: ACTIVE | Noted: 2021-09-18

## 2021-09-18 PROBLEM — N39.0 URINARY TRACT INFECTION: Status: ACTIVE | Noted: 2021-09-18

## 2021-09-18 PROBLEM — E83.39 HYPOPHOSPHATEMIA: Status: ACTIVE | Noted: 2021-09-18

## 2021-09-18 LAB
ALBUMIN SERPL BCP-MCNC: 3.1 G/DL (ref 3.2–4.9)
ALBUMIN/GLOB SERPL: 1 G/DL
ALP SERPL-CCNC: 73 U/L (ref 30–99)
ALT SERPL-CCNC: 8 U/L (ref 2–50)
ANION GAP SERPL CALC-SCNC: 9 MMOL/L (ref 7–16)
AST SERPL-CCNC: 13 U/L (ref 12–45)
BASOPHILS # BLD AUTO: 0.4 % (ref 0–1.8)
BASOPHILS # BLD: 0.02 K/UL (ref 0–0.12)
BILIRUB SERPL-MCNC: 0.5 MG/DL (ref 0.1–1.5)
BUN SERPL-MCNC: 14 MG/DL (ref 8–22)
CALCIUM SERPL-MCNC: 8.3 MG/DL (ref 8.4–10.2)
CHLORIDE SERPL-SCNC: 98 MMOL/L (ref 96–112)
CO2 SERPL-SCNC: 23 MMOL/L (ref 20–33)
CREAT SERPL-MCNC: 0.73 MG/DL (ref 0.5–1.4)
EOSINOPHIL # BLD AUTO: 0.03 K/UL (ref 0–0.51)
EOSINOPHIL NFR BLD: 0.6 % (ref 0–6.9)
ERYTHROCYTE [DISTWIDTH] IN BLOOD BY AUTOMATED COUNT: 50.3 FL (ref 35.9–50)
GLOBULIN SER CALC-MCNC: 3 G/DL (ref 1.9–3.5)
GLUCOSE SERPL-MCNC: 112 MG/DL (ref 65–99)
HAPTOGLOB SERPL-MCNC: 202 MG/DL (ref 30–200)
HCT VFR BLD AUTO: 37.6 % (ref 37–47)
HGB BLD-MCNC: 11.8 G/DL (ref 12–16)
IMM GRANULOCYTES # BLD AUTO: 0.04 K/UL (ref 0–0.11)
IMM GRANULOCYTES NFR BLD AUTO: 0.9 % (ref 0–0.9)
LYMPHOCYTES # BLD AUTO: 0.64 K/UL (ref 1–4.8)
LYMPHOCYTES NFR BLD: 13.6 % (ref 22–41)
MAGNESIUM SERPL-MCNC: 1.6 MG/DL (ref 1.5–2.5)
MCH RBC QN AUTO: 25.7 PG (ref 27–33)
MCHC RBC AUTO-ENTMCNC: 31.4 G/DL (ref 33.6–35)
MCV RBC AUTO: 81.9 FL (ref 81.4–97.8)
MONOCYTES # BLD AUTO: 0.57 K/UL (ref 0–0.85)
MONOCYTES NFR BLD AUTO: 12.1 % (ref 0–13.4)
NEUTROPHILS # BLD AUTO: 3.4 K/UL (ref 2–7.15)
NEUTROPHILS NFR BLD: 72.4 % (ref 44–72)
NRBC # BLD AUTO: 0 K/UL
NRBC BLD-RTO: 0 /100 WBC
PHOSPHATE SERPL-MCNC: 1.6 MG/DL (ref 2.5–4.5)
PLATELET # BLD AUTO: 49 K/UL (ref 164–446)
PMV BLD AUTO: 12.1 FL (ref 9–12.9)
POTASSIUM SERPL-SCNC: 4 MMOL/L (ref 3.6–5.5)
PROT SERPL-MCNC: 6.1 G/DL (ref 6–8.2)
RBC # BLD AUTO: 4.59 M/UL (ref 4.2–5.4)
SODIUM SERPL-SCNC: 130 MMOL/L (ref 135–145)
WBC # BLD AUTO: 4.7 K/UL (ref 4.8–10.8)

## 2021-09-18 PROCEDURE — 36415 COLL VENOUS BLD VENIPUNCTURE: CPT

## 2021-09-18 PROCEDURE — A9270 NON-COVERED ITEM OR SERVICE: HCPCS | Performed by: FAMILY MEDICINE

## 2021-09-18 PROCEDURE — 80053 COMPREHEN METABOLIC PANEL: CPT

## 2021-09-18 PROCEDURE — 83735 ASSAY OF MAGNESIUM: CPT

## 2021-09-18 PROCEDURE — 74176 CT ABD & PELVIS W/O CONTRAST: CPT | Mod: ME

## 2021-09-18 PROCEDURE — 700111 HCHG RX REV CODE 636 W/ 250 OVERRIDE (IP): Performed by: NURSE PRACTITIONER

## 2021-09-18 PROCEDURE — 770020 HCHG ROOM/CARE - TELE (206)

## 2021-09-18 PROCEDURE — 700101 HCHG RX REV CODE 250: Performed by: FAMILY MEDICINE

## 2021-09-18 PROCEDURE — A9270 NON-COVERED ITEM OR SERVICE: HCPCS | Performed by: INTERNAL MEDICINE

## 2021-09-18 PROCEDURE — 85025 COMPLETE CBC W/AUTO DIFF WBC: CPT

## 2021-09-18 PROCEDURE — 700102 HCHG RX REV CODE 250 W/ 637 OVERRIDE(OP): Performed by: NURSE PRACTITIONER

## 2021-09-18 PROCEDURE — 700105 HCHG RX REV CODE 258: Performed by: NURSE PRACTITIONER

## 2021-09-18 PROCEDURE — 84100 ASSAY OF PHOSPHORUS: CPT

## 2021-09-18 PROCEDURE — 700101 HCHG RX REV CODE 250: Performed by: NURSE PRACTITIONER

## 2021-09-18 PROCEDURE — 700102 HCHG RX REV CODE 250 W/ 637 OVERRIDE(OP): Performed by: INTERNAL MEDICINE

## 2021-09-18 PROCEDURE — 700102 HCHG RX REV CODE 250 W/ 637 OVERRIDE(OP): Performed by: FAMILY MEDICINE

## 2021-09-18 PROCEDURE — 99233 SBSQ HOSP IP/OBS HIGH 50: CPT | Performed by: FAMILY MEDICINE

## 2021-09-18 PROCEDURE — A9270 NON-COVERED ITEM OR SERVICE: HCPCS | Performed by: NURSE PRACTITIONER

## 2021-09-18 PROCEDURE — 700105 HCHG RX REV CODE 258: Performed by: FAMILY MEDICINE

## 2021-09-18 PROCEDURE — 700111 HCHG RX REV CODE 636 W/ 250 OVERRIDE (IP): Performed by: FAMILY MEDICINE

## 2021-09-18 RX ORDER — CALCIUM GLUCONATE 20 MG/ML
1 INJECTION, SOLUTION INTRAVENOUS ONCE
Status: COMPLETED | OUTPATIENT
Start: 2021-09-18 | End: 2021-09-18

## 2021-09-18 RX ADMIN — GABAPENTIN 100 MG: 250 SOLUTION ORAL at 17:53

## 2021-09-18 RX ADMIN — AMLODIPINE BESYLATE 10 MG: 5 TABLET ORAL at 05:34

## 2021-09-18 RX ADMIN — LORATADINE 10 MG: 10 TABLET ORAL at 05:34

## 2021-09-18 RX ADMIN — METOPROLOL TARTRATE 12.5 MG: 25 TABLET, FILM COATED ORAL at 17:54

## 2021-09-18 RX ADMIN — ACETAMINOPHEN 650 MG: 325 TABLET, FILM COATED ORAL at 12:46

## 2021-09-18 RX ADMIN — GABAPENTIN 100 MG: 250 SOLUTION ORAL at 05:33

## 2021-09-18 RX ADMIN — CHOLECALCIFEROL TAB 25 MCG (1000 UNIT) 1000 UNITS: 25 TAB at 05:34

## 2021-09-18 RX ADMIN — POTASSIUM CHLORIDE AND SODIUM CHLORIDE: 900; 300 INJECTION, SOLUTION INTRAVENOUS at 02:27

## 2021-09-18 RX ADMIN — ACETAMINOPHEN 650 MG: 325 TABLET, FILM COATED ORAL at 20:15

## 2021-09-18 RX ADMIN — GABAPENTIN 100 MG: 250 SOLUTION ORAL at 12:46

## 2021-09-18 RX ADMIN — MAGNESIUM OXIDE TAB 400 MG (241.3 MG ELEMENTAL MG) 400 MG: 400 (241.3 MG) TAB at 17:53

## 2021-09-18 RX ADMIN — MAGNESIUM OXIDE TAB 400 MG (241.3 MG ELEMENTAL MG) 400 MG: 400 (241.3 MG) TAB at 05:34

## 2021-09-18 RX ADMIN — CEFTRIAXONE SODIUM 1 G: 1 INJECTION, POWDER, FOR SOLUTION INTRAMUSCULAR; INTRAVENOUS at 05:33

## 2021-09-18 RX ADMIN — OMEPRAZOLE 40 MG: 20 CAPSULE, DELAYED RELEASE ORAL at 05:34

## 2021-09-18 RX ADMIN — DULOXETINE 60 MG: 30 CAPSULE, DELAYED RELEASE ORAL at 05:34

## 2021-09-18 RX ADMIN — SODIUM PHOSPHATE, MONOBASIC, MONOHYDRATE 20 MMOL: 276; 142 INJECTION, SOLUTION INTRAVENOUS at 13:21

## 2021-09-18 RX ADMIN — CALCIUM GLUCONATE 1 G: 20 INJECTION, SOLUTION INTRAVENOUS at 09:56

## 2021-09-18 RX ADMIN — METOPROLOL TARTRATE 12.5 MG: 25 TABLET, FILM COATED ORAL at 05:33

## 2021-09-18 ASSESSMENT — ENCOUNTER SYMPTOMS
CONSTIPATION: 0
FEVER: 0
ABDOMINAL PAIN: 1
DIZZINESS: 0
NAUSEA: 0
CHILLS: 1
DIARRHEA: 0
COUGH: 0
SHORTNESS OF BREATH: 0
VOMITING: 0

## 2021-09-18 ASSESSMENT — PAIN DESCRIPTION - PAIN TYPE
TYPE: ACUTE PAIN

## 2021-09-18 NOTE — PROGRESS NOTES
Telemetry Shift Summary     Rhythm SR with first degree AVB  HR Range 71-90  Ectopy  none  Measurements .22/.10/.38              Normal Values  Rhythm SR  HR Range    Measurements 0.12-0.20 / 0.06-0.10  / 0.30-0.52

## 2021-09-18 NOTE — ASSESSMENT & PLAN NOTE
- Continue with Rocephin coverage  - Blood culture growing klebsiella. Sensitivities indicate appropriate coverage, will continue with current abx course  - Urine culture negative, but was obtained 48hrs after starting abx coverage

## 2021-09-18 NOTE — DISCHARGE PLANNING
Anticipated Discharge Disposition: SNF    Action: Pt's case discussed during IDT rounds. Pt not medically cleared at this time.   Pt has been accepted to [facility].     Barriers to Discharge: None    Plan: Care coordination will continue to follow and assist with discharge planning

## 2021-09-18 NOTE — ASSESSMENT & PLAN NOTE
- New onset yesterday. Denies pain or tenderness today  - CT Abd/pelvis negative for acute findings. No abdominal source of infection identified.

## 2021-09-18 NOTE — PROGRESS NOTES
Hospital Medicine Daily Progress Note    Date of Service  9/18/2021    Chief Complaint  Lindsay Vargas is a 81 y.o. female admitted 9/15/2021 with generalized weakness    Hospital Course  Lindsay Vargas is a 81 y.o. female who presented 9/15/2021 with vague complaints of weakness and intermittent episodes of chest heaviness. Her  passed away about 3 weeks ago, since then she has been feeling more fatigued.  She states that her legs have been bothering her due to insect bites from working on her tree outside.  She has essential hypertension, depression, GERD, and chronic pain.     On admission, EKG showed sinus rhythm with abnormal R wave progression, and nonspecific repolarization abnormality. Potassium was 2.7, troponin was 68 (has a history of elevated troponin levels). Patient febrile to 101.2 on admission, with sinus tachycardia. On 3LNC, apparently this is lower than her home baseline O2 needs.     Interval Problem Update  9/18-  Patient seen and examined at bedside. On initial evaluation she denied any new pain, and reported weakness with standing while working with PT. On evaluation later in the morning the patient voiced LLQ pain, and in setting of bacteremia with unknown etiology will obtain CT Abd/Pelvis. UA demonstrates UTI, patient continues to deny dysuria at this time; awaiting culture results.      I have personally seen and examined the patient at bedside. I discussed the plan of care with patient and bedside RN.    Consultants/Specialty  none    Code Status  Full Code    Disposition  Patient is not medically cleared.   Anticipate discharge to to skilled nursing facility.  I have placed the appropriate orders for post-discharge needs.    Review of Systems  Review of Systems   Constitutional: Positive for chills. Negative for fever.   Respiratory: Negative for cough and shortness of breath.    Cardiovascular: Negative for chest pain and leg swelling.   Gastrointestinal: Positive for  abdominal pain. Negative for constipation, diarrhea, nausea and vomiting.   Genitourinary: Negative for dysuria.   Skin: Positive for rash.   Neurological: Negative for dizziness.   All other systems reviewed and are negative.       Physical Exam  Temp:  [36.4 °C (97.5 °F)-37.7 °C (99.8 °F)] 36.4 °C (97.5 °F)  Pulse:  [75-92] 92  Resp:  [16-20] 18  BP: (116-161)/(43-56) 161/56  SpO2:  [91 %-97 %] 91 %    Physical Exam  Constitutional:       General: She is not in acute distress.  HENT:      Head: Normocephalic.   Eyes:      Pupils: Pupils are equal, round, and reactive to light.   Cardiovascular:      Rate and Rhythm: Normal rate and regular rhythm.      Pulses: Normal pulses.      Heart sounds: Murmur heard.   No friction rub. No gallop.    Pulmonary:      Effort: Pulmonary effort is normal.      Breath sounds: No wheezing or rhonchi.   Abdominal:      General: Abdomen is flat. Bowel sounds are normal.      Palpations: Abdomen is soft.      Tenderness: There is abdominal tenderness.      Comments: LLQ tenderness with  palpation   Skin:     General: Skin is warm and dry.      Capillary Refill: Capillary refill takes less than 2 seconds.      Findings: Rash present.      Comments: Multiple bug bites to BLE   Neurological:      General: No focal deficit present.      Mental Status: She is alert.   Psychiatric:         Mood and Affect: Mood normal.         Behavior: Behavior normal.         Fluids    Intake/Output Summary (Last 24 hours) at 9/18/2021 1003  Last data filed at 9/17/2021 1201  Gross per 24 hour   Intake 240 ml   Output 300 ml   Net -60 ml       Laboratory  Recent Labs     09/16/21  0305 09/17/21  0207 09/18/21  0353   WBC 11.1* 6.5 4.7*   RBC 4.66 4.30 4.59   HEMOGLOBIN 12.0 11.2* 11.8*   HEMATOCRIT 37.8 34.5* 37.6   MCV 81.1* 80.2* 81.9   MCH 25.8* 26.0* 25.7*   MCHC 31.7* 32.5* 31.4*   RDW 48.1 48.1 50.3*   PLATELETCT 73* 51* 49*   MPV 11.5 11.0 12.1     Recent Labs     09/16/21  0305 09/17/21  0207  21  0353   SODIUM 131* 129* 130*   POTASSIUM 2.9* 3.7 4.0   CHLORIDE 92* 95* 98   CO2 22 20 23   GLUCOSE 118* 120* 112*   BUN 25* 19 14   CREATININE 1.04 0.81 0.73   CALCIUM 8.1* 8.2* 8.3*                   Imaging  US-EXTREMITY VENOUS LOWER BILAT   Final Result      EC-ECHOCARDIOGRAM COMPLETE W/O CONT   Final Result      [id number] VIEW   Final Result         No specific finding to suggest small bowel obstruction.      DX-CHEST-PORTABLE (1 VIEW)   Final Result         1. No new pulmonary infiltrates or consolidations are noted.      DX-CHEST-PORTABLE (1 VIEW)   Final Result      No evidence of acute cardiopulmonary process.           Assessment/Plan  * Weakness  Assessment & Plan  Likely multifactorial.  Patient's   a few weeks ago, and she states she needs help at home.  Unclear if she has been eating well.  Monitor and replace electrolytes as needed.    - PT/OT consulted, recommend post -acute placement for continued rehabilitation. SNF referral placed      Hypophosphatemia  Assessment & Plan  1.6 this AM  - Will replete with sodium phosphate in setting of hyponatremia    LLQ pain  Assessment & Plan  - New onset today  - Will obtain CT Abd/Pelvis in setting of GNR bacteremia  - Consider adding Flagyl if abdominal source identified    Urinary tract infection  Assessment & Plan  - Continue with Rocephin coverage  - Awaiting culture results, will narrow abx coverage based on speciation     Lactic acidosis  Assessment & Plan  - Continue IVF hydration    Positive D dimer  Assessment & Plan  Patient denies shortness of breath. On baseline 3LNC   Appropriate work of breathing, normal sinus rhythm on telemetry  - Likely in setting of sepsis. GNRs growing in blood cultures  - BLE dopplers negative  - Likely urinary source, CTA not indicated at this time    Bacteremia due to Gram-negative bacteria  Assessment & Plan  - 1 of 2 blood cultures positive , lactose fermenting  - Coverage with ceftriaxone    - Will narrow abx as speciation results    Heart murmur  Assessment & Plan  - TTE does not reveal valvular abnormalities  - Noted for hyperdynamic LV function, digoxin discontinued and started on metoprolol per cardiology recommendations    QT prolongation  Assessment & Plan  -  - avoid QT prolonging drugs      Chronic respiratory failure (HCC)  Assessment & Plan  - On 2-3L currently  - Admit CXR normal  - D-dimer 3.49  - Repeat CXR shows no pulmonary infiltrates or consolidations. No pleural effusion. No pneumothorax    Sepsis (HCC)  Assessment & Plan  - Pt largely asymptomatic - denies dysuria, SOB, states she is on O2 at home, just feels generally weak. Did have one episode of emesis prior to arrival   - Bug bites on legs do not appear infected   - COVID negative  - Blood cultures 1 of 2 Positive for Lactose Fermenting Gram Negative Rods. Awaiting speciation.  - Remains afebrile   - UA significant for moderate bacteria growth, 10-20 WBCs. Awaiting culture results  - Continue coverage with Rocephin pending speciation        Hypocalcemia  Assessment & Plan  - Ionized Tonny mildly improved to 1.01, PTH 74.2  - Will supplement again today      Hypomagnesemia  Assessment & Plan  - Given 4gm yesterday  - Continue with PO supplementation  - 1.6 today, continue to monitor level    Thrombocytopenia (HCC)  Assessment & Plan  - Secondary to underlying infection?  - Trend remains low, 50>49     Elevated troponin  Assessment & Plan  Patient has a history of CAD/MI.  Noted to have slight elevation in troponin with repolarization abnormalities on admission, which has been seen in previous EKGs.  She denies any chest pain or shortness of breath    Chronic pain syndrome  Assessment & Plan  Continue home gabapentin.  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 Spoke with cardiology who recommends discontinuing digoxin and starting metoprolol and having her follow up with cardiology outpatient.    Essential hypertension  Assessment & Plan  History of CAD and essential hypertension.   Continue Norvasc/Metoprolol   Continue to hold high dose HCTZ with her electrolyte abnormalities  Digoxin level 1.5    Depression  Assessment & Plan  History of mood disorder. Continue home Cymbalta.  Monitor    Gastroesophageal reflux disease without esophagitis  Assessment & Plan  Stable.  Continue home omeprazole    Dehydration- (present on admission)  Assessment & Plan  - Improving. BUN normalized with IV fluids  - Hypochloremia has normalized    Hyponatremia- (present on admission)  Assessment & Plan  - Continue NS with K+ supplementation   - Check urine lytes- still not collected, will have nursing obtain straight cath specimen    Hypokalemia- (present on admission)  Assessment & Plan  - Pt was on very high dose HCTZ which likely contributed, held for now  - Improved today, will continue wit IVF w/K today and evaluate AM BMP  - Mag normalized       VTE prophylaxis: SCDs/TEDs    I have performed a physical exam and reviewed and updated ROS and Plan today (9/18/2021). In review of yesterday's note (9/17/2021), there are no changes except as documented above.

## 2021-09-19 PROBLEM — N12 PYELONEPHRITIS: Status: ACTIVE | Noted: 2021-09-18

## 2021-09-19 PROBLEM — N17.9 AKI (ACUTE KIDNEY INJURY) (HCC): Status: ACTIVE | Noted: 2021-09-19

## 2021-09-19 LAB
ANION GAP SERPL CALC-SCNC: 12 MMOL/L (ref 7–16)
BACTERIA BLD CULT: ABNORMAL
BACTERIA BLD CULT: ABNORMAL
BASOPHILS # BLD AUTO: 0.5 % (ref 0–1.8)
BASOPHILS # BLD: 0.02 K/UL (ref 0–0.12)
BUN SERPL-MCNC: 10 MG/DL (ref 8–22)
CALCIUM SERPL-MCNC: 8.2 MG/DL (ref 8.4–10.2)
CHLORIDE SERPL-SCNC: 99 MMOL/L (ref 96–112)
CO2 SERPL-SCNC: 23 MMOL/L (ref 20–33)
CREAT SERPL-MCNC: 0.57 MG/DL (ref 0.5–1.4)
EOSINOPHIL # BLD AUTO: 0.07 K/UL (ref 0–0.51)
EOSINOPHIL NFR BLD: 1.6 % (ref 0–6.9)
ERYTHROCYTE [DISTWIDTH] IN BLOOD BY AUTOMATED COUNT: 48.7 FL (ref 35.9–50)
GLUCOSE SERPL-MCNC: 107 MG/DL (ref 65–99)
HCT VFR BLD AUTO: 31.9 % (ref 37–47)
HGB BLD-MCNC: 10.2 G/DL (ref 12–16)
IMM GRANULOCYTES # BLD AUTO: 0.03 K/UL (ref 0–0.11)
IMM GRANULOCYTES NFR BLD AUTO: 0.7 % (ref 0–0.9)
LYMPHOCYTES # BLD AUTO: 0.9 K/UL (ref 1–4.8)
LYMPHOCYTES NFR BLD: 20.5 % (ref 22–41)
MAGNESIUM SERPL-MCNC: 1.4 MG/DL (ref 1.5–2.5)
MCH RBC QN AUTO: 25.4 PG (ref 27–33)
MCHC RBC AUTO-ENTMCNC: 32 G/DL (ref 33.6–35)
MCV RBC AUTO: 79.4 FL (ref 81.4–97.8)
MONOCYTES # BLD AUTO: 0.54 K/UL (ref 0–0.85)
MONOCYTES NFR BLD AUTO: 12.3 % (ref 0–13.4)
NEUTROPHILS # BLD AUTO: 2.82 K/UL (ref 2–7.15)
NEUTROPHILS NFR BLD: 64.4 % (ref 44–72)
NRBC # BLD AUTO: 0 K/UL
NRBC BLD-RTO: 0 /100 WBC
PLATELET # BLD AUTO: 58 K/UL (ref 164–446)
PMV BLD AUTO: 12 FL (ref 9–12.9)
POTASSIUM SERPL-SCNC: 4.1 MMOL/L (ref 3.6–5.5)
RBC # BLD AUTO: 4.02 M/UL (ref 4.2–5.4)
SIGNIFICANT IND 70042: ABNORMAL
SITE SITE: ABNORMAL
SODIUM SERPL-SCNC: 134 MMOL/L (ref 135–145)
SOURCE SOURCE: ABNORMAL
WBC # BLD AUTO: 4.4 K/UL (ref 4.8–10.8)

## 2021-09-19 PROCEDURE — 700111 HCHG RX REV CODE 636 W/ 250 OVERRIDE (IP): Performed by: FAMILY MEDICINE

## 2021-09-19 PROCEDURE — A9270 NON-COVERED ITEM OR SERVICE: HCPCS | Performed by: FAMILY MEDICINE

## 2021-09-19 PROCEDURE — 85025 COMPLETE CBC W/AUTO DIFF WBC: CPT

## 2021-09-19 PROCEDURE — 700102 HCHG RX REV CODE 250 W/ 637 OVERRIDE(OP): Performed by: FAMILY MEDICINE

## 2021-09-19 PROCEDURE — 83735 ASSAY OF MAGNESIUM: CPT

## 2021-09-19 PROCEDURE — 700101 HCHG RX REV CODE 250: Performed by: NURSE PRACTITIONER

## 2021-09-19 PROCEDURE — A9270 NON-COVERED ITEM OR SERVICE: HCPCS | Performed by: INTERNAL MEDICINE

## 2021-09-19 PROCEDURE — 700102 HCHG RX REV CODE 250 W/ 637 OVERRIDE(OP): Performed by: NURSE PRACTITIONER

## 2021-09-19 PROCEDURE — 700102 HCHG RX REV CODE 250 W/ 637 OVERRIDE(OP): Performed by: INTERNAL MEDICINE

## 2021-09-19 PROCEDURE — 700105 HCHG RX REV CODE 258: Performed by: FAMILY MEDICINE

## 2021-09-19 PROCEDURE — 36415 COLL VENOUS BLD VENIPUNCTURE: CPT

## 2021-09-19 PROCEDURE — 770020 HCHG ROOM/CARE - TELE (206)

## 2021-09-19 PROCEDURE — 700111 HCHG RX REV CODE 636 W/ 250 OVERRIDE (IP): Performed by: NURSE PRACTITIONER

## 2021-09-19 PROCEDURE — 80048 BASIC METABOLIC PNL TOTAL CA: CPT

## 2021-09-19 PROCEDURE — 99233 SBSQ HOSP IP/OBS HIGH 50: CPT | Performed by: FAMILY MEDICINE

## 2021-09-19 PROCEDURE — A9270 NON-COVERED ITEM OR SERVICE: HCPCS | Performed by: NURSE PRACTITIONER

## 2021-09-19 RX ORDER — CALCIUM GLUCONATE 20 MG/ML
2 INJECTION, SOLUTION INTRAVENOUS ONCE
Status: COMPLETED | OUTPATIENT
Start: 2021-09-19 | End: 2021-09-19

## 2021-09-19 RX ORDER — MAGNESIUM SULFATE HEPTAHYDRATE 40 MG/ML
2 INJECTION, SOLUTION INTRAVENOUS ONCE
Status: COMPLETED | OUTPATIENT
Start: 2021-09-19 | End: 2021-09-19

## 2021-09-19 RX ADMIN — OMEPRAZOLE 40 MG: 20 CAPSULE, DELAYED RELEASE ORAL at 05:45

## 2021-09-19 RX ADMIN — MAGNESIUM OXIDE TAB 400 MG (241.3 MG ELEMENTAL MG) 400 MG: 400 (241.3 MG) TAB at 05:45

## 2021-09-19 RX ADMIN — METOPROLOL TARTRATE 12.5 MG: 25 TABLET, FILM COATED ORAL at 17:42

## 2021-09-19 RX ADMIN — METOPROLOL TARTRATE 12.5 MG: 25 TABLET, FILM COATED ORAL at 05:45

## 2021-09-19 RX ADMIN — MAGNESIUM SULFATE HEPTAHYDRATE 2 G: 40 INJECTION, SOLUTION INTRAVENOUS at 08:34

## 2021-09-19 RX ADMIN — AMLODIPINE BESYLATE 10 MG: 5 TABLET ORAL at 05:45

## 2021-09-19 RX ADMIN — POTASSIUM CHLORIDE AND SODIUM CHLORIDE: 900; 300 INJECTION, SOLUTION INTRAVENOUS at 02:52

## 2021-09-19 RX ADMIN — MAGNESIUM OXIDE TAB 400 MG (241.3 MG ELEMENTAL MG) 400 MG: 400 (241.3 MG) TAB at 17:41

## 2021-09-19 RX ADMIN — CEFTRIAXONE SODIUM 1 G: 1 INJECTION, POWDER, FOR SOLUTION INTRAMUSCULAR; INTRAVENOUS at 05:41

## 2021-09-19 RX ADMIN — CALCIUM GLUCONATE 2 G: 20 INJECTION, SOLUTION INTRAVENOUS at 14:29

## 2021-09-19 RX ADMIN — DULOXETINE 60 MG: 30 CAPSULE, DELAYED RELEASE ORAL at 05:45

## 2021-09-19 RX ADMIN — MAGNESIUM SULFATE HEPTAHYDRATE 2 G: 40 INJECTION, SOLUTION INTRAVENOUS at 11:59

## 2021-09-19 RX ADMIN — GABAPENTIN 100 MG: 250 SOLUTION ORAL at 12:01

## 2021-09-19 RX ADMIN — CHOLECALCIFEROL TAB 25 MCG (1000 UNIT) 1000 UNITS: 25 TAB at 05:45

## 2021-09-19 RX ADMIN — LORATADINE 10 MG: 10 TABLET ORAL at 05:45

## 2021-09-19 RX ADMIN — ACETAMINOPHEN 650 MG: 325 TABLET, FILM COATED ORAL at 05:47

## 2021-09-19 RX ADMIN — GABAPENTIN 100 MG: 250 SOLUTION ORAL at 05:47

## 2021-09-19 RX ADMIN — GABAPENTIN 100 MG: 250 SOLUTION ORAL at 17:42

## 2021-09-19 ASSESSMENT — COGNITIVE AND FUNCTIONAL STATUS - GENERAL
SUGGESTED CMS G CODE MODIFIER DAILY ACTIVITY: CK
TOILETING: A LOT
MOVING FROM LYING ON BACK TO SITTING ON SIDE OF FLAT BED: A LOT
DRESSING REGULAR UPPER BODY CLOTHING: A LITTLE
HELP NEEDED FOR BATHING: A LOT
SUGGESTED CMS G CODE MODIFIER MOBILITY: CL
CLIMB 3 TO 5 STEPS WITH RAILING: A LOT
DAILY ACTIVITIY SCORE: 17
DRESSING REGULAR LOWER BODY CLOTHING: A LOT
WALKING IN HOSPITAL ROOM: A LOT
TURNING FROM BACK TO SIDE WHILE IN FLAT BAD: A LITTLE
STANDING UP FROM CHAIR USING ARMS: A LOT
MOBILITY SCORE: 13
MOVING TO AND FROM BED TO CHAIR: A LOT

## 2021-09-19 ASSESSMENT — PAIN DESCRIPTION - PAIN TYPE
TYPE: ACUTE PAIN
TYPE: ACUTE PAIN

## 2021-09-19 ASSESSMENT — ENCOUNTER SYMPTOMS
ABDOMINAL PAIN: 0
FEVER: 0
DIZZINESS: 0
VOMITING: 0
SHORTNESS OF BREATH: 0
DIARRHEA: 0
CHILLS: 0
NAUSEA: 0
CONSTIPATION: 0
COUGH: 0

## 2021-09-19 NOTE — PROGRESS NOTES
Hospital Medicine Daily Progress Note    Date of Service  9/19/2021    Chief Complaint  Lindsay Vargas is a 81 y.o. female admitted 9/15/2021 with generalized weakness    Hospital Course  Lindsay Vargas is a 81 y.o. female who presented 9/15/2021 with vague complaints of weakness and intermittent episodes of chest heaviness. Her  passed away about 3 weeks ago, since then she has been feeling more fatigued. She states that her legs have been bothering her due to insect bites from working on her tree outside.  She has essential hypertension, depression, GERD, and chronic pain.     On admission, EKG showed sinus rhythm with abnormal R wave progression, and nonspecific repolarization abnormality. Potassium was 2.7, troponin was 68 (has a history of elevated troponin levels). Patient febrile to 101.2 on admission, with sinus tachycardia. On 3LNC, apparently this is lower than her home baseline O2 needs. Patient has been thrombocytopenic since admission, but the trend is improving. On 9/18 patient complained of LLQ pain, with tenderness to palpation. A CT abd/pelvis was obtained and was negative for acute findings. Urine cultures grew klebsiella, still waiting for sensitivities. Continues to have hypomagnesemia requiring both scheduled PO supplementation and IVPB repletion.     Interval Problem Update  9/19- Patient was examined at bedside, she is not in acute distress and has no complaints at this time. She voices feeling somewhat better, and her legs seem to be moving better, but still feels weak when ambulating with PT.       I have personally seen and examined the patient at bedside. I discussed the plan of care with patient and bedside RN.    Consultants/Specialty  none    Code Status  Full Code    Disposition  Patient is not medically cleared.   Anticipate discharge to to skilled nursing facility.  I have placed the appropriate orders for post-discharge needs.    Review of Systems  Review of  Systems   Constitutional: Negative for chills and fever.   Respiratory: Negative for cough and shortness of breath.    Cardiovascular: Negative for chest pain and leg swelling.   Gastrointestinal: Negative for abdominal pain, constipation, diarrhea, nausea and vomiting.   Genitourinary: Negative for dysuria.   Skin: Positive for rash.   Neurological: Negative for dizziness.   All other systems reviewed and are negative.       Physical Exam  Temp:  [36.2 °C (97.2 °F)-36.8 °C (98.3 °F)] 36.4 °C (97.6 °F)  Pulse:  [74-96] 84  Resp:  [16-20] 16  BP: (119-148)/(43-66) 148/50  SpO2:  [92 %-96 %] 94 %    Physical Exam  Constitutional:       General: She is not in acute distress.  HENT:      Head: Normocephalic.   Eyes:      Pupils: Pupils are equal, round, and reactive to light.   Cardiovascular:      Rate and Rhythm: Normal rate and regular rhythm.      Pulses: Normal pulses.      Heart sounds: Murmur heard.   No friction rub. No gallop.       Comments: Grade 2-3/6 systolic murmur  Pulmonary:      Effort: Pulmonary effort is normal.      Breath sounds: No wheezing or rhonchi.   Abdominal:      General: Abdomen is flat. Bowel sounds are normal.      Palpations: Abdomen is soft.      Tenderness: There is no abdominal tenderness. There is no guarding.   Skin:     General: Skin is warm and dry.      Capillary Refill: Capillary refill takes less than 2 seconds.      Findings: Rash present.      Comments: Multiple bug bites to BLE   Neurological:      General: No focal deficit present.      Mental Status: She is alert.   Psychiatric:         Mood and Affect: Mood normal.         Behavior: Behavior normal.         Fluids    Intake/Output Summary (Last 24 hours) at 9/19/2021 0945  Last data filed at 9/18/2021 1230  Gross per 24 hour   Intake 120 ml   Output --   Net 120 ml       Laboratory  Recent Labs     09/17/21  0207 09/18/21  0353 09/19/21  0310   WBC 6.5 4.7* 4.4*   RBC 4.30 4.59 4.02*   HEMOGLOBIN 11.2* 11.8* 10.2*    HEMATOCRIT 34.5* 37.6 31.9*   MCV 80.2* 81.9 79.4*   MCH 26.0* 25.7* 25.4*   MCHC 32.5* 31.4* 32.0*   RDW 48.1 50.3* 48.7   PLATELETCT 51* 49* 58*   MPV 11.0 12.1 12.0     Recent Labs     21  0207 21  0353 21  0310   SODIUM 129* 130* 134*   POTASSIUM 3.7 4.0 4.1   CHLORIDE 95* 98 99   CO2 20 23 23   GLUCOSE 120* 112* 107*   BUN 19 14 10   CREATININE 0.81 0.73 0.57   CALCIUM 8.2* 8.3* 8.2*                   Imaging  CT-ABDOMEN-PELVIS W/O   Final Result         1. No acute inflammatory change in the abdomen or pelvis.      2. 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      3. Sigmoid diverticulosis.      4. Small left pleural effusion.      5. Mild splenomegaly.      US-EXTREMITY VENOUS LOWER BILAT   Final Result      EC-ECHOCARDIOGRAM COMPLETE W/O CONT   Final Result      [id number] VIEW   Final Result         No specific finding to suggest small bowel obstruction.      DX-CHEST-PORTABLE (1 VIEW)   Final Result         1. No new pulmonary infiltrates or consolidations are noted.      DX-CHEST-PORTABLE (1 VIEW)   Final Result      No evidence of acute cardiopulmonary process.           Assessment/Plan  * Weakness  Assessment & Plan  Likely multifactorial.  Patient's   a few weeks ago, and she states she needs help at home.  Unclear if she has been eating well.  Monitor and replace electrolytes as needed.    - PT/OT consulted, recommend post -acute placement for continued rehabilitation.   - SNF referral placed, [facility] willing to accept. Anticipate tomorrow      SARAH (acute kidney injury) (HCC)  Assessment & Plan  - Back to baseline      Hypophosphatemia  Assessment & Plan  - Repleted yesterday, will continue to monitor    LLQ pain  Assessment & Plan  - New onset yesterday. Denies pain or tenderness today  - CT Abd/pelvis negative for acute findings. No abdominal source of infection identified.    Pyelonephritis  Assessment & Plan  - Continue with Rocephin  coverage  - Blood culture growing klebsiella. Sensitivities indicate appropriate coverage, will continue with current abx course  - Urine culture negative, but was obtained 48hrs after starting abx coverage    Lactic acidosis  Assessment & Plan  - Continue IVF hydration    Positive D dimer  Assessment & Plan  Patient denies shortness of breath. On baseline 3LNC   Appropriate work of breathing, normal sinus rhythm on telemetry  - Likely in setting of sepsis. GNRs growing in blood cultures  - BLE dopplers negative  - Likely urinary source, CTA not indicated at this time    Bacteremia due to Gram-negative bacteria  Assessment & Plan  - 1 of 2 blood cultures positive 9/17, now with klebsiella pneumoniae  - Continue with ceftriaxone course now that sensitivities have resulted    Heart murmur  Assessment & Plan  - TTE does not reveal valvular abnormalities  - Noted for hyperdynamic LV function, digoxin discontinued and started on metoprolol per cardiology recommendations    QT prolongation  Assessment & Plan  -  - avoid QT prolonging drugs      Chronic respiratory failure (HCC)  Assessment & Plan  - On 2-3L currently  - Admit CXR normal  - D-dimer 3.49  - Repeat CXR shows no pulmonary infiltrates or consolidations. No pleural effusion. No pneumothorax    Sepsis (HCC)  Assessment & Plan  - Secondary pylonephritis  - Pt largely asymptomatic - denies dysuria, SOB, states she is on O2 at home, just feels generally weak. Did have one episode of emesis prior to arrival   - Bug bites on legs do not appear infected   - COVID negative  - Blood cultures 1 of 2 Positive for klebsiella  - Remains afebrile  - CT Abd/pelvis negative for underlying acute process   - UA significant for moderate bacteria growth, 10-20 WBCs. Culture results negative due to not being obtained 48hrs after abx started  - Continue coverage with Rocephin as it provides coverage        Hypocalcemia  Assessment & Plan  - Ionized Tnony mildly improved to  1.01, PTH 74.2  - Trended back down to 8.2. Will replete      Hypomagnesemia  Assessment & Plan  - Continue with PO supplementation  - 1.4 today, will replete with 4gm IVPB  - Continue to monitor AM level    Thrombocytopenia (HCC)  Assessment & Plan  - Likely secondary to underlying infection  - Trend improved overnight, 50>49>58     Elevated troponin  Assessment & Plan  Patient has a history of CAD/MI.  Noted to have slight elevation in troponin with repolarization abnormalities on admission, which has been seen in previous EKGs.  She denies any chest pain or shortness of breath    Chronic pain syndrome  Assessment & Plan  Continue home gabapentin.  As needed Tylenol added    AVNRT (AV king re-entry tachycardia) (HCC)  Assessment & Plan  - Pt with history of AVNRT causing weakness, was placed on dig with the intention to ablate if it recurred   - Echocardiogram demonstrated increased LV intracavitary gradient. Spoke with cardiology who recommends discontinuing digoxin and starting metoprolol and having her follow up with cardiology outpatient.    Essential hypertension  Assessment & Plan  History of CAD and essential hypertension.   Continue Norvasc/Metoprolol   Continue to hold high dose HCTZ with her electrolyte abnormalities  Digoxin level 1.5    Depression  Assessment & Plan  History of mood disorder. Continue home Cymbalta.  Monitor    Gastroesophageal reflux disease without esophagitis  Assessment & Plan  Stable.  Continue home omeprazole    Dehydration- (present on admission)  Assessment & Plan  - Resolved. Pt appears euvolemic at this time   - BUN/Cr normalized  - Hypochloremia has normalized    Hyponatremia- (present on admission)  Assessment & Plan  - Trend improving  - Will discontinue NS w/K+    - Continue to monitor BMP    Hypokalemia- (present on admission)  Assessment & Plan  - Pt was on very high dose HCTZ which likely contributed, held for now  - Will d/c NS w/K+ as potassium has now normalized.    - Continue to monitor AM BMP         VTE prophylaxis: SCDs/TEDs    I have performed a physical exam and reviewed and updated ROS and Plan today (9/19/2021). In review of yesterday's note (9/18/2021), there are no changes except as documented above.

## 2021-09-19 NOTE — DOCUMENTATION QUERY
[facility]                                                                       Query Response Note      PATIENT:               DWIGHT VILLA  ACCT #:                  [id number]  MRN:                     [id number]  :                      1940  ADMIT DATE:       9/15/2021 7:31 AM  DISCH DATE:          RESPONDING  PROVIDER #:        [id number]           QUERY TEXT:    Pt has documented:  positive d-dimer likely in the setting of sepsis noted in the progress notes (Sidney ZAVALA)  Please clarify status of this condition:    NOTE:  If an appropriate response is not listed below, please respond with a new note.       The patient's Clinical Indicators include:  Positive d-dimer likely in the setting of sepsis is noted in the progress notes (Sidney Kim).  Sepsis is not noted in the H&P or noted in the assessment/plan section of this medical record.    Temp 99.1 (adm 9/15 @ 0733)  Temp 101.2 (9/15 @ 2300)  P 91  d-dimer 3.49 (21) - US neg for DVT  blood cult + for Klebsiella pneumoniae x 1  lactic acid 2.5 (21 - admission on 9/15/21)  procalcitonin 1.63 (21)  urine WBC 10-20 w/mod bacteria     Treatment:  Rocephin for UTI (no culture results)  Ceftriaxone for Klebsiella pneumoniae bacteria   considering Flagyl if abd source identified   IV hydration     Risk:  Pt age with gram neg bacteremia in her blood (1of 2 cult +) with noted weakness due to  death a few weeks ago and poor nutritional status.    Thank you,  Karrie Radford, RN, BSN  Clinical   [facility]  x [phone]  [email or web address]  Options provided:   -- Sepsis is ruled in, please identify source and POA status   -- Sepsis is ruled out   -- Unable to determine      Query created by: Karrie Radford on 2021 12:27 PM    RESPONSE TEXT:    Sepsis is ruled in, please identify source and POA status           Electronically signed by:  LUCIE TRINH MD 9/19/2021 12:59 PM

## 2021-09-19 NOTE — HOSPITAL COURSE
Lindsay Vargas is a 81 y.o. female who presented 9/15/2021 with vague complaints of weakness and intermittent episodes of chest heaviness. Her  passed away about 3 weeks ago, since then she has been feeling more fatigued.  She states that her legs have been bothering her due to insect bites from working on her tree outside.  She has essential hypertension, depression, GERD, and chronic pain.     On admission, EKG showed sinus rhythm with abnormal R wave progression, and nonspecific repolarization abnormality. Potassium was 2.7, troponin was 68 (has a history of elevated troponin levels). Patient febrile to 101.2 on admission, with sinus tachycardia. On 3LNC, apparently this is lower than her home baseline O2 needs. Patient has had mixed pancytopenia that initially worsened, but is now improving in trend.  On 9/18 patient complained of LLQ pain, with tenderness to palpation. A CT abd/pelvis was obtained and was negative for acute findings. Urine cultures grew klebsiella, still waiting for sensitivities. Continues to have hypomagnesemia requiring both scheduled PO supplementation and IVPB repletion.

## 2021-09-19 NOTE — DISCHARGE PLANNING
Anticipated Discharge Disposition:   [facility]     Action:   Chart review complete.     Discussed patient's plan of care and plans for discharge during rounds. Per MD, patient anticipated to be medially cleared tomorrow.     RN CM to follow up with a bed at [facility] when patient is medically cleared.     Barriers to Discharge:   Medical Clearance  SNF bed availability     Plan:   Hospital care management will continue to assist with discharge planning needs.

## 2021-09-20 ENCOUNTER — PATIENT OUTREACH (OUTPATIENT)
Dept: HEALTH INFORMATION MANAGEMENT | Facility: OTHER | Age: 81
End: 2021-09-20

## 2021-09-20 VITALS
HEART RATE: 76 BPM | HEIGHT: 68 IN | TEMPERATURE: 97.8 F | OXYGEN SATURATION: 98 % | BODY MASS INDEX: 22.19 KG/M2 | RESPIRATION RATE: 17 BRPM | WEIGHT: 146.39 LBS | DIASTOLIC BLOOD PRESSURE: 45 MMHG | SYSTOLIC BLOOD PRESSURE: 119 MMHG

## 2021-09-20 LAB
ALBUMIN SERPL BCP-MCNC: 3.2 G/DL (ref 3.2–4.9)
ALBUMIN/GLOB SERPL: 1 G/DL
ALP SERPL-CCNC: 100 U/L (ref 30–99)
ALT SERPL-CCNC: 22 U/L (ref 2–50)
ANION GAP SERPL CALC-SCNC: 10 MMOL/L (ref 7–16)
AST SERPL-CCNC: 33 U/L (ref 12–45)
BILIRUB SERPL-MCNC: 0.3 MG/DL (ref 0.1–1.5)
BUN SERPL-MCNC: 8 MG/DL (ref 8–22)
CALCIUM SERPL-MCNC: 8.5 MG/DL (ref 8.4–10.2)
CHLORIDE SERPL-SCNC: 97 MMOL/L (ref 96–112)
CO2 SERPL-SCNC: 26 MMOL/L (ref 20–33)
CREAT SERPL-MCNC: 0.62 MG/DL (ref 0.5–1.4)
ERYTHROCYTE [DISTWIDTH] IN BLOOD BY AUTOMATED COUNT: 48.5 FL (ref 35.9–50)
GLOBULIN SER CALC-MCNC: 3.2 G/DL (ref 1.9–3.5)
GLUCOSE SERPL-MCNC: 111 MG/DL (ref 65–99)
HCT VFR BLD AUTO: 33.2 % (ref 37–47)
HGB BLD-MCNC: 10.9 G/DL (ref 12–16)
MAGNESIUM SERPL-MCNC: 1.5 MG/DL (ref 1.5–2.5)
MCH RBC QN AUTO: 25.8 PG (ref 27–33)
MCHC RBC AUTO-ENTMCNC: 32.8 G/DL (ref 33.6–35)
MCV RBC AUTO: 78.5 FL (ref 81.4–97.8)
PLATELET # BLD AUTO: 82 K/UL (ref 164–446)
PMV BLD AUTO: 11 FL (ref 9–12.9)
POTASSIUM SERPL-SCNC: 4.1 MMOL/L (ref 3.6–5.5)
PROT SERPL-MCNC: 6.4 G/DL (ref 6–8.2)
RBC # BLD AUTO: 4.23 M/UL (ref 4.2–5.4)
SODIUM SERPL-SCNC: 133 MMOL/L (ref 135–145)
WBC # BLD AUTO: 5.5 K/UL (ref 4.8–10.8)

## 2021-09-20 PROCEDURE — A9270 NON-COVERED ITEM OR SERVICE: HCPCS | Performed by: NURSE PRACTITIONER

## 2021-09-20 PROCEDURE — A9270 NON-COVERED ITEM OR SERVICE: HCPCS | Performed by: FAMILY MEDICINE

## 2021-09-20 PROCEDURE — 80053 COMPREHEN METABOLIC PANEL: CPT

## 2021-09-20 PROCEDURE — 700102 HCHG RX REV CODE 250 W/ 637 OVERRIDE(OP): Performed by: NURSE PRACTITIONER

## 2021-09-20 PROCEDURE — 700102 HCHG RX REV CODE 250 W/ 637 OVERRIDE(OP): Performed by: FAMILY MEDICINE

## 2021-09-20 PROCEDURE — 99239 HOSP IP/OBS DSCHRG MGMT >30: CPT | Performed by: FAMILY MEDICINE

## 2021-09-20 PROCEDURE — 85027 COMPLETE CBC AUTOMATED: CPT

## 2021-09-20 PROCEDURE — 700102 HCHG RX REV CODE 250 W/ 637 OVERRIDE(OP): Performed by: INTERNAL MEDICINE

## 2021-09-20 PROCEDURE — 36415 COLL VENOUS BLD VENIPUNCTURE: CPT

## 2021-09-20 PROCEDURE — A9270 NON-COVERED ITEM OR SERVICE: HCPCS | Performed by: INTERNAL MEDICINE

## 2021-09-20 PROCEDURE — 700111 HCHG RX REV CODE 636 W/ 250 OVERRIDE (IP): Performed by: FAMILY MEDICINE

## 2021-09-20 PROCEDURE — 83735 ASSAY OF MAGNESIUM: CPT

## 2021-09-20 RX ORDER — CEFDINIR 300 MG/1
300 CAPSULE ORAL EVERY 12 HOURS
Status: DISCONTINUED | OUTPATIENT
Start: 2021-09-20 | End: 2021-09-20 | Stop reason: HOSPADM

## 2021-09-20 RX ORDER — MAGNESIUM SULFATE HEPTAHYDRATE 40 MG/ML
2 INJECTION, SOLUTION INTRAVENOUS ONCE
Status: COMPLETED | OUTPATIENT
Start: 2021-09-20 | End: 2021-09-20

## 2021-09-20 RX ORDER — CEFDINIR 300 MG/1
300 CAPSULE ORAL EVERY 12 HOURS
Qty: 12 CAPSULE | Refills: 0 | Status: SHIPPED | OUTPATIENT
Start: 2021-09-20 | End: 2021-09-26

## 2021-09-20 RX ORDER — CEFDINIR 300 MG/1
300 CAPSULE ORAL 2 TIMES DAILY
Qty: 20 CAPSULE | Refills: 0 | Status: CANCELLED | OUTPATIENT
Start: 2021-09-20

## 2021-09-20 RX ADMIN — GABAPENTIN 100 MG: 250 SOLUTION ORAL at 12:48

## 2021-09-20 RX ADMIN — LORATADINE 10 MG: 10 TABLET ORAL at 07:52

## 2021-09-20 RX ADMIN — GABAPENTIN 100 MG: 250 SOLUTION ORAL at 07:51

## 2021-09-20 RX ADMIN — OMEPRAZOLE 40 MG: 20 CAPSULE, DELAYED RELEASE ORAL at 07:51

## 2021-09-20 RX ADMIN — CHOLECALCIFEROL TAB 25 MCG (1000 UNIT) 1000 UNITS: 25 TAB at 07:51

## 2021-09-20 RX ADMIN — MAGNESIUM SULFATE HEPTAHYDRATE 2 G: 40 INJECTION, SOLUTION INTRAVENOUS at 10:07

## 2021-09-20 RX ADMIN — CEFDINIR 300 MG: 300 CAPSULE ORAL at 10:07

## 2021-09-20 RX ADMIN — MAGNESIUM OXIDE TAB 400 MG (241.3 MG ELEMENTAL MG) 400 MG: 400 (241.3 MG) TAB at 10:07

## 2021-09-20 RX ADMIN — DULOXETINE 60 MG: 30 CAPSULE, DELAYED RELEASE ORAL at 07:52

## 2021-09-20 RX ADMIN — METOPROLOL TARTRATE 12.5 MG: 25 TABLET, FILM COATED ORAL at 07:51

## 2021-09-20 RX ADMIN — AMLODIPINE BESYLATE 10 MG: 5 TABLET ORAL at 07:52

## 2021-09-20 ASSESSMENT — PAIN DESCRIPTION - PAIN TYPE: TYPE: ACUTE PAIN

## 2021-09-20 NOTE — PROGRESS NOTES
Telemetry Shift Summary     Rhythm SR  HR Range 78-86  Ectopy N/A  Measurements 0.20/0.08/0.32           Normal Values  Rhythm SR  HR Range    Measurements 0.12-0.20 / 0.06-0.10  / 0.30-0.52

## 2021-09-20 NOTE — DISCHARGE PLANNING
Anticipated Discharge Disposition: SNF- [facility]     Action: Pt discussed during IDT rounds. Per Dr. Davis pt medically cleared. Signature provided for COBRA.     LSW called [facility] to f/u with bed availability. No answer. LSW left a voicemail with name and number for call back. Pt appropriate for wheelchair transport.     Barriers to Discharge: SNF bed availability     Plan: Await SNF bed availability, LSW to continue to follow for d/c needs    Addendum  0949  LSW called [facility] and spoke to Lenore. Lenore requested LSW called main number.   LSW left a voicemail for [facility] informing that pt is medically cleared and okay to transfer via wheelchair. LSW left name and number for call back.     Addendum 0956  LSW received a voicemail from [facility] informing LSW that a bed is available. LSW called [facility] back and informed that transport confirmed for 1500 today.     LSW provided update to Dr. Davis and bedside RN, Lizy.     LSW to complete COBRA and transfer packet.      Addendum 1026  LSW met with pt at bedside to provide update. Pt stated she could not write and requested LSW take a verbal for COBRA and IMM. Pt was provided a copy of IMM.     LSW called pt's daughter, Tequila to provide update.    Addendum 1817  LSW faxed IMM to [facility].   LSW placed completed transfer packet in pt's chart.

## 2021-09-20 NOTE — DISCHARGE SUMMARY
Discharge Summary    CHIEF COMPLAINT ON ADMISSION  Chief Complaint   Patient presents with   • Weakness     Leg weakness and difficulty walking   • Failure to Thrive     Per family pt is declining over the last 3 weeks after the death of her    • Abrasion     Leg bruising and abrasions, pt states due to being stung by bugs from her plum tree.        Reason for Admission  EMS     Admission Date  9/15/2021    CODE STATUS  Full Code    HPI & HOSPITAL COURSE   Lindsay Vargas is a 81 y.o. female who presented 9/15/2021 with vague complaints of weakness and intermittent episodes of chest heaviness. Her  passed away about 3 weeks ago, since then she has been feeling more fatigued. She states that her legs have been bothering her due to insect bites from working on her tree outside.  She has essential hypertension, depression, GERD, and chronic pain.     On admission, EKG showed sinus rhythm with abnormal R wave progression, and nonspecific repolarization abnormality. Potassium was 2.7, troponin was 68 (has a history of elevated troponin levels). Patient febrile to 101.2 on admission, with sinus tachycardia. Defervesced with hydration and antibiotic therapy. On 3LNC, apparently this is lower than her home baseline O2 needs. Patient has thrombocytopenia that initially worsened, but is now improving in trend. Was found to be hyponatremic but has since stabilized in the 133-135 range.  On 9/18 patient complained of LLQ pain, with tenderness to palpation. A CT abd/pelvis was obtained and was negative for acute findings. Blood cultures grew klebsiella which is pansensitive. Converted IV Rocephin to PO Omnicef in anticipation of discharge to SNF. Urine cultures were negative but were collected 48 hours after starting antibiotics. Continues to have hypomagnesemia requiring scheduled PO supplementation.       Therefore, she is discharged in fair and stable condition to skilled nursing facility.    The patient  met 2-midnight criteria for an inpatient stay at the time of discharge.    Discharge Date  9/20/21    FOLLOW UP ITEMS POST DISCHARGE  Patient will need to follow up PCP 1 week after discharge from SNF    DISCHARGE DIAGNOSES  Principal Problem:    Weakness POA: Unknown  Active Problems:    Hypokalemia POA: Yes    Hyponatremia POA: Yes    Dehydration POA: Yes    Gastroesophageal reflux disease without esophagitis POA: Unknown    Depression POA: Unknown    Essential hypertension POA: Unknown    AVNRT (AV king re-entry tachycardia) ([facility]) POA: Unknown      Overview: IMO load March 2020    Chronic pain syndrome POA: Unknown    Elevated troponin POA: Unknown    Thrombocytopenia ([facility]) POA: Unknown    Hypomagnesemia POA: Unknown    Hypocalcemia POA: Unknown    Sepsis ([facility]) POA: Unknown    Chronic respiratory failure ([facility]) POA: Unknown    QT prolongation POA: Unknown    Heart murmur POA: Unknown    Bacteremia due to Gram-negative bacteria POA: Unknown    Positive D dimer POA: Unknown    Lactic acidosis POA: Unknown    Pyelonephritis POA: Unknown    LLQ pain POA: Unknown    Hypophosphatemia POA: Unknown    SARAH (acute kidney injury) ([facility]) POA: Unknown  Resolved Problems:    * No resolved hospital problems. *      FOLLOW UP  No future appointments.  No follow-up provider specified.    MEDICATIONS ON DISCHARGE     Medication List      START taking these medications      Instructions   cefdinir 300 MG Caps  Commonly known as: OMNICEF   Take 1 Capsule by mouth every 12 hours for 6 days.  Dose: 300 mg     magnesium oxide 400 MG Tabs tablet  Commonly known as: MAG-OX   Take 1 Tablet by mouth 2 times a day.  Dose: 400 mg     metoprolol tartrate 25 MG Tabs  Commonly known as: LOPRESSOR   Take 0.5 Tablets by mouth 2 times a day.  Dose: 12.5 mg     vitamin D 1000 UNIT Tabs  Start taking on: September 21, 2021  Commonly known as: VITAMIND D3   Take 1 Tablet by mouth every day.  Dose: 1,000 Units        CHANGE how you take these  medications      Instructions   omeprazole 40 MG delayed-release capsule  What changed:   · how much to take  · how to take this  · when to take this  Commonly known as: PRILOSEC   TAKE 1 CAPSULE BY MOUTH ONCE A DAY 30 MINUTES BEFORE BREAKFAST MEAL        CONTINUE taking these medications      Instructions   amLODIPine 5 MG Tabs  Commonly known as: NORVASC   Take 5 mg by mouth every day.  Dose: 5 mg     DULoxetine 60 MG Cpep delayed-release capsule  Commonly known as: CYMBALTA   Take 1 Cap by mouth every day.  Dose: 60 mg     gabapentin 100 MG Caps  Commonly known as: NEURONTIN   Take 100 mg by mouth 3 times a day.  Dose: 100 mg     loratadine 10 MG Tabs  Commonly known as: CLARITIN   Take 10 mg by mouth every day.  Dose: 10 mg        STOP taking these medications    amoxicillin-clavulanate 875-125 MG Tabs  Commonly known as: AUGMENTIN     digoxin 125 MCG Tabs  Commonly known as: LANOXIN     hydroCHLOROthiazide 25 MG Tabs  Commonly known as: HYDRODIURIL            Allergies  No Known Allergies    DIET  Orders Placed This Encounter   Procedures   • Diet Order Diet: Regular     Standing Status:   Standing     Number of Occurrences:   1     Order Specific Question:   Diet:     Answer:   Regular [1]       ACTIVITY  As tolerated and directed by skilled nursing.  Weight bearing as tolerated    CONSULTATIONS  None     PROCEDURES  N/A    LABORATORY  Lab Results   Component Value Date    SODIUM 133 (L) 09/20/2021    POTASSIUM 4.1 09/20/2021    CHLORIDE 97 09/20/2021    CO2 26 09/20/2021    GLUCOSE 111 (H) 09/20/2021    BUN 8 09/20/2021    CREATININE 0.62 09/20/2021    CREATININE 0.9 12/29/2008        Lab Results   Component Value Date    WBC 5.5 09/20/2021    HEMOGLOBIN 10.9 (L) 09/20/2021    HEMATOCRIT 33.2 (L) 09/20/2021    PLATELETCT 82 (L) 09/20/2021        Total time of the discharge process exceeds 38 minutes.

## 2021-09-20 NOTE — DISCHARGE INSTRUCTIONS
Bacteremia, Adult  Bacteremia is the presence of bacteria in the blood. When bacteria enter the bloodstream, they can cause a life-threatening reaction called sepsis, which is a medical emergency. Bacteremia can spread to other parts of the body, including the heart, joints, and brain.  What are the causes?  This condition is caused by bacteria that get into the blood.  · Bacteria can enter the blood:  ? From a skin infection or injury, such as a burn or a cut.  ? From a lung infection (pneumonia).  ? From an infection in your stomach or intestines (gastrointestinal infection).  ? From an infection in your bladder or urinary system (urinary tract infection).  ? During a dental or medical procedure.  ? From bleeding gums.  ? When a bacterial infection in another part of your body spreads to your blood.  ? Through an unclean (contaminated) needle.  What increases the risk?  This condition is more likely to develop in children, the elderly, and people who:  · Have a long-term (chronic) disease or condition like diabetes or chronic kidney failure.  · Have an artificial joint or heart valve.  · Have heart valve disease.  · Have a tube inserted to treat a medical condition, such as a urinary catheter or IV.  · Have a weak disease-fighting system (immune system).  · Inject illegal drugs.  · Have been hospitalized for more than 10 days in a row.  What are the signs or symptoms?  Symptoms of this condition include:  · Fever.  · Chills.  · Fast heartbeat.  · Shortness of breath.  · Dizziness.  · Weakness.  · Confusion.  · Nausea or vomiting.  · Diarrhea.  · Low blood pressure.  · Decreased urine output.  Bacteremia that has spread to other parts of the body may cause symptoms in those areas. In some cases, there are no symptoms.  How is this diagnosed?  This condition may be diagnosed with a physical exam and tests, such as:  · A complete blood count (CBC). This test checks for signs of infection.  · Blood cultures. These  check for bacteria in your blood.  · Tests of any tubes that you have had inserted. These tests check for a source of infection.  · Urine tests, including urine cultures. These check for bacteria in the urine that could be a source of infection.  · Imaging tests, such as an X-ray, CT scan, MRI, or heart ultrasound. These check for a source of infection in other parts of your body, such as your lungs, heart valves, or joints.  How is this treated?  This condition is usually treated in the hospital. Treatment may involve:  · Antibiotic medicines. These may be given by mouth (orally) or directly into your blood through an IV (infusion through your vein).  ? Depending on the source of infection, you may need antibiotics for several weeks.  ? At first, you may be given an antibiotic to kill most types of blood bacteria (broad-spectrum antibiotic). If your test results show that a certain kind of bacteria is causing the problem, you may be given a different antibiotic to kill that specific bacteria.  · IV fluids.  · Removing any catheter or device that could be a source of infection.  · Blood pressure and breathing support, if needed.  · Surgery to control the source or the spread of infection, such as surgery to remove an infected device, abscess, or tissue.  · Having follow-up visits for medicines, blood tests, and further evaluation.  Follow these instructions at home:  Medicines  · Take over-the-counter and prescription medicines only as told by your health care provider.  · If you were prescribed an antibiotic medicine, take it as told by your health care provider. Do not stop taking the antibiotic even if you start to feel better.  General instructions    · Rest as needed. Ask your health care provider when you may return to normal activities.  · Drink enough fluid to keep your urine pale yellow.  · Do not use any products that contain nicotine or tobacco, such as cigarettes and e-cigarettes. If you need help  quitting, ask your health care provider.  · Keep all follow-up visits as told by your health care provider. This is important.  How is this prevented?    · Wash your hands regularly with soap and water. If soap and water are not available, use hand .  · You should wash your hands:  ? After using the toilet or changing a diaper.  ? Before preparing, cooking, or serving food.  ? While caring for a sick person or while visiting someone in a hospital.  ? Before and after changing bandages (dressings) over wounds.  · Clean any scrapes or cuts with soap and water and cover them with clean dressings.  · Get vaccinations as recommended by your health care provider.  · Practice good oral hygiene. Brush your teeth two times a day, and floss regularly.  · Take good care of your skin. This includes bathing and moisturizing on a regular basis.  Get help right away if you have:  · Pain.  · A fever or chills.  · Trouble breathing.  · A fast heart rate.  · Skin that is blotchy, pale, or clammy.  · Confusion.  · Weakness.  · Lack of energy (lethargy) or unusual sleepiness.  · Diarrhea.  · New symptoms that develop after treatment has started.  These symptoms may represent a serious problem that is an emergency. Do not wait to see if the symptoms will go away. Get medical help right away. Call your local emergency services (911 in the [place] Do not drive yourself to the hospital.  Summary  · Bacteremia is the presence of bacteria in the blood. When bacteria enter the bloodstream, they can cause a life-threatening reaction called sepsis.  · Some symptoms of bacteremia include fever, chills, shortness of breath, confusion, nausea or vomiting, and diarrhea.  · Tests may be done to find the source of infection that led to bacteremia. These tests may include blood tests, urine tests, and imaging tests.  · Bacteremia is usually treated with antibiotic medicines in the hospital.  · Get help right away if you have any new symptoms  that develop after treatment has started.  This information is not intended to replace advice given to you by your health care provider. Make sure you discuss any questions you have with your health care provider.  Document Released: 10/01/2007 Document Revised: 04/29/2019 Document Reviewed: 04/29/2019  [employer] Patient Education © 2020 [employer]      Weakness  Weakness is a lack of strength. You may feel weak all over your body (generalized), or you may feel weak in one part of your body (focal). There are many potential causes of weakness. Sometimes, the cause of your weakness may not be known. Some causes of weakness can be serious, so it is important to see your doctor.  Follow these instructions at home:  Activity  · Rest as needed.  · Try to get enough sleep. Most adults need 7-8 hours of sleep each night. Talk to your doctor about how much sleep you need each night.  · Do exercises, such as arm curls and leg raises, for 30 minutes at least 2 days a week or as told by your doctor.  · Think about working with a physical therapist or  to help you get stronger.  General instructions    · Take over-the-counter and prescription medicines only as told by your doctor.  · Eat a healthy, well-balanced diet. This includes:  ? Proteins to build muscles, such as lean meats and fish.  ? Fresh fruits and vegetables.  ? Carbohydrates to boost energy, such as whole grains.  · Drink enough fluid to keep your pee (urine) pale yellow.  · Keep all follow-up visits as told by your doctor. This is important.  Contact a doctor if:  · Your weakness does not get better or it gets worse.  · Your weakness affects your ability to:  ? Think clearly.  ? Do your normal daily activities.  Get help right away if you:  · Have sudden weakness on one side of your face or body.  · Have chest pain.  · Have trouble breathing or shortness of breath.  · Have problems with your vision.  · Have trouble talking or swallowing.  · Have trouble  standing or walking.  · Are light-headed.  · Pass out (lose consciousness).  Summary  · Weakness is a lack of strength. You may feel weak all over your body or just in one part of your body.  · There are many potential causes of weakness. Sometimes, the cause of your weakness may not be known.  · Rest as needed, and try to get enough sleep. Most adults need 7-8 hours of sleep each night.  · Eat a healthy, well-balanced diet.  This information is not intended to replace advice given to you by your health care provider. Make sure you discuss any questions you have with your health care provider.  Document Released: 11/30/2009 Document Revised: 07/24/2019 Document Reviewed: 07/24/2019  [employer] Patient Education © 2020 [employer]      Discharge Instructions    Discharged to [facility] home by medical transportation with escort. Discharged via ambulance, hospital escort: Yes.  Special equipment needed: Oxygen    Be sure to schedule a follow-up appointment with your primary care doctor or any specialists as instructed.     Discharge Plan:        I understand that a diet low in cholesterol, fat, and sodium is recommended for good health. Unless I have been given specific instructions below for another diet, I accept this instruction as my diet prescription.   Other diet: regular    Special Instructions: None    · Is patient discharged on Warfarin / Coumadin?   No     Depression / Suicide Risk    As you are discharged from this [facility] facility, it is important to learn how to keep safe from harming yourself.    Recognize the warning signs:  · Abrupt changes in personality, positive or negative- including increase in energy   · Giving away possessions  · Change in eating patterns- significant weight changes-  positive or negative  · Change in sleeping patterns- unable to sleep or sleeping all the time   · Unwillingness or inability to communicate  · Depression  · Unusual sadness, discouragement and loneliness  · Talk  of wanting to die  · Neglect of personal appearance   · Rebelliousness- reckless behavior  · Withdrawal from people/activities they love  · Confusion- inability to concentrate     If you or a loved one observes any of these behaviors or has concerns about self-harm, here's what you can do:  · Talk about it- your feelings and reasons for harming yourself  · Remove any means that you might use to hurt yourself (examples: pills, rope, extension cords, firearm)  · Get professional help from the community (Mental Health, Substance Abuse, psychological counseling)  · Do not be alone:Call your Safe Contact- someone whom you trust who will be there for you.  · Call your local CRISIS HOTLINE 957-1229 or 808-165-5573  · Call your local Children's Mobile Crisis Response Team [place] (785) 314-4108 or www.Months Of Me  · Call the toll free National Suicide Prevention Hotlines   · National Suicide Prevention Lifeline 233-926-IKPV (0946)  · National Hope Line Network 800-SUICIDE (118-8024)

## 2021-09-20 NOTE — PROGRESS NOTES
Reviewed discharge instructions with patient. Patient is discharging to [facility]. Handsoff report given to RN at station 2 in [facility]. Patient show understanding of discharge instructions, all questions answered. IV  Dc'd. Patient meet criteria  for discharge. Discharge paper signed. Patient was escorted outside hospital on a wheelchair with transport.

## 2021-09-20 NOTE — PROGRESS NOTES
Telemetry Shift Summary    Rhythm SR  HR Range 70s-80s  Ectopy rare PAC  Measurements 0.20/0.08/0.38        Normal Values  Rhythm SR  HR Range    Measurements 0.12-0.20 / 0.06-0.10  / 0.30-0.52

## 2021-09-21 LAB
BACTERIA BLD CULT: NORMAL
BACTERIA UR CULT: ABNORMAL
BACTERIA UR CULT: ABNORMAL
SIGNIFICANT IND 70042: ABNORMAL
SIGNIFICANT IND 70042: NORMAL
SITE SITE: ABNORMAL
SITE SITE: NORMAL
SOURCE SOURCE: ABNORMAL
SOURCE SOURCE: NORMAL

## 2021-09-30 ENCOUNTER — HOME HEALTH ADMISSION (OUTPATIENT)
Dept: HOME HEALTH SERVICES | Facility: HOME HEALTHCARE | Age: 81
End: 2021-09-30
Payer: MEDICARE

## 2021-10-07 ENCOUNTER — HOME CARE VISIT (OUTPATIENT)
Dept: HOME HEALTH SERVICES | Facility: HOME HEALTHCARE | Age: 81
End: 2021-10-07
Payer: MEDICARE

## 2021-10-07 VITALS
SYSTOLIC BLOOD PRESSURE: 140 MMHG | OXYGEN SATURATION: 98 % | RESPIRATION RATE: 20 BRPM | DIASTOLIC BLOOD PRESSURE: 60 MMHG | TEMPERATURE: 97.6 F | BODY MASS INDEX: 22.2 KG/M2 | WEIGHT: 146 LBS | HEART RATE: 83 BPM

## 2021-10-07 PROCEDURE — 665001 SOC-HOME HEALTH

## 2021-10-07 PROCEDURE — G0493 RN CARE EA 15 MIN HH/HOSPICE: HCPCS

## 2021-10-07 ASSESSMENT — ENCOUNTER SYMPTOMS
HIGHEST PAIN SEVERITY IN PAST 24 HOURS: 3/10
NAUSEA: DENIES
PAIN LOCATION - EXACERBATING FACTORS: END OF DAY FATIGUE
PAIN LOCATION: NECK
PAIN: 1
PAIN LOCATION - PAIN FREQUENCY: INFREQUENT
PAIN LOCATION - PAIN DURATION: 20-30 MIN
VOMITING: DEINES
PAIN LOCATION - PAIN SEVERITY: 3/10
PAIN LOCATION - PAIN QUALITY: ACHY
DEPRESSED MOOD: 1
PAIN LOCATION - PAIN DURATION: 20-30 MIN
PAIN LOCATION - PAIN FREQUENCY: INFREQUENT
SUBJECTIVE PAIN PROGRESSION: UNCHANGED
PAIN SEVERITY GOAL: 0/10
PERSON REPORTING PAIN: PATIENT
PAIN LOCATION - EXACERBATING FACTORS: END OF DAY FATIGUE
POOR JUDGMENT: 1
LOWEST PAIN SEVERITY IN PAST 24 HOURS: 0/10
PAIN LOCATION: BACK
PAIN LOCATION - PAIN SEVERITY: 3/10
PAIN LOCATION - PAIN QUALITY: ACHY

## 2021-10-07 ASSESSMENT — PATIENT HEALTH QUESTIONNAIRE - PHQ9
2. FEELING DOWN, DEPRESSED, IRRITABLE, OR HOPELESS: 01
1. LITTLE INTEREST OR PLEASURE IN DOING THINGS: 00

## 2021-10-07 ASSESSMENT — FIBROSIS 4 INDEX: FIB4 SCORE: 7.12

## 2021-10-07 NOTE — Clinical Note
Primary dx/Skilled need:   SN frequency.  Zip code.  Disciplines ordered.  Insurance and authorization.  Certification period.  Special considerations.Assess ability to learn and education level. Pt. and pt's daughter Tequila are willing and able to learn.  Grandson Kayden also willing to learn.  No learning barriers for Tequila or Kayden.  Pt. is [place] and new to medication management.  Anxious re., correct management as  who passed unexpectedly 1 month ago managed her medication, so this will be new learning for her. anxious at times requires assurance and redirection at times

## 2021-10-08 ENCOUNTER — DOCUMENTATION (OUTPATIENT)
Dept: MEDICAL GROUP | Facility: PHYSICIAN GROUP | Age: 81
End: 2021-10-08

## 2021-10-08 NOTE — CASE COMMUNICATION
Apoogies incomplete previous CC    Primary dx/Skilled need: HTN.  Diarrhea.  CKI with UTI on 9/18-currently resolved.  Frequent falls. Denies depression post 's unexpected death one month ago after 60 years of marriage.  New to medication management ( performed this task)  SN frequency.1wk1, 2wk2, 1wk2  Zip code.[id number]  Disciplines ordered.SN, PT, OT  Insurance and authorization.[facility]  Certification period.10/7 to 12/5/21 (SN extended for only 5 weeks)  Special considerations.Anxious re., correct management as  who passed unexpectedly 1 month ago managed her medication, so this will be new learning for her. anxious at times requires assurance and redirection at times

## 2021-10-08 NOTE — PROGRESS NOTES
Medication chart review for [facility] services    PCP:  Lenny RENTERIA M.D.  [address]  [place] [address]  Fax: [phone]    Current medication list     Current Outpatient Medications:   •  Naproxen Sodium, 200 mg, Oral, BID PRN  •  magnesium oxide, 400 mg, Oral, BID (Patient taking differently: 400 mg, Oral, 2 TIMES DAILY, Magnesium is a 250mg tablet 2 times a day)  •  metoprolol tartrate, 12.5 mg, Oral, BID  •  vitamin D3, 1,000 Units, Oral, DAILY  •  loratadine, 10 mg, Oral, QDAY  •  gabapentin, 100 mg, Oral, TID  •  amLODIPine, 5 mg, Oral, DAILY  •  DULoxetine, 60 mg, Oral, DAILY  •  omeprazole, TAKE 1 CAPSULE BY MOUTH ONCE A DAY 30 MINUTES BEFORE BREAKFAST MEAL (Patient taking differently: 40 mg, Oral, DAILY, TAKE 1 CAPSULE BY MOUTH ONCE A DAY 30 MINUTES BEFORE BREAKFAST MEAL)    No Known Allergies    Labs     Lab Results   Component Value Date/Time    SODIUM 133 (L) 09/20/2021 07:30 AM    POTASSIUM 4.1 09/20/2021 07:30 AM    CHLORIDE 97 09/20/2021 07:30 AM    CO2 26 09/20/2021 07:30 AM    GLUCOSE 111 (H) 09/20/2021 07:30 AM    BUN 8 09/20/2021 07:30 AM    CREATININE 0.62 09/20/2021 07:30 AM    CREATININE 0.9 12/29/2008 10:00 AM     Lab Results   Component Value Date/Time    ALKPHOSPHAT 100 (H) 09/20/2021 07:30 AM    ASTSGOT 33 09/20/2021 07:30 AM    ALTSGPT 22 09/20/2021 07:30 AM    TBILIRUBIN 0.3 09/20/2021 07:30 AM    INR 1.03 03/18/2019 08:32 AM    ALBUMIN 3.2 09/20/2021 07:30 AM        Assessment and Plan:   • Received referral from [facility]. Medications reviewed.       Flavio Calhoun, PharmD, MS, BCACP, C  [facility]  Phone [phone] fax [phone]    This note was created using voice recognition software (Dragon). The accuracy of the dictation is limited by the abilities of the software. I have reviewed the note prior to signing, however some errors in grammar and context are still possible. If you have any questions related to this note  please do not hesitate to contact our office.

## 2021-10-09 ASSESSMENT — PATIENT HEALTH QUESTIONNAIRE - PHQ9
SUM OF ALL RESPONSES TO PHQ QUESTIONS 1-9: 2
CLINICAL INTERPRETATION OF PHQ2 SCORE: 1
5. POOR APPETITE OR OVEREATING: 0 - NOT AT ALL

## 2021-10-09 ASSESSMENT — ACTIVITIES OF DAILY LIVING (ADL): OASIS_M1830: 03

## 2021-10-11 ENCOUNTER — HOME CARE VISIT (OUTPATIENT)
Dept: HOME HEALTH SERVICES | Facility: HOME HEALTHCARE | Age: 81
End: 2021-10-11
Payer: MEDICARE

## 2021-10-11 VITALS
SYSTOLIC BLOOD PRESSURE: 120 MMHG | RESPIRATION RATE: 20 BRPM | TEMPERATURE: 98.2 F | HEART RATE: 78 BPM | OXYGEN SATURATION: 95 % | DIASTOLIC BLOOD PRESSURE: 80 MMHG

## 2021-10-11 PROCEDURE — G0299 HHS/HOSPICE OF RN EA 15 MIN: HCPCS

## 2021-10-11 ASSESSMENT — ENCOUNTER SYMPTOMS
NAUSEA: DENIES
DENIES PAIN: 1
PERSON REPORTING PAIN: PATIENT
MUSCLE WEAKNESS: 1
VOMITING: DENIES

## 2021-10-12 NOTE — CASE COMMUNICATION
Pt requested delay in P.T. evaluation until week of 10/18/2021 due to family activities due to her spouses memorial activities

## 2021-10-13 ENCOUNTER — HOME CARE VISIT (OUTPATIENT)
Dept: HOME HEALTH SERVICES | Facility: HOME HEALTHCARE | Age: 81
End: 2021-10-13
Payer: MEDICARE

## 2021-10-13 NOTE — CASE COMMUNICATION
Quality Review Completed for 10/7 OASIS by SHAGUFTA Alvarado RN on 10/13,  2021:  Edits completed by SHAGUFTA Alvarado RN:  1.  changed to #4 due to pain interferes w/sleep  2.  is #1 per functional limitation of endurance  3. [id number] goal is independent, refused in not accepted per agency policy  4. [id number] , P and M changed to goal of supervision for 50' ambulation at DC goal

## 2021-10-14 ENCOUNTER — HOME CARE VISIT (OUTPATIENT)
Dept: HOME HEALTH SERVICES | Facility: HOME HEALTHCARE | Age: 81
End: 2021-10-14
Payer: MEDICARE

## 2021-10-14 VITALS
HEART RATE: 77 BPM | DIASTOLIC BLOOD PRESSURE: 75 MMHG | OXYGEN SATURATION: 97 % | RESPIRATION RATE: 18 BRPM | SYSTOLIC BLOOD PRESSURE: 120 MMHG | TEMPERATURE: 98 F

## 2021-10-14 PROCEDURE — G0493 RN CARE EA 15 MIN HH/HOSPICE: HCPCS

## 2021-10-14 ASSESSMENT — ENCOUNTER SYMPTOMS
VOMITING: DENIES
HIGHEST PAIN SEVERITY IN PAST 24 HOURS: 5/10
LOWEST PAIN SEVERITY IN PAST 24 HOURS: 5/10
MUSCLE WEAKNESS: 1
SUBJECTIVE PAIN PROGRESSION: UNCHANGED
NAUSEA: DENIES
PAIN SEVERITY GOAL: 0/10
PAIN LOCATION: RIGHT SHOULDER

## 2021-10-14 NOTE — CASE COMMUNICATION
I agree with these changes    ----- Message -----  From: Marcelina Alvarado R.N.  Sent: 10/13/2021   1:29 PM PDT  To: Maira Reyes R.N.      Quality Review Completed for 10/7 OASIS by SHAGUFTA Alvarado, RN on 10/13,  2021:  Edits completed by SHAGUFTA Alvarado RN:  1.  changed to #4 due to pain interferes w/sleep  2.  is #1 per functional limitation of endurance  3. [id number] B goal is independent, refused in not accepted per agency policy  4. [id number] , P and M changed to goal of supervision for 50' ambulation at DC goal

## 2021-10-18 ENCOUNTER — HOME CARE VISIT (OUTPATIENT)
Dept: HOME HEALTH SERVICES | Facility: HOME HEALTHCARE | Age: 81
End: 2021-10-18
Payer: MEDICARE

## 2021-10-18 VITALS
TEMPERATURE: 98 F | SYSTOLIC BLOOD PRESSURE: 110 MMHG | RESPIRATION RATE: 16 BRPM | HEART RATE: 67 BPM | DIASTOLIC BLOOD PRESSURE: 70 MMHG | OXYGEN SATURATION: 97 %

## 2021-10-18 VITALS
SYSTOLIC BLOOD PRESSURE: 160 MMHG | DIASTOLIC BLOOD PRESSURE: 70 MMHG | TEMPERATURE: 98 F | RESPIRATION RATE: 16 BRPM | OXYGEN SATURATION: 98 % | HEART RATE: 67 BPM

## 2021-10-18 PROCEDURE — G0151 HHCP-SERV OF PT,EA 15 MIN: HCPCS

## 2021-10-18 PROCEDURE — G0152 HHCP-SERV OF OT,EA 15 MIN: HCPCS

## 2021-10-18 PROCEDURE — G0493 RN CARE EA 15 MIN HH/HOSPICE: HCPCS

## 2021-10-18 ASSESSMENT — ACTIVITIES OF DAILY LIVING (ADL)
AMBULATION ASSISTANCE ON FLAT SURFACES: 1
AMBULATION_DISTANCE/DURATION_TOLERATED: 100 FEET
AMBULATION ASSISTANCE ON UNEVEN SURFACES: 1

## 2021-10-18 ASSESSMENT — ENCOUNTER SYMPTOMS
VOMITING: DENIES
HIGHEST PAIN SEVERITY IN PAST 24 HOURS: 0/10
MUSCLE WEAKNESS: 1
PERSON REPORTING PAIN: PATIENT
DIFFICULTY THINKING: 1
POOR JUDGMENT: 1
PAIN SEVERITY GOAL: 0/10
SUBJECTIVE PAIN PROGRESSION: UNCHANGED
NAUSEA: DENIES
DENIES PAIN: 1
PERSON REPORTING PAIN: PATIENT
DENIES PAIN: 1
LOWEST PAIN SEVERITY IN PAST 24 HOURS: 0/10
DENIES PAIN: 1

## 2021-10-18 NOTE — Clinical Note
Patient is very hard of hearing and it was very difficult to do any instructions with her today.  She is supposed to be getting new hearing aids which hopefully will help the situation.  She would benefit from a functional lower extremity strengthening program including sit/stands from her kitchen chair since she struggles to get up from it.  In addition balance exercises would be appropriate.  She is a very sweet lady but is somewhat stressed out and anxious since the death of her  about 1 month ago Pt. states he needs new glucometer - Accuchek Anya, lancets, test strips. He tests glucose 5 times/day.  Pt. Is in [place], [place] until May of this year. He wants glucometer orders sent to [facility] pharmacy in [place] on [address]   Also, he needs our office to complete Tier 1 form for his insulin form [facility].

## 2021-10-18 NOTE — Clinical Note
Patient is actually in [id number] not [id number] as was initially put into the computer.  Physical therapy evaluation was performed 10/18/2021 and Tiffanie and Nori will follow 2-week 4.  Further authorization is required.

## 2021-10-19 ENCOUNTER — HOME CARE VISIT (OUTPATIENT)
Dept: HOME HEALTH SERVICES | Facility: HOME HEALTHCARE | Age: 81
End: 2021-10-19
Payer: MEDICARE

## 2021-10-20 ENCOUNTER — HOME CARE VISIT (OUTPATIENT)
Dept: HOME HEALTH SERVICES | Facility: HOME HEALTHCARE | Age: 81
End: 2021-10-20
Payer: MEDICARE

## 2021-10-20 VITALS
SYSTOLIC BLOOD PRESSURE: 132 MMHG | OXYGEN SATURATION: 98 % | HEART RATE: 76 BPM | DIASTOLIC BLOOD PRESSURE: 62 MMHG | RESPIRATION RATE: 20 BRPM | TEMPERATURE: 98.2 F

## 2021-10-20 PROCEDURE — G0152 HHCP-SERV OF OT,EA 15 MIN: HCPCS

## 2021-10-20 ASSESSMENT — ENCOUNTER SYMPTOMS
DENIES PAIN: 1
DIFFICULTY THINKING: 1

## 2021-10-21 ENCOUNTER — HOME CARE VISIT (OUTPATIENT)
Dept: HOME HEALTH SERVICES | Facility: HOME HEALTHCARE | Age: 81
End: 2021-10-21
Payer: MEDICARE

## 2021-10-21 ENCOUNTER — HOSPITAL ENCOUNTER (OUTPATIENT)
Facility: MEDICAL CENTER | Age: 81
End: 2021-10-21
Attending: INTERNAL MEDICINE
Payer: MEDICARE

## 2021-10-21 VITALS
RESPIRATION RATE: 20 BRPM | DIASTOLIC BLOOD PRESSURE: 70 MMHG | SYSTOLIC BLOOD PRESSURE: 130 MMHG | TEMPERATURE: 98.2 F | OXYGEN SATURATION: 98 % | HEART RATE: 83 BPM

## 2021-10-21 LAB
ALBUMIN SERPL BCP-MCNC: 4.4 G/DL (ref 3.2–4.9)
ALBUMIN/GLOB SERPL: 1.6 G/DL
ALP SERPL-CCNC: 99 U/L (ref 30–99)
ALT SERPL-CCNC: 9 U/L (ref 2–50)
ANION GAP SERPL CALC-SCNC: 12 MMOL/L (ref 7–16)
AST SERPL-CCNC: 18 U/L (ref 12–45)
BASOPHILS # BLD AUTO: 0.7 % (ref 0–1.8)
BASOPHILS # BLD: 0.05 K/UL (ref 0–0.12)
BILIRUB SERPL-MCNC: 0.3 MG/DL (ref 0.1–1.5)
BUN SERPL-MCNC: 22 MG/DL (ref 8–22)
CALCIUM SERPL-MCNC: 9.3 MG/DL (ref 8.4–10.2)
CHLORIDE SERPL-SCNC: 101 MMOL/L (ref 96–112)
CO2 SERPL-SCNC: 22 MMOL/L (ref 20–33)
CREAT SERPL-MCNC: 0.92 MG/DL (ref 0.5–1.4)
EOSINOPHIL # BLD AUTO: 0.25 K/UL (ref 0–0.51)
EOSINOPHIL NFR BLD: 3.6 % (ref 0–6.9)
ERYTHROCYTE [DISTWIDTH] IN BLOOD BY AUTOMATED COUNT: 51.3 FL (ref 35.9–50)
GLOBULIN SER CALC-MCNC: 2.8 G/DL (ref 1.9–3.5)
GLUCOSE SERPL-MCNC: 96 MG/DL (ref 65–99)
HCT VFR BLD AUTO: 35.1 % (ref 37–47)
HGB BLD-MCNC: 11 G/DL (ref 12–16)
IMM GRANULOCYTES # BLD AUTO: 0.03 K/UL (ref 0–0.11)
IMM GRANULOCYTES NFR BLD AUTO: 0.4 % (ref 0–0.9)
LYMPHOCYTES # BLD AUTO: 1.58 K/UL (ref 1–4.8)
LYMPHOCYTES NFR BLD: 22.9 % (ref 22–41)
MAGNESIUM SERPL-MCNC: 1.9 MG/DL (ref 1.5–2.5)
MCH RBC QN AUTO: 25.9 PG (ref 27–33)
MCHC RBC AUTO-ENTMCNC: 31.3 G/DL (ref 33.6–35)
MCV RBC AUTO: 82.8 FL (ref 81.4–97.8)
MONOCYTES # BLD AUTO: 0.36 K/UL (ref 0–0.85)
MONOCYTES NFR BLD AUTO: 5.2 % (ref 0–13.4)
NEUTROPHILS # BLD AUTO: 4.62 K/UL (ref 2–7.15)
NEUTROPHILS NFR BLD: 67.2 % (ref 44–72)
NRBC # BLD AUTO: 0 K/UL
NRBC BLD-RTO: 0 /100 WBC
PLATELET # BLD AUTO: 126 K/UL (ref 164–446)
PMV BLD AUTO: 11.3 FL (ref 9–12.9)
POTASSIUM SERPL-SCNC: 4 MMOL/L (ref 3.6–5.5)
PROT SERPL-MCNC: 7.2 G/DL (ref 6–8.2)
RBC # BLD AUTO: 4.24 M/UL (ref 4.2–5.4)
SODIUM SERPL-SCNC: 135 MMOL/L (ref 135–145)
WBC # BLD AUTO: 6.9 K/UL (ref 4.8–10.8)

## 2021-10-21 PROCEDURE — G0157 HHC PT ASSISTANT EA 15: HCPCS | Mod: CQ

## 2021-10-21 PROCEDURE — G0299 HHS/HOSPICE OF RN EA 15 MIN: HCPCS

## 2021-10-21 PROCEDURE — 85025 COMPLETE CBC W/AUTO DIFF WBC: CPT

## 2021-10-21 PROCEDURE — 80053 COMPREHEN METABOLIC PANEL: CPT

## 2021-10-21 PROCEDURE — 83735 ASSAY OF MAGNESIUM: CPT

## 2021-10-21 ASSESSMENT — ENCOUNTER SYMPTOMS
MUSCLE WEAKNESS: 1
DENIES PAIN: 1
NAUSEA: DENIES
VOMITING: DENIES

## 2021-10-22 VITALS
DIASTOLIC BLOOD PRESSURE: 70 MMHG | RESPIRATION RATE: 20 BRPM | OXYGEN SATURATION: 98 % | HEART RATE: 83 BPM | TEMPERATURE: 98.2 F | SYSTOLIC BLOOD PRESSURE: 130 MMHG

## 2021-10-22 ASSESSMENT — ENCOUNTER SYMPTOMS: DENIES PAIN: 1

## 2021-10-24 NOTE — CASE COMMUNICATION
Lindsay tends to talk alot and requires constant cuing to stay on task. It appears that she would benefit from a Rollator RW however she likes her RW that she is using at this time. Started her on HEP for sitting ther ex and handout given. She reprots that she gets up alot during the day and does move around the house to keep her moving. She is aware of safety within the home and did very well Will cont with POC to increase her functional mob and ind to prevent further decline in IND. S/B Nori Recinos PT

## 2021-10-25 ENCOUNTER — HOME CARE VISIT (OUTPATIENT)
Dept: HOME HEALTH SERVICES | Facility: HOME HEALTHCARE | Age: 81
End: 2021-10-25
Payer: MEDICARE

## 2021-10-25 PROCEDURE — G0155 HHCP-SVS OF CSW,EA 15 MIN: HCPCS

## 2021-10-26 ENCOUNTER — HOME CARE VISIT (OUTPATIENT)
Dept: HOME HEALTH SERVICES | Facility: HOME HEALTHCARE | Age: 81
End: 2021-10-26
Payer: MEDICARE

## 2021-10-26 PROCEDURE — G0157 HHC PT ASSISTANT EA 15: HCPCS | Mod: CQ

## 2021-10-26 NOTE — CASE COMMUNICATION
SN called patient to schedule SN visit for tomorrow 10/26/2021. Patient declined nursing visit until Friday 10/29/2021. Patient states she is attempting to get a root canal but has to sit in dentist office as a walk in and does not know when she will get scheduled for procedure so declined any scheduled nursing visits until Friday. Visit moved per patient request.

## 2021-10-27 ENCOUNTER — HOME CARE VISIT (OUTPATIENT)
Dept: HOME HEALTH SERVICES | Facility: HOME HEALTHCARE | Age: 81
End: 2021-10-27
Payer: MEDICARE

## 2021-10-27 VITALS
HEART RATE: 70 BPM | OXYGEN SATURATION: 98 % | RESPIRATION RATE: 18 BRPM | SYSTOLIC BLOOD PRESSURE: 120 MMHG | DIASTOLIC BLOOD PRESSURE: 58 MMHG | TEMPERATURE: 97.9 F

## 2021-10-27 VITALS
OXYGEN SATURATION: 95 % | TEMPERATURE: 97.7 F | DIASTOLIC BLOOD PRESSURE: 62 MMHG | RESPIRATION RATE: 18 BRPM | SYSTOLIC BLOOD PRESSURE: 128 MMHG | HEART RATE: 80 BPM

## 2021-10-27 PROCEDURE — G0152 HHCP-SERV OF OT,EA 15 MIN: HCPCS

## 2021-10-27 ASSESSMENT — ENCOUNTER SYMPTOMS
POOR JUDGMENT: 1
DENIES PAIN: 1
DENIES PAIN: 1
DIFFICULTY THINKING: 1
DIFFICULTY THINKING: 1

## 2021-10-27 NOTE — CASE COMMUNICATION
noted  ----- Message -----  From: Kierra Best, PT  Sent: 10/18/2021   7:37 PM PDT  To: Rama Bhatia R.N.      Patient is actually in [id number] not [id number] as was initially put into the computer.  Physical therapy evaluation was performed 10/18/2021 and Tiffanie and Nori will follow 2-week 4.  Further authorization is required.

## 2021-10-28 ENCOUNTER — HOME CARE VISIT (OUTPATIENT)
Dept: HOME HEALTH SERVICES | Facility: HOME HEALTHCARE | Age: 81
End: 2021-10-28
Payer: MEDICARE

## 2021-10-28 VITALS
SYSTOLIC BLOOD PRESSURE: 120 MMHG | DIASTOLIC BLOOD PRESSURE: 60 MMHG | OXYGEN SATURATION: 97 % | TEMPERATURE: 97.7 F | RESPIRATION RATE: 20 BRPM | HEART RATE: 79 BPM

## 2021-10-28 PROCEDURE — G0152 HHCP-SERV OF OT,EA 15 MIN: HCPCS

## 2021-10-28 ASSESSMENT — ENCOUNTER SYMPTOMS
DENIES PAIN: 1
DIFFICULTY THINKING: 1

## 2021-10-29 ENCOUNTER — HOME CARE VISIT (OUTPATIENT)
Dept: HOME HEALTH SERVICES | Facility: HOME HEALTHCARE | Age: 81
End: 2021-10-29
Payer: MEDICARE

## 2021-10-29 ENCOUNTER — HOSPITAL ENCOUNTER (OUTPATIENT)
Facility: MEDICAL CENTER | Age: 81
End: 2021-10-29
Attending: INTERNAL MEDICINE
Payer: MEDICARE

## 2021-10-29 VITALS
DIASTOLIC BLOOD PRESSURE: 75 MMHG | HEART RATE: 80 BPM | OXYGEN SATURATION: 97 % | SYSTOLIC BLOOD PRESSURE: 110 MMHG | TEMPERATURE: 98.2 F | RESPIRATION RATE: 18 BRPM

## 2021-10-29 LAB
TSH SERPL DL<=0.005 MIU/L-ACNC: 4.19 UIU/ML (ref 0.38–5.33)
VIT B12 SERPL-MCNC: 374 PG/ML (ref 211–911)

## 2021-10-29 PROCEDURE — G0299 HHS/HOSPICE OF RN EA 15 MIN: HCPCS

## 2021-10-29 PROCEDURE — 84443 ASSAY THYROID STIM HORMONE: CPT

## 2021-10-29 PROCEDURE — 82607 VITAMIN B-12: CPT

## 2021-10-29 ASSESSMENT — ENCOUNTER SYMPTOMS
DENIES PAIN: 1
HIGHEST PAIN SEVERITY IN PAST 24 HOURS: 0/10
VOMITING: DENIES
LOWEST PAIN SEVERITY IN PAST 24 HOURS: 0/10
PERSON REPORTING PAIN: PATIENT
NAUSEA: DENIES
DEPRESSED MOOD: 1
PAIN SEVERITY GOAL: 0/10
MUSCLE WEAKNESS: 1

## 2021-10-29 NOTE — CASE COMMUNICATION
Lindsay is cont to report that she is doing all her stuff in the home and staying busy She appears to be doing her HEP and is very aware of her safety with using RW and doing well with amb in the home. She cont to peggy increase act well and has less noted fatigue. Daughter reports that she is able to take her to appts with no noted issues Will cont with POC to increase her functional mob and ind to prevent further decline in IND. S/B Nori Recinos PT

## 2021-11-01 ENCOUNTER — HOME CARE VISIT (OUTPATIENT)
Dept: HOME HEALTH SERVICES | Facility: HOME HEALTHCARE | Age: 81
End: 2021-11-01
Payer: MEDICARE

## 2021-11-01 PROCEDURE — G0299 HHS/HOSPICE OF RN EA 15 MIN: HCPCS

## 2021-11-01 PROCEDURE — G0151 HHCP-SERV OF PT,EA 15 MIN: HCPCS

## 2021-11-01 PROCEDURE — G0152 HHCP-SERV OF OT,EA 15 MIN: HCPCS

## 2021-11-01 ASSESSMENT — ENCOUNTER SYMPTOMS
PERSON REPORTING PAIN: PATIENT
DENIES PAIN: 1
DIFFICULTY THINKING: 1
PAIN: PATIENT DENIES PAIN DURING THIS VISIT
DIFFICULTY THINKING: 1
DENIES PAIN: 1

## 2021-11-02 ENCOUNTER — HOME CARE VISIT (OUTPATIENT)
Dept: HOME HEALTH SERVICES | Facility: HOME HEALTHCARE | Age: 81
End: 2021-11-02
Payer: MEDICARE

## 2021-11-02 VITALS
TEMPERATURE: 98.4 F | HEART RATE: 76 BPM | RESPIRATION RATE: 18 BRPM | SYSTOLIC BLOOD PRESSURE: 110 MMHG | OXYGEN SATURATION: 97 % | DIASTOLIC BLOOD PRESSURE: 80 MMHG

## 2021-11-02 PROCEDURE — G0152 HHCP-SERV OF OT,EA 15 MIN: HCPCS

## 2021-11-02 ASSESSMENT — ENCOUNTER SYMPTOMS
MUSCLE WEAKNESS: 1
DENIES PAIN: 1
VOMITING: DENIES
PERSON REPORTING PAIN: PATIENT
NAUSEA: DENIES

## 2021-11-03 ENCOUNTER — HOME CARE VISIT (OUTPATIENT)
Dept: HOME HEALTH SERVICES | Facility: HOME HEALTHCARE | Age: 81
End: 2021-11-03
Payer: MEDICARE

## 2021-11-03 VITALS
SYSTOLIC BLOOD PRESSURE: 130 MMHG | HEART RATE: 70 BPM | RESPIRATION RATE: 20 BRPM | DIASTOLIC BLOOD PRESSURE: 70 MMHG | OXYGEN SATURATION: 96 % | TEMPERATURE: 97.8 F

## 2021-11-03 VITALS
TEMPERATURE: 97.6 F | RESPIRATION RATE: 18 BRPM | OXYGEN SATURATION: 98 % | DIASTOLIC BLOOD PRESSURE: 60 MMHG | SYSTOLIC BLOOD PRESSURE: 125 MMHG | HEART RATE: 72 BPM

## 2021-11-03 PROCEDURE — G0151 HHCP-SERV OF PT,EA 15 MIN: HCPCS

## 2021-11-03 ASSESSMENT — ENCOUNTER SYMPTOMS
POOR JUDGMENT: 1
DIFFICULTY THINKING: 1
DIFFICULTY THINKING: 1
DENIES PAIN: 1
DENIES PAIN: 1
PERSON REPORTING PAIN: PATIENT
POOR JUDGMENT: 1
PAIN: PATIENT DENIES PAIN

## 2021-11-05 ENCOUNTER — HOME CARE VISIT (OUTPATIENT)
Dept: HOME HEALTH SERVICES | Facility: HOME HEALTHCARE | Age: 81
End: 2021-11-05
Payer: MEDICARE

## 2021-11-08 ENCOUNTER — HOME CARE VISIT (OUTPATIENT)
Dept: HOME HEALTH SERVICES | Facility: HOME HEALTHCARE | Age: 81
End: 2021-11-08
Payer: MEDICARE

## 2021-11-08 VITALS
HEART RATE: 67 BPM | DIASTOLIC BLOOD PRESSURE: 62 MMHG | TEMPERATURE: 98.3 F | SYSTOLIC BLOOD PRESSURE: 122 MMHG | RESPIRATION RATE: 16 BRPM | OXYGEN SATURATION: 98 %

## 2021-11-08 PROCEDURE — 665001 SOC-HOME HEALTH

## 2021-11-08 PROCEDURE — G0153 HHCP-SVS OF S/L PATH,EA 15MN: HCPCS

## 2021-11-08 ASSESSMENT — ENCOUNTER SYMPTOMS
PAIN LOCATION - EXACERBATING FACTORS: MOVEMENT
AGGRESSION WITHIN DEFINED LIMITS: 1
PAIN: 1
PAIN LOCATION - PAIN QUALITY: SHARP
ANGER WITHIN DEFINED LIMITS: 1
PAIN LOCATION - PAIN DURATION: VARIES
PAIN LOCATION - RELIEVING FACTORS: REST
PAIN LOCATION: RIGHT HIP
PAIN LOCATION - PAIN SEVERITY: 3/10
PAIN LOCATION - PAIN FREQUENCY: INFREQUENT
PERSON REPORTING PAIN: PATIENT

## 2021-11-09 ENCOUNTER — HOME CARE VISIT (OUTPATIENT)
Dept: HOME HEALTH SERVICES | Facility: HOME HEALTHCARE | Age: 81
End: 2021-11-09
Payer: MEDICARE

## 2021-11-09 VITALS
TEMPERATURE: 98.4 F | HEART RATE: 72 BPM | DIASTOLIC BLOOD PRESSURE: 70 MMHG | OXYGEN SATURATION: 98 % | SYSTOLIC BLOOD PRESSURE: 130 MMHG | RESPIRATION RATE: 16 BRPM

## 2021-11-09 PROCEDURE — G0493 RN CARE EA 15 MIN HH/HOSPICE: HCPCS

## 2021-11-09 ASSESSMENT — ENCOUNTER SYMPTOMS
MUSCLE WEAKNESS: 1
PAIN: 1
PAIN SEVERITY GOAL: 0/10
HIGHEST PAIN SEVERITY IN PAST 24 HOURS: 4/10
SUBJECTIVE PAIN PROGRESSION: UNCHANGED
LOWEST PAIN SEVERITY IN PAST 24 HOURS: 4/10
NAUSEA: DENIES
VOMITING: DENIES

## 2021-11-15 ENCOUNTER — HOME CARE VISIT (OUTPATIENT)
Dept: HOME HEALTH SERVICES | Facility: HOME HEALTHCARE | Age: 81
End: 2021-11-15
Payer: MEDICARE

## 2021-11-15 VITALS
SYSTOLIC BLOOD PRESSURE: 118 MMHG | OXYGEN SATURATION: 98 % | HEART RATE: 65 BPM | RESPIRATION RATE: 16 BRPM | DIASTOLIC BLOOD PRESSURE: 70 MMHG | TEMPERATURE: 98.2 F

## 2021-11-15 PROCEDURE — G0493 RN CARE EA 15 MIN HH/HOSPICE: HCPCS

## 2021-11-15 ASSESSMENT — PATIENT HEALTH QUESTIONNAIRE - PHQ9: CLINICAL INTERPRETATION OF PHQ2 SCORE: 0

## 2021-11-15 ASSESSMENT — ENCOUNTER SYMPTOMS
DENIES PAIN: 1
PERSON REPORTING PAIN: PATIENT

## 2021-11-15 ASSESSMENT — ACTIVITIES OF DAILY LIVING (ADL)
OASIS_M1830: 01
HOME_HEALTH_OASIS: 00

## 2022-03-28 ENCOUNTER — TELEPHONE (OUTPATIENT)
Dept: HEALTH INFORMATION MANAGEMENT | Facility: OTHER | Age: 82
End: 2022-03-28

## 2022-04-03 NOTE — CARE PLAN
Problem: Bathing  Goal: STG-Within one week, patient will bathe  1) Individualized Goal:  UB/LB at a level of SBA.   2) Interventions:  OT Self Care/ADL, OT Neuro Re-Ed/Balance, OT Therapeutic Activity, OT Aquatic Therapy, OT Evaluation and OT Therapeutic Exercise     Outcome: NOT MET      Problem: Dressing  Goal: STG-Within one week, patient will dress LB  1) Individualized Goal:  At a level of SBA w/ AE as needed.   2) Interventions:  OT Neuro Re-Ed/Balance, OT Therapeutic Activity, OT Evaluation and OT Therapeutic Exercise     Outcome: NOT MET      Problem: Toileting  Goal: STG-Within one week, patient will complete toileting tasks  1) Individualized Goal:  At a level of SBA.   2) Interventions:  OT Self Care/ADL, OT Neuro Re-Ed/Balance, OT Therapeutic Activity, OT Evaluation and OT Therapeutic Exercise     Outcome: NOT MET      Problem: Functional Transfers  Goal: STG-Within one week, patient will transfer to tub/shower  1) Individualized Goal:  At a level of SBA.   2) Interventions:  OT Self Care/ADL, OT Cognitive Skill Dev, OT Neuro Re-Ed/Balance, OT Therapeutic Activity, OT Evaluation and OT Therapeutic Exercise     Outcome: NOT MET      Problem: IADL's  Goal: STG-Within one week, patient will prepare a meal  1) Individualized Goal:  At a level of SBA.   2) Interventions:  OT Self Care/ADL, OT Manual Ther Technique, OT Neuro Re-Ed/Balance, OT Therapeutic Activity, OT Aquatic Therapy and OT Evaluation     Outcome: NOT MET    Goal: STG-Within one week, patient will access kitchen area  1) Individualized Goal:  At a level of SBA with occasional cueing for safety.   2) Interventions:  OT Self Care/ADL, OT Community Reintegration, OT Neuro Re-Ed/Balance, OT Therapeutic Activity, OT Evaluation and OT Therapeutic Exercise     Outcome: NOT MET      Problem: OT Long Term Goals  Goal: LTG-By discharge, patient will complete basic self care tasks  1) Individualized Goal:  At a level of Mod I.   2) Interventions:  OT  Pt informed of results. Verbalized understanding   Self Care/ADL, OT Neuro Re-Ed/Balance, OT Therapeutic Activity, OT Aquatic Therapy, OT Evaluation and OT Therapeutic Exercise     Outcome: NOT MET    Goal: LTG-By discharge, patient will perform bathroom transfers  1) Individualized Goal:  At a level of Mod I.   2) Interventions:  OT Self Care/ADL, OT Neuro Re-Ed/Balance, OT Therapeutic Activity, OT Evaluation and OT Therapeutic Exercise     Outcome: NOT MET    Goal: LTG-By discharge, patient will complete basic home management  1) Individualized Goal:  At a level of SUP.   2) Interventions:  OT Cognitive Skill Dev, OT Community Reintegration, OT Neuro Re-Ed/Balance and OT Therapeutic Exercise     Outcome: NOT MET

## 2022-05-17 NOTE — TELEPHONE ENCOUNTER
Attempt # 2- Called to schedule COMPREHENSIVE HEALTH ASSESSMENT. Member stated that she was busy and just woke up from a nap. She requested a call back another day.

## 2022-09-19 ENCOUNTER — DOCUMENTATION (OUTPATIENT)
Dept: HEALTH INFORMATION MANAGEMENT | Facility: OTHER | Age: 82
End: 2022-09-19
Payer: MEDICARE

## 2023-01-05 NOTE — ASSESSMENT & PLAN NOTE
- Continue with PO supplementation  - 1.4 today, will replete with 4gm IVPB  - Continue to monitor AM level   Enbrel Counseling:  I discussed with the patient the risks of etanercept including but not limited to myelosuppression, immunosuppression, autoimmune hepatitis, demyelinating diseases, lymphoma, and infections.  The patient understands that monitoring is required including a PPD at baseline and must alert us or the primary physician if symptoms of infection or other concerning signs are noted.

## 2023-05-10 ENCOUNTER — APPOINTMENT (OUTPATIENT)
Dept: RADIOLOGY | Facility: MEDICAL CENTER | Age: 83
End: 2023-05-10
Attending: EMERGENCY MEDICINE
Payer: MEDICARE

## 2023-05-10 ENCOUNTER — HOSPITAL ENCOUNTER (EMERGENCY)
Facility: MEDICAL CENTER | Age: 83
End: 2023-05-10
Attending: EMERGENCY MEDICINE
Payer: MEDICARE

## 2023-05-10 VITALS
SYSTOLIC BLOOD PRESSURE: 115 MMHG | TEMPERATURE: 97.6 F | DIASTOLIC BLOOD PRESSURE: 45 MMHG | OXYGEN SATURATION: 97 % | HEIGHT: 67 IN | RESPIRATION RATE: 18 BRPM | HEART RATE: 74 BPM | BODY MASS INDEX: 20.56 KG/M2 | WEIGHT: 131 LBS

## 2023-05-10 DIAGNOSIS — E87.6 HYPOKALEMIA: ICD-10-CM

## 2023-05-10 DIAGNOSIS — R10.9 RIGHT FLANK PAIN: ICD-10-CM

## 2023-05-10 DIAGNOSIS — K62.89 RECTAL MASS: ICD-10-CM

## 2023-05-10 DIAGNOSIS — D64.9 ANEMIA, UNSPECIFIED TYPE: ICD-10-CM

## 2023-05-10 LAB
ALBUMIN SERPL BCP-MCNC: 4 G/DL (ref 3.2–4.9)
ALBUMIN/GLOB SERPL: 1.4 G/DL
ALP SERPL-CCNC: 77 U/L (ref 30–99)
ALT SERPL-CCNC: 13 U/L (ref 2–50)
ANION GAP SERPL CALC-SCNC: 15 MMOL/L (ref 7–16)
APPEARANCE UR: CLEAR
AST SERPL-CCNC: 18 U/L (ref 12–45)
BASOPHILS # BLD AUTO: 0.5 % (ref 0–1.8)
BASOPHILS # BLD: 0.04 K/UL (ref 0–0.12)
BILIRUB SERPL-MCNC: 0.4 MG/DL (ref 0.1–1.5)
BILIRUB UR QL STRIP.AUTO: NEGATIVE
BUN SERPL-MCNC: 33 MG/DL (ref 8–22)
CALCIUM ALBUM COR SERPL-MCNC: 9.3 MG/DL (ref 8.5–10.5)
CALCIUM SERPL-MCNC: 9.3 MG/DL (ref 8.4–10.2)
CHLORIDE SERPL-SCNC: 98 MMOL/L (ref 96–112)
CO2 SERPL-SCNC: 27 MMOL/L (ref 20–33)
COLOR UR: YELLOW
CREAT SERPL-MCNC: 0.88 MG/DL (ref 0.5–1.4)
EOSINOPHIL # BLD AUTO: 0.1 K/UL (ref 0–0.51)
EOSINOPHIL NFR BLD: 1.4 % (ref 0–6.9)
ERYTHROCYTE [DISTWIDTH] IN BLOOD BY AUTOMATED COUNT: 43.6 FL (ref 35.9–50)
GFR SERPLBLD CREATININE-BSD FMLA CKD-EPI: 64 ML/MIN/1.73 M 2
GLOBULIN SER CALC-MCNC: 2.8 G/DL (ref 1.9–3.5)
GLUCOSE SERPL-MCNC: 95 MG/DL (ref 65–99)
GLUCOSE UR STRIP.AUTO-MCNC: NEGATIVE MG/DL
HCT VFR BLD AUTO: 29.6 % (ref 37–47)
HGB BLD-MCNC: 9.5 G/DL (ref 12–16)
IMM GRANULOCYTES # BLD AUTO: 0.05 K/UL (ref 0–0.11)
IMM GRANULOCYTES NFR BLD AUTO: 0.7 % (ref 0–0.9)
KETONES UR STRIP.AUTO-MCNC: NEGATIVE MG/DL
LEUKOCYTE ESTERASE UR QL STRIP.AUTO: NEGATIVE
LIPASE SERPL-CCNC: 20 U/L (ref 7–58)
LYMPHOCYTES # BLD AUTO: 1.98 K/UL (ref 1–4.8)
LYMPHOCYTES NFR BLD: 27 % (ref 22–41)
MCH RBC QN AUTO: 23.4 PG (ref 27–33)
MCHC RBC AUTO-ENTMCNC: 32.1 G/DL (ref 33.6–35)
MCV RBC AUTO: 72.9 FL (ref 81.4–97.8)
MICRO URNS: NORMAL
MONOCYTES # BLD AUTO: 0.49 K/UL (ref 0–0.85)
MONOCYTES NFR BLD AUTO: 6.7 % (ref 0–13.4)
NEUTROPHILS # BLD AUTO: 4.68 K/UL (ref 2–7.15)
NEUTROPHILS NFR BLD: 63.7 % (ref 44–72)
NITRITE UR QL STRIP.AUTO: NEGATIVE
NRBC # BLD AUTO: 0 K/UL
NRBC BLD-RTO: 0 /100 WBC
PH UR STRIP.AUTO: 7 [PH] (ref 5–8)
PLATELET # BLD AUTO: 164 K/UL (ref 164–446)
PMV BLD AUTO: 10.3 FL (ref 9–12.9)
POTASSIUM SERPL-SCNC: 3 MMOL/L (ref 3.6–5.5)
PROT SERPL-MCNC: 6.8 G/DL (ref 6–8.2)
PROT UR QL STRIP: NEGATIVE MG/DL
RBC # BLD AUTO: 4.06 M/UL (ref 4.2–5.4)
RBC UR QL AUTO: NEGATIVE
SODIUM SERPL-SCNC: 140 MMOL/L (ref 135–145)
SP GR UR STRIP.AUTO: <=1.005
WBC # BLD AUTO: 7.3 K/UL (ref 4.8–10.8)

## 2023-05-10 PROCEDURE — 99285 EMERGENCY DEPT VISIT HI MDM: CPT

## 2023-05-10 PROCEDURE — 36415 COLL VENOUS BLD VENIPUNCTURE: CPT

## 2023-05-10 PROCEDURE — 85025 COMPLETE CBC W/AUTO DIFF WBC: CPT

## 2023-05-10 PROCEDURE — 80053 COMPREHEN METABOLIC PANEL: CPT

## 2023-05-10 PROCEDURE — 700105 HCHG RX REV CODE 258: Performed by: EMERGENCY MEDICINE

## 2023-05-10 PROCEDURE — 84466 ASSAY OF TRANSFERRIN: CPT

## 2023-05-10 PROCEDURE — 700102 HCHG RX REV CODE 250 W/ 637 OVERRIDE(OP): Performed by: EMERGENCY MEDICINE

## 2023-05-10 PROCEDURE — 82728 ASSAY OF FERRITIN: CPT

## 2023-05-10 PROCEDURE — 74177 CT ABD & PELVIS W/CONTRAST: CPT

## 2023-05-10 PROCEDURE — 83690 ASSAY OF LIPASE: CPT

## 2023-05-10 PROCEDURE — 83550 IRON BINDING TEST: CPT

## 2023-05-10 PROCEDURE — A9270 NON-COVERED ITEM OR SERVICE: HCPCS | Performed by: EMERGENCY MEDICINE

## 2023-05-10 PROCEDURE — 700117 HCHG RX CONTRAST REV CODE 255: Performed by: EMERGENCY MEDICINE

## 2023-05-10 PROCEDURE — 81003 URINALYSIS AUTO W/O SCOPE: CPT

## 2023-05-10 PROCEDURE — 83540 ASSAY OF IRON: CPT

## 2023-05-10 RX ORDER — HYDROCHLOROTHIAZIDE 25 MG/1
25 TABLET ORAL 2 TIMES DAILY
COMMUNITY
Start: 2023-04-19

## 2023-05-10 RX ORDER — OMEPRAZOLE 40 MG/1
40 CAPSULE, DELAYED RELEASE ORAL DAILY
COMMUNITY

## 2023-05-10 RX ORDER — POTASSIUM CHLORIDE 20 MEQ/1
60 TABLET, EXTENDED RELEASE ORAL ONCE
Status: COMPLETED | OUTPATIENT
Start: 2023-05-10 | End: 2023-05-10

## 2023-05-10 RX ORDER — SODIUM CHLORIDE 9 MG/ML
1000 INJECTION, SOLUTION INTRAVENOUS ONCE
Status: COMPLETED | OUTPATIENT
Start: 2023-05-10 | End: 2023-05-10

## 2023-05-10 RX ORDER — DIGOXIN 125 MCG
125 TABLET ORAL
COMMUNITY
Start: 2023-04-19

## 2023-05-10 RX ADMIN — IOHEXOL 100 ML: 350 INJECTION, SOLUTION INTRAVENOUS at 18:34

## 2023-05-10 RX ADMIN — SODIUM CHLORIDE 1000 ML: 9 INJECTION, SOLUTION INTRAVENOUS at 16:12

## 2023-05-10 RX ADMIN — POTASSIUM CHLORIDE 60 MEQ: 1500 TABLET, EXTENDED RELEASE ORAL at 17:12

## 2023-05-10 RX ADMIN — IOHEXOL 25 ML: 240 INJECTION, SOLUTION INTRATHECAL; INTRAVASCULAR; INTRAVENOUS; ORAL at 18:35

## 2023-05-10 ASSESSMENT — PAIN DESCRIPTION - PAIN TYPE: TYPE: ACUTE PAIN

## 2023-05-10 ASSESSMENT — FIBROSIS 4 INDEX: FIB4 SCORE: 4

## 2023-05-10 ASSESSMENT — PAIN DESCRIPTION - DESCRIPTORS: DESCRIPTORS: CRAMPING

## 2023-05-10 NOTE — ED NOTES
Pt bib ems. EMS reports they were called to a clinic where the pt was receiving a annual check up. Pt reports pain in RLQ of the abdomen for 1 week with movement. Pt denies pain at rest. Denies other complaints at this time.  Pt is alert and oriented, speaking in full sentences.   Family at bedside reports decline in mental clarity over the last two years. Denies other medical complaints at this time.

## 2023-05-10 NOTE — ED NOTES
Med rec complete per [facility].  Per Pt's family neither pt nor family knows Pt's  medications  nor last time Pt had any medications.  List per [facility].   Allergies reviewed with family.

## 2023-05-10 NOTE — ED PROVIDER NOTES
"  ER Provider Note    Scribed for Jaun Stone M.d. by Jennyfer Barnes. 5/10/2023  4:07 PM    Primary Care Provider: Lenny RENTERIA M.D.    CHIEF COMPLAINT  Chief Complaint   Patient presents with    Abdominal Pain     BIB EMS. 81 YO female. Pt reports RLQ pain with movement x1 week. Pt reports 0/10 pain at rest.     EXTERNAL RECORDS REVIEWED  Outpatient Notes Patient had a call in note for abdominal distention.    HPI/ROS  LIMITATION TO HISTORY   Select: : None  OUTSIDE HISTORIAN(S):  Family Daughter was at bedside to confirm sequence of events and collateral information provided below.     Lindsay Vargas is a 82 y.o. female who presents to the ED complaining of abdominal pain onset 7 days ago. She describes that she went in to  a prescription for back pain at her PCP. Her doctor wanted to perform exams on the patient and recommended the patient to come to the ER for further evaluation. She notes that she bumped her right lower quadrant about a week ago. When she walks, she states that the pain is \"excruciating.\" She also notes that when she uses the bathroom the pain is exacerbated. When she lays down, she states that the pain is alleviated, being a 0/10 in severity. The patient denies any burning with urination, vomiting, diarrhea, nausea. She reports that she mainly cares for herself, with the help of her daughter. 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     The patient also states that she is very sad at times. She denies feeling depressed or suicidal ideation. She notes that she also experiences headaches that \"hurt so bad.\" Her last headache was yesterday, but she denies having any headache at the moment.     PAST MEDICAL HISTORY  Past Medical History:   Diagnosis Date    Anesthesia     confused/hard to wake up-15 years ago    Arthritis     Cataract     Bilat IOL    Chronic pain     Hiatus hernia " "syndrome     surgically repaired    Hypertension     Lymphedema 2014    Migraine headache     Pain     back    Pneumonia     \"younger\"     SURGICAL HISTORY  Past Surgical History:   Procedure Laterality Date    PB INJ LUMBAR/SACRAL,W/WO CNTRST N/A 11/10/2016    Procedure: INJ EPI NON NEUROLYTIC L/S -MIDLINE L4-L5;  Surgeon: Eduardo Mustafa M.D.;  Location: [facility];  Service: Pain Management    PB FLUORSCOPIC GUIDANCE SPINAL INJECTION  11/10/2016    Procedure: FLUOROGUIDE FOR SPINAL INJ;  Surgeon: Eduardo Mustafa M.D.;  Location: [facility];  Service: Pain Management    PB INJ LUMBAR/SACRAL,W/WO CNTRST Right 9/29/2016    Procedure: INJ EPI NON NEUROLYTIC L/S - L5-S1;  Surgeon: Eduardo Mustafa M.D.;  Location: [facility];  Service: Pain Management    PB FLUORSCOPIC GUIDANCE SPINAL INJECTION  9/29/2016    Procedure: FLUOROGUIDE FOR SPINAL INJ;  Surgeon: Eduardo Mustafa M.D.;  Location: [facility];  Service: Pain Management    CATARACT PHACO WITH IOL Right 2/18/2016    Procedure: CATARACT PHACO WITH IOL;  Surgeon: Rodrigo Smyth M.D.;  Location: [facility];  Service:     CATARACT PHACO WITH IOL Left 2/4/2016    Procedure: CATARACT PHACO WITH IOL;  Surgeon: Rodrigo Smyth M.D.;  Location: [facility];  Service:     JUANI BY LAPAROSCOPY      HYSTERECTOMY LAPAROSCOPY      OTHER      HIATAL HERNIA REPAIR    OTHER ABDOMINAL SURGERY      LAP JUANI    OTHER ABDOMINAL SURGERY      APPENDECOMTY    OTHER ORTHOPEDIC SURGERY      LEFT KNEE SCOPE     FAMILY HISTORY  Family History   Problem Relation Age of Onset    Heart Disease Mother     Heart Disease Father      SOCIAL HISTORY   reports that she has never smoked. She has never used smokeless tobacco. She reports that she does not drink alcohol and does not use drugs.    CURRENT MEDICATIONS  Previous Medications    AMLODIPINE (NORVASC) 5 MG TAB    Take 5 mg by mouth every day.    " "DIGOXIN (LANOXIN) 125 MCG TAB    Take 125 mcg by mouth every day.    DULOXETINE (CYMBALTA) 60 MG CAP DR PARTICLES DELAYED-RELEASE CAPSULE    Take 1 Cap by mouth every day.    HYDROCHLOROTHIAZIDE (HYDRODIURIL) 25 MG TAB    Take 25 mg by mouth 2 times a day.    LORATADINE (CLARITIN) 10 MG TAB    Take 10 mg by mouth every day.    OMEPRAZOLE (PRILOSEC) 40 MG DELAYED-RELEASE CAPSULE    Take 40 mg by mouth every day.     ALLERGIES  Patient has no known allergies.    PHYSICAL EXAM  BP (!) 149/69   Pulse 83   Temp 36.6 °C (97.8 °F) (Temporal)   Resp 16   Ht 1.702 m (5' 7\")   Wt 59.4 kg (131 lb)   SpO2 100%   BMI 20.52 kg/m²   Constitutional: Well developed, Well nourished, Mild distress.   Cardiovascular: Normal heart rate, Normal rhythm, No murmurs, equal pulses.   Pulmonary: Normal breath sounds, No respiratory distress, No wheezing, No rales, No rhonchi.  Abdomen:Soft, No tenderness, No masses, no rebound, no guarding. Tenderness on the right flank, right lower quadrant, and right iliac wing. No obvious bruising.   Back: Significant CVA tenderness. No back point vertebral tenderness. No tenderness over the greater trochanter.   Musculoskeletal: No major deformities noted, No tenderness.   Skin: Warm, Dry, No erythema, No rash.   Neurologic: Alert & oriented x 3, Normal motor function,  No focal deficits noted.   Psychiatric: Affect normal, Judgment normal, Mood normal.     DIAGNOSTIC STUDIES    Labs:   Results for orders placed or performed during the hospital encounter of 05/10/23   CBC WITH DIFFERENTIAL   Result Value Ref Range    WBC 7.3 4.8 - 10.8 K/uL    RBC 4.06 (L) 4.20 - 5.40 M/uL    Hemoglobin 9.5 (L) 12.0 - 16.0 g/dL    Hematocrit 29.6 (L) 37.0 - 47.0 %    MCV 72.9 (L) 81.4 - 97.8 fL    MCH 23.4 (L) 27.0 - 33.0 pg    MCHC 32.1 (L) 33.6 - 35.0 g/dL    RDW 43.6 35.9 - 50.0 fL    Platelet Count 164 164 - 446 K/uL    MPV 10.3 9.0 - 12.9 fL    Neutrophils-Polys 63.70 44.00 - 72.00 %    Lymphocytes 27.00 22.00 " - 41.00 %    Monocytes 6.70 0.00 - 13.40 %    Eosinophils 1.40 0.00 - 6.90 %    Basophils 0.50 0.00 - 1.80 %    Immature Granulocytes 0.70 0.00 - 0.90 %    Nucleated RBC 0.00 /100 WBC    Neutrophils (Absolute) 4.68 2.00 - 7.15 K/uL    Lymphs (Absolute) 1.98 1.00 - 4.80 K/uL    Monos (Absolute) 0.49 0.00 - 0.85 K/uL    Eos (Absolute) 0.10 0.00 - 0.51 K/uL    Baso (Absolute) 0.04 0.00 - 0.12 K/uL    Immature Granulocytes (abs) 0.05 0.00 - 0.11 K/uL    NRBC (Absolute) 0.00 K/uL   COMP METABOLIC PANEL   Result Value Ref Range    Sodium 140 135 - 145 mmol/L    Potassium 3.0 (L) 3.6 - 5.5 mmol/L    Chloride 98 96 - 112 mmol/L    Co2 27 20 - 33 mmol/L    Anion Gap 15.0 7.0 - 16.0    Glucose 95 65 - 99 mg/dL    Bun 33 (H) 8 - 22 mg/dL    Creatinine 0.88 0.50 - 1.40 mg/dL    Calcium 9.3 8.4 - 10.2 mg/dL    AST(SGOT) 18 12 - 45 U/L    ALT(SGPT) 13 2 - 50 U/L    Alkaline Phosphatase 77 30 - 99 U/L    Total Bilirubin 0.4 0.1 - 1.5 mg/dL    Albumin 4.0 3.2 - 4.9 g/dL    Total Protein 6.8 6.0 - 8.2 g/dL    Globulin 2.8 1.9 - 3.5 g/dL    A-G Ratio 1.4 g/dL   URINALYSIS (UA)    Specimen: Urine   Result Value Ref Range    Color Yellow     Character Clear     Specific Gravity <=1.005 <1.035    Ph 7.0 5.0 - 8.0    Glucose Negative Negative mg/dL    Ketones Negative Negative mg/dL    Protein Negative Negative mg/dL    Bilirubin Negative Negative    Nitrite Negative Negative    Leukocyte Esterase Negative Negative    Occult Blood Negative Negative    Micro Urine Req see below    LIPASE   Result Value Ref Range    Lipase 20 7 - 58 U/L   CORRECTED CALCIUM   Result Value Ref Range    Correct Calcium 9.3 8.5 - 10.5 mg/dL   ESTIMATED GFR   Result Value Ref Range    GFR (CKD-EPI) 64 >60 mL/min/1.73 m 2      Radiology:   Radiologist interpretation:   CT-ABDOMEN-PELVIS WITH   Final Result      1.  Colonic diverticulosis without definite acute diverticulitis.   2.  A probable polypoid lesion within the rectum. Recommend direct visualization to  further evaluate.   3.  Moderate colonic stool.   4.  Mild biliary dilatation in this postcholecystectomy patient.   5.  Borderline splenomegaly.         COURSE & MEDICAL DECISION MAKING     ED Observation Status? Yes; I am placing the patient in to an observation status due to a diagnostic uncertainty as well as therapeutic intensity. Patient placed in observation status at 4:07 PM, 5/10/2023.     Observation plan is as follows: Perform lab work and imaging for further evaluation.    Upon Reevaluation, the patient's condition has: Improved; and will be discharged.    Patient discharged from ED Observation status at 8 PM (Time) 5/10/2023  (Date).     INITIAL ASSESSMENT, COURSE AND PLAN  Care Narrative:     5:48 PM - Patient seen and examined at bedside. Discussed plan of care, including lab work and imaging. Patient agrees to the plan of care. The patient will be resuscitated with 1L NS IV and medicated with Kdur 60 mEq. Ordered for Lipase, UA, CMP, CBC w/ Diff., and CT-Abdomen w/ to evaluate her symptoms. Differential diagnoses include, but are not limited to: small bowl obstruction, fracture, or urinary tract infection.     7:26 PM - Patient was reevaluated at bedside. Discussed lab and radiology results with the patient and informed her that she needs to get a colonoscopy.    7:40 PM discussed case with Dr. Andre BUCIO.  He will arrange with his office to help get the patient a outpatient colonoscopy    8 PM PM - Discussed discharge instructions and return precautions with the patient and they were cleared for discharge. Patient was given the opportunity to ask any further questions. She is comfortable with discharge at this time.       HYDRATION: Based on the patient's presentation of Dehydration the patient was given IV fluids. IV Hydration was used because oral hydration was not adequate alone. Upon recheck following hydration, the patient was improved.     PROBLEM LIST  Right flank pain at this point time the  exact etiology of the patient's right flank pain is unclear to me.  Patient does have some mild anemia and diverticulosis but no diverticulitis.  There is what appears to be a polypoid mass in her rectum I have discussed this with the patient and her family and the need for follow-up colonoscopy to make sure this is not cancer.  Patient does not show any signs of a urinary tract infection no obvious inflammation in the right upper quadrant.  Patient does not show any signs of biliary obstruction based off her labs.    Problem #2 anemia patient is mildly anemic I have added iron studies for her primary to follow-up on.  Patient understands she is to follow-up with her primary for this.    #3 hypokalemia -patient had some mild hypokalemia she was given oral potassium.  Discussed increasing her green leafy vegetables, bananas, oranges and pineapple which can all increase her potassium levels.    DISPOSITION AND DISCUSSIONS  I have discussed management of the patient with the following physicians and ED's: Dr. Powell    Discussion of management with other [facility] or appropriate source(s): None     Escalation of care considered, and ultimately not performed: acute inpatient care management, however at this time, the patient is most appropriate for outpatient management.    Barriers to care at this time, including but not limited to:  None .        The patient will return for new or worsening symptoms and is stable at the time of discharge.    The patient is referred to a primary physician for blood pressure management, diabetic screening, and for all other preventative health concerns.        DISPOSITION:  Patient will be discharged home in stable condition.    FOLLOW UP:  Lenny RENTERIA M.D.  [address] [facility] [id number] [phone]    Schedule an appointment as soon as possible for a visit in 1 week      Stiven Powell M.D.  [address] [facility] [id number] [phone]    Schedule an appointment  as soon as possible for a visit   For colonoscopy because of the rectal mass      OUTPATIENT MEDICATIONS:  New Prescriptions    No medications on file         FINAL DIAGNOSIS  1. Right flank pain    2. Rectal mass    3. Hypokalemia    4. Anemia, unspecified type         I, Jennyfer Barnes (Scribe), am scribing for, and in the presence of, LAUREN Vela*.    Electronically signed by: Jennyfer Barnes (Scribe), 5/10/2023    I, LAUREN Vela* personally performed the services described in this documentation, as scribed by Jennyfer Barnes in my presence, and it is both accurate and complete.      The note accurately reflects work and decisions made by me.  Jaun Stone M.D.  5/10/2023  8:12 PM

## 2023-05-10 NOTE — ED TRIAGE NOTES
"Chief Complaint   Patient presents with    Abdominal Pain     BIB EMS. 83 YO female. Pt reports RLQ pain with movement x1 week. Pt reports 0/10 pain at rest.   BP (!) 149/69   Pulse 83   Temp 36.6 °C (97.8 °F) (Temporal)   Resp 16   Ht 1.702 m (5' 7\")   Wt 59.4 kg (131 lb)   SpO2 100%   BMI 20.52 kg/m²   "

## 2023-05-11 ENCOUNTER — HOME HEALTH ADMISSION (OUTPATIENT)
Dept: HOME HEALTH SERVICES | Facility: HOME HEALTHCARE | Age: 83
End: 2023-05-11
Payer: MEDICARE

## 2023-05-11 LAB
FERRITIN SERPL-MCNC: 31.3 NG/ML (ref 10–291)
IRON SATN MFR SERPL: 9 % (ref 15–55)
IRON SERPL-MCNC: 31 UG/DL (ref 40–170)
TIBC SERPL-MCNC: 355 UG/DL (ref 250–450)
TRANSFERRIN SERPL-MCNC: 335 MG/DL (ref 200–370)
UIBC SERPL-MCNC: 324 UG/DL (ref 110–370)

## 2023-05-11 NOTE — ED NOTES
Pt discharged home with daughter. Instructed to follow up with primary care and GI. Pt and daughter denied questions at this time.  Pt instructed to come back to the ER if they feel they are having a medical emergency.  Pt aox4, requested a wheelchair to the waiting area. Pt brought to the ER via wheelchair by RUSSEL corea.

## 2023-05-11 NOTE — ED NOTES
Family called for an update on pt. Informed pt had completed ct, pending results. Daughter reports she will come back to the ER.

## 2023-05-11 NOTE — ED NOTES
PT ambulated to bathroom in back with standby assist with walker by charge nurse. Pt hooked back up to monitor, no other needs at this time. Pts call light in reach.

## 2023-05-11 NOTE — DISCHARGE INSTRUCTIONS
Have your doctor follow-up on your iron studies.  The exact etiology of your right-sided abdominal pain is unclear you can use Tylenol and ibuprofen for this.  You do have a rectal mass seen on CT this needs to be followed up with a colonoscopy by GI please contact Dr. Powell office to make sure that this is not cancer.

## 2023-05-12 ENCOUNTER — TELEPHONE (OUTPATIENT)
Dept: SOCIAL WORK | Facility: CLINIC | Age: 83
End: 2023-05-12
Payer: MEDICARE

## 2023-05-12 NOTE — TELEPHONE ENCOUNTER
ED Follow-up  Call Attempts: 1  Date of ED visit: 05/10/23  Call Outcome: Reviewed ED visit with patient  Since you've been home, how have you been feeling?: Better  Were you prescribed any medications?: No  Do you have any questions about your discharge instructions?: No  RN Recommendations: Follow-up appointment scheduled  Additional details: Member reports she has f/u appt. w/ PCP but declines to make an appt. with GI at this time. She will discuss her situation with her PCP.  Total time spent (mins): 3

## 2023-07-06 ENCOUNTER — HOSPITAL ENCOUNTER (OUTPATIENT)
Dept: LAB | Facility: MEDICAL CENTER | Age: 83
End: 2023-07-06
Attending: FAMILY MEDICINE
Payer: MEDICARE

## 2023-07-06 LAB
ALBUMIN SERPL BCP-MCNC: 4.1 G/DL (ref 3.2–4.9)
ALBUMIN/GLOB SERPL: 1.6 G/DL
ALP SERPL-CCNC: 72 U/L (ref 30–99)
ALT SERPL-CCNC: 12 U/L (ref 2–50)
ANION GAP SERPL CALC-SCNC: 13 MMOL/L (ref 7–16)
AST SERPL-CCNC: 17 U/L (ref 12–45)
BASOPHILS # BLD AUTO: 0.6 % (ref 0–1.8)
BASOPHILS # BLD: 0.04 K/UL (ref 0–0.12)
BILIRUB SERPL-MCNC: 0.4 MG/DL (ref 0.1–1.5)
BUN SERPL-MCNC: 32 MG/DL (ref 8–22)
CALCIUM ALBUM COR SERPL-MCNC: 9.1 MG/DL (ref 8.5–10.5)
CALCIUM SERPL-MCNC: 9.2 MG/DL (ref 8.4–10.2)
CHLORIDE SERPL-SCNC: 105 MMOL/L (ref 96–112)
CHOLEST SERPL-MCNC: 163 MG/DL (ref 100–199)
CO2 SERPL-SCNC: 23 MMOL/L (ref 20–33)
CREAT SERPL-MCNC: 0.82 MG/DL (ref 0.5–1.4)
EOSINOPHIL # BLD AUTO: 0.21 K/UL (ref 0–0.51)
EOSINOPHIL NFR BLD: 2.9 % (ref 0–6.9)
ERYTHROCYTE [DISTWIDTH] IN BLOOD BY AUTOMATED COUNT: 42.8 FL (ref 35.9–50)
EST. AVERAGE GLUCOSE BLD GHB EST-MCNC: 117 MG/DL
FASTING STATUS PATIENT QL REPORTED: NORMAL
FERRITIN SERPL-MCNC: 7.9 NG/ML (ref 10–291)
FOLATE SERPL-MCNC: 12.8 NG/ML
GFR SERPLBLD CREATININE-BSD FMLA CKD-EPI: 70 ML/MIN/1.73 M 2
GLOBULIN SER CALC-MCNC: 2.6 G/DL (ref 1.9–3.5)
GLUCOSE SERPL-MCNC: 110 MG/DL (ref 65–99)
HBA1C MFR BLD: 5.7 % (ref 4–5.6)
HCT VFR BLD AUTO: 31.4 % (ref 37–47)
HDLC SERPL-MCNC: 38 MG/DL
HGB BLD-MCNC: 9.7 G/DL (ref 12–16)
HGB RETIC QN AUTO: 25.7 PG/CELL (ref 29–35)
IMM GRANULOCYTES # BLD AUTO: 0.02 K/UL (ref 0–0.11)
IMM GRANULOCYTES NFR BLD AUTO: 0.3 % (ref 0–0.9)
IMM RETICS NFR: 17.8 % (ref 2.6–16.1)
IRON SATN MFR SERPL: 9 % (ref 15–55)
IRON SERPL-MCNC: 39 UG/DL (ref 40–170)
LDLC SERPL CALC-MCNC: 100 MG/DL
LYMPHOCYTES # BLD AUTO: 1.72 K/UL (ref 1–4.8)
LYMPHOCYTES NFR BLD: 24 % (ref 22–41)
MCH RBC QN AUTO: 22.7 PG (ref 27–33)
MCHC RBC AUTO-ENTMCNC: 30.9 G/DL (ref 32.2–35.5)
MCV RBC AUTO: 73.5 FL (ref 81.4–97.8)
MONOCYTES # BLD AUTO: 0.44 K/UL (ref 0–0.85)
MONOCYTES NFR BLD AUTO: 6.1 % (ref 0–13.4)
NEUTROPHILS # BLD AUTO: 4.75 K/UL (ref 1.82–7.42)
NEUTROPHILS NFR BLD: 66.1 % (ref 44–72)
NRBC # BLD AUTO: 0 K/UL
NRBC BLD-RTO: 0 /100 WBC (ref 0–0.2)
PLATELET # BLD AUTO: 143 K/UL (ref 164–446)
PMV BLD AUTO: 10 FL (ref 9–12.9)
POTASSIUM SERPL-SCNC: 3.9 MMOL/L (ref 3.6–5.5)
PROT SERPL-MCNC: 6.7 G/DL (ref 6–8.2)
RBC # BLD AUTO: 4.27 M/UL (ref 4.2–5.4)
RETICS # AUTO: 0.05 M/UL (ref 0.04–0.12)
RETICS/RBC NFR: 1.2 % (ref 0.8–2.6)
SODIUM SERPL-SCNC: 141 MMOL/L (ref 135–145)
TIBC SERPL-MCNC: 448 UG/DL (ref 250–450)
TRIGL SERPL-MCNC: 125 MG/DL (ref 0–149)
TSH SERPL DL<=0.005 MIU/L-ACNC: 4.12 UIU/ML (ref 0.38–5.33)
UIBC SERPL-MCNC: 409 UG/DL (ref 110–370)
VIT B12 SERPL-MCNC: 371 PG/ML (ref 211–911)
WBC # BLD AUTO: 7.2 K/UL (ref 4.8–10.8)

## 2023-07-06 PROCEDURE — 83540 ASSAY OF IRON: CPT

## 2023-07-06 PROCEDURE — 82746 ASSAY OF FOLIC ACID SERUM: CPT

## 2023-07-06 PROCEDURE — 80061 LIPID PANEL: CPT

## 2023-07-06 PROCEDURE — 85025 COMPLETE CBC W/AUTO DIFF WBC: CPT

## 2023-07-06 PROCEDURE — 83550 IRON BINDING TEST: CPT

## 2023-07-06 PROCEDURE — 80053 COMPREHEN METABOLIC PANEL: CPT

## 2023-07-06 PROCEDURE — 36415 COLL VENOUS BLD VENIPUNCTURE: CPT

## 2023-07-06 PROCEDURE — 83036 HEMOGLOBIN GLYCOSYLATED A1C: CPT

## 2023-07-06 PROCEDURE — 85046 RETICYTE/HGB CONCENTRATE: CPT

## 2023-07-06 PROCEDURE — 82607 VITAMIN B-12: CPT

## 2023-07-06 PROCEDURE — 84443 ASSAY THYROID STIM HORMONE: CPT

## 2023-07-06 PROCEDURE — 82728 ASSAY OF FERRITIN: CPT

## 2023-07-30 NOTE — CARE PLAN
Problem: Safety  Goal: Will remain free from injury  Outcome: PROGRESSING AS EXPECTED      Problem: Pain Management  Goal: Pain level will decrease to patient's comfort goal  Outcome: PROGRESSING AS EXPECTED         RT paged      Charlee Victoria RN  07/30/23 [phone]

## 2023-08-22 ENCOUNTER — TELEPHONE (OUTPATIENT)
Dept: HEALTH INFORMATION MANAGEMENT | Facility: OTHER | Age: 83
End: 2023-08-22

## 2023-09-05 ENCOUNTER — TELEPHONE (OUTPATIENT)
Dept: HEALTH INFORMATION MANAGEMENT | Facility: OTHER | Age: 83
End: 2023-09-05
Payer: MEDICARE

## 2023-09-05 ENCOUNTER — DOCUMENTATION (OUTPATIENT)
Dept: HEALTH INFORMATION MANAGEMENT | Facility: OTHER | Age: 83
End: 2023-09-05
Payer: MEDICARE

## 2023-10-09 ENCOUNTER — TELEPHONE (OUTPATIENT)
Dept: HEALTH INFORMATION MANAGEMENT | Facility: OTHER | Age: 83
End: 2023-10-09
Payer: MEDICARE

## 2023-10-16 NOTE — CODE DOCUMENTATION
Per Dr. Love, give 500 cc bolus, recheck BP if SBP above 110 give second dose of propanolol.    What Type Of Note Output Would You Prefer (Optional)?: Standard Output What Is The Reason For Today's Visit?: Full Body Skin Examination with No Concerns What Is The Reason For Today's Visit? (Being Monitored For X): concerning skin lesions on an annual basis

## 2023-11-15 ENCOUNTER — APPOINTMENT (OUTPATIENT)
Dept: RADIOLOGY | Facility: MEDICAL CENTER | Age: 83
End: 2023-11-15
Attending: EMERGENCY MEDICINE
Payer: MEDICARE

## 2023-11-15 ENCOUNTER — HOSPITAL ENCOUNTER (OUTPATIENT)
Facility: MEDICAL CENTER | Age: 83
End: 2023-11-16
Attending: EMERGENCY MEDICINE | Admitting: HOSPITALIST
Payer: MEDICARE

## 2023-11-15 DIAGNOSIS — S01.81XA LACERATION OF FOREHEAD, INITIAL ENCOUNTER: ICD-10-CM

## 2023-11-15 DIAGNOSIS — S73.101A HIP SPRAIN, RIGHT, INITIAL ENCOUNTER: ICD-10-CM
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
## 2023-11-15 DIAGNOSIS — R55 NEAR SYNCOPE: ICD-10-CM

## 2023-11-15 DIAGNOSIS — D64.9 ANEMIA, UNSPECIFIED TYPE: ICD-10-CM

## 2023-11-15 DIAGNOSIS — S40.011A CONTUSION OF RIGHT SHOULDER, INITIAL ENCOUNTER: ICD-10-CM

## 2023-11-15 DIAGNOSIS — D72.829 LEUKOCYTOSIS, UNSPECIFIED TYPE: ICD-10-CM

## 2023-11-15 DIAGNOSIS — N39.0 ACUTE UTI: ICD-10-CM

## 2023-11-15 DIAGNOSIS — S63.617A SPRAIN OF LEFT LITTLE FINGER, UNSPECIFIED SITE OF DIGIT, INITIAL ENCOUNTER: ICD-10-CM

## 2023-11-15 DIAGNOSIS — E87.6 HYPOKALEMIA: ICD-10-CM

## 2023-11-15 LAB
ALBUMIN SERPL BCP-MCNC: 4.4 G/DL (ref 3.2–4.9)
ALBUMIN/GLOB SERPL: 1.6 G/DL
ALP SERPL-CCNC: 72 U/L (ref 30–99)
ALT SERPL-CCNC: 10 U/L (ref 2–50)
ANION GAP SERPL CALC-SCNC: 17 MMOL/L (ref 7–16)
APPEARANCE UR: ABNORMAL
APTT PPP: 27.5 SEC (ref 24.7–36)
AST SERPL-CCNC: 21 U/L (ref 12–45)
BACTERIA #/AREA URNS HPF: NEGATIVE /HPF
BASOPHILS # BLD AUTO: 0.3 % (ref 0–1.8)
BASOPHILS # BLD: 0.03 K/UL (ref 0–0.12)
BILIRUB SERPL-MCNC: 0.5 MG/DL (ref 0.1–1.5)
BILIRUB UR QL STRIP.AUTO: NEGATIVE
BUN SERPL-MCNC: 30 MG/DL (ref 8–22)
CALCIUM ALBUM COR SERPL-MCNC: 9 MG/DL (ref 8.5–10.5)
CALCIUM SERPL-MCNC: 9.3 MG/DL (ref 8.4–10.2)
CHLORIDE SERPL-SCNC: 100 MMOL/L (ref 96–112)
CO2 SERPL-SCNC: 23 MMOL/L (ref 20–33)
COLOR UR: YELLOW
CREAT SERPL-MCNC: 0.89 MG/DL (ref 0.5–1.4)
DIGOXIN SERPL-MCNC: 0.9 NG/ML (ref 0.8–2)
EKG IMPRESSION: NORMAL
EOSINOPHIL # BLD AUTO: 0.1 K/UL (ref 0–0.51)
EOSINOPHIL NFR BLD: 0.9 % (ref 0–6.9)
EPI CELLS #/AREA URNS HPF: ABNORMAL /HPF
ERYTHROCYTE [DISTWIDTH] IN BLOOD BY AUTOMATED COUNT: 41.9 FL (ref 35.9–50)
GFR SERPLBLD CREATININE-BSD FMLA CKD-EPI: 63 ML/MIN/1.73 M 2
GLOBULIN SER CALC-MCNC: 2.7 G/DL (ref 1.9–3.5)
GLUCOSE SERPL-MCNC: 91 MG/DL (ref 65–99)
GLUCOSE UR STRIP.AUTO-MCNC: NEGATIVE MG/DL
HCT VFR BLD AUTO: 33.9 % (ref 37–47)
HGB BLD-MCNC: 10.5 G/DL (ref 12–16)
IMM GRANULOCYTES # BLD AUTO: 0.07 K/UL (ref 0–0.11)
IMM GRANULOCYTES NFR BLD AUTO: 0.6 % (ref 0–0.9)
INR PPP: 0.98 (ref 0.87–1.13)
KETONES UR STRIP.AUTO-MCNC: NEGATIVE MG/DL
LEUKOCYTE ESTERASE UR QL STRIP.AUTO: ABNORMAL
LYMPHOCYTES # BLD AUTO: 2.33 K/UL (ref 1–4.8)
LYMPHOCYTES NFR BLD: 20.1 % (ref 22–41)
MCH RBC QN AUTO: 22.2 PG (ref 27–33)
MCHC RBC AUTO-ENTMCNC: 31 G/DL (ref 32.2–35.5)
MCV RBC AUTO: 71.5 FL (ref 81.4–97.8)
MICRO URNS: ABNORMAL
MONOCYTES # BLD AUTO: 0.69 K/UL (ref 0–0.85)
MONOCYTES NFR BLD AUTO: 6 % (ref 0–13.4)
NEUTROPHILS # BLD AUTO: 8.35 K/UL (ref 1.82–7.42)
NEUTROPHILS NFR BLD: 72.1 % (ref 44–72)
NITRITE UR QL STRIP.AUTO: NEGATIVE
NRBC # BLD AUTO: 0 K/UL
NRBC BLD-RTO: 0 /100 WBC (ref 0–0.2)
PH UR STRIP.AUTO: 7.5 [PH] (ref 5–8)
PLATELET # BLD AUTO: 192 K/UL (ref 164–446)
PMV BLD AUTO: 10.3 FL (ref 9–12.9)
POTASSIUM SERPL-SCNC: 3.3 MMOL/L (ref 3.6–5.5)
PROT SERPL-MCNC: 7.1 G/DL (ref 6–8.2)
PROT UR QL STRIP: NEGATIVE MG/DL
PROTHROMBIN TIME: 13.5 SEC (ref 12–14.6)
RBC # BLD AUTO: 4.74 M/UL (ref 4.2–5.4)
RBC # URNS HPF: ABNORMAL /HPF
RBC UR QL AUTO: NEGATIVE
SODIUM SERPL-SCNC: 140 MMOL/L (ref 135–145)
SP GR UR STRIP.AUTO: 1.01
TROPONIN T SERPL-MCNC: 37 NG/L (ref 6–19)
WBC # BLD AUTO: 11.6 K/UL (ref 4.8–10.8)
WBC #/AREA URNS HPF: ABNORMAL /HPF

## 2023-11-15 PROCEDURE — 94760 N-INVAS EAR/PLS OXIMETRY 1: CPT

## 2023-11-15 PROCEDURE — 81001 URINALYSIS AUTO W/SCOPE: CPT

## 2023-11-15 PROCEDURE — G0378 HOSPITAL OBSERVATION PER HR: HCPCS

## 2023-11-15 PROCEDURE — 304217 HCHG IRRIGATION SYSTEM

## 2023-11-15 PROCEDURE — 80162 ASSAY OF DIGOXIN TOTAL: CPT

## 2023-11-15 PROCEDURE — 70450 CT HEAD/BRAIN W/O DYE: CPT

## 2023-11-15 PROCEDURE — 80053 COMPREHEN METABOLIC PANEL: CPT

## 2023-11-15 PROCEDURE — 99285 EMERGENCY DEPT VISIT HI MDM: CPT

## 2023-11-15 PROCEDURE — 73030 X-RAY EXAM OF SHOULDER: CPT | Mod: RT

## 2023-11-15 PROCEDURE — 85610 PROTHROMBIN TIME: CPT

## 2023-11-15 PROCEDURE — 304999 HCHG REPAIR-SIMPLE/INTERMED LEVEL 1

## 2023-11-15 PROCEDURE — 72170 X-RAY EXAM OF PELVIS: CPT

## 2023-11-15 PROCEDURE — 73140 X-RAY EXAM OF FINGER(S): CPT | Mod: LT

## 2023-11-15 PROCEDURE — 303747 HCHG EXTRA SUTURE

## 2023-11-15 PROCEDURE — 85730 THROMBOPLASTIN TIME PARTIAL: CPT

## 2023-11-15 PROCEDURE — 99223 1ST HOSP IP/OBS HIGH 75: CPT | Performed by: HOSPITALIST

## 2023-11-15 PROCEDURE — 700101 HCHG RX REV CODE 250: Performed by: EMERGENCY MEDICINE

## 2023-11-15 PROCEDURE — 73552 X-RAY EXAM OF FEMUR 2/>: CPT | Mod: RT

## 2023-11-15 PROCEDURE — 71045 X-RAY EXAM CHEST 1 VIEW: CPT

## 2023-11-15 PROCEDURE — 85025 COMPLETE CBC W/AUTO DIFF WBC: CPT

## 2023-11-15 PROCEDURE — 72125 CT NECK SPINE W/O DYE: CPT

## 2023-11-15 PROCEDURE — 93005 ELECTROCARDIOGRAM TRACING: CPT

## 2023-11-15 PROCEDURE — 36415 COLL VENOUS BLD VENIPUNCTURE: CPT

## 2023-11-15 PROCEDURE — 84484 ASSAY OF TROPONIN QUANT: CPT

## 2023-11-15 PROCEDURE — 93005 ELECTROCARDIOGRAM TRACING: CPT | Performed by: EMERGENCY MEDICINE

## 2023-11-15 RX ORDER — OMEPRAZOLE 20 MG/1
40 CAPSULE, DELAYED RELEASE ORAL DAILY
Status: DISCONTINUED | OUTPATIENT
Start: 2023-11-16 | End: 2023-11-16 | Stop reason: HOSPADM

## 2023-11-15 RX ORDER — BISACODYL 10 MG
10 SUPPOSITORY, RECTAL RECTAL
Status: DISCONTINUED | OUTPATIENT
Start: 2023-11-15 | End: 2023-11-16 | Stop reason: HOSPADM

## 2023-11-15 RX ORDER — DULOXETIN HYDROCHLORIDE 30 MG/1
60 CAPSULE, DELAYED RELEASE ORAL DAILY
Status: DISCONTINUED | OUTPATIENT
Start: 2023-11-16 | End: 2023-11-16 | Stop reason: HOSPADM

## 2023-11-15 RX ORDER — AMLODIPINE BESYLATE 5 MG/1
5 TABLET ORAL DAILY
Status: DISCONTINUED | OUTPATIENT
Start: 2023-11-16 | End: 2023-11-16 | Stop reason: HOSPADM

## 2023-11-15 RX ORDER — LIDOCAINE HYDROCHLORIDE AND EPINEPHRINE 10; 10 MG/ML; UG/ML
10 INJECTION, SOLUTION INFILTRATION; PERINEURAL ONCE
Status: COMPLETED | OUTPATIENT
Start: 2023-11-15 | End: 2023-11-15

## 2023-11-15 RX ORDER — HYDROCHLOROTHIAZIDE 25 MG/1
25 TABLET ORAL 2 TIMES DAILY
Status: DISCONTINUED | OUTPATIENT
Start: 2023-11-15 | End: 2023-11-15

## 2023-11-15 RX ORDER — AMOXICILLIN 250 MG
2 CAPSULE ORAL 2 TIMES DAILY
Status: DISCONTINUED | OUTPATIENT
Start: 2023-11-15 | End: 2023-11-16 | Stop reason: HOSPADM

## 2023-11-15 RX ORDER — POLYETHYLENE GLYCOL 3350 17 G/17G
1 POWDER, FOR SOLUTION ORAL
Status: DISCONTINUED | OUTPATIENT
Start: 2023-11-15 | End: 2023-11-16 | Stop reason: HOSPADM

## 2023-11-15 RX ORDER — ACETAMINOPHEN 325 MG/1
650 TABLET ORAL EVERY 6 HOURS PRN
Status: DISCONTINUED | OUTPATIENT
Start: 2023-11-15 | End: 2023-11-16 | Stop reason: HOSPADM

## 2023-11-15 RX ADMIN — LIDOCAINE HYDROCHLORIDE,EPINEPHRINE BITARTRATE 10 ML: 10; .01 INJECTION, SOLUTION INFILTRATION; PERINEURAL at 21:54

## 2023-11-15 ASSESSMENT — FIBROSIS 4 INDEX: FIB4 SCORE: 2.92

## 2023-11-16 ENCOUNTER — PATIENT OUTREACH (OUTPATIENT)
Dept: SCHEDULING | Facility: IMAGING CENTER | Age: 83
End: 2023-11-16

## 2023-11-16 ENCOUNTER — HOME HEALTH ADMISSION (OUTPATIENT)
Dept: HOME HEALTH SERVICES | Facility: HOME HEALTHCARE | Age: 83
End: 2023-11-16
Payer: MEDICARE

## 2023-11-16 ENCOUNTER — APPOINTMENT (OUTPATIENT)
Dept: CARDIOLOGY | Facility: MEDICAL CENTER | Age: 83
End: 2023-11-16
Attending: HOSPITALIST
Payer: MEDICARE

## 2023-11-16 VITALS
BODY MASS INDEX: 21 KG/M2 | SYSTOLIC BLOOD PRESSURE: 121 MMHG | HEART RATE: 75 BPM | DIASTOLIC BLOOD PRESSURE: 51 MMHG | WEIGHT: 133.82 LBS | RESPIRATION RATE: 16 BRPM | OXYGEN SATURATION: 97 % | TEMPERATURE: 99.2 F | HEIGHT: 67 IN

## 2023-11-16 PROBLEM — D50.9 MICROCYTIC ANEMIA: Status: ACTIVE | Noted: 2023-11-16

## 2023-11-16 PROBLEM — N39.0 ACUTE UTI: Status: ACTIVE | Noted: 2023-11-16

## 2023-11-16 LAB
ANION GAP SERPL CALC-SCNC: 16 MMOL/L (ref 7–16)
BUN SERPL-MCNC: 29 MG/DL (ref 8–22)
CALCIUM SERPL-MCNC: 8.8 MG/DL (ref 8.4–10.2)
CHLORIDE SERPL-SCNC: 103 MMOL/L (ref 96–112)
CO2 SERPL-SCNC: 22 MMOL/L (ref 20–33)
CREAT SERPL-MCNC: 0.82 MG/DL (ref 0.5–1.4)
ERYTHROCYTE [DISTWIDTH] IN BLOOD BY AUTOMATED COUNT: 42.3 FL (ref 35.9–50)
GFR SERPLBLD CREATININE-BSD FMLA CKD-EPI: 70 ML/MIN/1.73 M 2
GLUCOSE SERPL-MCNC: 115 MG/DL (ref 65–99)
HCT VFR BLD AUTO: 31.3 % (ref 37–47)
HGB BLD-MCNC: 9.6 G/DL (ref 12–16)
LV EJECT FRACT  99904: 70
MCH RBC QN AUTO: 21.9 PG (ref 27–33)
MCHC RBC AUTO-ENTMCNC: 30.7 G/DL (ref 32.2–35.5)
MCV RBC AUTO: 71.5 FL (ref 81.4–97.8)
PLATELET # BLD AUTO: 163 K/UL (ref 164–446)
PMV BLD AUTO: 10.7 FL (ref 9–12.9)
POTASSIUM SERPL-SCNC: 3.1 MMOL/L (ref 3.6–5.5)
RBC # BLD AUTO: 4.38 M/UL (ref 4.2–5.4)
SODIUM SERPL-SCNC: 141 MMOL/L (ref 135–145)
TROPONIN T SERPL-MCNC: 36 NG/L (ref 6–19)
TROPONIN T SERPL-MCNC: 38 NG/L (ref 6–19)
WBC # BLD AUTO: 10.6 K/UL (ref 4.8–10.8)

## 2023-11-16 PROCEDURE — 80048 BASIC METABOLIC PNL TOTAL CA: CPT

## 2023-11-16 PROCEDURE — 700117 HCHG RX CONTRAST REV CODE 255: Performed by: HOSPITALIST

## 2023-11-16 PROCEDURE — A9270 NON-COVERED ITEM OR SERVICE: HCPCS | Performed by: HOSPITALIST

## 2023-11-16 PROCEDURE — 97162 PT EVAL MOD COMPLEX 30 MIN: CPT

## 2023-11-16 PROCEDURE — G0378 HOSPITAL OBSERVATION PER HR: HCPCS

## 2023-11-16 PROCEDURE — 700111 HCHG RX REV CODE 636 W/ 250 OVERRIDE (IP): Mod: JZ | Performed by: HOSPITALIST

## 2023-11-16 PROCEDURE — A9270 NON-COVERED ITEM OR SERVICE: HCPCS | Performed by: INTERNAL MEDICINE

## 2023-11-16 PROCEDURE — 96365 THER/PROPH/DIAG IV INF INIT: CPT

## 2023-11-16 PROCEDURE — 700105 HCHG RX REV CODE 258: Performed by: HOSPITALIST

## 2023-11-16 PROCEDURE — 700102 HCHG RX REV CODE 250 W/ 637 OVERRIDE(OP): Performed by: HOSPITALIST

## 2023-11-16 PROCEDURE — 99239 HOSP IP/OBS DSCHRG MGMT >30: CPT | Performed by: INTERNAL MEDICINE

## 2023-11-16 PROCEDURE — 93306 TTE W/DOPPLER COMPLETE: CPT

## 2023-11-16 PROCEDURE — 93306 TTE W/DOPPLER COMPLETE: CPT | Mod: 26 | Performed by: INTERNAL MEDICINE

## 2023-11-16 PROCEDURE — 84484 ASSAY OF TROPONIN QUANT: CPT | Mod: 91

## 2023-11-16 PROCEDURE — 97535 SELF CARE MNGMENT TRAINING: CPT

## 2023-11-16 PROCEDURE — 85027 COMPLETE CBC AUTOMATED: CPT

## 2023-11-16 PROCEDURE — 94760 N-INVAS EAR/PLS OXIMETRY 1: CPT

## 2023-11-16 PROCEDURE — 700102 HCHG RX REV CODE 250 W/ 637 OVERRIDE(OP): Performed by: INTERNAL MEDICINE

## 2023-11-16 PROCEDURE — 36415 COLL VENOUS BLD VENIPUNCTURE: CPT

## 2023-11-16 RX ORDER — POTASSIUM CHLORIDE 20 MEQ/1
20 TABLET, EXTENDED RELEASE ORAL DAILY
COMMUNITY

## 2023-11-16 RX ORDER — METOPROLOL SUCCINATE 25 MG/1
25 TABLET, EXTENDED RELEASE ORAL DAILY
COMMUNITY

## 2023-11-16 RX ORDER — POTASSIUM CHLORIDE 20 MEQ/1
20 TABLET, EXTENDED RELEASE ORAL ONCE
Status: COMPLETED | OUTPATIENT
Start: 2023-11-16 | End: 2023-11-16

## 2023-11-16 RX ORDER — CEPHALEXIN 500 MG/1
500 CAPSULE ORAL EVERY 8 HOURS
Qty: 9 CAPSULE | Refills: 0 | Status: ACTIVE | OUTPATIENT
Start: 2023-11-16 | End: 2023-11-19

## 2023-11-16 RX ORDER — CEPHALEXIN 250 MG/1
500 CAPSULE ORAL EVERY 8 HOURS
Status: DISCONTINUED | OUTPATIENT
Start: 2023-11-16 | End: 2023-11-16 | Stop reason: HOSPADM

## 2023-11-16 RX ADMIN — CEPHALEXIN 500 MG: 250 CAPSULE ORAL at 08:49

## 2023-11-16 RX ADMIN — POTASSIUM CHLORIDE 20 MEQ: 1500 TABLET, EXTENDED RELEASE ORAL at 01:43

## 2023-11-16 RX ADMIN — CEFTRIAXONE SODIUM 1000 MG: 1 INJECTION, POWDER, FOR SOLUTION INTRAMUSCULAR; INTRAVENOUS at 05:23

## 2023-11-16 RX ADMIN — CEPHALEXIN 500 MG: 250 CAPSULE ORAL at 15:46

## 2023-11-16 RX ADMIN — AMLODIPINE BESYLATE 5 MG: 5 TABLET ORAL at 05:15

## 2023-11-16 RX ADMIN — HUMAN ALBUMIN MICROSPHERES AND PERFLUTREN 3 ML: 10; .22 INJECTION, SOLUTION INTRAVENOUS at 11:30

## 2023-11-16 RX ADMIN — DULOXETINE HYDROCHLORIDE 60 MG: 30 CAPSULE, DELAYED RELEASE ORAL at 05:15

## 2023-11-16 RX ADMIN — OMEPRAZOLE 40 MG: 20 CAPSULE, DELAYED RELEASE ORAL at 05:16

## 2023-11-16 RX ADMIN — SENNOSIDES AND DOCUSATE SODIUM 2 TABLET: 50; 8.6 TABLET ORAL at 05:15

## 2023-11-16 ASSESSMENT — ENCOUNTER SYMPTOMS
WEAKNESS: 0
DIZZINESS: 0
FEVER: 0
INSOMNIA: 0
BLURRED VISION: 0
HEADACHES: 0
NECK PAIN: 0
DOUBLE VISION: 0
DEPRESSION: 0
PALPITATIONS: 0
NAUSEA: 0
VOMITING: 0
BRUISES/BLEEDS EASILY: 0
SHORTNESS OF BREATH: 1
COUGH: 0
SORE THROAT: 0
MYALGIAS: 0

## 2023-11-16 ASSESSMENT — LIFESTYLE VARIABLES
TOTAL SCORE: 0
ON A TYPICAL DAY WHEN YOU DRINK ALCOHOL HOW MANY DRINKS DO YOU HAVE: 0
HAVE YOU EVER FELT YOU SHOULD CUT DOWN ON YOUR DRINKING: NO
HAVE PEOPLE ANNOYED YOU BY CRITICIZING YOUR DRINKING: NO
TOTAL SCORE: 0
HOW MANY TIMES IN THE PAST YEAR HAVE YOU HAD 5 OR MORE DRINKS IN A DAY: 0
EVER HAD A DRINK FIRST THING IN THE MORNING TO STEADY YOUR NERVES TO GET RID OF A HANGOVER: NO
AVERAGE NUMBER OF DAYS PER WEEK YOU HAVE A DRINK CONTAINING ALCOHOL: 0
ALCOHOL_USE: NO
CONSUMPTION TOTAL: NEGATIVE
EVER FELT BAD OR GUILTY ABOUT YOUR DRINKING: NO
TOTAL SCORE: 0

## 2023-11-16 ASSESSMENT — COGNITIVE AND FUNCTIONAL STATUS - GENERAL
PERSONAL GROOMING: A LITTLE
MOBILITY SCORE: 21
MOVING TO AND FROM BED TO CHAIR: A LITTLE
DAILY ACTIVITIY SCORE: 21
MOVING FROM LYING ON BACK TO SITTING ON SIDE OF FLAT BED: A LITTLE
SUGGESTED CMS G CODE MODIFIER DAILY ACTIVITY: CJ
TOILETING: A LITTLE
STANDING UP FROM CHAIR USING ARMS: A LITTLE
SUGGESTED CMS G CODE MODIFIER MOBILITY: CJ
WALKING IN HOSPITAL ROOM: A LITTLE
MOBILITY SCORE: 18
CLIMB 3 TO 5 STEPS WITH RAILING: A LITTLE
WALKING IN HOSPITAL ROOM: A LITTLE
HELP NEEDED FOR BATHING: A LITTLE
STANDING UP FROM CHAIR USING ARMS: A LITTLE
CLIMB 3 TO 5 STEPS WITH RAILING: A LITTLE
SUGGESTED CMS G CODE MODIFIER MOBILITY: CK
TURNING FROM BACK TO SIDE WHILE IN FLAT BAD: A LITTLE

## 2023-11-16 ASSESSMENT — GAIT ASSESSMENTS
DISTANCE (FEET): 200
ASSISTIVE DEVICE: FRONT WHEEL WALKER
DEVIATION: BRADYKINETIC;SHUFFLED GAIT;DECREASED HEEL STRIKE;DECREASED TOE OFF
GAIT LEVEL OF ASSIST: STANDBY ASSIST

## 2023-11-16 ASSESSMENT — ACTIVITIES OF DAILY LIVING (ADL): TOILETING: INDEPENDENT

## 2023-11-16 ASSESSMENT — PAIN DESCRIPTION - PAIN TYPE
TYPE: ACUTE PAIN

## 2023-11-16 ASSESSMENT — FIBROSIS 4 INDEX: FIB4 SCORE: 3.46

## 2023-11-16 NOTE — DISCHARGE PLANNING
"HTH/SCP TCN chart review completed. Collaborated with RUBEN Davies prior to meeting with the pt. The most current review of medical record, knowledge of pt's PLOF and social support, LACE+ score of 74, 6 clicks scores of 21 ADL and 18 mobility were considered.  Appreciate PT consult 11/16/2023 noting recommendations for home health for continued physical therapy services.     TCN met with patient at bedside.  Patient pleasant, noted to be hard of hearing and mildly tangential but easily redirected during simple conversation.     Introduced TCN program. Provided education and discussion regarding post acute resources based on current therapy recommendations and rehabilitation needs. Patient stated familiarity with [facility] services, but requested TCN speak with daughterTequila, and grandsonSkyler, regarding current home healthcare recommendations.    12:20PM- TCN placed telephone call to patient daughterTequila, as requested by patient. Tequila stated that patient is \"adamant to remain home\" stating patient desire to remain home can pose as a potential barrier to patient care. Tequila stated that family is \"on board with patient having additional care in the home\" and verbalized understanding and agreement to home healthcare services. TCN placed telephone call to patient Skyler munoz, time set to meet with patient at bedside for 1:15PM.     1:20PM- TCN met with patient and grandasif Holland at bedside as requested by patient. Choice proactively obtained for HH, faxed to [facility] and given to RUBEN. In discussion regarding after hospital visit follow up, patient was amendable to in home physician visit through [facility] given current home healthcare recommendations.     TCN will continue to follow and collaborate with discharge planning team as additional post acute needs arise. Thank you.     Completed today  PT with recommendations for home health for continued physical therapy services on 11/16/2023  Choice obtained:   [facility] with " [facility]. [facility] [place]/16/2023

## 2023-11-16 NOTE — H&P
"Hospital Medicine History & Physical Note    Date of Service  11/15/2023    Primary Care Physician  Lenny RENTERIA M.D.    Consultants  None      Code Status  Full Code    Chief Complaint  Chief Complaint   Patient presents with    Fall     Reports she was carrying some water \"into my house and I was feeling dizzy. I hit my head against the stucco in my house\". Reports also pain to L hand 5th finger and R shoulder. Pt. Is poor historian, confused at baseline per family. Family reports pt. Lives alone and they deny that pt. Is on blood thinners. Pt. Denies LOC.     Dizziness       History of Presenting Illness  Lindsay Vargas is a 83 y.o. female, who presented to the ER on 11/15/2023 after head injury and near fall/syncope at home.  Patient reports feeling dizziness and \"unwell \"during the day after working with her grandson yesterday about 4 hours cleaning up the pantry.  Patient was taking water to her dog, lost balance and hit her head and the stucco.  Sustained a small laceration and reports bleeding and losing strength.  She was able to hold a wall and sit down on the floor.  She denies loss of consciousness or fall but also states that she was at home alone and this was unwitnessed.  She also reports having mild chest discomfort but not really chest pain.  Denies prior history of MI.  Patient is not sure about past medical history, somewhat poor historian very tangential with her conversation.  Daughter is also unable to provide me detailed history about her health and health problems.  She is on digoxin but no blood thinners, possible underlying A-fib.    Her   2 years ago.  She has been living alone with her dog.  Family is nearby and check on her.    I discussed the plan of care with patient and ERP  and I also called her Daughter Mrs Mandel and talked with her over the phone  .    Review of Systems  Review of Systems   Constitutional:  Positive for malaise/fatigue. Negative " for fever.   HENT:  Negative for congestion and sore throat.    Eyes:  Negative for blurred vision and double vision.   Respiratory:  Positive for shortness of breath. Negative for cough.    Cardiovascular:  Positive for chest pain. Negative for palpitations.   Gastrointestinal:  Negative for nausea and vomiting.   Genitourinary:  Negative for dysuria and urgency.   Musculoskeletal:  Negative for myalgias and neck pain.   Skin:  Negative for itching and rash.   Neurological:  Negative for dizziness, weakness and headaches.   Endo/Heme/Allergies:  Does not bruise/bleed easily.   Psychiatric/Behavioral:  Negative for depression. The patient does not have insomnia.        Past Medical History   has a past medical history of Anesthesia, Arthritis, Cataract, Chronic pain, Hiatus hernia syndrome, Hypertension, Lymphedema (2014), Migraine headache, Pain, and Pneumonia.    Surgical History   has a past surgical history that includes hysterectomy laparoscopy; chely by laparoscopy; other abdominal surgery; other abdominal surgery; other; other orthopedic surgery; cataract phaco with iol (Left, 2/4/2016); cataract phaco with iol (Right, 2/18/2016); pr inj lumbar/sacral,w/wo cntrst (Right, 9/29/2016); pr fluorscopic guidance spinal injection (9/29/2016); pr inj lumbar/sacral,w/wo cntrst (N/A, 11/10/2016); and pr fluorscopic guidance spinal injection (11/10/2016).     Family History  family history includes Heart Disease in her father and mother.   Family history reviewed with patient. There is no family history that is pertinent to the chief complaint.     Social History   reports that she has never smoked. She has never used smokeless tobacco. She reports that she does not drink alcohol and does not use drugs.    Allergies  No Known Allergies    Medications  Prior to Admission Medications   Prescriptions Last Dose Informant Patient Reported? Taking?   DULoxetine (CYMBALTA) 60 MG Cap DR Particles delayed-release capsule   Patient's Home Pharmacy No No   Sig: Take 1 Cap by mouth every day.   amLODIPine (NORVASC) 5 MG Tab  Patient's Home Pharmacy Yes No   Sig: Take 5 mg by mouth every day.   digoxin (LANOXIN) 125 MCG Tab  Patient's Home Pharmacy Yes No   Sig: Take 125 mcg by mouth every day.   hydroCHLOROthiazide (HYDRODIURIL) 25 MG Tab  Patient's Home Pharmacy Yes No   Sig: Take 25 mg by mouth 2 times a day.   loratadine (CLARITIN) 10 MG Tab  Patient's Home Pharmacy Yes No   Sig: Take 10 mg by mouth every day.   omeprazole (PRILOSEC) 40 MG delayed-release capsule  Patient's Home Pharmacy Yes No   Sig: Take 40 mg by mouth every day.      Facility-Administered Medications: None       Physical Exam  Temp:  [36.6 °C (97.9 °F)] 36.6 °C (97.9 °F)  Pulse:  [77] 77  Resp:  [20] 20  BP: (136)/(63) 136/63  SpO2:  [98 %] 98 %  Blood Pressure : 136/63   Temperature: 36.6 °C (97.9 °F)   Pulse: 77   Respiration: 20   Pulse Oximetry: 98 %       Physical Exam  Constitutional:       Appearance: Normal appearance.   HENT:      Head: Normocephalic.      Comments: Right labral laceration status post suture repair in the emergency department.     Mouth/Throat:      Mouth: Mucous membranes are moist.      Pharynx: Oropharynx is clear.   Eyes:      Extraocular Movements: Extraocular movements intact.      Pupils: Pupils are equal, round, and reactive to light.   Cardiovascular:      Rate and Rhythm: Normal rate and regular rhythm.      Heart sounds: Normal heart sounds.   Pulmonary:      Effort: Pulmonary effort is normal.      Breath sounds: Normal breath sounds.   Abdominal:      General: Abdomen is flat. Bowel sounds are normal.      Palpations: Abdomen is soft.   Musculoskeletal:      Cervical back: Normal range of motion and neck supple.   Skin:     General: Skin is warm and dry.   Neurological:      General: No focal deficit present.      Mental Status: She is alert and oriented to person, place, and time.   Psychiatric:         Mood and Affect: Mood  "normal.         Speech: Speech is tangential.         Behavior: Behavior normal.         Laboratory:  Recent Labs     11/15/23  2007   WBC 11.6*   RBC 4.74   HEMOGLOBIN 10.5*   HEMATOCRIT 33.9*   MCV 71.5*   MCH 22.2*   MCHC 31.0*   RDW 41.9   PLATELETCT 192   MPV 10.3     Recent Labs     11/15/23  2007   SODIUM 140   POTASSIUM 3.3*   CHLORIDE 100   CO2 23   GLUCOSE 91   BUN 30*   CREATININE 0.89   CALCIUM 9.3     Recent Labs     11/15/23  2007   ALTSGPT 10   ASTSGOT 21   ALKPHOSPHAT 72   TBILIRUBIN 0.5   GLUCOSE 91     Recent Labs     11/15/23  2007   APTT 27.5   INR 0.98     No results for input(s): \"NTPROBNP\" in the last 72 hours.      No results for input(s): \"TROPONINT\" in the last 72 hours.    Imaging:  DX-FINGER(S) 2+ LEFT   Final Result      1.  No acute fracture or dislocation.   2.  Erosive osteoarthritis involving several DIP joints.      DX-SHOULDER 2+ RIGHT   Final Result      1.  Severe glenohumeral and acromioclavicular osteoarthrosis.   2.  No fracture or dislocation.      DX-CHEST-LIMITED (1 VIEW)   Final Result      No acute cardiopulmonary abnormality.      DX-FEMUR-2+ RIGHT   Final Result      No acute fracture or dislocation.      DX-PELVIS-1 OR 2 VIEWS   Final Result      No acute fracture or dislocation.      CT-CSPINE WITHOUT PLUS RECONS   Final Result      No acute fracture or acute traumatic listhesis in the cervical spine.   Old, nonunited type II dens fracture. It is new since 2019 CT.   Cervical spondylosis, detailed above.   A 1.9 cm right thyroid nodule, larger compared to prior CT study. It can be followed with outpatient ultrasound.      CT-HEAD W/O   Final Result      1. No CT evidence of acute infarct, hemorrhage or mass.   2. Moderate global parenchymal atrophy. Chronic small vessel ischemic changes.          X-Ray:  I have personally reviewed the images and compared with prior images. and My impression is: CT head negative for acute intracranial abnormalities.  CT spine no acute " "fracture or traumatic process.  She also had x-ray of her finger, shoulder, chest, femur, pelvis, no significant traumatic fracture/abnormalities  EKG:  I have personally reviewed the images and compared with prior images. and My impression is: Normal sinus rhythm at 70 bpm.  No acute ST elevation.    Assessment/Plan:  Justification for Admission Status  I anticipate this patient is appropriate for observation status at this time because 83-year-old female, coming for near syncope work-up.      * Near syncope- (present on admission)  Assessment & Plan  -Observation status on telemetry unit.  -Near syncope versus ground-level fall.  -Patient also reports some chest discomfort and dizziness.  -Initial troponin 37 ng/L.  Serial cardiac enzymes ordered.  -She is currently chest pain-free.  EKG negative for acute ischemia.  -CT head negative for acute intracranial abnormalities.  CT spine is also negative for traumatic injuries.  She also had x-ray of her left fingers, shoulder, chest, femur and pelvis, negative for acute fractures.  -Added echocardiogram in the morning.  -Patient is on digoxin.  It is unclear if she has underlying A-fib.  Both patient and her daughter states that she is NOT on blood thinners.  Patient reports prior history of \"easy bleeding \".  Digoxin level is 0.9.  Hold off on reordering digoxin for now until clarifying why she is taking this medication.  -PT/OT ordered.      Acute UTI  Assessment & Plan  -Possibly contributing to above.  -Starting Rocephin.  She does have minimally elevated WBC is at 11.6 but no other SIRS criteria.  There is no sepsis present on admission.  -Closely monitor.    Hypokalemia- (present on admission)  Assessment & Plan  -Her potassium is 3.3 on admission.  I am adding 20 mEq of oral potassium replacement now.  Monitor chemistry panel in the morning.    Microcytic anemia  Assessment & Plan  -Hemoglobin is 10.5.  Monitor CBC in the morning.    Cognitive deficits- (present " on admission)  Assessment & Plan  -Very poor historian.  Speech is very tangential.  I spoke with her daughter as well.  Patient is living alone, she has a 9-year-old dog WITH her.  Family checks on her periodically.  -PT/OT ordered.  -Discharge planning will needed to evaluate for safe discharge.    Essential hypertension- (present on admission)  Assessment & Plan  -Continue amlodipine        VTE prophylaxis: SCDs/TEDs

## 2023-11-16 NOTE — ASSESSMENT & PLAN NOTE
-Possibly contributing to above.  -Starting Rocephin.  She does have minimally elevated WBC is at 11.6 but no other SIRS criteria.  There is no sepsis present on admission.  -Closely monitor.

## 2023-11-16 NOTE — CARE PLAN
The patient is Stable - Low risk of patient condition declining or worsening    Shift Goals  Clinical Goals: neuro checkq4, monitor labs,safety  Patient Goals: rest, sleep    Progress made toward(s) clinical / shift goals:  Pt A&Ox4, hard of hearing. Denies pain. Pt felt better this morning. Abx given. Will monitor labs. Pt up to the bathroom with walker. Gait slightly unsteady.   Problem: Fall Risk  Goal: Patient will remain free from falls  Outcome: Progressing     Problem: Hemodynamics  Goal: Patient's hemodynamics, fluid balance and neurologic status will be stable or improve  Outcome: Progressing     Problem: Respiratory  Goal: Patient will achieve/maintain optimum respiratory ventilation and gas exchange  Outcome: Progressing     Problem: Infection - Standard  Goal: Patient will remain free from infection  Outcome: Progressing   Call light within reach.     Patient is not progressing towards the following goals:

## 2023-11-16 NOTE — DIETARY
"Nutrition services: Day 0 of admit.  Lindsay Vargas is a 83 y.o. female with admitting DX of Near syncope [R55]    Consult received for MST 4: unsure wt loss, unsure amount, no poor PO intake PTA.    Assessment:  Height: 170.2 cm (5' 7\")  Weight: 60.7 kg (133 lb 13.1 oz)  Body mass index is 20.96 kg/m²., BMI classification: Normal  Diet/Intake: Cardiac; PO intake not yet documented    Evaluation:   Pt admitted after head injury and near fall/syncope at home. \"Is poor historian, confused at baseline per family\" per ERP note. Pt lives alone. PMHx cataract, lymphedema, laparoscopic chely  Labs: K+ 3.1, glu 115, BUN 29  Last BM not noted in flowsheets, +pericolace per MAR  No documented pressure wounds or edema  Wt hx per chart review shows overall stable wt the past 6 months:  11/16/23 60.7 kg (133 lb 13.1 oz)  05/10/23 59.4 kg (131 lb)    Malnutrition Risk: no wt loss per chart hx review x 6 months PTA. No poor PO intake PTA per admit screen    Recommendations/Plan:  Encourage intake of meals >50-75%.  Document intake of all PO as % taken in ADL's to provide interdisciplinary communication across all shifts.   Monitor weight.  Nutrition rep will continue to see patient for ongoing meal and snack preferences.     RD will screen weekly per department policy; available PRN.        "

## 2023-11-16 NOTE — PROGRESS NOTES
4 Eyes Skin Assessment Completed by DEVANG Rosas and DEVANG Peterson.    Head Redness back of head, bruise R eye with stitches  Ears WDL  Nose WDL  Mouth WDL  Neck Discoloration  Breast/Chest Excoriation  Shoulder Blades WDL  Spine WDL  (R) Arm/Elbow/Hand Redness and Scab  (L) Arm/Elbow/Hand Redness and Scab  Abdomen WDL  Groin WDL  Scrotum/Coccyx/Buttocks WDL  (R) Leg WDLscratches  (L) Leg WDL scratches  (R) Heel/Foot/Toe WDL  (L) Heel/Foot/Toe WDL          Devices In Places Tele Box      Interventions In Place N/A    Possible Skin Injury Yes    Pictures Uploaded Into Epic N/A  Wound Consult Placed N/A  RN Wound Prevention Protocol Ordered No

## 2023-11-16 NOTE — THERAPY
"Physical Therapy   Initial Evaluation     Patient Name: Lindsay Vargas  Age:  83 y.o., Sex:  female  Medical Record #: [id number]  Today's Date: 11/16/2023     Precautions  Precautions: Fall Risk    Assessment  Patient is a 83 y.o. female who  presented to the ER on 11/15/2023 after head injury and near fall/syncope at home. Pt states she was leaning down to water her dog when she hit her head on the side to her home. Pt denies any prior falls and states she takes care of herself. Pt demonstrated the ability to ambulate with FWW for functional home distances, pt demonstrated the ability to toilet self and get in and out of bed w/o assistance.   Pt will benefit from  safety evaluation upon discharge.   Patient will not be actively followed for physical therapy services at this time, however may be seen if requested by physician for 1 more visit within 30 days to address any discharge or equipment needs. .      Plan    Physical Therapy Initial Treatment Plan   Duration: (P) Discharge Needs Only    DC Equipment Recommendations: (P) None  Discharge Recommendations: (P) Recommend home health for continued physical therapy services       Initial Contact Note    Initial Contact Note Order Received and Verified, Physical Therapy Evaluation in Progress with Full Report to Follow.   Precautions   Precautions Fall Risk   Pain 0 - 10 Group   Location Head   Location Orientation Anterior;Right   Description Aching   Therapist Pain Assessment Post Activity Pain Same as Prior to Activity;Nurse Notified;5   Prior Living Situation   Prior Services None   Housing / Facility 1 Story House   Steps Into Home 1   Steps In Home 0   Bathroom Set up Walk In Shower;Grab Bars   Equipment Owned Front-Wheel Walker  (Life line)   Lives with - Patient's Self Care Capacity Alone and Able to Care For Self   Comments pt states she is able to care for self \"I do everything\" Recommended clarification from family on PLOF and self care abilities. " "  Prior Level of Functional Mobility   Bed Mobility Independent   Transfer Status Independent   Ambulation Independent   Ambulation Distance household   Comments Pt difficulty to direct to questions, tangential. [place]   History of Falls   Date of Last Fall 11/15/23  (pt states she did not fall but hit her head on the \"stucco wall\")   Cognition    Speech/ Communication Hard of Hearing   Level of Consciousness Alert   New Learning Impaired   Attention Impaired   Active ROM Lower Body    Active ROM Lower Body  WDL   Strength Lower Body   Lower Body Strength  WDL   Lower Body Muscle Tone   Lower Body Muscle Tone  WDL   Balance Assessment   Sitting Balance (Static) Good   Sitting Balance (Dynamic) Good   Standing Balance (Static) Fair +   Standing Balance (Dynamic) Fair   Weight Shift Sitting Good   Weight Shift Standing Fair   Bed Mobility    Supine to Sit Supervised   Sit to Supine Supervised   Scooting Supervised   Gait Analysis   Gait Level Of Assist Standby Assist   Assistive Device Front Wheel Walker   Distance (Feet) 200   # of Times Distance was Traveled 1   Deviation Bradykinetic;Shuffled Gait;Decreased Heel Strike;Decreased Toe Off   # of Stairs Climbed 0   Weight Bearing Status no restrictions.   Functional Mobility   Sit to Stand Standby Assist   Bed, Chair, Wheelchair Transfer Standby Assist   Toilet Transfers Standby Assist   Mobility supine> EOB> transfer to toilet> gait in hallway> BTB   How much difficulty does the patient currently have...   Turning over in bed (including adjusting bedclothes, sheets and blankets)? 3   Sitting down on and standing up from a chair with arms (e.g., wheelchair, bedside commode, etc.) 3   Moving from lying on back to sitting on the side of the bed? 3   How much help from another person does the patient currently need...   Moving to and from a bed to a chair (including a wheelchair)? 3   Need to walk in a hospital room? 3   Climbing 3-5 steps with a railing? 3   6 clicks " Mobility Score 18   Edema / Skin Assessment   Comments m   Education Group   Education Provided Role of Physical Therapist;Gait Training   Role of Physical Therapist Patient Response Patient;Acceptance;Explanation;Verbal Demonstration   Gait Training Patient Response Patient;Acceptance;Explanation;Action Demonstration;Verbal Demonstration   Physical Therapy Initial Treatment Plan    Duration Discharge Needs Only   Anticipated Discharge Equipment and Recommendations   DC Equipment Recommendations None   Discharge Recommendations Recommend home health for continued physical therapy services   Interdisciplinary Plan of Care Collaboration   IDT Collaboration with  Nursing   Patient Position at End of Therapy In Bed;Bed Alarm On;Tray Table within Reach;Call Light within Reach;Phone within Reach   Collaboration Comments RN updated. Recommend verification from family on ability to provide care.   Session Information   Date / Session Number  11/16 DC needs only.

## 2023-11-16 NOTE — ASSESSMENT & PLAN NOTE
-Very poor historian.  Speech is very tangential.  I spoke with her daughter as well.  Patient is living alone, she has a 9-year-old dog WITH her.  Family checks on her periodically.  -PT/OT ordered.  -Discharge planning will needed to evaluate for safe discharge.

## 2023-11-16 NOTE — ED TRIAGE NOTES
"Chief Complaint   Patient presents with    Fall     Reports she was carrying some water \"into my house and I was feeling dizzy. I hit my head against the stucco in my house\". Reports also pain to L hand 5th finger and R shoulder. Pt. Is poor historian, confused at baseline per family. Family reports pt. Lives alone and they deny that pt. Is on blood thinners. Pt. Denies LOC.     Dizziness       "

## 2023-11-16 NOTE — ASSESSMENT & PLAN NOTE
-Her potassium is 3.3 on admission.  I am adding 20 mEq of oral potassium replacement now.  Monitor chemistry panel in the morning.

## 2023-11-16 NOTE — DISCHARGE PLANNING
ATTN: Case Management  RE: Referral for Home Health    As of 11-16-23, we have accepted the Home Health referral for the patient listed above.    A [facility] clinician will be out to see the patient within 48 hours. If you have any questions or concerns regarding the patient’s transition to Home Health, please do not hesitate to contact us at [phone].      We look forward to collaborating with you,  [facility] Health Team

## 2023-11-16 NOTE — ED PROVIDER NOTES
"ED Provider Note    CHIEF COMPLAINT  Chief Complaint   Patient presents with    Fall     Reports she was carrying some water \"into my house and I was feeling dizzy. I hit my head against the stucco in my house\". Reports also pain to L hand 5th finger and R shoulder. Pt. Is poor historian, confused at baseline per family. Family reports pt. Lives alone and they deny that pt. Is on blood thinners. Pt. Denies LOC.     Dizziness       EXTERNAL RECORDS REVIEWED  Outpatient Notes Home care visit November 2021, per patient's daughter patient has been reluctant to accept home care recently    HPI/ROS  LIMITATION TO HISTORY   Select: : None  OUTSIDE HISTORIAN(S):  Family patient's daughter at bedside for discussion history and symptoms, patient's reluctance to enter into assisted care or have home health    Lindsay Vargas is a 83 y.o. female who presents after fall resulting in right head injury, right shoulder pain, left fifth finger pain and right hip pain.  Patient states she was carrying a bowl of water for her dog when she stumbled falling into the sidewall.  Later admits that she felt lightheaded and weak.  No chest pain or difficulty breathing, no cough or fever.  She denies abdominal pain.  No vomiting.  Pain is worse with movement.  She has abrasion to the apex of her right shoulder.  Patient also had forehead laceration from the fall.  Patient complains of left fifth finger pain, right hip pain.  No acute numbness or weakness.  Patient has been prone to falls in the past according to daughter, thus far the patient has been extremely reluctant to consider any type of assisted living or skilled nursing facility.    PAST MEDICAL HISTORY   has a past medical history of Anesthesia, Arthritis, Cataract, Chronic pain, Hiatus hernia syndrome, Hypertension, Lymphedema (2014), Migraine headache, Pain, and Pneumonia.    SURGICAL HISTORY   has a past surgical history that includes hysterectomy laparoscopy; chely by " "laparoscopy; other abdominal surgery; other abdominal surgery; other; other orthopedic surgery; cataract phaco with iol (Left, 2/4/2016); cataract phaco with iol (Right, 2/18/2016); inj lumbar/sacral,w/wo cntrst (Right, 9/29/2016); fluorscopic guidance spinal injection (9/29/2016); inj lumbar/sacral,w/wo cntrst (N/A, 11/10/2016); and fluorscopic guidance spinal injection (11/10/2016).    FAMILY HISTORY  Family History   Problem Relation Age of Onset    Heart Disease Mother     Heart Disease Father        SOCIAL HISTORY  Social History     Tobacco Use    Smoking status: Never    Smokeless tobacco: Never   Vaping Use    Vaping Use: Never used   Substance and Sexual Activity    Alcohol use: No     Comment: rarely    Drug use: No    Sexual activity: Not on file       CURRENT MEDICATIONS  Home Medications       Reviewed by Mariluz Craft R.N. (Registered Nurse) on 11/15/23 at 1814  Med List Status: Not Addressed     Medication Last Dose Status   amLODIPine (NORVASC) 5 MG Tab  Active   digoxin (LANOXIN) 125 MCG Tab  Active   DULoxetine (CYMBALTA) 60 MG Cap DR Particles delayed-release capsule  Active   hydroCHLOROthiazide (HYDRODIURIL) 25 MG Tab  Active   loratadine (CLARITIN) 10 MG Tab  Active   omeprazole (PRILOSEC) 40 MG delayed-release capsule  Active                    ALLERGIES  No Known Allergies    PHYSICAL EXAM  VITAL SIGNS: /63   Pulse 77   Temp 36.6 °C (97.9 °F) (Temporal)   Resp 20   Ht 1.702 m (5' 7\")   Wt 58.7 kg (129 lb 6.6 oz)   SpO2 98%   BMI 20.27 kg/m²    Constitutional: Alert, no acute distress  HEENT: Right forehead laceration, approximate 1.5 cm.  Other facial bones nontender  Muscle skeletal: Mild mid cervical spine tenderness.  Thoracic and lumbar spine are nontender.  Ribs nontender.  Right hip tenderness with range of motion of the hip.  Mild shortening of the right leg compared to the left.  Left fifth finger is bruised and tender throughout.  Skin: Bruising left fifth finger.  " Mild skin abrasion right shoulder.  Forehead laceration as noted above  Psychiatric: At times agitated and anxious, other times calm and throughout the patient has been cooperative.  GI: Abdomen is soft and nontender, no guarding  Cardiac: Regular rate and rhythm  Respiratory: Clear lung sounds    DIAGNOSTIC STUDIES / PROCEDURES        Laceration Repair Procedure Note    Indication: Laceration    Procedure: The patient was placed in the appropriate position and anesthesia around the laceration was obtained by infiltration using 1% Lidocaine with epinephrine. The area was then cleansed with betadine and draped in a sterile fashion and irrigated with high pressure normal saline. The laceration was closed with 5-0 Ethilon using interrupted sutures. There were no additional lacerations requiring repair. The wound area was then dressed with bacitracin.      Total repaired wound length: 1.5 cm.     Other Items: Suture count: 2    The patient tolerated the procedure well.    Complications: None      EKG  I have independently interpreted this EKG  See below    LABS  Results for orders placed or performed during the hospital encounter of 11/15/23   CBC w/ Differential   Result Value Ref Range    WBC 11.6 (H) 4.8 - 10.8 K/uL    RBC 4.74 4.20 - 5.40 M/uL    Hemoglobin 10.5 (L) 12.0 - 16.0 g/dL    Hematocrit 33.9 (L) 37.0 - 47.0 %    MCV 71.5 (L) 81.4 - 97.8 fL    MCH 22.2 (L) 27.0 - 33.0 pg    MCHC 31.0 (L) 32.2 - 35.5 g/dL    RDW 41.9 35.9 - 50.0 fL    Platelet Count 192 164 - 446 K/uL    MPV 10.3 9.0 - 12.9 fL    Neutrophils-Polys 72.10 (H) 44.00 - 72.00 %    Lymphocytes 20.10 (L) 22.00 - 41.00 %    Monocytes 6.00 0.00 - 13.40 %    Eosinophils 0.90 0.00 - 6.90 %    Basophils 0.30 0.00 - 1.80 %    Immature Granulocytes 0.60 0.00 - 0.90 %    Nucleated RBC 0.00 0.00 - 0.20 /100 WBC    Neutrophils (Absolute) 8.35 (H) 1.82 - 7.42 K/uL    Lymphs (Absolute) 2.33 1.00 - 4.80 K/uL    Monos (Absolute) 0.69 0.00 - 0.85 K/uL    Eos  (Absolute) 0.10 0.00 - 0.51 K/uL    Baso (Absolute) 0.03 0.00 - 0.12 K/uL    Immature Granulocytes (abs) 0.07 0.00 - 0.11 K/uL    NRBC (Absolute) 0.00 K/uL   Complete Metabolic Panel (CMP)   Result Value Ref Range    Sodium 140 135 - 145 mmol/L    Potassium 3.3 (L) 3.6 - 5.5 mmol/L    Chloride 100 96 - 112 mmol/L    Co2 23 20 - 33 mmol/L    Anion Gap 17.0 (H) 7.0 - 16.0    Glucose 91 65 - 99 mg/dL    Bun 30 (H) 8 - 22 mg/dL    Creatinine 0.89 0.50 - 1.40 mg/dL    Calcium 9.3 8.4 - 10.2 mg/dL    Correct Calcium 9.0 8.5 - 10.5 mg/dL    AST(SGOT) 21 12 - 45 U/L    ALT(SGPT) 10 2 - 50 U/L    Alkaline Phosphatase 72 30 - 99 U/L    Total Bilirubin 0.5 0.1 - 1.5 mg/dL    Albumin 4.4 3.2 - 4.9 g/dL    Total Protein 7.1 6.0 - 8.2 g/dL    Globulin 2.7 1.9 - 3.5 g/dL    A-G Ratio 1.6 g/dL   Prothrombin (PT/INR)   Result Value Ref Range    PT 13.5 12.0 - 14.6 sec    INR 0.98 0.87 - 1.13   APTT   Result Value Ref Range    APTT 27.5 24.7 - 36.0 sec   ESTIMATED GFR   Result Value Ref Range    GFR (CKD-EPI) 63 >60 mL/min/1.73 m 2   EKG   Result Value Ref Range    Report       [facility] Emergency [facility]    Test Date:  2023-11-15  Pt Name:    DWIGHT VILLA              Department: [facility]  MRN:        [id number]                      Room:  Gender:     Female                       Technician: [id number]  :        [id number]                   Requested By:ER TRIAGE PROTOCOL  Order #:    [id number]                    Reading MD: ABIGAIL MOLINA MD    Measurements  Intervals                                Axis  Rate:       78                           P:          25  NH:         205                          QRS:        54  QRSD:       103                          T:          32  QT:         380  QTc:        433    Interpretive Statements  Sinus rhythm  Compared to ECG 09/15/2021 07:47:32  Early repolarization no longer present  Electronically Signed On 11- 21:55:35 PST by ABIGAIL MOLINA MD            RADIOLOGY  I have independently interpreted the diagnostic imaging associated with this visit and am waiting the final reading from the radiologist.   My preliminary interpretation is as follows: CT scan of the head is negative for acute hemorrhage  Radiologist interpretation:   DX-FINGER(S) 2+ LEFT   Final Result      1.  No acute fracture or dislocation.   2.  Erosive osteoarthritis involving several DIP joints.      DX-SHOULDER 2+ RIGHT   Final Result      1.  Severe glenohumeral and acromioclavicular osteoarthrosis.   2.  No fracture or dislocation.      DX-CHEST-LIMITED (1 VIEW)   Final Result      No acute cardiopulmonary abnormality.      DX-FEMUR-2+ RIGHT   Final Result      No acute fracture or dislocation.      DX-PELVIS-1 OR 2 VIEWS   Final Result      No acute fracture or dislocation.      CT-CSPINE WITHOUT PLUS RECONS   Final Result      No acute fracture or acute traumatic listhesis in the cervical spine.   Old, nonunited type II dens fracture. It is new since 2019 CT.   Cervical spondylosis, detailed above.   A 1.9 cm right thyroid nodule, larger compared to prior CT study. It can be followed with outpatient ultrasound.      CT-HEAD W/O   Final Result      1. No CT evidence of acute infarct, hemorrhage or mass.   2. Moderate global parenchymal atrophy. Chronic small vessel ischemic changes.            COURSE & MEDICAL DECISION MAKING    ED Observation Status? No; Patient does not meet criteria for ED Observation.     INITIAL ASSESSMENT, COURSE AND PLAN  Care Narrative: Patient felt weak, fell.  Initially described to stumbling, later possibly dizziness and near syncope.  Patient minimizes her symptoms, clearly struck her head with laceration complaining of right hip right shoulder and left fifth finger pain.  X-rays were negative for acute traumatic injury.  CT scan of her neck picked up on old malunion dens to fracture, no acute findings however.  Her neurologic exam is intact at this time,  no strokelike symptoms.  EKG negative for acute ischemia, digitalis effect present.  Her digoxin level is currently pending.  Mild leukocytosis, urinalysis has been added and is pending.  She is afebrile, stable vital signs at this time, no evidence of sepsis.  Patient lives alone, poor candidate for discharge home in the setting of near syncope will be hospitalized for ongoing evaluation and cardiac monitoring.        ADDITIONAL PROBLEM LIST  Generalized weakness and near syncope: Multifactorial, cannot rule out arrhythmia at the time of the incident.  She has mild anemia, possible mild dehydration, and general declining health for her daughter since her   2 years ago.    Leukocytosis: Urinalysis pending    Head injury: CT scan of the head is negative.  Laceration repaired in the ER, sutures out in 5 days    DISPOSITION AND DISCUSSIONS  I have discussed management of the patient with the following physicians and ED's: Admitting hospitalist service      Decision tools and prescription drugs considered including, but not limited to: NIH Stroke Scale 0 .    FINAL DIAGNOSIS  1. Fall, initial encounter    2. Near syncope    3. Contusion of right shoulder, initial encounter    4. Laceration of forehead, initial encounter    5. Sprain of left little finger, unspecified site of digit, initial encounter    6. Hip sprain, right, initial encounter    7. Hypokalemia    8. Anemia, unspecified type    9. Leukocytosis, unspecified type           Electronically signed by: Dev Avilez M.D., 11/15/2023 8:47 PM

## 2023-11-16 NOTE — ASSESSMENT & PLAN NOTE
"-Observation status on telemetry unit.  -Near syncope versus ground-level fall.  -Patient also reports some chest discomfort and dizziness.  -Initial troponin 37 ng/L.  Serial cardiac enzymes ordered.  -She is currently chest pain-free.  EKG negative for acute ischemia.  -CT head negative for acute intracranial abnormalities.  CT spine is also negative for traumatic injuries.  She also had x-ray of her left fingers, shoulder, chest, femur and pelvis, negative for acute fractures.  -Added echocardiogram in the morning.  -Patient is on digoxin.  It is unclear if she has underlying A-fib.  Both patient and her daughter states that she is NOT on blood thinners.  Patient reports prior history of \"easy bleeding \".  Digoxin level is 0.9.  Hold off on reordering digoxin for now until clarifying why she is taking this medication.  -PT/OT ordered.    "

## 2023-11-16 NOTE — DISCHARGE PLANNING
Received Choice form at: 5985  Agency/Facility name: [facility]  Referral sent per Choice form at: [id number]

## 2023-11-16 NOTE — DISCHARGE SUMMARY
"Discharge Summary    CHIEF COMPLAINT ON ADMISSION  Chief Complaint   Patient presents with    Fall     Reports she was carrying some water \"into my house and I was feeling dizzy. I hit my head against the stucco in my house\". Reports also pain to L hand 5th finger and R shoulder. Pt. Is poor historian, confused at baseline per family. Family reports pt. Lives alone and they deny that pt. Is on blood thinners. Pt. Denies LOC.     Dizziness       Reason for Admission  Fall:Laceration     Admission Date  11/15/2023    CODE STATUS  Full Code    HPI & HOSPITAL COURSE  83-year-old female who came into the emergency room with head injury after she fell and hit her head on the side of her stucco house.  She been feeling slightly dizzy during the day and she has been spending the last 4 hours cleaning up the pantry as she states her house has mice nesting in it.  She was taking water out to the dog lost her balance and hit her head on the stucco.  She had a small laceration to the right temple which was stitched up in the emergency room.  She had no loss of consciousness.  Patient was admitted and found to have urinary tract infection initially placed on Rocephin which was transitioned to oral Keflex.  She was seen by physical therapy who is recommending home health.  This has been coordinated upon discharge.  Given the question of possible syncope she had 2D echocardiogram which showed an ejection fraction of 70% with the suggestion of LVOT obstruction.  Normal diastolic dysfunction normal right ventricular size and systolic function.  Patient states she is feeling well and is hoping to go home.  She will be sent home with 3 days of Keflex and home health.    Therefore, she is discharged in good and stable condition to home with close outpatient follow-up.        Discharge Date  11/16/2023    FOLLOW UP ITEMS POST DISCHARGE  PCP-2 weeks  Stitches to be removed in 1 week either at PCP office or in the emergency " room    DISCHARGE DIAGNOSES  Principal Problem:    Near syncope (POA: Yes)  Active Problems:    Hypokalemia (POA: Yes)    Cognitive deficits (POA: Yes)    Essential hypertension (POA: Yes)    Acute UTI (POA: Unknown)    Microcytic anemia (POA: Unknown)  Resolved Problems:    * No resolved hospital problems. *      FOLLOW UP  No future appointments.  Lenny RENTERIA M.D.  [address] [facility] [address]  [phone]    Follow up      Lenny RENTERIA M.D.  [address] [facility] [address]  [phone]            MEDICATIONS ON DISCHARGE     Medication List        START taking these medications        Instructions   cephALEXin 500 MG Caps  Commonly known as: Keflex   Take 1 Capsule by mouth every 8 hours for 3 days.  Dose: 500 mg            CONTINUE taking these medications        Instructions   amLODIPine 5 MG Tabs  Commonly known as: Norvasc   Take 5 mg by mouth every day.  Dose: 5 mg     digoxin 125 MCG Tabs  Commonly known as: Lanoxin   Take 125 mcg by mouth every day.  Dose: 125 mcg     DULoxetine 60 MG Cpep delayed-release capsule  Commonly known as: Cymbalta   Take 1 Cap by mouth every day.  Dose: 60 mg     hydroCHLOROthiazide 25 MG Tabs   Take 25 mg by mouth 2 times a day.  Dose: 25 mg     loratadine 10 MG Tabs  Commonly known as: Claritin   Take 10 mg by mouth every day.  Dose: 10 mg     metoprolol SR 25 MG Tb24  Commonly known as: Toprol XL   Take 25 mg by mouth every day.  Dose: 25 mg     omeprazole 40 MG delayed-release capsule  Commonly known as: PriLOSEC   Take 40 mg by mouth every day.  Dose: 40 mg     potassium chloride SA 20 MEQ Tbcr  Commonly known as: Kdur   Take 20 mEq by mouth every day.  Dose: 20 mEq              Allergies  No Known Allergies    DIET  Orders Placed This Encounter   Procedures    Diet Order Diet: Cardiac     Standing Status:   Standing     Number of Occurrences:   1     Order Specific Question:   Diet:     Answer:   Cardiac [6]       ACTIVITY  As  tolerated.  Weight bearing as tolerated    CONSULTATIONS  None    PROCEDURES  None    LABORATORY  Lab Results   Component Value Date    SODIUM 141 11/16/2023    POTASSIUM 3.1 (L) 11/16/2023    CHLORIDE 103 11/16/2023    CO2 22 11/16/2023    GLUCOSE 115 (H) 11/16/2023    BUN 29 (H) 11/16/2023    CREATININE 0.82 11/16/2023    CREATININE 0.9 12/29/2008        Lab Results   Component Value Date    WBC 10.6 11/16/2023    HEMOGLOBIN 9.6 (L) 11/16/2023    HEMATOCRIT 31.3 (L) 11/16/2023    PLATELETCT 163 (L) 11/16/2023        Total time of the discharge process exceeds 35 minutes.

## 2023-11-16 NOTE — PROGRESS NOTES
Telemetry Shift Summary     Rhythm SR  HR Range 70-78  Ectopy n/a  Measurements .20/.10/.38           Normal Values  Rhythm SR  HR Range    Measurements 0.12-0.20 / 0.06-0.10  / 0.30-0.52

## 2023-11-20 ENCOUNTER — HOSPITAL ENCOUNTER (EMERGENCY)
Facility: MEDICAL CENTER | Age: 83
End: 2023-11-20
Payer: MEDICARE

## 2023-11-20 VITALS
WEIGHT: 133 LBS | RESPIRATION RATE: 18 BRPM | SYSTOLIC BLOOD PRESSURE: 157 MMHG | BODY MASS INDEX: 20.88 KG/M2 | DIASTOLIC BLOOD PRESSURE: 62 MMHG | OXYGEN SATURATION: 98 % | TEMPERATURE: 97.1 F | HEART RATE: 72 BPM | HEIGHT: 67 IN

## 2023-11-20 PROCEDURE — 99281 EMR DPT VST MAYX REQ PHY/QHP: CPT

## 2023-11-20 PROCEDURE — 15853 REMOVAL SUTR/STAPL XREQ ANES: CPT

## 2023-11-20 ASSESSMENT — FIBROSIS 4 INDEX: FIB4 SCORE: 3.46

## 2023-11-20 NOTE — ED TRIAGE NOTES
"83 yr old female to triage with her grandson  Chief Complaint   Patient presents with    Suture Removal     Patient report here for suture (? 2-3)  removal placed on the Nov 15, 2023. Wound appears well approximated with scabs noted. Patient report of pressure pain around the stitches.      BP (!) 157/62   Pulse 72   Temp 36.2 °C (97.1 °F) (Temporal)   Resp 18   Ht 1.702 m (5' 7\")   Wt 60.3 kg (133 lb)   SpO2 98%   BMI 20.83 kg/m²     "

## 2023-11-20 NOTE — ED NOTES
Removed 2 sutures from pt forehead. Wound edges are approximated and closed with no drainage. Pt leaves this ED with alexander.

## 2023-11-28 ENCOUNTER — HOME CARE VISIT (OUTPATIENT)
Dept: HOME HEALTH SERVICES | Facility: HOME HEALTHCARE | Age: 83
End: 2023-11-28
Payer: MEDICARE

## 2023-11-28 PROCEDURE — G0493 RN CARE EA 15 MIN HH/HOSPICE: HCPCS

## 2023-11-28 PROCEDURE — 665001 SOC-HOME HEALTH

## 2023-11-28 ASSESSMENT — ENCOUNTER SYMPTOMS
LOWEST PAIN SEVERITY IN PAST 24 HOURS: 0/10
VOMITING: DENIES
NAUSEA: DENIES
DENIES PAIN: 1
HIGHEST PAIN SEVERITY IN PAST 24 HOURS: 0/10
PAIN SEVERITY GOAL: 0/10
LAST BOWEL MOVEMENT: 66806
STOOL FREQUENCY: DAILY
PERSON REPORTING PAIN: PATIENT
DEPRESSED MOOD: 1

## 2023-11-28 NOTE — CASE COMMUNICATION
Primary dx/Skilled need: 84 yo female admitted to  following a GLF with injury on 11/15/23.  Pt sustained a laceration to her right forehead that required sutures.  The sutures have been removed and the laceration is healed.  Some residual bruising noted on right forehead and temple.  PMH: includes HTN, hypotension, NSTEMI (2019), chronic resp failure, compression spinal fxs, anxiety, and depression.   SN frequency: 2w4  Zip code: [phone]  Disciplines ordered: SN, PT, OT  Insurance and authorization: [facility]  Certification period: 11/28/23 - 1/26/23  Special considerations: Prefers afternoon visits.

## 2023-11-29 ENCOUNTER — HOME CARE VISIT (OUTPATIENT)
Dept: HOME HEALTH SERVICES | Facility: HOME HEALTHCARE | Age: 83
End: 2023-11-29
Payer: MEDICARE

## 2023-11-29 VITALS
SYSTOLIC BLOOD PRESSURE: 132 MMHG | HEART RATE: 67 BPM | TEMPERATURE: 97.5 F | DIASTOLIC BLOOD PRESSURE: 60 MMHG | OXYGEN SATURATION: 99 % | RESPIRATION RATE: 16 BRPM

## 2023-11-29 SDOH — ECONOMIC STABILITY: HOUSING INSECURITY: EVIDENCE OF SMOKING MATERIAL: 0

## 2023-11-29 ASSESSMENT — ACTIVITIES OF DAILY LIVING (ADL)
USING THE TELPHONE: INDEPENDENT
AMBULATION ASSISTANCE: 1
TRANSPORTATION ASSESSED: 1
ORAL_CARE_CURRENT_FUNCTION: INDEPENDENT
CURRENT_FUNCTION: SUPERVISION
PHYSICAL TRANSFERS ASSESSED: 1
SHOPPING: NEEDS ASSISTANCE
GROOMING_CURRENT_FUNCTION: SUPERVISION
HOUSEKEEPING ASSESSED: 1
PREPARING MEALS: NEEDS ASSISTANCE
TOILETING: SUPERVISION
ORAL_CARE_ASSESSED: 1
TRANSPORTATION: DEPENDENT
SHOPPING ASSESSED: 1
DRESSING_UB_CURRENT_FUNCTION: SUPERVISION
TELEPHONE USE ASSESSED: 1
DRESSING_LB_CURRENT_FUNCTION: SUPERVISION
BATHING ASSESSED: 1
AMBULATION ASSISTANCE: SUPERVISION
BATHING_CURRENT_FUNCTION: SUPERVISION
GROOMING ASSESSED: 1
FEEDING: INDEPENDENT
FEEDING ASSESSED: 1
LIGHT HOUSEKEEPING: NEEDS ASSISTANCE
LAUNDRY: NEEDS ASSISTANCE
TOILETING: 1
LAUNDRY ASSESSED: 1

## 2023-11-29 ASSESSMENT — ENCOUNTER SYMPTOMS
SHORTNESS OF BREATH: 1
BOWEL PATTERN NORMAL: 1
DYSPNEA ACTIVITY LEVEL: AT REST
HYPOTENSION: 1
HYPERTENSION: 1

## 2023-11-29 NOTE — CASE COMMUNICATION
noted  ----- Message -----  From: Nikia Lynch R.N.  Sent: 11/28/2023   3:03 PM PST  To: Rama Bhatia R.N.; Odalys Cope R.N.; *      Primary dx/Skilled need: 82 yo female admitted to  following a GLF with injury on 11/15/23.  Pt sustained a laceration to her right forehead that required sutures.  The sutures have been removed and the laceration is healed.  Some residual bruising noted on right forehead and temple.  PMH: in cludes HTN, hypotension, NSTEMI (2019), chronic resp failure, compression spinal fxs, anxiety, and depression.   SN frequency: 2w4  Zip code: [phone]  Disciplines ordered: SN, PT, OT  Insurance and authorization: [facility]  Certification period: 11/28/23 - 1/26/23  Special considerations: Prefers afternoon visits.

## 2023-11-29 NOTE — CASE COMMUNICATION
Drug-Drug: digoxin and omeprazole   Plasma concentrations and pharmacologic effects of digoxin may be increased by proton pump inhibitors, possibly due to increased gastric absorption. Clinical significance is not known.   Details Moderate   digoxin (LANOXIN) 125 MCG Tab     omeprazole (PRILOSEC) 40 MG delayed-release capsule   Drug-Drug  <jscript:void(0)>  Drug-Drug: Alive Womens Gummy, Multiple Vitamins-Minerals (MEGAVITE FRUITS & VEG GIES PO) and omeprazole   omeprazole may decrease GI absorption of dietary (protein-bound) Alive Womens Gummy, Multiple Vitamins-Minerals (MEGAVITE FRUITS & VEGGIES PO). Hematologic or neurologic disorders may occur. Clinical significance is unknown.   Details Moderate   Multiple Vitamins-Minerals (ALIVE WOMENS GUMMY) Chew Tab     omeprazole (PRILOSEC) 40 MG delayed-release capsule     Multiple Vitamins-Minerals (MEGAVITE FRUITS & VEGGI ES PO)   Drug-Drug  <jscript:void(0)>  Drug-Drug: Multiple Vitamins-Minerals (MEGAVITE FRUITS & VEGGIES PO) and omeprazole   Proton pump inhibitors may decrease the absorption of Multiple Vitamins-Minerals (MEGAVITE FRUITS & VEGGIES PO) leading a decrease in the ability of Multiple Vitamins-Minerals (MEGAVITE FRUITS & VEGGIES PO) to establish a positive iron balance in iron deficient patients.   Details Moderate   Multiple Vitamins-Mine rals (MEGAVITE FRUITS & VEGGIES PO)     omeprazole (PRILOSEC) 40 MG delayed-release capsule   Drug-Drug  <jscript:void(0)>  Drug-Drug: digoxin and Multiple Vitamins-Minerals (MEGAVITE FRUITS & VEGGIES PO)   Pharmacologic effects of digoxin might be decreased possibly due to decreased GI absorption caused by Multiple Vitamins-Minerals (MEGAVITE FRUITS & VEGGIES PO).   Details Moderate   digoxin (LANOXIN) 125 MCG Tab     Multiple Vitamins -Minerals (MEGAVITE FRUITS & VEGGIES PO)   Drug-Drug  <jscript:void(0)>  Drug-Drug: metoprolol SR and DULoxetine   Plasma concentrations and pharmacologic effects of metoprolol may be  increased by moderate CY inhibitors (eg, DULoxetine).   Details Moderate   metoprolol SR (TOPROL XL) 25 MG TABLET SR 24 HR     DULoxetine (CYMBALTA) 60 MG Cap DR Particles delayed-release capsule   Drug-Drug  <jscript:void(0)>  Drug-Drug: Alive Womens  Gummy, Multiple Vitamins-Minerals (MEGAVITE FRUITS & VEGGIES PO) and omeprazole   Proton pump inhibitors may decrease gastrointestinal absorption of Alive Womens Gummy, Multiple Vitamins-Minerals (MEGAVITE FRUITS & VEGGIES PO).   Details Moderate   Multiple Vitamins-Minerals (ALIVE WOMENS GUMMY) Chew Tab     omeprazole (PRILOSEC) 40 MG delayed-release capsule     Multiple Vitamins-Minerals (MEGAVITE FRUITS & VEGGIES PO)   Drug-Drug  <js cript:void(0)>  Drug-Drug: Alive Womens Gummy and Multiple Vitamins-Minerals (MEGAVITE FRUITS & VEGGIES PO)   Gastrointestinal absorption of Alive Womens Gummy may be impaired by Multiple Vitamins-Minerals (MEGAVITE FRUITS & VEGGIES PO) resulting in decreased effectiveness of Alive Womens Gummy and possible zinc deficiency from dietary sources. Clinical significance is not known.   Details Moderate   Multiple Vitamins-Minerals (ALIVE WO MENS GUMMY) Chew Tab     Multiple Vitamins-Minerals (MEGAVITE FRUITS & VEGGIES PO)   Drug-Drug  <jscript:void(0)>  Drug-Drug: digoxin and hydroCHLOROthiazide   Co-administration of hydroCHLOROthiazide and digoxin may result in hypokalemia, and possibly hypomagnesemia, which may increase the risk of toxic digitalis arrhythmias.   Details Moderate   digoxin (LANOXIN) 125 MCG Tab     hydroCHLOROthiazide (HYDRODIURIL) 25 MG Tab   Drug-Drug   <jscript:void(0)>  Drug-Drug: Alive Womens Gummy, Multiple Vitamins-Minerals (MEGAVITE FRUITS & VEGGIES PO) and hydroCHLOROthiazide   The combination of Alive Womens Gummy, Multiple Vitamins-Minerals (MEGAVITE FRUITS & VEGGIES PO) and hydroCHLOROthiazide may produce hypercalcemia.   Details Minor   Multiple Vitamins-Minerals (ALIVE WOMENS GUMMY) Chew Tab  "    hydroCHLOROthiazide (HYDRODIURIL) 25 MG Tab     Multiple Vitamins-Minerals (ME GAVITE FRUITS & VEGGIES PO)   Drug-Drug  <jscript:void(0)>  Drug-Drug: Alive Womens Gummy and hydroCHLOROthiazide   The pharmacologic effects of Alive Womens Gummy may be increased by hydroCHLOROthiazide. Hypercalcemia may occur in some patients.   Details Minor   Multiple Vitamins-Minerals (ALIVE WOMENS GUMMY) Chew Tab     hydroCHLOROthiazide (HYDRODIURIL) 25 MG Tab   Drug-Food  <jscript:void(0)>  Drug-Food: metoprolol SR   The oral bi oavailability and pharmacologic effects of propranolol and metoprolol may be increased when consistently taken with meals, especially meals high in protein. Atenolol bioavailability may be decreased when taken with food.   Details Moderate   metoprolol SR (TOPROL XL) 25 MG TABLET SR 24 HR   Drug-Food  <jscript:void(0)>  Drug-Food: digoxin   High fiber foods may interfere with the absorption of digoxin.   Details Minor   digoxin (LANOXIN ) 125 MCG Tab   Drug-Food  <jscript:void(0)>  Drug-Food: amLODIPine   The bioavailability of amLODIPine may be increased in the presence of grapefruit juice. The clinical impact of this interaction is not known.   Details Minor   amLODIPine (NORVASC) 5 MG Tab   Drug-Alcohol  <jscript:void(0)>  Drug-Alcohol: Alive Womens Gummy   Delirium and lactic acidosis may occur after ethanol and Alive Womens Gummy coadministration. A causal relatio nship has not been established.   Details Moderate   Multiple Vitamins-Minerals (ALIVE WOMENS GUMMY) Chew Tab   Drug-Alcohol  <jscript:void(0)>  Drug-Alcohol: DULoxetine   Concomitant heavy use of alcohol with serotonin and norepinephrine reuptake inhibitors \"(eg, DULoxetine)\" has been associated with hepatotoxicity.   Details Minor   DULoxetine (CYMBALTA) 60 MG Cap DR Particles delayed-release capsule  "

## 2023-11-30 ENCOUNTER — HOME CARE VISIT (OUTPATIENT)
Dept: HOME HEALTH SERVICES | Facility: HOME HEALTHCARE | Age: 83
End: 2023-11-30
Payer: MEDICARE

## 2023-11-30 ENCOUNTER — DOCUMENTATION (OUTPATIENT)
Dept: MEDICAL GROUP | Facility: PHYSICIAN GROUP | Age: 83
End: 2023-11-30
Payer: MEDICARE

## 2023-11-30 NOTE — PROGRESS NOTES
Medication chart review for [facility] services    Received referral from [facility].   Medications reviewed  compared with discharge summary if available.  Discharge summary date, if applicable:   11/16    Current medication list per [facility]     Medication list one, patient is currently taking    Current Outpatient Medications:     Alive Womens Gummy, 1 Capsule, Oral, DAILY    Multiple Vitamins-Minerals (MEGAVITE FRUITS & VEGGIES PO), 1 Capsule, Oral, TID    metoprolol SR, 25 mg, Oral, DAILY    potassium chloride SA, 20 mEq, Oral, DAILY    omeprazole, 40 mg, Oral, DAILY    digoxin, 125 mcg, Oral, QDAY    hydroCHLOROthiazide, 25 mg, Oral, BID    loratadine, 10 mg, Oral, QDAY    amLODIPine, 5 mg, Oral, DAILY    DULoxetine, 60 mg, Oral, DAILY      Medication list two, drugs that the patient has been prescribed or recommended to take by their healthcare provider on discharge summary    START taking these medications         Instructions   cephALEXin 500 MG Caps  Commonly known as: Keflex    Take 1 Capsule by mouth every 8 hours for 3 days.  Dose: 500 mg                CONTINUE taking these medications         Instructions   amLODIPine 5 MG Tabs  Commonly known as: Norvasc    Take 5 mg by mouth every day.  Dose: 5 mg      digoxin 125 MCG Tabs  Commonly known as: Lanoxin    Take 125 mcg by mouth every day.  Dose: 125 mcg      DULoxetine 60 MG Cpep delayed-release capsule  Commonly known as: Cymbalta    Take 1 Cap by mouth every day.  Dose: 60 mg      hydroCHLOROthiazide 25 MG Tabs    Take 25 mg by mouth 2 times a day.  Dose: 25 mg      loratadine 10 MG Tabs  Commonly known as: Claritin    Take 10 mg by mouth every day.  Dose: 10 mg      metoprolol SR 25 MG Tb24  Commonly known as: Toprol XL    Take 25 mg by mouth every day.  Dose: 25 mg      omeprazole 40 MG delayed-release capsule  Commonly known as: PriLOSEC    Take 40 mg by mouth every day.  Dose: 40 mg      potassium chloride SA 20 MEQ Tbcr  Commonly  known as: Kdur    Take 20 mEq by mouth every day.  Dose: 20 mEq          No Known Allergies    Labs     Lab Results   Component Value Date/Time    SODIUM 141 11/16/2023 02:05 AM    POTASSIUM 3.1 (L) 11/16/2023 02:05 AM    CHLORIDE 103 11/16/2023 02:05 AM    CO2 22 11/16/2023 02:05 AM    GLUCOSE 115 (H) 11/16/2023 02:05 AM    BUN 29 (H) 11/16/2023 02:05 AM    CREATININE 0.82 11/16/2023 02:05 AM    CREATININE 0.9 12/29/2008 10:00 AM     Lab Results   Component Value Date/Time    ALKPHOSPHAT 72 11/15/2023 08:07 PM    ASTSGOT 21 11/15/2023 08:07 PM    ALTSGPT 10 11/15/2023 08:07 PM    TBILIRUBIN 0.5 11/15/2023 08:07 PM    INR 0.98 11/15/2023 08:07 PM    ALBUMIN 4.4 11/15/2023 08:07 PM        Assessment for clinically significant drug interactions, drug omissions/additions, duplicative therapies.            CC   Lenny RENTERIA M.D.  [address]  [place] [address]  Fax: [phone]    [facility]  Phone [phone] fax [phone]    This note was created using voice recognition software (Dragon). The accuracy of the dictation is limited by the abilities of the software. I have reviewed the note prior to signing, however some errors in grammar and context are still possible. If you have any questions related to this note please do not hesitate to contact our office.

## 2023-11-30 NOTE — Clinical Note
Terra please send to Dr Cohen    Pt cancelled P.T. visit 12/1/2023 and wants to wait until next week for P.T. evaluation - I will scedule then

## 2023-12-01 ENCOUNTER — HOME CARE VISIT (OUTPATIENT)
Dept: HOME HEALTH SERVICES | Facility: HOME HEALTHCARE | Age: 83
End: 2023-12-01
Payer: MEDICARE

## 2023-12-01 PROCEDURE — G0299 HHS/HOSPICE OF RN EA 15 MIN: HCPCS

## 2023-12-01 NOTE — Clinical Note
Patient reports 5/10 pain when SN arrived. Took an Aleve about 2 hours ago.  After getting up and walking around in house, showing this RN her family photos and telling some family stories. Patient reports pain has resolved.   Patient was seen by [facility] yesterday and no new medication ordered at this time. Per patient provider recommended Tylenol but she prefers Aleve since Tylenol didn't provide any pain relief. Medications reconciled. Use of Aleve reviewed with patient.   Found oxygen concentrator in bedroom. Patient reports uses as needed if feels short of breath. Flow rate set at 2.5 L/min and patient states that's how much she usually uses. Oxygen safety reviewed with patient. Patient voiced understanding.

## 2023-12-01 NOTE — CASE COMMUNICATION
noted  ----- Message -----  From: Kierra Best, PT  Sent: 11/30/2023   6:04 PM PST  To: Rama Bhatia R.N.; Odalys Cope R.N.; *      Terra please send to Dr Cohen    Pt cancelled P.T. visit 12/1/2023 and wants to wait until next week for P.T. evaluation - I will scedule then

## 2023-12-02 VITALS
SYSTOLIC BLOOD PRESSURE: 112 MMHG | TEMPERATURE: 98.4 F | OXYGEN SATURATION: 95 % | DIASTOLIC BLOOD PRESSURE: 54 MMHG | RESPIRATION RATE: 18 BRPM | HEART RATE: 74 BPM

## 2023-12-02 ASSESSMENT — ENCOUNTER SYMPTOMS
MUSCLE WEAKNESS: 1
PAIN SEVERITY GOAL: 0/10
SUBJECTIVE PAIN PROGRESSION: RESOLVED
PAIN LOCATION: RIGHT HIP
LAST BOWEL MOVEMENT: 66809
HIGHEST PAIN SEVERITY IN PAST 24 HOURS: 5/10
STOOL FREQUENCY: LESS THAN DAILY
DIFFICULTY THINKING: 1
VOMITING: NO
NAUSEA: NO
PAIN: 1
PERSON REPORTING PAIN: PATIENT
PAIN LOCATION - PAIN SEVERITY: 0/10
LOWEST PAIN SEVERITY IN PAST 24 HOURS: 0/10

## 2023-12-02 ASSESSMENT — ACTIVITIES OF DAILY LIVING (ADL)
CURRENT_FUNCTION: STAND BY ASSIST
AMBULATION ASSISTANCE: STAND BY ASSIST

## 2023-12-03 ENCOUNTER — HOME CARE VISIT (OUTPATIENT)
Dept: HOME HEALTH SERVICES | Facility: HOME HEALTHCARE | Age: 83
End: 2023-12-03
Payer: MEDICARE

## 2023-12-03 NOTE — Clinical Note
Terra please send to Dr Cohen  P.T. requested no P.T.  evaluation until her pain resolves so P.T. order D/Reyes on 12/3/2023. Will need  new referral for P.T. if pt is willing to participate in future.
NORA Jha
DR Bruce

## 2023-12-05 ENCOUNTER — HOME CARE VISIT (OUTPATIENT)
Dept: HOME HEALTH SERVICES | Facility: HOME HEALTHCARE | Age: 83
End: 2023-12-05
Payer: MEDICARE

## 2023-12-05 ASSESSMENT — ACTIVITIES OF DAILY LIVING (ADL): OASIS_M1830: 03

## 2023-12-05 NOTE — CASE COMMUNICATION
noted  ----- Message -----  From: Kierra Best, PT  Sent: 12/3/2023  10:18 AM PST  To: Rama Bhatia R.N.; Odalys Cope R.N.; *      Terra please send to Dr Cohen  P.T. requested no P.T.  evaluation until her pain resolves so P.T. order D/Reyes on 12/3/2023. Will need  new referral for P.T. if pt is willing to participate in future.

## 2023-12-05 NOTE — CASE COMMUNICATION
Quality Review Completed for 11/28 SOC OASIS by SHAGUFTA Alvarado, RN on 12/5/2023:     Edits completed by SHAGUFTA Alvarado RN:     1.  and  diagnosis coding updated per chart review  2.  changed to 11/28/23 for LSOC ordered  3.  is NA, no recent discharges for inpatient in EMR  4.  checked #1 and #2 per EMR reports and BIMS score,  is daily  5. Narrative documents Supervision level of assist with functional status items . Changed , 1810, 1820, 1845, 1850 to 2.  and  are 3  6. EO4040 A and D changed to needed help per EMR OV documentation  7.  changed to 3 per guidelines when ambulation is supervised  8.  changed to yes per care plan completed intervention  9. Checked exercises prescribed to Activities Permitted on 485 form.  Checked dyspnea w/min exertion to 485 functional limitations.   10. Added o2 to the triage code

## 2023-12-05 NOTE — CASE COMMUNICATION
noted  ----- Message -----  From: Serenity Mariee R.N.  Sent: 12/2/2023   9:38 PM PST  To: Kierra Best PT; Rama Bhatia R.N.; *      Patient reports 5/10 pain when SN arrived. Took an Aleve about 2 hours ago.  After getting up and walking around in house, showing this RN her family photos and telling some family stories. Patient reports pain has resolved.   Patient was seen by [facility] yesterday and no new medication ordered at this  time. Per patient provider recommended Tylenol but she prefers Aleve since Tylenol didn't provide any pain relief. Medications reconciled. Use of Aleve reviewed with patient.   Found oxygen concentrator in bedroom. Patient reports uses as needed if feels short of breath. Flow rate set at 2.5 L/min and patient states that's how much she usually uses. Oxygen safety reviewed with patient. Patient voiced understanding.

## 2023-12-07 ENCOUNTER — HOME CARE VISIT (OUTPATIENT)
Dept: HOME HEALTH SERVICES | Facility: HOME HEALTHCARE | Age: 83
End: 2023-12-07
Payer: MEDICARE

## 2023-12-07 PROCEDURE — G0299 HHS/HOSPICE OF RN EA 15 MIN: HCPCS

## 2023-12-07 NOTE — Clinical Note
I had asked the team about an order for this patient last week based on the BIMS.  Ana put it on my schedule but its in your area.  So I added it to you  ----- Message -----  From: Serenity Mariee R.N.  Sent: 12/8/2023  12:50 PM PST  To: Rama Bhatia R.N.; Odalys Cope R.N.; *      Added 1w1 SLP eval due to impaired memory and cognition.

## 2023-12-07 NOTE — Clinical Note
Thank you.    ----- Message -----  From: Marleny Casey SLP  Sent: 12/8/2023   2:18 PM PST  To: LISET Sandoval  Subject: FW:                                                I had asked the team about an order for this patient last week based on the BIMS.  Ana put it on my schedule but its in your area.  So I added it to you  ----- Message -----  From: Serenity Mariee R.N.  Sent: 12/8/2023  12:50 PM PST  To: Rama Bhatia R.N.; Odalys Cope R.N.; *      Added 1w1 SLP eval due to impaired memory and cognition.

## 2023-12-07 NOTE — Clinical Note
SNV was scheduled at 10 am. Patient called HH office and requested later visit. This RN called back multiple times to reschedule but patient didn't' answer. VM box full.   Drove by at 1615. Patient answered door. Told SN that she didn't have good sleep so didn't want AM visit  All VSS. Patient wasn't under any acute distress. SN checked med planner and patient took scheduled meds. Pain under good control per patient using Aleve.  Reviewed HH binder, calendar and visit frequency. Patient declined PT and OT but interested in SLP eval due to impaired memory. Eval added.  No fall or new change to meds at this time.   SN offered to call and schedule f/u appt with PCP but patient declined, said she will discuss with dtr first and preferred to schedule by themselves.

## 2023-12-08 ENCOUNTER — HOME CARE VISIT (OUTPATIENT)
Dept: HOME HEALTH SERVICES | Facility: HOME HEALTHCARE | Age: 83
End: 2023-12-08
Payer: MEDICARE

## 2023-12-08 VITALS
RESPIRATION RATE: 16 BRPM | SYSTOLIC BLOOD PRESSURE: 120 MMHG | HEART RATE: 75 BPM | DIASTOLIC BLOOD PRESSURE: 50 MMHG | TEMPERATURE: 98.6 F | OXYGEN SATURATION: 97 %

## 2023-12-08 ASSESSMENT — ACTIVITIES OF DAILY LIVING (ADL)
CURRENT_FUNCTION: STAND BY ASSIST
AMBULATION ASSISTANCE: STAND BY ASSIST

## 2023-12-08 ASSESSMENT — ENCOUNTER SYMPTOMS
PERSON REPORTING PAIN: PATIENT
LAST BOWEL MOVEMENT: 66815
MUSCLE WEAKNESS: 1
DENIES PAIN: 1
VOMITING: DENIES
DIFFICULTY THINKING: 1
NAUSEA: DENIES

## 2023-12-08 NOTE — Clinical Note
Terra, please send CC to Lenny RENTERIA MD     Patient refused PT and OT eval due to no need. Both discontinued per patient's request.

## 2023-12-08 NOTE — CASE COMMUNICATION
Reviewed and approved of edits.  ----- Message -----  From: Marcelina Alvarado R.N.  Sent: 12/5/2023  11:14 AM PST  To: Nikia Lynch R.N.      Quality Review Completed for 11/28 SOC OASIS by SHAGUFTA Alvarado RN on 12/5/2023:     Edits completed by SHAGUFTA Alvarado RN:     1.  and  diagnosis coding updated per chart review  2.  changed to 11/28/23 for LSOC ordered  3.  is NA, no recent discharges for inpatient in EMR  4. M174 0 checked #1 and #2 per EMR reports and BIMS score,  is daily  5. Narrative documents Supervision level of assist with functional status items. Changed , 1810, 1820, 1845, 1850 to 2.  and  are 3  6. ZU4040 A and D changed to needed help per EMR OV documentation  7.  changed to 3 per guidelines when ambulation is supervised  8.  changed to yes per care plan completed intervention  9. Checked exercises prescr ibed to Activities Permitted on 485 form.  Checked dyspnea w/min exertion to 485 functional limitations.   10. Added o2 to the triage code

## 2023-12-09 NOTE — CASE COMMUNICATION
noted  ----- Message -----  From: Serenity Mariee R.N.  Sent: 12/8/2023  12:50 PM PST  To: Rama Bhatia R.N.; Odalys Cope R.N.; *      SNV was scheduled at 10 am. Patient called HH office and requested later visit. This RN called back multiple times to reschedule but patient didn't' answer. VM box full.   Drove by at 1615. Patient answered door. Told SN that she didn't have good sleep so didn't want AM visit  All VSS. Patient wasn't u nder any acute distress. SN checked med planner and patient took scheduled meds. Pain under good control per patient using Aleve.  Reviewed HH binder, calendar and visit frequency. Patient declined PT and OT but interested in SLP eval due to impaired memory. Eval added.  No fall or new change to meds at this time.   SN offered to call and schedule f/u appt with PCP but patient declined, said she will discuss with dtr first and preferred  to schedule by themselves.

## 2023-12-09 NOTE — CASE COMMUNICATION
noted  ----- Message -----  From: Serenity Mariee R.N.  Sent: 12/8/2023  12:50 PM PST  To: Rama Bhatia R.N.; Odalys Cope R.N.; *      Added 1w1 SLP eval due to impaired memory and cognition.

## 2023-12-11 NOTE — CASE COMMUNICATION
noted  ----- Message -----  From: Serenity Mariee R.N.  Sent: 12/8/2023  12:56 PM PST  To: Rama Bhatia R.N.; Terra Ellison, please send CC to Lenny RENTERIA MD     Patient refused PT and OT eval due to no need. Both discontinued per patient's request.

## 2023-12-12 ENCOUNTER — HOME CARE VISIT (OUTPATIENT)
Dept: HOME HEALTH SERVICES | Facility: HOME HEALTHCARE | Age: 83
End: 2023-12-12
Payer: MEDICARE

## 2023-12-12 VITALS
SYSTOLIC BLOOD PRESSURE: 140 MMHG | RESPIRATION RATE: 18 BRPM | HEART RATE: 78 BPM | TEMPERATURE: 98.7 F | OXYGEN SATURATION: 97 % | DIASTOLIC BLOOD PRESSURE: 70 MMHG

## 2023-12-12 PROCEDURE — G0495 RN CARE TRAIN/EDU IN HH: HCPCS

## 2023-12-12 ASSESSMENT — ENCOUNTER SYMPTOMS
LAST BOWEL MOVEMENT: 66820
PERSON REPORTING PAIN: PATIENT
POOR JUDGMENT: 1
STOOL FREQUENCY: DAILY
DENIES PAIN: 1
BOWEL PATTERN NORMAL: 1

## 2023-12-13 ENCOUNTER — HOME CARE VISIT (OUTPATIENT)
Dept: HOME HEALTH SERVICES | Facility: HOME HEALTHCARE | Age: 83
End: 2023-12-13
Payer: MEDICARE

## 2023-12-14 ENCOUNTER — HOME CARE VISIT (OUTPATIENT)
Dept: HOME HEALTH SERVICES | Facility: HOME HEALTHCARE | Age: 83
End: 2023-12-14
Payer: MEDICARE

## 2023-12-14 VITALS
RESPIRATION RATE: 18 BRPM | TEMPERATURE: 97.5 F | HEART RATE: 87 BPM | SYSTOLIC BLOOD PRESSURE: 132 MMHG | OXYGEN SATURATION: 97 % | DIASTOLIC BLOOD PRESSURE: 58 MMHG

## 2023-12-14 PROCEDURE — G0493 RN CARE EA 15 MIN HH/HOSPICE: HCPCS

## 2023-12-14 ASSESSMENT — ENCOUNTER SYMPTOMS
DENIES PAIN: 1
STOOL FREQUENCY: DAILY
PERSON REPORTING PAIN: PATIENT
BOWEL PATTERN NORMAL: 1
LAST BOWEL MOVEMENT: 66822

## 2023-12-15 ASSESSMENT — ENCOUNTER SYMPTOMS: POOR JUDGMENT: 1

## 2023-12-15 NOTE — PROCEDURES
DATE OF SERVICE:  08/24/2017    PROCEDURE:  Lumbar epidural steroid infiltration.    PREPROCEDURAL DIAGNOSES:  Patient with lumbar degenerative disk disease,   lumbar spinal stenosis with right leg radicular symptoms for repeat epidural.    POSTPROCEDURAL DIAGNOSES:  Patient with lumbar degenerative disk disease,   lumbar spinal stenosis with right leg radicular symptoms for repeat epidural.    SURGEON:  Eduardo Mustafa MD    ANESTHESIA:  Local standby.    PROCEDURE NOTE:  Patient was placed in prone position.  Back was sterilely   prepped using surgical prep set.  Lidocaine skin wheal was raised over the   L4-L5 interspace.  A 20-gauge Tuohy was used, loss of resistance was obtained,   negative aspiration, negative paresthesia.  A 2 mL of radiographic contrast   material did confirm appropriate positioning of the needle.  This was followed   by 7 mL containing 12 mg of Celestone, 30 mg of lidocaine, and   preservative-free normal saline.  Needle was removed.  The patient tolerated   the procedure quite well.  There were no apparent complications.       ____________________________________     MD SERAFIN ASH / NTS    DD:  08/24/2017 08:27:56  DT:  08/24/2017 08:39:55    D#:  [id number]  Job#:  [id number]  
15-Dec-2023 05:27

## 2023-12-19 ENCOUNTER — HOME CARE VISIT (OUTPATIENT)
Dept: HOME HEALTH SERVICES | Facility: HOME HEALTHCARE | Age: 83
End: 2023-12-19
Payer: MEDICARE

## 2023-12-19 NOTE — CASE COMMUNICATION
noted  ----- Message -----  From: Monisha Lee R.N.  Sent: 12/19/2023   7:17 AM PST  To: Rama Bhatia R.N.; Odalys Cope R.N.; *      Patient refused nursing visit today. Her last scheduled visit is 12/21.

## 2023-12-21 ENCOUNTER — HOME CARE VISIT (OUTPATIENT)
Dept: HOME HEALTH SERVICES | Facility: HOME HEALTHCARE | Age: 83
End: 2023-12-21
Payer: MEDICARE

## 2023-12-21 VITALS
HEART RATE: 79 BPM | RESPIRATION RATE: 18 BRPM | DIASTOLIC BLOOD PRESSURE: 50 MMHG | SYSTOLIC BLOOD PRESSURE: 130 MMHG | OXYGEN SATURATION: 97 % | TEMPERATURE: 97.5 F

## 2023-12-21 PROCEDURE — G0493 RN CARE EA 15 MIN HH/HOSPICE: HCPCS

## 2023-12-21 ASSESSMENT — ENCOUNTER SYMPTOMS
CHANGE IN APPETITE: UNCHANGED
STOOL FREQUENCY: DAILY
BOWEL PATTERN NORMAL: 1
LAST BOWEL MOVEMENT: 66829
DENIES PAIN: 1

## 2023-12-21 ASSESSMENT — ACTIVITIES OF DAILY LIVING (ADL): HOME_HEALTH_OASIS: 00

## 2023-12-22 ASSESSMENT — ENCOUNTER SYMPTOMS: POOR JUDGMENT: 1

## 2023-12-22 ASSESSMENT — ACTIVITIES OF DAILY LIVING (ADL): OASIS_M1830: 01

## 2023-12-27 ENCOUNTER — HOME CARE VISIT (OUTPATIENT)
Dept: HOME HEALTH SERVICES | Facility: HOME HEALTHCARE | Age: 83
End: 2023-12-27
Payer: MEDICARE

## 2023-12-27 NOTE — CASE COMMUNICATION
Quality Review Completed for DC OASIS by SHAGUFTA Alvarado RN on 12/27/2023:     Edits completed by SHAGUFTA Alvarado RN:  1.  is yes to pressure ulcer prevention per care plan interventions

## 2023-12-28 NOTE — CASE COMMUNICATION
I agree with change  ----- Message -----  From: Marcelina Alvardao R.N.  Sent: 12/27/2023   1:37 PM PST  To: Rama Bhatia R.N.      Quality Review Completed for MS OASIS by SHAGUFTA Alvarado RN on 12/27/2023:     Edits completed by SHAGUFTA Alvarado RN:  1.  is yes to pressure ulcer prevention per care plan interventions

## 2024-02-13 NOTE — ED NOTES
To and from CT.   [facility] PRE-ADMISSION TESTING INSTRUCTIONS    The Preadmission Testing patient is instructed accordingly using the following criteria (check applicable):    ARRIVAL INSTRUCTIONS:  [x] Parking the day of Surgery is located in the Main Entrance lot.  Upon entering the door, make an immediate right to the surgery reception desk    [x] Bring photo ID and insurance card    [] Bring in a copy of Living will or Durable Power of  papers.    [x] Please be sure to arrange for responsible adult to provide transportation to and from the hospital    [x] Please arrange for responsible adult to be with you for the 24 hour period post procedure due to having anesthesia    [x] If you awake am of surgery not feeling well or have temperature >100 please call [phone]    GENERAL INSTRUCTIONS:    [x] May have clear liquids until 2 hours prior to surgery. Examples include water, fruit juices (no pulp), jello, popsicles, black coffee or tea, beef or chicken broth.       May have light meal 6 hours prior to surgery. Example include toast and clear liquids.        No gum, candy or mints.    [x] You may brush your teeth, but do not swallow any water    [x] Take medications as instructed with 1-2 oz of water    [x] Stop herbal supplements and vitamins 5 days prior to procedure    [x] Follow preop dosing of blood thinners per physician instructions    [x] Take 1/2 dose of evening insulin, but no insulin after midnight    [x] No oral diabetic medications after midnight    [x] If diabetic and have low blood sugar or feel symptomatic, take 1-2oz apple juice only    [] Bring inhalers day of surgery    [] Bring C-PAP/ Bi-Pap day of surgery    [] Bring urine specimen day of surgery    [x] Shower or bath with soap, lather and rinse well, AM of Surgery, no lotion, powders or creams to surgical site    [x] Follow bowel prep as instructed per surgeon    [x] No tobacco products within 24 hours of surgery     [x]  No alcohol or illegal drug use within 24 hours of surgery.    [x] Jewelry, body piercing's, eyeglasses, contact lenses and dentures are not permitted into surgery (bring cases)      [x] Please do not wear any nail polish, make up or hair products on the day of surgery    [x] You can expect a call the business day prior to procedure to notify you if your arrival time changes    [x] If you receive a survey after surgery we would greatly appreciate your comments    [] Parent/guardian of a minor must accompany their child and remain on the premises  the entire time they are under our care     [] Pediatric patients may bring favorite toy, blanket or comfort item with them    [] A caregiver or family member must remain with the patient during their stay if they are mentally handicapped, have dementia, disoriented or unable to use a call light or would be a safety concern if left unattended    [x] Please notify surgeon if you develop any illness between now and time of surgery (cold, cough, sore throat, fever, nausea, vomiting) or any signs of infections  including skin, wounds, and dental.    [x]  The Outpatient Pharmacy is available to fill your prescription here on your day of surgery, ask your preop nurse for details    [] Other instructions    EDUCATIONAL MATERIALS PROVIDED:    [] PAT Preoperative Education Packet/Booklet     [] Medication List    [] Transfusion bracelet applied with instructions    [] Shower with soap, lather and rinse well, and use CHG wipes provided the evening before surgery as instructed    [] Incentive spirometer with instructions

## 2024-03-03 NOTE — PROGRESS NOTES
Bedside report received from day shift RN, assumed pt care. Pt assessment complete. Pt alert, disoriented to time. Reviewed plan of care with pt. Tele box on and rhythm verified.  Chart and labs reviewed.  Bed in lowest position, call light within reach. Hourly rounding in place. Educated about calling for assistance.    (1) Oriented to own ability

## 2024-04-24 ENCOUNTER — TELEPHONE (OUTPATIENT)
Dept: HEALTH INFORMATION MANAGEMENT | Facility: OTHER | Age: 84
End: 2024-04-24
Payer: MEDICARE

## 2024-12-03 ENCOUNTER — HOSPITAL ENCOUNTER (EMERGENCY)
Facility: MEDICAL CENTER | Age: 84
End: 2024-12-04
Attending: EMERGENCY MEDICINE
Payer: MEDICARE

## 2024-12-03 ENCOUNTER — APPOINTMENT (OUTPATIENT)
Dept: RADIOLOGY | Facility: MEDICAL CENTER | Age: 84
End: 2024-12-03
Attending: EMERGENCY MEDICINE
Payer: MEDICARE

## 2024-12-03 DIAGNOSIS — S41.102D ARM WOUND, LEFT, SUBSEQUENT ENCOUNTER: ICD-10-CM

## 2024-12-03 DIAGNOSIS — S09.90XA CLOSED HEAD INJURY, INITIAL ENCOUNTER: ICD-10-CM

## 2024-12-03 DIAGNOSIS — S01.81XA LACERATION OF FOREHEAD, INITIAL ENCOUNTER: Primary | ICD-10-CM

## 2024-12-03 DIAGNOSIS — S41.111A SKIN TEAR OF RIGHT UPPER EXTREMITY: ICD-10-CM

## 2024-12-03 PROCEDURE — 90471 IMMUNIZATION ADMIN: CPT

## 2024-12-03 PROCEDURE — 70450 CT HEAD/BRAIN W/O DYE: CPT

## 2024-12-03 PROCEDURE — 72125 CT NECK SPINE W/O DYE: CPT

## 2024-12-03 PROCEDURE — 700101 HCHG RX REV CODE 250: Performed by: EMERGENCY MEDICINE

## 2024-12-03 PROCEDURE — 99284 EMERGENCY DEPT VISIT MOD MDM: CPT

## 2024-12-03 PROCEDURE — 90715 TDAP VACCINE 7 YRS/> IM: CPT | Performed by: EMERGENCY MEDICINE

## 2024-12-03 PROCEDURE — 304999 HCHG REPAIR-SIMPLE/INTERMED LEVEL 1

## 2024-12-03 PROCEDURE — 303747 HCHG EXTRA SUTURE

## 2024-12-03 PROCEDURE — 700111 HCHG RX REV CODE 636 W/ 250 OVERRIDE (IP): Performed by: EMERGENCY MEDICINE

## 2024-12-03 PROCEDURE — 304217 HCHG IRRIGATION SYSTEM

## 2024-12-03 RX ORDER — LIDOCAINE HYDROCHLORIDE AND EPINEPHRINE 10; 10 MG/ML; UG/ML
5 INJECTION, SOLUTION INFILTRATION; PERINEURAL ONCE
Status: COMPLETED | OUTPATIENT
Start: 2024-12-03 | End: 2024-12-03

## 2024-12-03 RX ADMIN — CLOSTRIDIUM TETANI TOXOID ANTIGEN (FORMALDEHYDE INACTIVATED), CORYNEBACTERIUM DIPHTHERIAE TOXOID ANTIGEN (FORMALDEHYDE INACTIVATED), BORDETELLA PERTUSSIS TOXOID ANTIGEN (GLUTARALDEHYDE INACTIVATED), BORDETELLA PERTUSSIS FILAMENTOUS HEMAGGLUTININ ANTIGEN (FORMALDEHYDE INACTIVATED), BORDETELLA PERTUSSIS PERTACTIN ANTIGEN, AND BORDETELLA PERTUSSIS FIMBRIAE 2/3 ANTIGEN 0.5 ML: 5; 2; 2.5; 5; 3; 5 INJECTION, SUSPENSION INTRAMUSCULAR at 23:53

## 2024-12-03 RX ADMIN — LIDOCAINE HYDROCHLORIDE AND EPINEPHRINE 5 ML: 10; 10 INJECTION, SOLUTION INFILTRATION; PERINEURAL at 22:45

## 2024-12-03 ASSESSMENT — FIBROSIS 4 INDEX: FIB4 SCORE: 3.5

## 2024-12-04 VITALS
TEMPERATURE: 98 F | RESPIRATION RATE: 16 BRPM | OXYGEN SATURATION: 95 % | BODY MASS INDEX: 21.19 KG/M2 | HEIGHT: 67 IN | DIASTOLIC BLOOD PRESSURE: 75 MMHG | WEIGHT: 135 LBS | SYSTOLIC BLOOD PRESSURE: 161 MMHG | HEART RATE: 81 BPM

## 2024-12-04 NOTE — ED NOTES
Dc instructions and medications discussed with patient at bedside. All questions answered at this time. VSS. No IV in place at this time. Pt to lobby without incident. Daughter to drive patient home.

## 2024-12-04 NOTE — ED PROVIDER NOTES
"ER Provider Note    Scribed for Bola Rodriguez Ii, M.d. by Joe Mallory. 12/3/2024  10:02 PM    Primary Care Provider: Lenny RENTERIA M.D.    CHIEF COMPLAINT   Ground Level Fall with head strike         HPI/ROS  LIMITATION TO HISTORY   Select: : None  OUTSIDE HISTORIAN(S):  EMS Present at bedside provides all history.     Lindsay Vargas is a 84 y.o. female who presents to the ED via EMS for evaluation of a ground level fall onset prior to arrival. The patient states she was pushing her garbage can which leaned too far, causing her to fall forward, striking her head, before crawling back to her house. The patient reports she was able to contact her neighbor who then contacted EMS. EMS notes a skin tear to the right forehead with bleeding controlled on arrival. She denies losing consciousness. She denies any blood thinners.     PAST MEDICAL HISTORY  Past Medical History:   Diagnosis Date    Anesthesia     confused/hard to wake up-15 years ago    Arthritis     Cataract     Bilat IOL    Chronic pain     Hiatus hernia syndrome     surgically repaired    Hypertension     Lymphedema 2014    Migraine headache     Pain     back    Pneumonia     \"younger\"   SURGICAL HISTORY  Past Surgical History:   Procedure Laterality Date    PB INJ LUMBAR/SACRAL,W/WO CNTRST N/A 11/10/2016    Procedure: INJ EPI NON NEUROLYTIC L/S -MIDLINE L4-L5;  Surgeon: Eduardo Mustafa M.D.;  Location: SURGERY [facility];  Service: Pain Management    PB FLUORSCOPIC GUIDANCE SPINAL INJECTION  11/10/2016    Procedure: FLUOROGUIDE FOR SPINAL INJ;  Surgeon: Eduardo Mustafa M.D.;  Location: SURGERY [facility];  Service: Pain Management    PB INJ LUMBAR/SACRAL,W/WO CNTRST Right 9/29/2016    Procedure: INJ EPI NON NEUROLYTIC L/S - L5-S1;  Surgeon: Eduardo Mustafa M.D.;  Location: SURGERY [facility];  Service: Pain Management    PB FLUORSCOPIC GUIDANCE SPINAL INJECTION  9/29/2016    Procedure: FLUOROGUIDE FOR SPINAL INJ;  " Surgeon: Eduardo Mustafa M.D.;  Location: SURGERY SURGICAL [facility] OR[facility];  Service: Pain Management    CATARACT PHACO WITH IOL Right 2/18/2016    Procedure: CATARACT PHACO WITH IOL;  Surgeon: Rodrigo Smyth M.D.;  Location: SURGERY SURGICAL [facility] OR[facility];  Service:     CATARACT PHACO WITH IOL Left 2/4/2016    Procedure: CATARACT PHACO WITH IOL;  Surgeon: Rodrigo Smyth M.D.;  Location: SURGERY SURGICAL [facility];  Service:     JUANI BY LAPAROSCOPY      HYSTERECTOMY LAPAROSCOPY      OTHER      HIATAL HERNIA REPAIR    OTHER ABDOMINAL SURGERY      LAP JUAIN    OTHER ABDOMINAL SURGERY      APPENDECOMTY    OTHER ORTHOPEDIC SURGERY      LEFT KNEE SCOPE     FAMILY HISTORY  Family History   Problem Relation Age of Onset    Heart Disease Mother     Heart Disease Father      SOCIAL HISTORY   reports that she has never smoked. She has never used smokeless tobacco. She reports that she does not drink alcohol and does not use drugs.    CURRENT MEDICATIONS  Previous Medications    AMLODIPINE (NORVASC) 5 MG TAB    Take 5 mg by mouth every day. Indications: High Blood Pressure Disorder    BENZOCAINE (ANBESOL) 20 % GEL TOPICAL GEL    Use 1 Application in the mouth or throat 4 times a day as needed (oral pain). Indications: oral pain    DIGOXIN (LANOXIN) 125 MCG TAB    Take 125 mcg by mouth every day. Indications: cariac HTN    DULOXETINE (CYMBALTA) 60 MG CAP DR PARTICLES DELAYED-RELEASE CAPSULE    Take 1 Cap by mouth every day.    HOME CARE OXYGEN    Inhale 2.5 L/min as needed for Shortness of Breath (shortness of breath). Oxygen dose range: 2.5 L/min  Respiratory route via: Nasal Cannula   Oxygen supplier: [facility]      Indications: shortness of breath    HYDROCHLOROTHIAZIDE (HYDRODIURIL) 25 MG TAB    Take 25 mg by mouth 2 times a day. Indications: High Blood Pressure Disorder    KETOTIFEN FUMARATE OP    Administer 1 Drop into both eyes 2 times a day as needed (itchy eyes). KETOTIFEN FUMARATE OP 0.035%   Equate Eye Itch  "Relief   Indications: ithcy eyes    LORATADINE (CLARITIN) 10 MG TAB    Take 10 mg by mouth every day. Indications: Allergies    METOPROLOL SR (TOPROL XL) 25 MG TABLET SR 24 HR    Take 25 mg by mouth every day. Indications: High Blood Pressure Disorder    MULTIPLE VITAMIN (MULTIVITAMIN ADULT) TAB    Take 1 Tablet by mouth in the morning, at noon, and at bedtime. Blalance of Nature Fruits capsules  Indications: Nutritional Support    MULTIPLE VITAMINS-MINERALS (ALIVE WOMENS GUMMY) CHEW TAB    Chew 1 Capsule every day. Indications: supplement    MULTIPLE VITAMINS-MINERALS (MEGAVITE FRUITS & VEGGIES PO)    Take 1 Capsule by mouth 3 times a day. Balance of Nature Veggies  Indications: supplement    NAPROXEN (ANAPROX) 220 MG TABLET    Take 220 mg by mouth every 12 hours as needed (pain). Per bottle instructions:  Take 1 tab every 12 hours while symptoms last; for the first dose you may take 2 tabs within the first hour; do not exceed 2 tabs in any 12-hour period; do not exceed 3 tab in a 24-hour period.   Indications: pain    OMEPRAZOLE (PRILOSEC) 40 MG DELAYED-RELEASE CAPSULE    Take 40 mg by mouth every day. Indications: Gastroesophageal Reflux Disease    POTASSIUM CHLORIDE SA (KDUR) 20 MEQ TAB CR    Take 20 mEq by mouth every day. Indications: High Blood Pressure Disorder     ALLERGIES  Patient has no known allergies.    PHYSICAL EXAM  BP (!) 141/62   Pulse 87   Temp 36.7 °C (98 °F) (Temporal)   Resp 18   Ht 1.702 m (5' 7\")   Wt 61.2 kg (135 lb)   SpO2 97%   BMI 21.14 kg/m²   Physical Exam  Vitals and nursing note reviewed.   HENT:      Head: Normocephalic.      Comments:   3 cm laceration to the right forehead with skin tear     Nose: Nose normal.      Mouth/Throat:      Mouth: Mucous membranes are moist.   Eyes:      General: No scleral icterus.        Right eye: No discharge.         Left eye: No discharge.      Extraocular Movements: Extraocular movements intact.      Pupils: Pupils are equal, round, and " reactive to light.      Comments: Looks like she had cataract surgery previously. No vision loss.    Cardiovascular:      Rate and Rhythm: Normal rate and regular rhythm.      Pulses: Normal pulses.      Heart sounds: Normal heart sounds.   Pulmonary:      Effort: Pulmonary effort is normal.      Breath sounds: Normal breath sounds.   Abdominal:      General: Abdomen is flat. There is no distension.      Tenderness: There is no abdominal tenderness. There is no guarding.   Musculoskeletal:      Cervical back: Normal range of motion.   Skin:     General: Skin is warm.      Comments: Skin tear to the right elbow. 2 cm circular wound with raised fatty tissue to the left upper extremity.    Neurological:      General: No focal deficit present.      Mental Status: She is alert.   Psychiatric:         Mood and Affect: Mood normal.     DIAGNOSTIC STUDIES    RADIOLOGY/PROCEDURES   The attending emergency physician has independently interpreted the diagnostic imaging associated with this visit and am waiting the final reading from the radiologist.   My preliminary interpretation is a follows: CT head and cspine No acute hemorrhage or fracture    Radiologist interpretation:  CT-CSPINE WITHOUT PLUS RECONS   Final Result      No acute fracture or dislocation of the cervical spine.      Chronic nonunited type II dens fracture.      CT-HEAD W/O   Final Result      1.  Cerebral atrophy and chronic microvascular ischemic type changes.   2.  No acute intracranial abnormality.                 Laceration Repair Procedure Note    Indication: Laceration    Procedure: The patient was placed in the appropriate position and anesthesia around the laceration was obtained by infiltration using 1% Lidocaine with epinephrine. The wound was minimally contaminated .The area was then irrigated with normal saline. The laceration was closed with 5-O plain gut. There was an overlying skin tear in the area of the wound and devitalized tissue was  excised for a  repair. There were no additional lacerations requiring repair. The wound area was then dressed with a sterile dressing.      Total repaired wound length: 3 cm.     Other Items: Suture count: 7    The patient tolerated the procedure well.    Complications: None     COURSE & MEDICAL DECISION MAKING     ASSESSMENT, COURSE AND PLAN  Care Narrative:     10:02 PM - This is an evaluation of a 84 y.o. woman who presents to the ED via EMS following a ground level fall with head strike prior to arrival. No thinners, no loss of consciousness. On exam she has a 3 cm laceration to the right forehead and a laceration to the right elbow. The patient will be taken to CT immediately for imaging of the head and Cspine to evaluate for intracranial hemorrhage, Cspine injury, or other acute abnormality.    10:22 PM - The patient was reevaluated at bedside after she returned from CT. Reviewed CT imaging which showed no acute abnormality. Plan for forehead laceration repair.     10:40 PM - Patient reevaluated at bedside. Anaesthetic administered at this time.     10:59 PM - The patient was reevaluated at bedside. Laceration repair performed at this time as noted above. Patient notes a wound to the right upper extremity for which she will be referred to dermatology.     She was up and ambulating, drinking water.  Daughter is coming to pick her up.     PROBLEM LIST  # Forehead laceration   -tdap updated   -laceration irrigated and repaired with absorbable sutures    # Fall with closed head injury    #Left arm skin lesion/wound   -referred to dermatology    DISPOSITION AND DISCUSSIONS      FINAL DIAGNOSIS  1. Laceration of forehead, initial encounter    2. Closed head injury, initial encounter    3. Skin tear of right upper extremity    4. Arm wound, left, subsequent encounter       IJoe (Scribe), am scribing for, and in the presence of, No att. providers found.    Electronically signed by: Joe Mallory  (Scribe), 12/3/2024    I, No att. providers found personally performed the services described in this documentation, as scribed by Joe Mallory in my presence, and it is both accurate and complete.      The note accurately reflects work and decisions made by me.  Bola Rodriguez II, M.D.  12/4/2024  12:23 AM

## 2024-12-04 NOTE — ED TRIAGE NOTES
"Chief Complaint   Patient presents with    GLF     BIBA after ground level fall at home. Pt reports she tripped in the kitchen this evening.     Head Laceration     Pt bib ems from home for GLF and lac and bruising to right side of head. Negative LOC and thinners     BP (!) 141/62   Pulse 87   Temp 36.7 °C (98 °F) (Temporal)   Resp 18   Ht 1.702 m (5' 7\")   Wt 61.2 kg (135 lb)   SpO2 97%   BMI 21.14 kg/m²     "

## 2025-02-04 ENCOUNTER — TELEPHONE (OUTPATIENT)
Dept: FAMILY PLANNING/WOMEN'S HEALTH CLINIC | Facility: PHYSICIAN GROUP | Age: 85
End: 2025-02-04
Payer: MEDICARE

## 2025-03-30 ENCOUNTER — HOME CARE VISIT (OUTPATIENT)
Dept: HOME HEALTH SERVICES | Facility: HOME HEALTHCARE | Age: 85
End: 2025-03-30

## 2025-05-18 ENCOUNTER — APPOINTMENT (OUTPATIENT)
Dept: RADIOLOGY | Facility: MEDICAL CENTER | Age: 85
DRG: 689 | End: 2025-05-18
Attending: EMERGENCY MEDICINE
Payer: MEDICARE

## 2025-05-18 ENCOUNTER — HOSPITAL ENCOUNTER (INPATIENT)
Facility: MEDICAL CENTER | Age: 85
LOS: 2 days | DRG: 689 | End: 2025-05-20
Attending: EMERGENCY MEDICINE | Admitting: HOSPITALIST
Payer: MEDICARE

## 2025-05-18 DIAGNOSIS — E83.39 HYPOPHOSPHATEMIA: ICD-10-CM

## 2025-05-18 DIAGNOSIS — I47.19 AVNRT (AV NODAL RE-ENTRY TACHYCARDIA) (HCC): ICD-10-CM

## 2025-05-18 DIAGNOSIS — I27.20 PULMONARY HYPERTENSION (HCC): ICD-10-CM

## 2025-05-18 DIAGNOSIS — E83.42 HYPOMAGNESEMIA: ICD-10-CM

## 2025-05-18 DIAGNOSIS — I21.4 NSTEMI (NON-ST ELEVATED MYOCARDIAL INFARCTION) (HCC): ICD-10-CM

## 2025-05-18 DIAGNOSIS — M54.16 LUMBAR RADICULITIS: ICD-10-CM

## 2025-05-18 DIAGNOSIS — E86.0 DEHYDRATION: Primary | ICD-10-CM

## 2025-05-18 DIAGNOSIS — R29.6 FREQUENT FALLS: ICD-10-CM

## 2025-05-18 DIAGNOSIS — R53.1 WEAKNESS: ICD-10-CM

## 2025-05-18 DIAGNOSIS — M54.2 NECK PAIN: ICD-10-CM

## 2025-05-18 DIAGNOSIS — G25.81 RESTLESS LEGS SYNDROME: ICD-10-CM

## 2025-05-18 DIAGNOSIS — I10 PRIMARY HYPERTENSION: ICD-10-CM

## 2025-05-18 DIAGNOSIS — E53.8 VITAMIN B12 DEFICIENCY: ICD-10-CM

## 2025-05-18 DIAGNOSIS — R55 SYNCOPE, UNSPECIFIED SYNCOPE TYPE: ICD-10-CM

## 2025-05-18 DIAGNOSIS — R41.89 COGNITIVE DEFICITS: ICD-10-CM

## 2025-05-18 DIAGNOSIS — Z87.81 HISTORY OF COMPRESSION FRACTURE OF SPINE: ICD-10-CM

## 2025-05-18 DIAGNOSIS — M54.50 CHRONIC LOW BACK PAIN WITHOUT SCIATICA, UNSPECIFIED BACK PAIN LATERALITY: ICD-10-CM

## 2025-05-18 DIAGNOSIS — R32 URINARY INCONTINENCE, UNSPECIFIED TYPE: ICD-10-CM

## 2025-05-18 DIAGNOSIS — H25.812 COMBINED FORM OF SENILE CATARACT OF LEFT EYE: ICD-10-CM

## 2025-05-18 DIAGNOSIS — E87.6 HYPOKALEMIA: ICD-10-CM

## 2025-05-18 DIAGNOSIS — N39.0 URINARY TRACT INFECTION WITHOUT HEMATURIA, SITE UNSPECIFIED: ICD-10-CM

## 2025-05-18 DIAGNOSIS — H25.811 COMBINED FORM OF SENILE CATARACT OF RIGHT EYE: ICD-10-CM

## 2025-05-18 DIAGNOSIS — R01.1 HEART MURMUR: ICD-10-CM

## 2025-05-18 DIAGNOSIS — R00.2 PALPITATIONS: ICD-10-CM

## 2025-05-18 DIAGNOSIS — N17.9 AKI (ACUTE KIDNEY INJURY) (HCC): ICD-10-CM

## 2025-05-18 DIAGNOSIS — J96.11 CHRONIC RESPIRATORY FAILURE WITH HYPOXIA (HCC): ICD-10-CM

## 2025-05-18 DIAGNOSIS — R79.89 POSITIVE D DIMER: ICD-10-CM

## 2025-05-18 DIAGNOSIS — R78.81 BACTEREMIA DUE TO GRAM-NEGATIVE BACTERIA: ICD-10-CM

## 2025-05-18 DIAGNOSIS — N12 PYELONEPHRITIS: ICD-10-CM

## 2025-05-18 DIAGNOSIS — G89.4 CHRONIC PAIN SYNDROME: ICD-10-CM

## 2025-05-18 DIAGNOSIS — K21.9 GASTROESOPHAGEAL REFLUX DISEASE WITHOUT ESOPHAGITIS: ICD-10-CM

## 2025-05-18 DIAGNOSIS — D50.9 MICROCYTIC ANEMIA: ICD-10-CM

## 2025-05-18 DIAGNOSIS — R11.0 NAUSEA: ICD-10-CM

## 2025-05-18 DIAGNOSIS — R79.89 ELEVATED TROPONIN: ICD-10-CM

## 2025-05-18 DIAGNOSIS — R10.32 LLQ PAIN: ICD-10-CM

## 2025-05-18 DIAGNOSIS — E83.51 HYPOCALCEMIA: ICD-10-CM

## 2025-05-18 DIAGNOSIS — G93.41 ACUTE METABOLIC ENCEPHALOPATHY: ICD-10-CM

## 2025-05-18 DIAGNOSIS — M62.838 MUSCLE SPASMS OF NECK: ICD-10-CM

## 2025-05-18 DIAGNOSIS — I89.0 LYMPHEDEMA: ICD-10-CM

## 2025-05-18 DIAGNOSIS — I10 ESSENTIAL HYPERTENSION: ICD-10-CM

## 2025-05-18 DIAGNOSIS — N39.0 ACUTE UTI: ICD-10-CM

## 2025-05-18 DIAGNOSIS — F32.A DEPRESSION, UNSPECIFIED DEPRESSION TYPE: ICD-10-CM

## 2025-05-18 DIAGNOSIS — F41.9 ANXIETY: ICD-10-CM

## 2025-05-18 DIAGNOSIS — D69.6 THROMBOCYTOPENIA (HCC): ICD-10-CM

## 2025-05-18 DIAGNOSIS — E87.1 HYPONATREMIA: ICD-10-CM

## 2025-05-18 DIAGNOSIS — R94.31 QT PROLONGATION: ICD-10-CM

## 2025-05-18 DIAGNOSIS — G89.29 CHRONIC LOW BACK PAIN WITHOUT SCIATICA, UNSPECIFIED BACK PAIN LATERALITY: ICD-10-CM

## 2025-05-18 DIAGNOSIS — R55 NEAR SYNCOPE: ICD-10-CM

## 2025-05-18 DIAGNOSIS — E87.20 LACTIC ACIDOSIS: ICD-10-CM

## 2025-05-18 LAB
ALBUMIN SERPL BCP-MCNC: 3.8 G/DL (ref 3.2–4.9)
ALBUMIN/GLOB SERPL: 1.4 G/DL
ALP SERPL-CCNC: 83 U/L (ref 30–99)
ALT SERPL-CCNC: 12 U/L (ref 2–50)
ANION GAP SERPL CALC-SCNC: 14 MMOL/L (ref 7–16)
APPEARANCE UR: ABNORMAL
AST SERPL-CCNC: 17 U/L (ref 12–45)
BACTERIA #/AREA URNS HPF: ABNORMAL /HPF
BASOPHILS # BLD AUTO: 0.2 % (ref 0–1.8)
BASOPHILS # BLD: 0.02 K/UL (ref 0–0.12)
BILIRUB SERPL-MCNC: 0.5 MG/DL (ref 0.1–1.5)
BILIRUB UR QL STRIP.AUTO: NEGATIVE
BUN SERPL-MCNC: 27 MG/DL (ref 8–22)
CALCIUM ALBUM COR SERPL-MCNC: 8.9 MG/DL (ref 8.5–10.5)
CALCIUM SERPL-MCNC: 8.7 MG/DL (ref 8.5–10.5)
CASTS URNS QL MICRO: ABNORMAL /LPF (ref 0–2)
CHLORIDE SERPL-SCNC: 99 MMOL/L (ref 96–112)
CO2 SERPL-SCNC: 24 MMOL/L (ref 20–33)
COLOR UR: YELLOW
CREAT SERPL-MCNC: 0.83 MG/DL (ref 0.5–1.4)
EOSINOPHIL # BLD AUTO: 0.03 K/UL (ref 0–0.51)
EOSINOPHIL NFR BLD: 0.3 % (ref 0–6.9)
EPITHELIAL CELLS 1715: ABNORMAL /HPF (ref 0–5)
ERYTHROCYTE [DISTWIDTH] IN BLOOD BY AUTOMATED COUNT: 42 FL (ref 35.9–50)
GFR SERPLBLD CREATININE-BSD FMLA CKD-EPI: 68 ML/MIN/1.73 M 2
GLOBULIN SER CALC-MCNC: 2.8 G/DL (ref 1.9–3.5)
GLUCOSE SERPL-MCNC: 126 MG/DL (ref 65–99)
GLUCOSE UR STRIP.AUTO-MCNC: NEGATIVE MG/DL
HCT VFR BLD AUTO: 31.1 % (ref 37–47)
HGB BLD-MCNC: 9.6 G/DL (ref 12–16)
IMM GRANULOCYTES # BLD AUTO: 0.04 K/UL (ref 0–0.11)
IMM GRANULOCYTES NFR BLD AUTO: 0.4 % (ref 0–0.9)
INR PPP: 1.02 (ref 0.87–1.13)
KETONES UR STRIP.AUTO-MCNC: NEGATIVE MG/DL
LEUKOCYTE ESTERASE UR QL STRIP.AUTO: ABNORMAL
LYMPHOCYTES # BLD AUTO: 0.48 K/UL (ref 1–4.8)
LYMPHOCYTES NFR BLD: 5 % (ref 22–41)
MCH RBC QN AUTO: 21.8 PG (ref 27–33)
MCHC RBC AUTO-ENTMCNC: 30.9 G/DL (ref 32.2–35.5)
MCV RBC AUTO: 70.5 FL (ref 81.4–97.8)
MICRO URNS: ABNORMAL
MONOCYTES # BLD AUTO: 0.7 K/UL (ref 0–0.85)
MONOCYTES NFR BLD AUTO: 7.3 % (ref 0–13.4)
NEUTROPHILS # BLD AUTO: 8.36 K/UL (ref 1.82–7.42)
NEUTROPHILS NFR BLD: 86.8 % (ref 44–72)
NITRITE UR QL STRIP.AUTO: NEGATIVE
NRBC # BLD AUTO: 0 K/UL
NRBC BLD-RTO: 0 /100 WBC (ref 0–0.2)
PH UR STRIP.AUTO: 6.5 [PH] (ref 5–8)
PLATELET # BLD AUTO: 119 K/UL (ref 164–446)
PMV BLD AUTO: 10.4 FL (ref 9–12.9)
POTASSIUM SERPL-SCNC: 2.8 MMOL/L (ref 3.6–5.5)
PROT SERPL-MCNC: 6.6 G/DL (ref 6–8.2)
PROT UR QL STRIP: NEGATIVE MG/DL
PROTHROMBIN TIME: 13.7 SEC (ref 12–14.6)
RBC # BLD AUTO: 4.41 M/UL (ref 4.2–5.4)
RBC # URNS HPF: ABNORMAL /HPF
RBC UR QL AUTO: ABNORMAL
SODIUM SERPL-SCNC: 137 MMOL/L (ref 135–145)
SP GR UR STRIP.AUTO: 1.01
UROBILINOGEN UR STRIP.AUTO-MCNC: 0.2 EU/DL
WBC # BLD AUTO: 9.6 K/UL (ref 4.8–10.8)
WBC #/AREA URNS HPF: ABNORMAL /HPF

## 2025-05-18 PROCEDURE — 96365 THER/PROPH/DIAG IV INF INIT: CPT

## 2025-05-18 PROCEDURE — 87086 URINE CULTURE/COLONY COUNT: CPT

## 2025-05-18 PROCEDURE — 770020 HCHG ROOM/CARE - TELE (206)

## 2025-05-18 PROCEDURE — 87186 SC STD MICRODIL/AGAR DIL: CPT

## 2025-05-18 PROCEDURE — 87077 CULTURE AEROBIC IDENTIFY: CPT

## 2025-05-18 PROCEDURE — 81001 URINALYSIS AUTO W/SCOPE: CPT

## 2025-05-18 PROCEDURE — 96375 TX/PRO/DX INJ NEW DRUG ADDON: CPT

## 2025-05-18 PROCEDURE — 71045 X-RAY EXAM CHEST 1 VIEW: CPT

## 2025-05-18 PROCEDURE — 85610 PROTHROMBIN TIME: CPT

## 2025-05-18 PROCEDURE — 700111 HCHG RX REV CODE 636 W/ 250 OVERRIDE (IP): Performed by: EMERGENCY MEDICINE

## 2025-05-18 PROCEDURE — 700105 HCHG RX REV CODE 258: Performed by: EMERGENCY MEDICINE

## 2025-05-18 PROCEDURE — 36415 COLL VENOUS BLD VENIPUNCTURE: CPT

## 2025-05-18 PROCEDURE — 72125 CT NECK SPINE W/O DYE: CPT

## 2025-05-18 PROCEDURE — 80053 COMPREHEN METABOLIC PANEL: CPT

## 2025-05-18 PROCEDURE — 99285 EMERGENCY DEPT VISIT HI MDM: CPT

## 2025-05-18 PROCEDURE — 70450 CT HEAD/BRAIN W/O DYE: CPT

## 2025-05-18 PROCEDURE — 99223 1ST HOSP IP/OBS HIGH 75: CPT | Mod: AI | Performed by: HOSPITALIST

## 2025-05-18 PROCEDURE — 85025 COMPLETE CBC W/AUTO DIFF WBC: CPT

## 2025-05-18 RX ORDER — POLYETHYLENE GLYCOL 3350 17 G/17G
1 POWDER, FOR SOLUTION ORAL
Status: DISCONTINUED | OUTPATIENT
Start: 2025-05-18 | End: 2025-05-19

## 2025-05-18 RX ORDER — AMOXICILLIN 250 MG
2 CAPSULE ORAL EVERY EVENING
Status: DISCONTINUED | OUTPATIENT
Start: 2025-05-18 | End: 2025-05-19

## 2025-05-18 RX ORDER — ACETAMINOPHEN 325 MG/1
650 TABLET ORAL EVERY 6 HOURS PRN
Status: DISCONTINUED | OUTPATIENT
Start: 2025-05-18 | End: 2025-05-20 | Stop reason: HOSPADM

## 2025-05-18 RX ORDER — SODIUM CHLORIDE 9 MG/ML
1000 INJECTION, SOLUTION INTRAVENOUS ONCE
Status: COMPLETED | OUTPATIENT
Start: 2025-05-18 | End: 2025-05-18

## 2025-05-18 RX ORDER — ONDANSETRON 4 MG/1
4 TABLET, ORALLY DISINTEGRATING ORAL EVERY 4 HOURS PRN
Status: DISCONTINUED | OUTPATIENT
Start: 2025-05-18 | End: 2025-05-20 | Stop reason: HOSPADM

## 2025-05-18 RX ORDER — SODIUM CHLORIDE, SODIUM LACTATE, POTASSIUM CHLORIDE, AND CALCIUM CHLORIDE .6; .31; .03; .02 G/100ML; G/100ML; G/100ML; G/100ML
1000 INJECTION, SOLUTION INTRAVENOUS ONCE
Status: COMPLETED | OUTPATIENT
Start: 2025-05-18 | End: 2025-05-18

## 2025-05-18 RX ORDER — ONDANSETRON 2 MG/ML
4 INJECTION INTRAMUSCULAR; INTRAVENOUS ONCE
Status: COMPLETED | OUTPATIENT
Start: 2025-05-18 | End: 2025-05-18

## 2025-05-18 RX ORDER — POTASSIUM CHLORIDE 7.45 MG/ML
10 INJECTION INTRAVENOUS
Status: COMPLETED | OUTPATIENT
Start: 2025-05-18 | End: 2025-05-19

## 2025-05-18 RX ORDER — DULOXETIN HYDROCHLORIDE 30 MG/1
60 CAPSULE, DELAYED RELEASE ORAL DAILY
Status: DISCONTINUED | OUTPATIENT
Start: 2025-05-19 | End: 2025-05-20 | Stop reason: HOSPADM

## 2025-05-18 RX ORDER — OMEPRAZOLE 20 MG/1
40 CAPSULE, DELAYED RELEASE ORAL DAILY
Status: DISCONTINUED | OUTPATIENT
Start: 2025-05-19 | End: 2025-05-20 | Stop reason: HOSPADM

## 2025-05-18 RX ORDER — AMLODIPINE BESYLATE 5 MG/1
5 TABLET ORAL DAILY
Status: DISCONTINUED | OUTPATIENT
Start: 2025-05-19 | End: 2025-05-20 | Stop reason: HOSPADM

## 2025-05-18 RX ORDER — METOPROLOL SUCCINATE 25 MG/1
25 TABLET, EXTENDED RELEASE ORAL DAILY
Status: DISCONTINUED | OUTPATIENT
Start: 2025-05-19 | End: 2025-05-20 | Stop reason: HOSPADM

## 2025-05-18 RX ORDER — LORATADINE 10 MG/1
10 TABLET ORAL
Status: DISCONTINUED | OUTPATIENT
Start: 2025-05-18 | End: 2025-05-18

## 2025-05-18 RX ORDER — ONDANSETRON 2 MG/ML
4 INJECTION INTRAMUSCULAR; INTRAVENOUS EVERY 4 HOURS PRN
Status: DISCONTINUED | OUTPATIENT
Start: 2025-05-18 | End: 2025-05-20 | Stop reason: HOSPADM

## 2025-05-18 RX ADMIN — SODIUM CHLORIDE, POTASSIUM CHLORIDE, SODIUM LACTATE AND CALCIUM CHLORIDE 1000 ML: 600; 310; 30; 20 INJECTION, SOLUTION INTRAVENOUS at 21:00

## 2025-05-18 RX ADMIN — POTASSIUM CHLORIDE 10 MEQ: 7.46 INJECTION, SOLUTION INTRAVENOUS at 23:00

## 2025-05-18 RX ADMIN — SODIUM CHLORIDE 1000 ML: 9 INJECTION, SOLUTION INTRAVENOUS at 19:30

## 2025-05-18 RX ADMIN — POTASSIUM CHLORIDE 10 MEQ: 7.46 INJECTION, SOLUTION INTRAVENOUS at 22:19

## 2025-05-18 RX ADMIN — ONDANSETRON 4 MG: 2 INJECTION INTRAMUSCULAR; INTRAVENOUS at 19:30

## 2025-05-18 ASSESSMENT — FIBROSIS 4 INDEX: FIB4 SCORE: 3.55

## 2025-05-19 PROBLEM — K21.9 GASTROESOPHAGEAL REFLUX DISEASE WITHOUT ESOPHAGITIS: Status: RESOLVED | Noted: 2019-03-26 | Resolved: 2025-05-19

## 2025-05-19 LAB
ANION GAP SERPL CALC-SCNC: 14 MMOL/L (ref 7–16)
BUN SERPL-MCNC: 18 MG/DL (ref 8–22)
CALCIUM SERPL-MCNC: 8.2 MG/DL (ref 8.5–10.5)
CHLORIDE SERPL-SCNC: 99 MMOL/L (ref 96–112)
CO2 SERPL-SCNC: 23 MMOL/L (ref 20–33)
CREAT SERPL-MCNC: 0.73 MG/DL (ref 0.5–1.4)
DIGOXIN SERPL-MCNC: 1 NG/ML (ref 0.8–2)
ERYTHROCYTE [DISTWIDTH] IN BLOOD BY AUTOMATED COUNT: 43.3 FL (ref 35.9–50)
GFR SERPLBLD CREATININE-BSD FMLA CKD-EPI: 80 ML/MIN/1.73 M 2
GLUCOSE SERPL-MCNC: 120 MG/DL (ref 65–99)
HCT VFR BLD AUTO: 30.9 % (ref 37–47)
HGB BLD-MCNC: 9.3 G/DL (ref 12–16)
MCH RBC QN AUTO: 21.7 PG (ref 27–33)
MCHC RBC AUTO-ENTMCNC: 30.1 G/DL (ref 32.2–35.5)
MCV RBC AUTO: 72.2 FL (ref 81.4–97.8)
PLATELET # BLD AUTO: 126 K/UL (ref 164–446)
PMV BLD AUTO: 10.9 FL (ref 9–12.9)
POTASSIUM SERPL-SCNC: 2.9 MMOL/L (ref 3.6–5.5)
RBC # BLD AUTO: 4.28 M/UL (ref 4.2–5.4)
SODIUM SERPL-SCNC: 136 MMOL/L (ref 135–145)
WBC # BLD AUTO: 11.5 K/UL (ref 4.8–10.8)

## 2025-05-19 PROCEDURE — 36415 COLL VENOUS BLD VENIPUNCTURE: CPT

## 2025-05-19 PROCEDURE — 85027 COMPLETE CBC AUTOMATED: CPT

## 2025-05-19 PROCEDURE — 97166 OT EVAL MOD COMPLEX 45 MIN: CPT

## 2025-05-19 PROCEDURE — 700111 HCHG RX REV CODE 636 W/ 250 OVERRIDE (IP): Mod: JZ | Performed by: HOSPITALIST

## 2025-05-19 PROCEDURE — A9270 NON-COVERED ITEM OR SERVICE: HCPCS | Performed by: HOSPITALIST

## 2025-05-19 PROCEDURE — A9270 NON-COVERED ITEM OR SERVICE: HCPCS | Mod: JZ | Performed by: INTERNAL MEDICINE

## 2025-05-19 PROCEDURE — 99233 SBSQ HOSP IP/OBS HIGH 50: CPT | Performed by: INTERNAL MEDICINE

## 2025-05-19 PROCEDURE — 770020 HCHG ROOM/CARE - TELE (206)

## 2025-05-19 PROCEDURE — 80048 BASIC METABOLIC PNL TOTAL CA: CPT

## 2025-05-19 PROCEDURE — 94760 N-INVAS EAR/PLS OXIMETRY 1: CPT

## 2025-05-19 PROCEDURE — 97535 SELF CARE MNGMENT TRAINING: CPT

## 2025-05-19 PROCEDURE — 700102 HCHG RX REV CODE 250 W/ 637 OVERRIDE(OP): Performed by: HOSPITALIST

## 2025-05-19 PROCEDURE — 700102 HCHG RX REV CODE 250 W/ 637 OVERRIDE(OP): Mod: JZ | Performed by: INTERNAL MEDICINE

## 2025-05-19 PROCEDURE — 700105 HCHG RX REV CODE 258: Performed by: HOSPITALIST

## 2025-05-19 PROCEDURE — 80162 ASSAY OF DIGOXIN TOTAL: CPT

## 2025-05-19 RX ORDER — POTASSIUM CHLORIDE 1500 MG/1
40 TABLET, EXTENDED RELEASE ORAL DAILY
Status: DISCONTINUED | OUTPATIENT
Start: 2025-05-19 | End: 2025-05-20 | Stop reason: HOSPADM

## 2025-05-19 RX ADMIN — DULOXETINE HYDROCHLORIDE 60 MG: 30 CAPSULE, DELAYED RELEASE ORAL at 05:33

## 2025-05-19 RX ADMIN — OMEPRAZOLE 40 MG: 20 CAPSULE, DELAYED RELEASE ORAL at 05:33

## 2025-05-19 RX ADMIN — METOPROLOL SUCCINATE 25 MG: 25 TABLET, EXTENDED RELEASE ORAL at 05:33

## 2025-05-19 RX ADMIN — AMLODIPINE BESYLATE 5 MG: 5 TABLET ORAL at 05:33

## 2025-05-19 RX ADMIN — CEFTRIAXONE SODIUM 1000 MG: 1 INJECTION, POWDER, FOR SOLUTION INTRAMUSCULAR; INTRAVENOUS at 05:31

## 2025-05-19 RX ADMIN — POTASSIUM CHLORIDE 40 MEQ: 1500 TABLET, EXTENDED RELEASE ORAL at 08:00

## 2025-05-19 SDOH — ECONOMIC STABILITY: TRANSPORTATION INSECURITY
IN THE PAST 12 MONTHS, HAS LACK OF RELIABLE TRANSPORTATION KEPT YOU FROM MEDICAL APPOINTMENTS, MEETINGS, WORK OR FROM GETTING THINGS NEEDED FOR DAILY LIVING?: NO

## 2025-05-19 SDOH — ECONOMIC STABILITY: TRANSPORTATION INSECURITY
IN THE PAST 12 MONTHS, HAS THE LACK OF TRANSPORTATION KEPT YOU FROM MEDICAL APPOINTMENTS OR FROM GETTING MEDICATIONS?: NO

## 2025-05-19 ASSESSMENT — ENCOUNTER SYMPTOMS
COUGH: 0
HEARTBURN: 0
SENSORY CHANGE: 0
HEADACHES: 0
PHOTOPHOBIA: 0
SHORTNESS OF BREATH: 0
CONSTIPATION: 0
MYALGIAS: 0
DEPRESSION: 0
WEAKNESS: 1
BRUISES/BLEEDS EASILY: 0
PALPITATIONS: 0
NAUSEA: 0
NERVOUS/ANXIOUS: 0
SPEECH CHANGE: 0
WEAKNESS: 0
ABDOMINAL PAIN: 0
FEVER: 0
BLURRED VISION: 0
DIARRHEA: 0
INSOMNIA: 0
VOMITING: 0
SORE THROAT: 0
CLAUDICATION: 0
DIZZINESS: 0
CHILLS: 0
DOUBLE VISION: 0
NECK PAIN: 0

## 2025-05-19 ASSESSMENT — LIFESTYLE VARIABLES
EVER HAD A DRINK FIRST THING IN THE MORNING TO STEADY YOUR NERVES TO GET RID OF A HANGOVER: NO
ALCOHOL_USE: NO
TOTAL SCORE: 0
ON A TYPICAL DAY WHEN YOU DRINK ALCOHOL HOW MANY DRINKS DO YOU HAVE: 0
TOTAL SCORE: 0
DOES PATIENT WANT TO STOP DRINKING: NO
EVER FELT BAD OR GUILTY ABOUT YOUR DRINKING: NO
HOW MANY TIMES IN THE PAST YEAR HAVE YOU HAD 5 OR MORE DRINKS IN A DAY: 0
AVERAGE NUMBER OF DAYS PER WEEK YOU HAVE A DRINK CONTAINING ALCOHOL: 0
TOTAL SCORE: 0
HAVE YOU EVER FELT YOU SHOULD CUT DOWN ON YOUR DRINKING: NO
CONSUMPTION TOTAL: NEGATIVE
HAVE PEOPLE ANNOYED YOU BY CRITICIZING YOUR DRINKING: NO

## 2025-05-19 ASSESSMENT — COGNITIVE AND FUNCTIONAL STATUS - GENERAL
DRESSING REGULAR LOWER BODY CLOTHING: A LOT
DRESSING REGULAR LOWER BODY CLOTHING: A LITTLE
CLIMB 3 TO 5 STEPS WITH RAILING: A LOT
DAILY ACTIVITIY SCORE: 17
SUGGESTED CMS G CODE MODIFIER MOBILITY: CL
TOILETING: A LITTLE
PERSONAL GROOMING: A LITTLE
WALKING IN HOSPITAL ROOM: A LOT
TOILETING: A LITTLE
PERSONAL GROOMING: A LITTLE
DRESSING REGULAR UPPER BODY CLOTHING: A LITTLE
TURNING FROM BACK TO SIDE WHILE IN FLAT BAD: A LITTLE
MOVING TO AND FROM BED TO CHAIR: A LOT
HELP NEEDED FOR BATHING: A LOT
STANDING UP FROM CHAIR USING ARMS: A LOT
HELP NEEDED FOR BATHING: A LOT
MOBILITY SCORE: 14
SUGGESTED CMS G CODE MODIFIER DAILY ACTIVITY: CK
DAILY ACTIVITIY SCORE: 17
DRESSING REGULAR UPPER BODY CLOTHING: A LITTLE
EATING MEALS: A LITTLE
MOVING FROM LYING ON BACK TO SITTING ON SIDE OF FLAT BED: A LITTLE
SUGGESTED CMS G CODE MODIFIER DAILY ACTIVITY: CK

## 2025-05-19 ASSESSMENT — FIBROSIS 4 INDEX: FIB4 SCORE: 3.55

## 2025-05-19 ASSESSMENT — ACTIVITIES OF DAILY LIVING (ADL): TOILETING: INDEPENDENT

## 2025-05-19 ASSESSMENT — SOCIAL DETERMINANTS OF HEALTH (SDOH)
WITHIN THE LAST YEAR, HAVE YOU BEEN HUMILIATED OR EMOTIONALLY ABUSED IN OTHER WAYS BY YOUR PARTNER OR EX-PARTNER?: NO
WITHIN THE PAST 12 MONTHS, THE FOOD YOU BOUGHT JUST DIDN'T LAST AND YOU DIDN'T HAVE MONEY TO GET MORE: NEVER TRUE
WITHIN THE PAST 12 MONTHS, YOU WORRIED THAT YOUR FOOD WOULD RUN OUT BEFORE YOU GOT THE MONEY TO BUY MORE: NEVER TRUE
WITHIN THE LAST YEAR, HAVE TO BEEN RAPED OR FORCED TO HAVE ANY KIND OF SEXUAL ACTIVITY BY YOUR PARTNER OR EX-PARTNER?: NO
IN THE PAST 12 MONTHS, HAS THE ELECTRIC, GAS, OIL, OR WATER COMPANY THREATENED TO SHUT OFF SERVICE IN YOUR HOME?: NO
WITHIN THE LAST YEAR, HAVE YOU BEEN KICKED, HIT, SLAPPED, OR OTHERWISE PHYSICALLY HURT BY YOUR PARTNER OR EX-PARTNER?: NO
WITHIN THE LAST YEAR, HAVE YOU BEEN AFRAID OF YOUR PARTNER OR EX-PARTNER?: NO

## 2025-05-19 ASSESSMENT — GAIT ASSESSMENTS: DISTANCE (FEET): 15

## 2025-05-19 NOTE — PROGRESS NOTES
Report received from erik Andrews sleeping during report.      Awoke pt about 0740 to eat her breakfast; pt is very [place] and challenging to communicate with.  Hearing aides removed and placed in the .  When asking orientation question, pt disoriented to time thinking its March and knows the year is 202something.  She says that her nausea is better and help open items for breakfast.  Pt able to take po potassium and used her oatmeal to help swallow them.  Urine is hazy in color with PW in place.    After breakfast, showered the pt as she is incontinent of urine.  Pt moderate assist with use of FWW and weak her legs; needs prompting for guidance.  Found new open wound on her left upper arm while showering.    Pt currently sitting up in chair with chair alarm in place.

## 2025-05-19 NOTE — DIETARY
"Nutrition Services: Initial Assessment     Day 1 5/18/2025of admit. Lindsay Vargas is 84 y.o., female with admitting DX of Hypokalemia [E87.6].    Consult Received for: BMI    Current Hospital Problems List:    Hypokalemia  Acute metabolic encephalopathy  Microcytic anemia  Acute UTI  Thrombocytopenia  Dehydration  Essential HTN  Depression        Nutrition Assessment:      Height: 170.2 cm (5' 7\")  Admit Weight: 61.2 kg (134 lb 14.7 oz)  Weight taken via: Bed Scale  BMI Calculated: 18.61  BMI Classification: Underweight, For age group       Weight Readings from Last 3 encounters:   Wt Readings from Last 5 Encounters:   05/18/25 61.2 kg (134 lb 14.7 oz)   12/03/24 61.2 kg (135 lb)   11/15/23 58.7 kg (129 lb 6.6 oz)         Objective:   Pertinent Medical Hx: HTN, Depression and GERD   Pertinent Labs: 5/19/25: potassium=2.9  Pertinent Meds: Norvasc, Ancef, metoprolol SR, omeprazole, KCl  Last BM:      PTA       Current Diet Order/Intake:   Cardiac diet      Subjective:   RD visited pt in her room.   Patient reported UBW: based on wt listed on [place] ID card issued in 2015, pt weighed 170 lb.  Dietary Recall/Energy Intake: 25-50% of breakfast this morning. Pt said that prior to hospitalization, she was eating less but she was not able to expand on this.  This most likely indicates Insufficient energy intake.   Pt is agreeable to addition of Ensure Plus to meals TID for additional nutrition  Based on pt's age and low BMI, pt may benefit from liberalization of cardiac diet to regular. This may improve PO intake.       Nutrition Focused Physical Exam (NFPE)  Weight Loss: Pt said that she has lost 80 lb x 1 year. Limited wt hx in Epic x 1 year. Wt has been stable since December and wt gain noted at some point since 11/2023.  Wt loss of 80 lb reported by pt may have occurred prior to November of 2023.   Muscle Mass: Moderate Wasting at temple, shoulder, and clavicle  Subcutaneous Fat: Moderate Loss at upper arm, orbital and " buccal fat pad region.   Fluid Accumulation: none noted  Reduced  Strength: N/A in acute care setting.    Nutrition Diagnosis:      Inadequate oral intake r/t presence of infection AEB PO of 25-50% x 1 meal during hospitalization.     Pt meets ASPEN criteria for moderate chronic malnutrition r/t decreased PO intake AEB moderate muscle wasting and moderate subcutaneous fat loss.        Nutrition Interventions:      Change diet from cardiac to regular  Recommend Ensure Plus High Protein (provides 350 calories) 20 g protein per 8 fl oz) TID.  Patient aware of active plan of care as appropriate.       Nutrition Monitoring and Evaluation:      Monitor nutrition POC, goal for >50% intake from meals and supplements.  Additional fluids per MD/DO  Monitor vital signs pertinent to nutrition.    RD following and will provide updated recommendations as indicated.      Maricruz Odell R.D.                                         ASPEN/AND CRITERIA FOR MALNUTRITION

## 2025-05-19 NOTE — ED NOTES
Medication history reviewed with patient's daughter. Med rec is complete.    Allergies reviewed.     Patient denies any outpatient antibiotics in the last 30 days.     Anticoagulants (rivaroxaban, apixaban, edoxaban, dabigatran, enoxaparin) taken in the last 14 days? No    Italo Zayas, GwendolynD

## 2025-05-19 NOTE — PROGRESS NOTES
4 Eyes Skin Assessment Completed by DEVANG chapman and DEVANG barrett.    Head WDL  Ears WDL  Nose WDL  Mouth WDL  Neck WDL  Breast/Chest WDL  Shoulder Blades WDL  Spine WDL  (R) Arm/Elbow/Hand WDL  (L) Arm/Elbow/Hand WDL  Abdomen WDL  Groin Redness and Blanching  Scrotum/Coccyx/Buttocks Redness and Blanching  (R) Leg WDL  (L) Leg WDL  (R) Heel/Foot/Toe WDL  (L) Heel/Foot/Toe WDL          Devices In Places Tele Box, Blood Pressure Cuff, and Pulse Ox      Interventions In Place TAP System and Pillows    Possible Skin Injury No    Pictures Uploaded Into Epic N/A  Wound Consult Placed N/A  RN Wound Prevention Protocol Ordered No

## 2025-05-19 NOTE — ASSESSMENT & PLAN NOTE
- BUN is 27.  Normal creatinine.  She received 1 L bolus of IV fluids.  I will monitor her kidney function in the morning.

## 2025-05-19 NOTE — PROGRESS NOTES
Hospital Medicine Daily Progress Note    Date of Service  5/19/2025    Chief Complaint  Lindsay Vargas is a 84 y.o. female admitted 5/18/2025 with altered mental status.    Hospital Course  Patient is an 84-year-old female who came in with altered level of consciousness and some intermittent nausea.  Patient had worsening confusion.  Information was given from daughter at bedside who states patient moved yesterday to an apartment close to patient's daughter's home to be closer to the daughter and to downsize living environment.  Patient's evaluation consistent with urinary tract infection with many bacteria in the UA and leuk esterase positive.  She was initially placed on Rocephin however they have de-escalated this to Ancef.  Awaiting urine culture.  Potassium also low, will replete as needed.    Interval Problem Update  5/19 patient's altered mental status has resolved and she states she feels much better than she did yesterday.  Potassium still to be repleted.  I did de-escalate antibiotics from Rocephin to Ancef.  PT OT to evaluate patient and she is already requesting home health which I have ordered.  Likely home in the next 24 to 48 hours.    I have discussed this patient's plan of care and discharge plan at IDT rounds today with Case Management, Nursing, Nursing leadership, and other members of the IDT team.    Consultants/Specialty  none    Code Status  Full Code    Disposition  The patient is not medically cleared for discharge to home or a post-acute facility.  Anticipate discharge to: home with organized home healthcare and close outpatient follow-up    I have placed the appropriate orders for post-discharge needs.    Review of Systems  Review of Systems   Constitutional:  Negative for chills and fever.   HENT:  Negative for congestion.    Eyes:  Negative for blurred vision and photophobia.   Respiratory:  Negative for cough and shortness of breath.    Cardiovascular:  Negative for chest pain,  claudication and leg swelling.   Gastrointestinal:  Negative for abdominal pain, constipation, diarrhea, heartburn and vomiting.   Genitourinary:  Negative for dysuria and hematuria.   Musculoskeletal:  Negative for joint pain and myalgias.   Skin:  Negative for itching and rash.   Neurological:  Negative for dizziness, sensory change, speech change, weakness and headaches.   Psychiatric/Behavioral:  Negative for depression. The patient is not nervous/anxious and does not have insomnia.         Physical Exam  Temp:  [36.5 °C (97.7 °F)-36.7 °C (98.1 °F)] 36.5 °C (97.7 °F)  Pulse:  [66-99] 70  Resp:  [16-18] 18  BP: (114-162)/(60-77) 114/77  SpO2:  [93 %-98 %] 97 %    Physical Exam  Vitals and nursing note reviewed.   Constitutional:       General: She is not in acute distress.     Appearance: Normal appearance. She is not ill-appearing.   HENT:      Head: Normocephalic and atraumatic.      Nose: Nose normal.   Cardiovascular:      Rate and Rhythm: Normal rate and regular rhythm.      Heart sounds: Normal heart sounds. No murmur heard.  Pulmonary:      Effort: Pulmonary effort is normal.      Breath sounds: Normal breath sounds.   Abdominal:      General: Bowel sounds are normal. There is no distension.      Palpations: Abdomen is soft.   Musculoskeletal:         General: No swelling or tenderness.      Cervical back: Neck supple.   Skin:     General: Skin is warm and dry.   Neurological:      General: No focal deficit present.      Mental Status: She is alert and oriented to person, place, and time.      Sensory: No sensory deficit.   Psychiatric:         Mood and Affect: Mood normal.         Fluids    Intake/Output Summary (Last 24 hours) at 5/19/2025 1440  Last data filed at 5/19/2025 1104  Gross per 24 hour   Intake 120 ml   Output --   Net 120 ml        Laboratory  Recent Labs     05/18/25  1940 05/19/25  0454   WBC 9.6 11.5*   RBC 4.41 4.28   HEMOGLOBIN 9.6* 9.3*   HEMATOCRIT 31.1* 30.9*   MCV 70.5* 72.2*   MCH  21.8* 21.7*   MCHC 30.9* 30.1*   RDW 42.0 43.3   PLATELETCT 119* 126*   MPV 10.4 10.9     Recent Labs     05/18/25 1940 05/19/25  0454   SODIUM 137 136   POTASSIUM 2.8* 2.9*   CHLORIDE 99 99   CO2 24 23   GLUCOSE 126* 120*   BUN 27* 18   CREATININE 0.83 0.73   CALCIUM 8.7 8.2*     Recent Labs     05/18/25 1940   INR 1.02               Imaging  DX-CHEST-PORTABLE (1 VIEW)   Final Result         1.  No acute cardiopulmonary disease.   2.  Atherosclerosis      CT-CSPINE WITHOUT PLUS RECONS   Final Result         1.  No acute traumatic bony injury of the cervical spine is apparent.   2.  Chronic type II dens fracture, stable since prior study.   3.  Atherosclerosis      CT-HEAD W/O   Final Result         1.  No acute intracranial abnormality is identified, there are nonspecific white matter changes, commonly associated with small vessel ischemic disease.  Associated mild cerebral atrophy is noted.   2.  Atherosclerosis.                    Assessment/Plan  * Hypokalemia- (present on admission)  Assessment & Plan  -Inpatient status to telemetry.  Replete as needed, 40 mEq given this morning.  Potassium 2.9    Microcytic anemia- (present on admission)  Assessment & Plan  - Hemoglobin is 9.6 on admission.  There is no bleeding.  She has been anemic since 2022.  Closely follow-up CBC in the morning.        Acute UTI- (present on admission)  Assessment & Plan  - UA is positive for UTI.  This is likely contributing to acute metabolic encephalopathy but daughter is also reporting a slow decline in function.  De-escalate antibiotic to Ancef, await sensitivities and speciation from urine culture    Thrombocytopenia (HCC)- (present on admission)  Assessment & Plan  - Platelets are 119 on admission.  This is acute on chronic issue.      Essential hypertension- (present on admission)  Assessment & Plan  - Continue metoprolol.  Hydrochlorothiazide is also listed but patient had hyponatremia in the past.  Holding hydrochlorothiazide  for now.  Her sodium is normal on admission.    Depression- (present on admission)  Assessment & Plan  Continue duloxetine.    Acute metabolic encephalopathy- (present on admission)  Assessment & Plan  - In the setting of acute UTI.  - Per daughter, she is slowly declining in function.  She moved yesterday to an apartment that is a mile away from daughter's house for additional safety.  Patient ANO x 3, able to recount yesterday's events.  Patient states she will not go to a facility, she is requesting home health.    Dehydration- (present on admission)  Assessment & Plan  - BUN is 27.  Normal creatinine.  She received 1 L bolus of IV fluids.  I will monitor her kidney function in the morning.         VTE prophylaxis:   SCDs/TEDs      I have performed a physical exam and reviewed and updated ROS and Plan today (5/19/2025). In review of yesterday's note (5/18/2025), there are no changes except as documented above.    My total time spent caring for the patient on the day of the encounter was 55 minutes.   This does not include time spent on separately billable procedures/tests.

## 2025-05-19 NOTE — CARE PLAN
Problem: Fall Risk  Goal: Patient will remain free from falls  Outcome: Not Progressing  Note: Pt recently moved into a condo after being hospitalized per the pt; awaiting PT/OT eval to help determine needs.      Problem: Knowledge Deficit - Standard  Goal: Patient and family/care givers will demonstrate understanding of plan of care, disease process/condition, diagnostic tests and medications  Outcome: Progressing  Note: K+ low this morning and pt able to swallow potassium with oatmeal.     The patient is Stable - Low risk of patient condition declining or worsening    Shift Goals  Clinical Goals: K+ replacement, assess mobility, track i&Os  Patient Goals: Get some food and feel better    Progress made toward(s) clinical / shift goals:  achieved    Patient is not progressing towards the following goals:      Problem: Fall Risk  Goal: Patient will remain free from falls  Outcome: Not Progressing  Note: Pt recently moved into a condo after being hospitalized per the pt; awaiting PT/OT eval to help determine needs.

## 2025-05-19 NOTE — ASSESSMENT & PLAN NOTE
- In the setting of acute UTI.  - Per daughter, she is slowly declining in function.  She moved yesterday to an apartment that is a mile away from daughter's house for additional safety.  Patient ANO x 3, able to recount yesterday's events.  Patient states she will not go to a facility, she is requesting home health.

## 2025-05-19 NOTE — HOSPITAL COURSE
Patient is an 84-year-old female who came in with altered level of consciousness and some intermittent nausea.  Patient had worsening confusion.  Information was given from daughter at bedside who states patient moved yesterday to an apartment close to patient's daughter's home to be closer to the daughter and to downsize living environment.  Patient's evaluation consistent with urinary tract infection with many bacteria in the UA and leuk esterase positive.  She was initially placed on Rocephin however they have de-escalated this to Ancef.  Awaiting urine culture.  Potassium also low, will replete as needed.

## 2025-05-19 NOTE — H&P
Hospital Medicine History & Physical Note    Date of Service  5/18/2025    Primary Care Physician  Lenny RENTERIA M.D.    Consultants  None    Code Status  Full Code    Chief Complaint  Chief Complaint   Patient presents with    ALOC     Patient states she has been nauseous since 10 am. Family reports she was acting normal two hours ago. Patient currently complaining of a headache with minor confusion regarding dates and recent events.        History of Presenting Illness  Lindsay Vargas is a 84 y.o. female, with h/o HTN, Depression and GERD who was brought to the ER on  5/18/2025 for evaluation of nausea and worsening confusion.  I obtained the history by her daughter Tequila, who is by the bedside.  She reports the patient lives alone, she is incontinent of urine and over the past few months has had progressive cognitive deficits however still managing to live alone.  She has balance issues, walks very slowly with her walker and given concerns of her safety, daughter convinced her to move from her house to a new apartment that is less than a mile away from daughter's house.  Patient moved yesterday and is still adjusting.  Daughter reports that she is left with her mom last night, she was doing well this morning they had breakfast together however over the course of the day she is progressively more confused reason why she decided to come to the ED for further evaluation.    I discussed the plan of care with patient and ERP Dr. Joy.    Review of Systems  Review of Systems   Constitutional:  Positive for malaise/fatigue. Negative for fever.   HENT:  Negative for congestion and sore throat.    Eyes:  Negative for blurred vision and double vision.   Respiratory:  Negative for cough and shortness of breath.    Cardiovascular:  Negative for chest pain and palpitations.   Gastrointestinal:  Negative for nausea and vomiting.   Genitourinary:  Negative for dysuria and urgency.   Musculoskeletal:  Negative for  myalgias and neck pain.   Skin:  Negative for itching and rash.   Neurological:  Positive for weakness (Generalized). Negative for dizziness and headaches.   Endo/Heme/Allergies:  Does not bruise/bleed easily.   Psychiatric/Behavioral:  Negative for depression. The patient does not have insomnia.        Past Medical History   has a past medical history of Anesthesia, Arthritis, Cataract, Chronic pain, Hiatus hernia syndrome, Hypertension, Lymphedema (2014), Migraine headache, Pain, and Pneumonia.    Surgical History   has a past surgical history that includes hysterectomy laparoscopy; chely by laparoscopy; other abdominal surgery; other abdominal surgery; other; other orthopedic surgery; cataract phaco with iol (Left, 2/4/2016); cataract phaco with iol (Right, 2/18/2016); pr inj lumbar/sacral,w/wo cntrst (Right, 9/29/2016); pr fluorscopic guidance spinal injection (9/29/2016); pr inj lumbar/sacral,w/wo cntrst (N/A, 11/10/2016); and pr fluorscopic guidance spinal injection (11/10/2016).     Family History  family history includes Heart Disease in her father and mother.   Family history reviewed with patient. There is no family history that is pertinent to the chief complaint.     Social History   reports that she has never smoked. She has never used smokeless tobacco. She reports that she does not drink alcohol and does not use drugs.    Allergies  Allergies[1]    Medications  Prior to Admission Medications   Prescriptions Last Dose Informant Patient Reported? Taking?   DULoxetine (CYMBALTA) 60 MG Cap DR Particles delayed-release capsule  Patient's Home Pharmacy No No   Sig: Take 1 Cap by mouth every day.   Home Care Oxygen   Yes No   Sig: Inhale 2.5 L/min as needed for Shortness of Breath (shortness of breath). Oxygen dose range: 2.5 L/min  Respiratory route via: Nasal Cannula   Oxygen supplier: [facility]      Indications: shortness of breath   KETOTIFEN FUMARATE OP   Yes No   Sig: Administer 1 Drop  into both eyes 2 times a day as needed (itchy eyes). KETOTIFEN FUMARATE OP 0.035%   Equate Eye Itch Relief   Indications: ithcy eyes   Multiple Vitamin (MULTIVITAMIN ADULT) Tab   Yes No   Sig: Take 1 Tablet by mouth in the morning, at noon, and at bedtime. Blalance of Nature Fruits capsules  Indications: Nutritional Support   Multiple Vitamins-Minerals (ALIVE WOMENS GUMMY) Chew Tab   Yes No   Sig: Chew 1 Capsule every day. Indications: supplement   Multiple Vitamins-Minerals (MEGAVITE FRUITS & VEGGIES PO)   Yes No   Sig: Take 1 Capsule by mouth 3 times a day. Balance of Nature Veggies  Indications: supplement   amLODIPine (NORVASC) 5 MG Tab  Patient's Home Pharmacy Yes No   Sig: Take 5 mg by mouth every day. Indications: High Blood Pressure Disorder   benzocaine (ANBESOL) 20 % Gel topical gel   Yes No   Sig: Use 1 Application in the mouth or throat 4 times a day as needed (oral pain). Indications: oral pain   digoxin (LANOXIN) 125 MCG Tab  Patient's Home Pharmacy Yes No   Sig: Take 125 mcg by mouth every day. Indications: cariac HTN   hydroCHLOROthiazide (HYDRODIURIL) 25 MG Tab  Patient's Home Pharmacy Yes No   Sig: Take 25 mg by mouth 2 times a day. Indications: High Blood Pressure Disorder   loratadine (CLARITIN) 10 MG Tab  Patient's Home Pharmacy Yes No   Sig: Take 10 mg by mouth every day. Indications: Allergies   metoprolol SR (TOPROL XL) 25 MG TABLET SR 24 HR  Patient's Home Pharmacy Yes No   Sig: Take 25 mg by mouth every day. Indications: High Blood Pressure Disorder   naproxen (ANAPROX) 220 MG tablet   Yes No   Sig: Take 220 mg by mouth every 12 hours as needed (pain). Per bottle instructions:  Take 1 tab every 12 hours while symptoms last; for the first dose you may take 2 tabs within the first hour; do not exceed 2 tabs in any 12-hour period; do not exceed 3 tab in a 24-hour period.   Indications: pain   omeprazole (PRILOSEC) 40 MG delayed-release capsule  Patient's Home Pharmacy Yes No   Sig: Take 40 mg  by mouth every day. Indications: Gastroesophageal Reflux Disease   potassium chloride SA (KDUR) 20 MEQ Tab CR  Patient's Home Pharmacy Yes No   Sig: Take 20 mEq by mouth every day. Indications: High Blood Pressure Disorder      Facility-Administered Medications: None       Physical Exam  Temp:  [36.7 °C (98 °F)] 36.7 °C (98 °F)  Pulse:  [89-99] 99  Resp:  [16-18] 18  BP: (152-162)/(60-62) 152/62  SpO2:  [95 %-98 %] 95 %  Blood Pressure : (!) 152/62   Temperature: 36.7 °C (98 °F)   Pulse: 99   Respiration: 18   Pulse Oximetry: 95 %       Physical Exam  Constitutional:       Comments: Somnolent but answering simple questions.  Cannot carry conversations at this point.   HENT:      Head: Normocephalic and atraumatic.      Mouth/Throat:      Mouth: Mucous membranes are moist.      Pharynx: Oropharynx is clear.   Eyes:      Extraocular Movements: Extraocular movements intact.      Pupils: Pupils are equal, round, and reactive to light.   Cardiovascular:      Rate and Rhythm: Normal rate and regular rhythm.      Heart sounds: Normal heart sounds.   Pulmonary:      Effort: Pulmonary effort is normal.      Breath sounds: Normal breath sounds.   Abdominal:      General: Abdomen is flat. Bowel sounds are normal.      Palpations: Abdomen is soft.   Musculoskeletal:      Cervical back: Normal range of motion and neck supple.   Skin:     General: Skin is warm and dry.   Neurological:      General: No focal deficit present.      Comments: Oriented to person only.   Psychiatric:      Comments: Flat affect         Laboratory:  Recent Labs     05/18/25 1940   WBC 9.6   RBC 4.41   HEMOGLOBIN 9.6*   HEMATOCRIT 31.1*   MCV 70.5*   MCH 21.8*   MCHC 30.9*   RDW 42.0   PLATELETCT 119*   MPV 10.4     Recent Labs     05/18/25 1940   SODIUM 137   POTASSIUM 2.8*   CHLORIDE 99   CO2 24   GLUCOSE 126*   BUN 27*   CREATININE 0.83   CALCIUM 8.7     Recent Labs     05/18/25 1940   ALTSGPT 12   ASTSGOT 17   ALKPHOSPHAT 83   TBILIRUBIN 0.5    GLUCOSE 126*     Recent Labs     05/18/25  1940   INR 1.02         Imaging:  DX-CHEST-PORTABLE (1 VIEW)   Final Result         1.  No acute cardiopulmonary disease.   2.  Atherosclerosis      CT-CSPINE WITHOUT PLUS RECONS   Final Result         1.  No acute traumatic bony injury of the cervical spine is apparent.   2.  Chronic type II dens fracture, stable since prior study.   3.  Atherosclerosis      CT-HEAD W/O   Final Result         1.  No acute intracranial abnormality is identified, there are nonspecific white matter changes, commonly associated with small vessel ischemic disease.  Associated mild cerebral atrophy is noted.   2.  Atherosclerosis.                   X-Ray:  I have personally reviewed the images and compared with prior images. and My impression is: CT of the head was negative for acute intracranial abnormality.  CT of the spine showed no traumatic bony injury of the cervical spine.    Assessment/Plan:  Justification for Admission Status  I anticipate this patient will require at least two midnights for appropriate medical management, necessitating inpatient admission because 84-year-old female, acute metabolic encephalopathy in the setting of acute UTI, hypokalemia and worsening overall ability to function at home alone.      * Hypokalemia- (present on admission)  Assessment & Plan  -Inpatient status to telemetry.  - Initial potassium is 2.8.  She will need potassium replacement.  ERP ordered 10 mEq of IV potassium chloride replacement.  She will likely need additional replacement.  - I will recheck potassium in the morning and added more if needed.    Acute metabolic encephalopathy- (present on admission)  Assessment & Plan  - In the setting of acute UTI.  - Per daughter, she is slowly declining in function.  She moved yesterday to an apartment that is a mile away from daughter's house for additional safety.  - I also noticed that she still has listed digoxin but unclear if she has A-fib.  I  ordered digoxin level.  Consider stop digoxin.  I have seen her in 2023 and at that time digoxin was unclear.  May need a verify with pharmacy if this patient is continue on digoxin.  - Additionally, will treat UTI with IV antibiotics.  She does not have SIRS and not septic on admission but UTI likely cause of worsening acute metabolic encephalopathy.  -I ordered PT and OT evaluation to further assist how much help she will need at home.  Daughter also would like to consider assisted living options.    Microcytic anemia- (present on admission)  Assessment & Plan  - Hemoglobin is 9.6 on admission.  There is no bleeding.  She has been anemic since 2022.  Closely follow-up CBC in the morning.        Acute UTI- (present on admission)  Assessment & Plan  - UA is positive for UTI.  This is likely contributing to acute metabolic encephalopathy but daughter is also reporting a slow decline in function.  - I am starting her on Rocephin.  - There is no SIRS.  She is not septic on admission.    Thrombocytopenia (HCC)- (present on admission)  Assessment & Plan  - Platelets are 119 on admission.  This is acute on chronic issue.  I will monitor CBC in the morning.    Dehydration- (present on admission)  Assessment & Plan  - BUN is 27.  Normal creatinine.  She received 1 L bolus of IV fluids.  I will monitor her kidney function in the morning.    Essential hypertension- (present on admission)  Assessment & Plan  - Continue metoprolol.  Hydrochlorothiazide is also listed but patient had hyponatremia in the past.  Holding hydrochlorothiazide for now.  Her sodium is normal on admission.    Depression- (present on admission)  Assessment & Plan  Continue duloxetine.        VTE prophylaxis: SCDs/TEDs         [1] No Known Allergies

## 2025-05-19 NOTE — ASSESSMENT & PLAN NOTE
- UA is positive for UTI.  This is likely contributing to acute metabolic encephalopathy but daughter is also reporting a slow decline in function.  De-escalate antibiotic to Ancef, await sensitivities and speciation from urine culture

## 2025-05-19 NOTE — DISCHARGE PLANNING
"Care Transition Team Assessment    LMSW spoke with pt's daughter Tequila, by phone to complete assessment as pt is not A&O x4. This writer introduced self and explained role of the department. Tequila reports that the patient recently moved and gave pts new address. The pt now lives in a single story apartment with one step to enter. Prior to the hospital admission the pt was independent with some ADL and some IADLS. She uses a FWW at baseline and a WC for community outings. Tequila is pt primary support and states \" My mom has grown noticeably weaker over the last year is now minimally able to care for herself I assist with bathing and all home chores, which prompted the recent move closer to me.\"  PCP is Lenny Darby MD and preferred pharmacy is [facility] on [address] recent change from [facility]. Pt has had HH in the past with [facility] and has no prior hx with SNF or rehab facilities.  Pt is retired and receives SSI monthly deposits of around $4000 total. Pt does not have an advance directive. Pt's daughter will transport pt home.     Information Source  Orientation Level: Oriented to place, Oriented to situation, Oriented to person, Disoriented to time (Thinks its March ?202something)  Information Given By: Relative  Informant's Name: Tequila Reyes, daughter  Who is responsible for making decisions for patient? : Adult child    Readmission Evaluation  Is this a readmission?: No    Elopement Risk  Legal Hold: No  Ambulatory or Self Mobile in Wheelchair: No-Not an Elopement Risk  Elopement Risk: Not at Risk for Elopement    Interdisciplinary Discharge Planning  Lives with - Patient's Self Care Capacity: Alone and Able to Care For Self  Patient or legal guardian wants to designate a caregiver: No  Support Systems: Family Member(s), Friends / Neighbors  Housing / Facility: 1 Story Apartment / Condo    Discharge Preparedness  What is your plan after discharge?: Uncertain - pending medical team " collaboration  What are your discharge supports?: Child  Prior Functional Level: Uses Walker, Uses Wheelchair  Difficulity with ADLs: Bathing, Walking, Toileting  Difficulity with IADLs: Driving, Keeping track of finances, Laundry, Managing medication, Shopping    Functional Assesment  Prior Functional Level: Uses Walker, Uses Wheelchair    Finances  Financial Barriers to Discharge: No  Prescription Coverage: Yes    Vision / Hearing Impairment  Vision Impairment : Yes  Right Eye Vision: Impaired  Left Eye Vision: Impaired  Hearing Impairment : Yes  Hearing Impairment: Both Ears, Hearing Device(s) Available (Charging Hearing Aides this morning)  Does Pt Need Special Equipment for the Hearing Impaired?: No      Domestic Abuse  Have you ever been the victim of abuse or violence?: No  Possible Abuse/Neglect Reported to:: Not Applicable    Psychological Assessment  History of Substance Abuse: None  History of Psychiatric Problems: No    Discharge Risks or Barriers  Discharge risks or barriers?: Complex medical needs  Patient risk factors: Cognitive / sensory / physical deficit, Complex medical needs    Anticipated Discharge Information  Discharge Disposition: D/T to SNF with Medicare cert in anticipation of skilled care (03)

## 2025-05-19 NOTE — ED NOTES
Cath done minimal urine collected sample sent   ERP aware  Lab called back the sample is not enough

## 2025-05-19 NOTE — ED TRIAGE NOTES
Patient presents to the ER with the following complaints:    Chief Complaint   Patient presents with    ALOC     Patient states she has been nauseous since 10 am. Family reports she was acting normal two hours ago. Patient currently complaining of a headache with minor confusion regarding dates and recent events.        BP (!) 162/60   Pulse 89   Temp 36.7 °C (98 °F) (Temporal)   Resp 18   SpO2 98%

## 2025-05-19 NOTE — ASSESSMENT & PLAN NOTE
- Hemoglobin is 9.6 on admission.  There is no bleeding.  She has been anemic since 2022.  Closely follow-up CBC in the morning.

## 2025-05-19 NOTE — ASSESSMENT & PLAN NOTE
- Continue metoprolol.  Hydrochlorothiazide is also listed but patient had hyponatremia in the past.  Holding hydrochlorothiazide for now.  Her sodium is normal on admission.
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
## 2025-05-19 NOTE — THERAPY
"Occupational Therapy   Initial Evaluation     Patient Name: Lindsay Vargas  Age:  84 y.o., Sex:  female  Medical Record #: [id number]  Today's Date: 5/19/2025     Precautions  Precautions: Fall Risk    Assessment  Patient is 84 y.o. female, with h/o HTN, Depression and GERD who was brought to the ER on  5/18/2025 for evaluation of nausea and worsening confusion.  Hypokalemia, acute metabolic encephalopathy, anemia, UTI, thrombocytopenia, dehydration, HTN, Depression.  Per EMR pt was living alone is a new condo very close to her dtrs home.  Pt is currently limited by decreased functional mobility, activity tolerance, cognition, strength, AROM, coordination, balance, adherence to precautions, and pain which are affecting her ability to complete ADLs/IADLs at baseline. See grid for details. Pt will benefit from further inpt post acute therapy vs HH with increased family support. Will continue to follow while in house.     Plan    Occupational Therapy Initial Treatment Plan   Treatment Interventions: Self Care / Activities of Daily Living, Adaptive Equipment, Neuro Re-Education / Balance, Therapeutic Exercises, Therapeutic Activity, Family / Caregiver Training  Treatment Frequency: 3 Times per Week  Duration: Until Therapy Goals Met    DC Equipment Recommendations: Unable to determine at this time (may benefit from tub transfer bench (TBD but need to verify with dtr what she has))  Discharge Recommendations: Recommend post-acute placement for additional occupational therapy services prior to discharge home (vs home with HH if family able to provide increased supervision in light of pt's new confusion)     Subjective    \"My walker is like this but it's really nice.\"     Objective       05/19/25 1430   Prior Living Situation   Prior Services Intermittent Physical Support for ADL Per Family   Housing / Facility 1 Story Apartment / Condo   Steps Into Home 0   Bathroom Set up Bathtub / Shower Combination;Shower Chair;Grab " "Bars   Equipment Owned 4-Wheel Walker;Tub / Shower Seat;Grab Bar(s) In Tub / Shower   Lives with - Patient's Self Care Capacity Alone and Able to Care For Self   Comments Pt reports that dtr checks in on her and assists with getting her groceries.  Pt is questionable historian partly 2/2 Venetie and confusion   Prior Level of ADL Function   Self Feeding Independent   Grooming / Hygiene Independent   Bathing Unable To Determine At This Time  (pt reports \"My daughter is going to come over and help with my shower\")   Dressing Independent   Toileting Independent   Prior Level of IADL Function   Medication Management Unable To Determine At This Time   Laundry Independent   Kitchen Mobility Independent   Finances Unable To Determine At This Time   Home Management Unable To Determine At This Time   Shopping Requires Assist   Prior Level Of Mobility Independent With Device in Home   Driving / Transportation Relatives / Others Provide Transportation   Occupation (Pre-Hospital Vocational) Retired Due To Age   History of Falls   History of Falls Yes  (per EMR)   Precautions   Precautions Fall Risk   Vitals   O2 Delivery Device None - Room Air   Cognition    Cognition / Consciousness X   Speech/ Communication Hard of Hearing   Level of Consciousness Alert   Safety Awareness Impaired   Attention Impaired   Comments Pt demos confusion, difficulty following directions, partially related to Venetie at times.  Tangential with conversation and easily distracted.  Demos impaired insight into her overall limitations   Active ROM Upper Body   Active ROM Upper Body  X   Comments limited ROM B shoulders   Strength Upper Body   Upper Body Strength  X   Gross Strength Generalized Weakness, Equal Bilaterally.    Coordination Upper Body   Comments grossly intact   Balance Assessment   Sitting Balance (Static) Fair +   Sitting Balance (Dynamic) Fair   Standing Balance (Static) Fair -   Standing Balance (Dynamic) Fair -   Weight Shift Sitting Fair "   Weight Shift Standing Poor   Comments with FWW shuffled gait, needs freq v/d to stay close to walker   Bed Mobility    Supine to Sit Minimal Assist  (use of rail and HOB up)   Sit to Supine Minimal Assist  (use of rail, HOB partially elevated)   ADL Assessment   Grooming Standby Assist;Seated   Lower Body Dressing Maximal Assist   Toileting Minimal Assist   How much help from another person does the patient currently need...   Putting on and taking off regular lower body clothing? 2   Bathing (including washing, rinsing, and drying)? 2   Toileting, which includes using a toilet, bedpan, or urinal? 3   Putting on and taking off regular upper body clothing? 3   Taking care of personal grooming such as brushing teeth? 3   Eating meals? 4   6 Clicks Daily Activity Score 17   Functional Mobility   Sit to Stand Contact Guard Assist   Bed, Chair, Wheelchair Transfer Contact Guard Assist   Toilet Transfers Minimal Assist   Transfer Method Stand Step   Mobility Close CGA with FWW to BR and back to bed   Distance (Feet) 15   # of Times Distance was Traveled 2   Edema / Skin Assessment   Edema / Skin  Not Assessed   Activity Tolerance   Sitting in Chair toilet 10  min   Sitting Edge of Bed 3 min x2   Standing 7-8 min   Patient / Family Goals   Patient / Family Goal #1 home   Short Term Goals   Short Term Goal # 1 Pt will tolerate standing 8 min at sink for grooming with SBA in 3 visits   Short Term Goal # 2 Pt will toilet in BR with appropriate AE and SBA in 3 visits.   Short Term Goal # 3 Pt will dress FB with SBA from seated with AE as needed in 4 visits.   Short Term Goal # 4 Pt will tolerate OOB to chair for 3 hours for meals and ADL's in 4 visits.   Education Group   Education Provided Role of Occupational Therapist;Activities of Daily Living   Role of Occupational Therapist Patient Response Patient;Acceptance;Explanation;Verbal Demonstration   ADL Patient Response Patient;Acceptance;Explanation;Verbal Demonstration

## 2025-05-20 ENCOUNTER — HOME HEALTH ADMISSION (OUTPATIENT)
Dept: HOME HEALTH SERVICES | Facility: HOME HEALTHCARE | Age: 85
End: 2025-05-20
Payer: MEDICARE

## 2025-05-20 ENCOUNTER — PHARMACY VISIT (OUTPATIENT)
Dept: PHARMACY | Facility: MEDICAL CENTER | Age: 85
End: 2025-05-20
Payer: COMMERCIAL

## 2025-05-20 VITALS
RESPIRATION RATE: 16 BRPM | HEART RATE: 65 BPM | TEMPERATURE: 98.2 F | HEIGHT: 67 IN | DIASTOLIC BLOOD PRESSURE: 59 MMHG | SYSTOLIC BLOOD PRESSURE: 120 MMHG | OXYGEN SATURATION: 95 % | BODY MASS INDEX: 18.69 KG/M2 | WEIGHT: 119.05 LBS

## 2025-05-20 LAB
ANION GAP SERPL CALC-SCNC: 14 MMOL/L (ref 7–16)
BUN SERPL-MCNC: 20 MG/DL (ref 8–22)
CALCIUM SERPL-MCNC: 8.4 MG/DL (ref 8.5–10.5)
CHLORIDE SERPL-SCNC: 100 MMOL/L (ref 96–112)
CO2 SERPL-SCNC: 22 MMOL/L (ref 20–33)
CREAT SERPL-MCNC: 0.71 MG/DL (ref 0.5–1.4)
ERYTHROCYTE [DISTWIDTH] IN BLOOD BY AUTOMATED COUNT: 45.6 FL (ref 35.9–50)
GFR SERPLBLD CREATININE-BSD FMLA CKD-EPI: 82 ML/MIN/1.73 M 2
GLUCOSE SERPL-MCNC: 104 MG/DL (ref 65–99)
HCT VFR BLD AUTO: 29.5 % (ref 37–47)
HGB BLD-MCNC: 8.7 G/DL (ref 12–16)
MCH RBC QN AUTO: 21.5 PG (ref 27–33)
MCHC RBC AUTO-ENTMCNC: 29.5 G/DL (ref 32.2–35.5)
MCV RBC AUTO: 72.8 FL (ref 81.4–97.8)
PLATELET # BLD AUTO: 99 K/UL (ref 164–446)
PLATELETS.RETICULATED NFR BLD AUTO: 4.7 % (ref 0.6–13.1)
PMV BLD AUTO: 11.5 FL (ref 9–12.9)
POTASSIUM SERPL-SCNC: 3.5 MMOL/L (ref 3.6–5.5)
RBC # BLD AUTO: 4.05 M/UL (ref 4.2–5.4)
SODIUM SERPL-SCNC: 136 MMOL/L (ref 135–145)
WBC # BLD AUTO: 6.9 K/UL (ref 4.8–10.8)

## 2025-05-20 PROCEDURE — RXMED WILLOW AMBULATORY MEDICATION CHARGE: Performed by: HOSPITALIST

## 2025-05-20 PROCEDURE — 700102 HCHG RX REV CODE 250 W/ 637 OVERRIDE(OP): Performed by: INTERNAL MEDICINE

## 2025-05-20 PROCEDURE — 700102 HCHG RX REV CODE 250 W/ 637 OVERRIDE(OP): Performed by: HOSPITALIST

## 2025-05-20 PROCEDURE — A9270 NON-COVERED ITEM OR SERVICE: HCPCS | Performed by: INTERNAL MEDICINE

## 2025-05-20 PROCEDURE — 85055 RETICULATED PLATELET ASSAY: CPT

## 2025-05-20 PROCEDURE — 700105 HCHG RX REV CODE 258: Performed by: INTERNAL MEDICINE

## 2025-05-20 PROCEDURE — 80048 BASIC METABOLIC PNL TOTAL CA: CPT

## 2025-05-20 PROCEDURE — 36415 COLL VENOUS BLD VENIPUNCTURE: CPT

## 2025-05-20 PROCEDURE — 97163 PT EVAL HIGH COMPLEX 45 MIN: CPT

## 2025-05-20 PROCEDURE — A9270 NON-COVERED ITEM OR SERVICE: HCPCS | Performed by: HOSPITALIST

## 2025-05-20 PROCEDURE — 700111 HCHG RX REV CODE 636 W/ 250 OVERRIDE (IP): Performed by: INTERNAL MEDICINE

## 2025-05-20 PROCEDURE — 85027 COMPLETE CBC AUTOMATED: CPT

## 2025-05-20 RX ADMIN — METOPROLOL SUCCINATE 25 MG: 25 TABLET, EXTENDED RELEASE ORAL at 05:40

## 2025-05-20 RX ADMIN — AMLODIPINE BESYLATE 5 MG: 5 TABLET ORAL at 05:39

## 2025-05-20 RX ADMIN — DULOXETINE HYDROCHLORIDE 60 MG: 30 CAPSULE, DELAYED RELEASE ORAL at 05:39

## 2025-05-20 RX ADMIN — POTASSIUM CHLORIDE 40 MEQ: 1500 TABLET, EXTENDED RELEASE ORAL at 05:39

## 2025-05-20 RX ADMIN — CEFAZOLIN 2 G: 2 INJECTION, POWDER, FOR SOLUTION INTRAMUSCULAR; INTRAVENOUS at 15:41

## 2025-05-20 RX ADMIN — CEFAZOLIN 2 G: 2 INJECTION, POWDER, FOR SOLUTION INTRAMUSCULAR; INTRAVENOUS at 05:44

## 2025-05-20 RX ADMIN — OMEPRAZOLE 40 MG: 20 CAPSULE, DELAYED RELEASE ORAL at 05:39

## 2025-05-20 ASSESSMENT — COGNITIVE AND FUNCTIONAL STATUS - GENERAL
WALKING IN HOSPITAL ROOM: A LITTLE
SUGGESTED CMS G CODE MODIFIER MOBILITY: CK
MOBILITY SCORE: 15
STANDING UP FROM CHAIR USING ARMS: A LITTLE
TURNING FROM BACK TO SIDE WHILE IN FLAT BAD: A LITTLE
MOVING FROM LYING ON BACK TO SITTING ON SIDE OF FLAT BED: A LOT
CLIMB 3 TO 5 STEPS WITH RAILING: A LOT
MOVING TO AND FROM BED TO CHAIR: A LOT

## 2025-05-20 ASSESSMENT — GAIT ASSESSMENTS
DEVIATION: DECREASED BASE OF SUPPORT;SHUFFLED GAIT;BRADYKINETIC;DECREASED HEEL STRIKE;DECREASED TOE OFF
DISTANCE (FEET): 20
ASSISTIVE DEVICE: FRONT WHEEL WALKER
GAIT LEVEL OF ASSIST: MINIMAL ASSIST

## 2025-05-20 ASSESSMENT — FIBROSIS 4 INDEX: FIB4 SCORE: 3.35

## 2025-05-20 NOTE — DISCHARGE PLANNING
ATTN: Case Management  RE: Referral for Home Health    Reason for referral denial: [facility] sent us the referral. However, we are declining due to patient needing a higher level of care.               Unfortunately, we are not able to accept this referral for the reason listed above. If further clarity is needed, our Transitional Care Specialists are available to discuss any barriers to service at [phone].      We look forward to collaborating with you in the future,  [facility] Team

## 2025-05-20 NOTE — DISCHARGE PLANNING
"Case Management Discharge Planning    Admission Date: 5/18/2025  GMLOS: 3.8  ALOS: 2    6-Clicks ADL Score: 17  6-Clicks Mobility Score: 14  PT and/or OT Eval ordered: Yes  Post-acute Referrals Ordered: Yes  Post-acute Choice Obtained: NA  Has referral(s) been sent to post-acute provider:  [facility]      Anticipated Discharge Dispo: Discharge Disposition: D/T to home under HHA care in anticipation of covered skilled care (06)    DME Needed: No    Action(s) Taken: Choice obtained for HH pt refused SNF placement. LMSW left  for pts daughter regarding HH and SNF refusal. Choice faxed to [facility]    Addendum: Pt's daughter called back wants pt to discharge to SNF as recommended by PT/OT and states she has DPOA. This writer requested documents be emailed for verification. MD notified via Voalte of situation.     @1030: [facility] met with pt's at bedside, pt is A&O x4 and clearly states she did not want to go to a SNF and states \" I need to go home, I have a walker, and a friend that come and sit with me while my daughter is out of town, I do not want to go to a SNF.\" Pt refused to sign choice and discharge to SNF. Pt agreed to      DPOA documents were received from pt daughter Tequila Reyes. Document states in article 5 \" You have the right to make medical decisions and other health care decisions for yourself so long as you can give informed consent.\"  Pt is A&O x4  Pt has HH acceptance with [facility]. DPOA faxed to [facility] for chart.    @1130 this writer called pt's daughter call went to .     Escalations Completed: None    Medically Clear: No    Next Steps: Arrange transportation with pts daughter for discharge today.    Barriers to Discharge: Medical clearance         "

## 2025-05-20 NOTE — DISCHARGE PLANNING
"Case Management Discharge Planning    Admission Date: 5/18/2025  GMLOS: 3.8  ALOS: 2    6-Clicks ADL Score: 17  6-Clicks Mobility Score: 15  PT and/or OT Eval ordered: Yes  Post-acute Referrals Ordered: Yes  Post-acute Choice Obtained: Yes  Has referral(s) been sent to post-acute provider:  Yes    LMSW rec'd email from pt's daughter stating she had a conversation with her mother and she has agreed to discharge to a SNF. This writer met with pt at bedside, pt confirmed she has agreed to discharge to a \" therapy place\" per her daughter's request.   Request sent to [facility] for blanket SNF.   Addendum: [place] [facility] accepted pt and can admit her today, pt agreed, daughter Tequila agreed, transportation requested for 1630 via [facility] or [place] waiting for confirmation.             "

## 2025-05-20 NOTE — DISCHARGE PLANNING
VOALTE received from [facility] [place] notifying of patient anticipated discharge to SNF level of care today. [facility] authorization for SNF requested. [facility] authorization #[id number] is in place for patient anticipated discharge to SNF level of care. Update sent to  via VOALTE.

## 2025-05-20 NOTE — CARE PLAN
The patient is Stable - Low risk of patient condition declining or worsening    Shift Goals  Clinical Goals: IV abx, monitor labs and VS  Patient Goals: drink water    Progress made toward(s) clinical / shift goals:    Problem: Knowledge Deficit - Standard  Goal: Patient and family/care givers will demonstrate understanding of plan of care, disease process/condition, diagnostic tests and medications  Description: Target End Date:  1-3 days or as soon as patient condition allowsDocument in Patient Education1.  Patient and family/caregiver oriented to unit, equipment, visitation policy and means for communicating concern2.  Complete/review Learning Assessment3.  Assess knowledge level of disease process/condition, treatment plan, diagnostic tests and medications4.  Explain disease process/condition, treatment plan, diagnostic tests and medications  Outcome: Progressing     Problem: Skin Integrity  Goal: Skin integrity is maintained or improved  Description: Target End Date:  Prior to discharge or change in level of careDocument interventions on Skin Risk/Jarvis flowsheet groups and corresponding LDA1.  Assess and monitor skin integrity, appearance and/or temperature2.  Assess risk factors for impaired skin integrity and/or pressures ulcers3.  Implement precautions to protect skin integrity in collaboration with interdisciplinary team4.  Implement pressure ulcer prevention protocol if at risk for skin breakdown5.  Confirm wound care consult if at risk for skin breakdown6.  Ensure patient use of pressure relieving devices  (Low air loss bed, waffle overlay, heel protectors, ROHO cushion, etc)  Outcome: Progressing     Problem: Fall Risk  Goal: Patient will remain free from falls  Description: Target End Date:  Prior to discharge or change in level of careDocument interventions on the Radha Lemon Fall Risk Assessment1.  Assess for fall risk factors2.  Implement fall precautions  Outcome: Progressing       Patient is not  progressing towards the following goals:

## 2025-05-20 NOTE — THERAPY
Physical Therapy   Initial Evaluation     Patient Name: Lindsay Vargas  Age:  84 y.o., Sex:  female  Medical Record #: [id number]  Today's Date: 5/20/2025     Precautions  Precautions: Fall Risk    Assessment  Patient is an 84 y.o. female who came into the acute setting w/ altered level of consciousness, hypokalemia, UTI, and some intermittent nausea. Pt displayed difficulty w/ reporting PLOF and home setup. She lives in a ground level apartment close to her [facility] and is wanting to d/c home w/ caregivers and HH. Pt had a difficult time keeping attention on PT evaluation, but tolerated it fair. Pt shows impairments w/ cognition, generalized weakness, use of AD, ambulation, and tolerance to activity. Pt was able to complete chair mobility and sit<>stand transfers w/ SBA, but required VC and TC to do so. Pt ambulated within her room about 20 ft w/ Radha and AD guidance from SPT. Pt required extensive VC and TC to utilize AD and ambulate safely around the room. Pt was educated on mobilizing and being upright but only w/ assistance from the FWW and nurse.     Pt will benefit from skilled acute PT while in this setting to progress mobility and strength prior to d/c.To keep safety of pt at the forefront, d/c recommendation to post acute would benefit to increase strength, functional mobility, and tolerance to activity. Pt was made aware of this recommendation and appeared receptive, but lack of focus made it difficult to ensure understanding occurred.      Plan  Physical Therapy Initial Treatment Plan   Treatment Plan : Bed Mobility, Equipment, Gait Training, Neuro Re-Education / Balance, Manual Therapy, Self Care / Home Evaluation, Therapeutic Activities, Therapeutic Exercise  Treatment Frequency: 4 Times per Week  Duration: Until Therapy Goals Met    DC Equipment Recommendations: Unable to determine at this time  Discharge Recommendations: Recommend post-acute placement for additional physical therapy services prior to  discharge home      Objective   05/20/25 1227   Initial Contact Note    Initial Contact Note Order Received and Verified, Physical Therapy Evaluation in Progress with Full Report to Follow.   Precautions   Precautions Fall Risk   Pain 0 - 10 Group   Therapist Pain Assessment Prior to Activity;During Activity;Post Activity;Nurse Notified;0   Prior Living Situation   Prior Services Intermittent Physical Support for ADL Per Family   Housing / Facility 1 Story Apartment / Condo   Steps Into Home 0   Steps In Home 0   Rail None   Equipment Owned 4-Wheel Walker   Lives with - Patient's Self Care Capacity Alone and Able to Care For Self   Comments pt had difficulty w/ history but states she is eager to get back home   Prior Level of Functional Mobility   Ambulation Distance household   Assistive Devices Used 4-Wheel Walker   History of Falls   History of Falls Yes   Cognition    Cognition / Consciousness X   Speech/ Communication Hard of Hearing   Level of Consciousness Alert   Safety Awareness Impaired   Attention Impaired   Comments Pt displays confusion and difficulty w/ timeline of information prior to hospital. Pt is easily distracted and takes tangential w/ conversations. Pt is hard of hearing and has difficulty following commands.   Passive ROM Lower Body   Passive ROM Lower Body X   Comments B ankles demonstrated limited ROM   Active ROM Lower Body    Active ROM Lower Body  X   Comments B ankles demonstrated limited ROM   Strength Lower Body   Lower Body Strength  X   Gross Strength Generalized Weakness, Equal Bilaterally   Comments Pt demonstrates 4/5 strength of LE, but has difficulty w/ functional mobility/strength   Sensation Lower Body   Lower Extremity Sensation   Not Tested   Balance Assessment   Sitting Balance (Static) Fair +   Sitting Balance (Dynamic) Fair   Standing Balance (Static) Fair -   Standing Balance (Dynamic) Poor +   Weight Shift Sitting Fair   Weight Shift Standing Poor   Comments w/ FWW    Bed Mobility    Comments pt in bedside chair at start and end of session   Gait Analysis   Gait Level Of Assist Minimal Assist   Assistive Device Front Wheel Walker   Distance (Feet) 20   # of Times Distance was Traveled 1   Deviation Decreased Base Of Support;Shuffled Gait;Bradykinetic;Decreased Heel Strike;Decreased Toe Off   # of Stairs Climbed 0   Weight Bearing Status FWB   Comments w/ FWW and Kalin requiring directing of FWW from SPT   Functional Mobility   Sit to Stand Contact Guard Assist   Bed, Chair, Wheelchair Transfer Contact Guard Assist   Transfer Method Stand Step   Mobility upright, sit<>stand, ambulation, back to upright   Comments w/ FWW   6 Clicks Assessment - How much HELP from from another person do you currently need... (If the patient hasn't done an activity recently, how much help from another person do you think he/she would need if he/she tried?)   Turning from your back to your side while in a flat bed without using bedrails? 3   Moving from lying on your back to sitting on the side of a flat bed without using bedrails? 2   Moving to and from a bed to a chair (including a wheelchair)? 2   Standing up from a chair using your arms (e.g., wheelchair, or bedside chair)? 3   Walking in hospital room? 3   Climbing 3-5 steps with a railing? 2   6 clicks Mobility Score 15   Activity Tolerance   Sitting in Chair functional   Sitting Edge of Bed NT   Standing 4 mins   Patient / Family Goals    Patient / Family Goal #1 pt would like to return home w/ HH and caregivers   Short Term Goals    Short Term Goal # 1 pt will perform bed mobility w/ SBA in 6tx to demonstrate improved independence   Short Term Goal # 2 pt will perform sit<>stand transfers w/ SBA and a FWW in 6tx to show improved generalized strength   Short Term Goal # 3 pt will ambulate 40 ft w/ FWW and CGA to show progress towards functional independence   Education Group   Education Provided Role of Physical Therapist;Use of Assistive  Device;Gait Training   Role of Physical Therapist Patient Response Patient;Acceptance;Explanation;Verbal Demonstration   Gait Training Patient Response Patient;Acceptance;Demonstration;Reinforcement Needed   Use of Assistive Device Patient Response Patient;Acceptance;Demonstration;Reinforcement Needed   Physical Therapy Initial Treatment Plan    Treatment Plan  Bed Mobility;Equipment;Gait Training;Neuro Re-Education / Balance;Manual Therapy;Self Care / Home Evaluation;Therapeutic Activities;Therapeutic Exercise   Treatment Frequency 4 Times per Week   Duration Until Therapy Goals Met   Problem List    Problems Impaired Bed Mobility;Impaired Transfers;Impaired Ambulation;Functional Strength Deficit;Impaired Balance;Decreased Activity Tolerance;Safety Awareness Deficits / Cognition   Anticipated Discharge Equipment and Recommendations   DC Equipment Recommendations Unable to determine at this time   Discharge Recommendations Recommend post-acute placement for additional physical therapy services prior to discharge home   Interdisciplinary Plan of Care Collaboration   IDT Collaboration with  Nursing   Patient Position at End of Therapy Seated;Chair Alarm On;Call Light within Reach;Tray Table within Reach;Phone within Reach   Collaboration Comments RN aware of visit and recs   Session Information   Date / Session Number  5/20 - 1 (1/4, 5/26)

## 2025-05-20 NOTE — PROGRESS NOTES
4 Eyes Skin Assessment Completed by DEVANG chapman and DEVANG moreau.    Head WDL  Ears WDL  Nose WDL  Mouth WDL  Neck WDL  Breast/Chest WDL  Shoulder Blades WDL  Spine WDL  (R) Arm/Elbow/Hand WDL  (L) Arm/Elbow/Hand WDL  Abdomen WDL  Groin Redness and Blanching  Scrotum/Coccyx/Buttocks Redness and Blanching  (R) Leg WDL  (L) Leg WDL  (R) Heel/Foot/Toe WDL  (L) Heel/Foot/Toe WDL          Devices In Places Tele Box, Blood Pressure Cuff, and Pulse Ox      Interventions In Place TAP System, Pillows, and Q2 Turns    Possible Skin Injury No    Pictures Uploaded Into Epic N/A  Wound Consult Placed N/A  RN Wound Prevention Protocol Ordered No

## 2025-05-20 NOTE — DISCHARGE SUMMARY
Discharge Summary    CHIEF COMPLAINT ON ADMISSION  Chief Complaint   Patient presents with    ALOC     Patient states she has been nauseous since 10 am. Family reports she was acting normal two hours ago. Patient currently complaining of a headache with minor confusion regarding dates and recent events.        Reason for Admission  EMS    Admission Date  5/18/2025     CODE STATUS  Full Code    HPI & HOSPITAL COURSE  Ms. Lindsay Vargas is AN 84-year-old female comes to the hospital with altered level of consciousness.  Patient is altered level of consciousness seems to be secondary to an E. coli urinary tract infection.  Acute cystitis without hematuria because the patient's mental status change and caused her to fall at home.  The patient apparently even hit her head.  Patient's imaging studies were negative for intracranial abnormality.  The patient at this point still however is not at her baseline and she is extremely weak and tired and unable to take care of herself.  The patient wanted to go home however the patient at this point is not understanding that she is a danger to herself by staying at home alone and the daughter who is her POA is requesting patient go to a skilled facility given the fact that patient is not at her baseline with her mental status the POA can invoke her decision making ability and at this point we will send the patient to a skilled facility for ongoing therapies.      Therefore, she is discharged in good and stable condition to skilled nursing facility.    The patient met 2-midnight criteria for an inpatient stay at the time of discharge.      FOLLOW UP ITEMS POST DISCHARGE  Follow-up with the primary care physician after discharge from the skilled facility    DISCHARGE DIAGNOSES  Principal Problem:    Hypokalemia (POA: Yes)  Active Problems:    Dehydration (POA: Yes)    Acute metabolic encephalopathy (POA: Yes)    Depression (POA: Yes)    Essential hypertension (POA: Yes)     Thrombocytopenia (HCC) (POA: Yes)    Acute UTI (POA: Yes)    Microcytic anemia (POA: Yes)  Resolved Problems:    Gastroesophageal reflux disease without esophagitis (POA: Yes)      FOLLOW UP  No future appointments.  [facility]  [id number]  [place] [address]  [phone]          MEDICATIONS ON DISCHARGE     Medication List        START taking these medications        Instructions   amoxicillin-clavulanate 875-125 MG Tabs  Commonly known as: Augmentin   Take 1 Tablet by mouth 2 times a day for 5 days.  Dose: 1 Tablet            CONTINUE taking these medications        Instructions   amLODIPine 5 MG Tabs  Commonly known as: Norvasc   Take 5 mg by mouth every day. Indications: High Blood Pressure Disorder  Dose: 5 mg     DULoxetine 60 MG Cpep delayed-release capsule  Commonly known as: Cymbalta   Take 1 Cap by mouth every day.  Dose: 60 mg     Home Care Oxygen   Inhale 2.5 L/min as needed for Shortness of Breath (shortness of breath). Oxygen dose range: 2.5 L/min  Respiratory route via: Nasal Cannula   Oxygen supplier: [facility]      Indications: shortness of breath  Dose: 2.5 L/min     hydroCHLOROthiazide 25 MG Tabs   Take 25 mg by mouth 2 times a day. Indications: High Blood Pressure Disorder  Dose: 25 mg     metoprolol SR 25 MG Tb24  Commonly known as: Toprol XL   Take 25 mg by mouth every day. Indications: High Blood Pressure Disorder  Dose: 25 mg     omeprazole 40 MG delayed-release capsule  Commonly known as: PriLOSEC   Take 40 mg by mouth every day. Indications: Gastroesophageal Reflux Disease  Dose: 40 mg     potassium chloride SA 20 MEQ Tbcr  Commonly known as: Kdur   Take 20 mEq by mouth every day. Indications: High Blood Pressure Disorder  Dose: 20 mEq            STOP taking these medications      Alive Womens Gummy Chew     digoxin 125 MCG Tabs  Commonly known as: Lanoxin              Allergies  Allergies[1]    DIET  Orders Placed This Encounter   Procedures     Diet Order Diet: Regular     Standing Status:   Standing     Number of Occurrences:   1     Diet::   Regular [1]       ACTIVITY  As tolerated.  Weight bearing as tolerated    LINES, DRAINS, AND WOUNDS  This is an automated list. Peripheral IVs will be removed prior to discharge.  Peripheral IV 05/18/25 20 G Anterior;Right Forearm (Active)   Site Assessment Clean;Dry;Intact 05/20/25 0800   Dressing Type Transparent 05/20/25 0800   Line Status Scrubbed the hub prior to access;Flushed;No blood return;Saline locked 05/20/25 0800   Dressing Status Clean;Dry;Intact 05/20/25 0800   Dressing Intervention N/A 05/20/25 0800   Dressing Change Due 05/25/25 05/20/25 0800   Date Primary Tubing Changed 05/19/25 05/19/25 0800   Date Secondary Tubing Changed 05/19/25 05/19/25 0800   NEXT Primary Tubing Change  05/25/25 05/19/25 0800   NEXT Secondary Tubing Change  05/20/25 05/19/25 0800   Infiltration Grading ([facility]) 0 05/20/25 0800   Phlebitis Scale ([facility] Only) 0 05/20/25 0800          Peripheral IV 05/18/25 20 G Anterior;Right Forearm (Active)   Site Assessment Clean;Dry;Intact 05/20/25 0800   Dressing Type Transparent 05/20/25 0800   Line Status Scrubbed the hub prior to access;Flushed;No blood return;Saline locked 05/20/25 0800   Dressing Status Clean;Dry;Intact 05/20/25 0800   Dressing Intervention N/A 05/20/25 0800   Dressing Change Due 05/25/25 05/20/25 0800   Date Primary Tubing Changed 05/19/25 05/19/25 0800   Date Secondary Tubing Changed 05/19/25 05/19/25 0800   NEXT Primary Tubing Change  05/25/25 05/19/25 0800   NEXT Secondary Tubing Change  05/20/25 05/19/25 0800   Infiltration Grading ([facility]) 0 05/20/25 0800   Phlebitis Scale ([facility] Only) 0 05/20/25 0800               MENTAL STATUS ON TRANSFER             CONSULTATIONS  None    PROCEDURES  None    LABORATORY  Lab Results   Component Value Date    SODIUM 136 05/20/2025    POTASSIUM 3.5 (L) 05/20/2025    CHLORIDE 100 05/20/2025    CO2 22 05/20/2025     GLUCOSE 104 (H) 05/20/2025    BUN 20 05/20/2025    CREATININE 0.71 05/20/2025    CREATININE 0.9 12/29/2008        Lab Results   Component Value Date    WBC 6.9 05/20/2025    HEMOGLOBIN 8.7 (L) 05/20/2025    HEMATOCRIT 29.5 (L) 05/20/2025    PLATELETCT 99 (L) 05/20/2025        Total time of the discharge process exceeds 35 minutes.       [1] No Known Allergies

## 2025-05-20 NOTE — PROGRESS NOTES
Telemetry Shift Summary     Rhythm: SR  Rate: 70-82  Measurements: 0.21/0.06/0.32  Ectopy (reported by Monitor Tech): [facility]     Normal Values  Rhythm: Sinus  HR:   Measurements: 0.12-0.20/0.06-0.10/0.30-0.52

## 2025-05-20 NOTE — PROGRESS NOTES
Tele strip at 1135 shows SR .      Measurements from am strip were as follows:  WA=0.21  QRS=0.05  QT=0.39    Tele Shift Summary:    Rhythm : SR  Rate : 66-83  Ectopy : Per CCT Hattie, pt had rare PVCs and PACs.     Telemetry monitoring strips placed in pt's chart.

## 2025-05-20 NOTE — DISCHARGE PLANNING
DC Transport Scheduled    Transport Company Scheduled:  [facility]    Scheduled Date: 5/20/2025  Scheduled Time: 1630    Transport Type: Wheelchair  Destination: ([facility]) [address] [place], [address], [place]    Notified care team of scheduled transport via Voalte.     If there are any changes needed to the DC transportation scheduled, please contact [facility] at ext. [phone] between the hours of [phone]. If outside those hours, contact the ED Case Manager at ext. [phone].      supplement/verbal instruction/teach back - (Patient repeats in own words)/patient instructed

## 2025-05-20 NOTE — CARE PLAN
The patient is Stable - Low risk of patient condition declining or worsening    Shift Goals  Clinical Goals: Mobilize >25 ft  Patient Goals: Rest  Family Goals: KIRT    Progress made toward(s) clinical / shift goals:  PT ambulated up to the chair, and around the room. Pt also ambulated into the bathroom and was able to void and have a BM. PT eval done and recommendations sent to  Stephania. Patient and daughter updated on POC, plan is for discharge to [facility] skilled nursing.

## 2025-05-21 LAB
BACTERIA UR CULT: ABNORMAL
BACTERIA UR CULT: ABNORMAL
SIGNIFICANT IND 70042: ABNORMAL
SITE SITE: ABNORMAL
SOURCE SOURCE: ABNORMAL

## 2025-07-01 ENCOUNTER — HOSPITAL ENCOUNTER (EMERGENCY)
Facility: MEDICAL CENTER | Age: 85
End: 2025-07-01
Attending: EMERGENCY MEDICINE
Payer: MEDICARE

## 2025-07-01 VITALS
WEIGHT: 119 LBS | OXYGEN SATURATION: 95 % | HEART RATE: 71 BPM | BODY MASS INDEX: 18.68 KG/M2 | SYSTOLIC BLOOD PRESSURE: 121 MMHG | HEIGHT: 67 IN | RESPIRATION RATE: 18 BRPM | TEMPERATURE: 98.2 F | DIASTOLIC BLOOD PRESSURE: 76 MMHG

## 2025-07-01 DIAGNOSIS — S41.102A ARM WOUND, LEFT, INITIAL ENCOUNTER: ICD-10-CM

## 2025-07-01 DIAGNOSIS — T17.308A FOREIGN BODY IN LARYNX, INITIAL ENCOUNTER: Primary | ICD-10-CM

## 2025-07-01 PROCEDURE — A9270 NON-COVERED ITEM OR SERVICE: HCPCS | Performed by: EMERGENCY MEDICINE

## 2025-07-01 PROCEDURE — 99284 EMERGENCY DEPT VISIT MOD MDM: CPT

## 2025-07-01 PROCEDURE — 700102 HCHG RX REV CODE 250 W/ 637 OVERRIDE(OP): Performed by: EMERGENCY MEDICINE

## 2025-07-01 RX ADMIN — LIDOCAINE HYDROCHLORIDE 30 ML: 20 SOLUTION ORAL; TOPICAL at 14:30

## 2025-07-01 ASSESSMENT — FIBROSIS 4 INDEX: FIB4 SCORE: 4.26

## 2025-07-01 NOTE — ED NOTES
Call made to patient's daughter to inquire about a ride home  Message left for daughter to call back

## 2025-07-01 NOTE — ED TRIAGE NOTES
"Chief Complaint   Patient presents with    Foreign Body Swallowed     Pt states she ate a small piece of cantelpoe and the skin is stuck in her throat.  Original EMS call was a GLF, however patient states she never fell.  -blood thinners.       BP (!) 151/67   Pulse 73   Temp 36.8 °C (98.2 °F) (Temporal)   Resp 18   Ht 1.702 m (5' 7\")   Wt 54 kg (119 lb)   SpO2 97%   BMI 18.64 kg/m²     "

## 2025-07-01 NOTE — ED NOTES
Patient talking with friend on her phone  No apparent signs of distress  Reminded patient to try to take a few sips of water for PO challenge  Verbalized understanding

## 2025-07-01 NOTE — ED NOTES
Patient able to take some sips of water  States she feels better and asking if she can get help getting a ride home  ERP aware of successful PO challenge

## 2025-07-01 NOTE — ED PROVIDER NOTES
"ED Provider Note    CHIEF COMPLAINT  Chief Complaint   Patient presents with    Foreign Body Swallowed     Pt states she ate a small piece of cantelpoe and the skin is stuck in her throat.  Original EMS call was a GLF, however patient states she never fell.  -blood thinners.         EXTERNAL RECORDS REVIEWED  Other none    HPI/ROS  LIMITATION TO HISTORY   Select: None    OUTSIDE HISTORIAN(S):  EMS per report per EMS    Lindsay Vargas is a 84 y.o. female with history of hypertension and hiatal hernia who presents for multiple issues.    States that she has infected wound on her upper arm.  She states a fellow resident in her apartment building is a nurse and looked at yesterday and said \"it looks infected.\"  She is concerned that a mouse may have bitten her.  It has been there for 3 months.    Patient also states her legs are swollen and have been so for some time.  This is not a new issue.    Patient also reports that her right eye drainage and she has been meaning to see somebody about it for over a year.    Patient states the acute issue for today was getting a piece of cantaloupe stuck in her throat.  She states there was a nurse who lives in her building who reported to the ambulance that the patient had fallen but she insists she did not fall.  She did not have syncope and she denies shortness of breath but states that is irritating on the right side of her throat.    PAST MEDICAL HISTORY   has a past medical history of Anesthesia, Arthritis, Cataract, Chronic pain, Hiatus hernia syndrome, Hypertension, Lymphedema (2014), Migraine headache, Pain, and Pneumonia.    SURGICAL HISTORY   has a past surgical history that includes hysterectomy laparoscopy; chely by laparoscopy; other abdominal surgery; other abdominal surgery; other; other orthopedic surgery; cataract phaco with iol (Left, 2/4/2016); cataract phaco with iol (Right, 2/18/2016); inj lumbar/sacral,w/wo cntrst (Right, 9/29/2016); fluorscopic guidance " "spinal injection (9/29/2016); inj lumbar/sacral,w/wo cntrst (N/A, 11/10/2016); and fluorscopic guidance spinal injection (11/10/2016).    FAMILY HISTORY  Family History   Problem Relation Age of Onset    Heart Disease Mother     Heart Disease Father        SOCIAL HISTORY  Social History     Tobacco Use    Smoking status: Never    Smokeless tobacco: Never   Vaping Use    Vaping status: Never Used   Substance and Sexual Activity    Alcohol use: No     Comment: rarely    Drug use: No    Sexual activity: Not on file       CURRENT MEDICATIONS  Home Medications    **Home medications have not yet been reviewed for this encounter**         ALLERGIES  Allergies[1]    PHYSICAL EXAM  VITAL SIGNS: /76   Pulse 71   Temp 36.8 °C (98.2 °F) (Temporal)   Resp 18   Ht 1.702 m (5' 7\")   Wt 54 kg (119 lb)   SpO2 95%   BMI 18.64 kg/m²    General:  WD thin elderly female, nontoxic appearing in NAD; A+ oriented to person and place; V/S as above; febrile, elevated blood pressure; very talkative  Skin: warm and dry; good color; no rash  HEENT: NCAT; no trismus, no drooling, no voice hoarseness  Neck: No stridor, no JVD  Cardiovascular: Regular heart rate and rhythm.    Lungs: No respiratory distress or tachypnea; Clear to auscultation with good air movement bilaterally.  No wheezes, rhonchi, or rales.   Extremities: YANES x 4; circular open wound in the left upper arm without surrounding erythema, drainage, or edema; no streaking  Neurologic: CNs III-XII grossly intact; speech clear  Psychiatric: Appropriate affect, normal mood      EKG/LABS  none    RADIOLOGY/PROCEDURES   none      COURSE & MEDICAL DECISION MAKING    ASSESSMENT, COURSE AND PLAN  Care Narrative: This is an 84-year-old female who arrives from home via EMS for suppose a ground-level fall but the patient denies falling.  She does report a chronic left upper arm wound, from her right eye, and a piece of cantaloupe stuck in her throat.    RN attempted sips of water " which the patient seemed to have difficulty swallowing but ultimately was able to.    In order to assess the degree of impaction myself, I brought the patient a GI cocktail.  She took 1 small sip and then incrementally larger sips.  She started coughing fairly persistently and ultimately brought up what appeared to be a piece of cantaloupe.  She felt much improved afterwards.  She still had numbness of her tongue so we watched her for some time before trying another p.o. challenge.  The RN approached me and let me know that the patient was ready to go home and was feeling better.  She tolerated a subsequent p.o. challenge.  I advised the patient to return to the ER for fever, difficulty breathing, productive cough, or other concerns.  She is aware that she may have aspirated which puts her at risk for pneumonia.  She will return as necessary.      FINAL DIAGNOSIS  1. Foreign body in larynx, initial encounter    2. Arm wound, left, initial encounter         Electronically signed by: Hope Scott M.D., 7/1/2025 1:05 PM           [1] No Known Allergies

## 2025-07-01 NOTE — DISCHARGE INSTRUCTIONS
Return to the ER for fever, cough, difficulty breathing, feeling like something is stuck in your throat, or other concerns    These follow-up with your primary care doctor in 24 to 48 hours regarding the symptoms you came in for today.  You may need a swallow evaluation or a change in your diet to accommodate difficulty swallowing.    Watch for signs of infection in your left arm wound.  Return for redness, drainage, fever, or other concerns.  You may wash this daily with mild soap and water but otherwise keep it clean and dry.

## 2025-07-01 NOTE — DISCHARGE PLANNING
Anticipated Discharge Disposition: Home with Transport    Action: ER CM spoke with daughter She does not get off work till after 5 pm and cannot come get pt. She lives in a apt, Addresso n facesheet is correct. She has a keypad on the door  it will light up with 2 numbers then you enter [phone], Daughter and  are reachable by phone.  Spoke with Lianet at [facility]. Reservation [id number] Cost. 123.95 ETA 18:30 [place] called for transport availability , vm left with Carolynn as no one answered. Called dtr back to let her know how late ETA is, VM left .    Barriers to Discharge: ETA transport pending 18:30 with T. Left this on will call.      Plan: Will cont to follow

## 2025-07-01 NOTE — ED NOTES
Patient discharged home in stable condition to the [facility] to wait for her ride  AVS provided with recommended follow up and home care instructions and education information  No prescriptions provided at this time  Patient verbalized understanding  Ambulatory to wheelchair at time of discharge

## 2025-07-13 ENCOUNTER — HOSPITAL ENCOUNTER (EMERGENCY)
Facility: MEDICAL CENTER | Age: 85
End: 2025-07-14
Attending: EMERGENCY MEDICINE
Payer: MEDICARE

## 2025-07-13 DIAGNOSIS — R29.6 FREQUENT FALLS: ICD-10-CM

## 2025-07-13 DIAGNOSIS — Z78.9 UNABLE TO CARE FOR SELF: ICD-10-CM

## 2025-07-13 DIAGNOSIS — N30.00 ACUTE CYSTITIS WITHOUT HEMATURIA: ICD-10-CM

## 2025-07-13 DIAGNOSIS — N39.0 ACUTE UTI: Primary | ICD-10-CM

## 2025-07-13 PROBLEM — I34.0 MITRAL REGURGITATION: Status: ACTIVE | Noted: 2021-09-16

## 2025-07-13 PROBLEM — R62.7 FAILURE TO THRIVE IN ADULT: Status: ACTIVE | Noted: 2025-07-13

## 2025-07-13 LAB
ALBUMIN SERPL BCP-MCNC: 3.8 G/DL (ref 3.2–4.9)
ALBUMIN/GLOB SERPL: 1.2 G/DL
ALP SERPL-CCNC: 91 U/L (ref 30–99)
ALT SERPL-CCNC: 9 U/L (ref 2–50)
ANION GAP SERPL CALC-SCNC: 14 MMOL/L (ref 7–16)
APPEARANCE UR: ABNORMAL
AST SERPL-CCNC: 19 U/L (ref 12–45)
BACTERIA #/AREA URNS HPF: ABNORMAL /HPF
BASOPHILS # BLD AUTO: 0.2 % (ref 0–1.8)
BASOPHILS # BLD: 0.02 K/UL (ref 0–0.12)
BILIRUB SERPL-MCNC: 0.7 MG/DL (ref 0.1–1.5)
BILIRUB UR QL STRIP.AUTO: NEGATIVE
BUN SERPL-MCNC: 20 MG/DL (ref 8–22)
CALCIUM ALBUM COR SERPL-MCNC: 9.2 MG/DL (ref 8.5–10.5)
CALCIUM SERPL-MCNC: 9 MG/DL (ref 8.5–10.5)
CASTS URNS QL MICRO: ABNORMAL /LPF (ref 0–2)
CHLORIDE SERPL-SCNC: 97 MMOL/L (ref 96–112)
CO2 SERPL-SCNC: 25 MMOL/L (ref 20–33)
COLOR UR: YELLOW
CREAT SERPL-MCNC: 0.86 MG/DL (ref 0.5–1.4)
EOSINOPHIL # BLD AUTO: 0.01 K/UL (ref 0–0.51)
EOSINOPHIL NFR BLD: 0.1 % (ref 0–6.9)
EPITHELIAL CELLS 1715: ABNORMAL /HPF (ref 0–5)
ERYTHROCYTE [DISTWIDTH] IN BLOOD BY AUTOMATED COUNT: 46 FL (ref 35.9–50)
GFR SERPLBLD CREATININE-BSD FMLA CKD-EPI: 65 ML/MIN/1.73 M 2
GLOBULIN SER CALC-MCNC: 3.1 G/DL (ref 1.9–3.5)
GLUCOSE SERPL-MCNC: 116 MG/DL (ref 65–99)
GLUCOSE UR STRIP.AUTO-MCNC: NEGATIVE MG/DL
HCT VFR BLD AUTO: 33.5 % (ref 37–47)
HGB BLD-MCNC: 10.4 G/DL (ref 12–16)
HGB RETIC QN AUTO: 25.2 PG/CELL (ref 29–35)
IMM GRANULOCYTES # BLD AUTO: 0.05 K/UL (ref 0–0.11)
IMM GRANULOCYTES NFR BLD AUTO: 0.6 % (ref 0–0.9)
IMM RETICS NFR: 8.6 % (ref 2.6–16.1)
IRON SATN MFR SERPL: 4 % (ref 15–55)
IRON SERPL-MCNC: 14 UG/DL (ref 40–170)
KETONES UR STRIP.AUTO-MCNC: NEGATIVE MG/DL
LEUKOCYTE ESTERASE UR QL STRIP.AUTO: ABNORMAL
LYMPHOCYTES # BLD AUTO: 0.69 K/UL (ref 1–4.8)
LYMPHOCYTES NFR BLD: 7.8 % (ref 22–41)
MCH RBC QN AUTO: 22.5 PG (ref 27–33)
MCHC RBC AUTO-ENTMCNC: 31 G/DL (ref 32.2–35.5)
MCV RBC AUTO: 72.4 FL (ref 81.4–97.8)
MICRO URNS: ABNORMAL
MONOCYTES # BLD AUTO: 0.59 K/UL (ref 0–0.85)
MONOCYTES NFR BLD AUTO: 6.7 % (ref 0–13.4)
NEUTROPHILS # BLD AUTO: 7.51 K/UL (ref 1.82–7.42)
NEUTROPHILS NFR BLD: 84.6 % (ref 44–72)
NITRITE UR QL STRIP.AUTO: NEGATIVE
NRBC # BLD AUTO: 0 K/UL
NRBC BLD-RTO: 0 /100 WBC (ref 0–0.2)
PH UR STRIP.AUTO: 6 [PH] (ref 5–8)
PLATELET # BLD AUTO: 146 K/UL (ref 164–446)
PMV BLD AUTO: 10.7 FL (ref 9–12.9)
POTASSIUM SERPL-SCNC: 3.2 MMOL/L (ref 3.6–5.5)
PROT SERPL-MCNC: 6.9 G/DL (ref 6–8.2)
PROT UR QL STRIP: 30 MG/DL
RBC # BLD AUTO: 4.63 M/UL (ref 4.2–5.4)
RBC # URNS HPF: ABNORMAL /HPF
RBC UR QL AUTO: ABNORMAL
RETICS # AUTO: 0.07 M/UL (ref 0.04–0.12)
RETICS/RBC NFR: 1.4 % (ref 0.8–2.6)
SODIUM SERPL-SCNC: 136 MMOL/L (ref 135–145)
SP GR UR STRIP.AUTO: 1.01
TIBC SERPL-MCNC: 344 UG/DL (ref 250–450)
UIBC SERPL-MCNC: 330 UG/DL (ref 110–370)
UROBILINOGEN UR STRIP.AUTO-MCNC: 1 EU/DL
WBC # BLD AUTO: 8.9 K/UL (ref 4.8–10.8)
WBC #/AREA URNS HPF: >100 /HPF

## 2025-07-13 PROCEDURE — 700101 HCHG RX REV CODE 250: Performed by: EMERGENCY MEDICINE

## 2025-07-13 PROCEDURE — 700102 HCHG RX REV CODE 250 W/ 637 OVERRIDE(OP): Performed by: HOSPITALIST

## 2025-07-13 PROCEDURE — 36415 COLL VENOUS BLD VENIPUNCTURE: CPT

## 2025-07-13 PROCEDURE — 86480 TB TEST CELL IMMUN MEASURE: CPT

## 2025-07-13 PROCEDURE — 83540 ASSAY OF IRON: CPT

## 2025-07-13 PROCEDURE — 99284 EMERGENCY DEPT VISIT MOD MDM: CPT | Performed by: HOSPITALIST

## 2025-07-13 PROCEDURE — 82728 ASSAY OF FERRITIN: CPT

## 2025-07-13 PROCEDURE — 85025 COMPLETE CBC W/AUTO DIFF WBC: CPT

## 2025-07-13 PROCEDURE — 83550 IRON BINDING TEST: CPT

## 2025-07-13 PROCEDURE — A9270 NON-COVERED ITEM OR SERVICE: HCPCS | Performed by: HOSPITALIST

## 2025-07-13 PROCEDURE — 85046 RETICYTE/HGB CONCENTRATE: CPT

## 2025-07-13 PROCEDURE — 99285 EMERGENCY DEPT VISIT HI MDM: CPT

## 2025-07-13 PROCEDURE — 81001 URINALYSIS AUTO W/SCOPE: CPT

## 2025-07-13 PROCEDURE — 80053 COMPREHEN METABOLIC PANEL: CPT

## 2025-07-13 RX ORDER — HYDROCHLOROTHIAZIDE 25 MG/1
25 TABLET ORAL 2 TIMES DAILY
Status: DISCONTINUED | OUTPATIENT
Start: 2025-07-13 | End: 2025-07-14 | Stop reason: HOSPADM

## 2025-07-13 RX ORDER — DIGOXIN 125 MCG
125 TABLET ORAL DAILY
Status: DISCONTINUED | OUTPATIENT
Start: 2025-07-13 | End: 2025-07-14 | Stop reason: HOSPADM

## 2025-07-13 RX ORDER — POTASSIUM CHLORIDE 1500 MG/1
20 TABLET, EXTENDED RELEASE ORAL 3 TIMES DAILY
Status: DISCONTINUED | OUTPATIENT
Start: 2025-07-13 | End: 2025-07-14 | Stop reason: HOSPADM

## 2025-07-13 RX ORDER — METOPROLOL SUCCINATE 25 MG/1
25 TABLET, EXTENDED RELEASE ORAL DAILY
Status: DISCONTINUED | OUTPATIENT
Start: 2025-07-14 | End: 2025-07-14 | Stop reason: HOSPADM

## 2025-07-13 RX ORDER — DULOXETIN HYDROCHLORIDE 30 MG/1
60 CAPSULE, DELAYED RELEASE ORAL DAILY
Status: DISCONTINUED | OUTPATIENT
Start: 2025-07-14 | End: 2025-07-14 | Stop reason: HOSPADM

## 2025-07-13 RX ORDER — OMEPRAZOLE 20 MG/1
40 CAPSULE, DELAYED RELEASE ORAL DAILY
Status: DISCONTINUED | OUTPATIENT
Start: 2025-07-14 | End: 2025-07-14 | Stop reason: HOSPADM

## 2025-07-13 RX ORDER — GRANULES FOR ORAL 3 G/1
3 POWDER ORAL ONCE
Status: COMPLETED | OUTPATIENT
Start: 2025-07-13 | End: 2025-07-13

## 2025-07-13 RX ORDER — ACETAMINOPHEN 325 MG/1
650 TABLET ORAL EVERY 6 HOURS PRN
Status: DISCONTINUED | OUTPATIENT
Start: 2025-07-13 | End: 2025-07-14 | Stop reason: HOSPADM

## 2025-07-13 RX ORDER — AMLODIPINE BESYLATE 5 MG/1
5 TABLET ORAL DAILY
Status: DISCONTINUED | OUTPATIENT
Start: 2025-07-13 | End: 2025-07-14 | Stop reason: HOSPADM

## 2025-07-13 RX ORDER — AMOXICILLIN 250 MG
2 CAPSULE ORAL EVERY EVENING
Status: DISCONTINUED | OUTPATIENT
Start: 2025-07-13 | End: 2025-07-14 | Stop reason: HOSPADM

## 2025-07-13 RX ORDER — POLYETHYLENE GLYCOL 3350 17 G/17G
1 POWDER, FOR SOLUTION ORAL
Status: DISCONTINUED | OUTPATIENT
Start: 2025-07-13 | End: 2025-07-14 | Stop reason: HOSPADM

## 2025-07-13 RX ORDER — DIGOXIN 125 MCG
125 TABLET ORAL DAILY
COMMUNITY

## 2025-07-13 RX ADMIN — HYDROCHLOROTHIAZIDE 25 MG: 25 TABLET ORAL at 18:40

## 2025-07-13 RX ADMIN — SENNOSIDES AND DOCUSATE SODIUM 2 TABLET: 50; 8.6 TABLET ORAL at 18:41

## 2025-07-13 RX ADMIN — POTASSIUM CHLORIDE 20 MEQ: 1500 TABLET, EXTENDED RELEASE ORAL at 18:40

## 2025-07-13 RX ADMIN — GRANULES FOR ORAL SOLUTION 3 G: 3 POWDER ORAL at 14:49

## 2025-07-13 RX ADMIN — AMLODIPINE BESYLATE 5 MG: 5 TABLET ORAL at 18:38

## 2025-07-13 RX ADMIN — DIGOXIN 125 MCG: 0.12 TABLET ORAL at 18:39

## 2025-07-13 ASSESSMENT — FIBROSIS 4 INDEX: FIB4 SCORE: 4.26

## 2025-07-13 ASSESSMENT — VISUAL ACUITY: OU: 1

## 2025-07-13 NOTE — DISCHARGE PLANNING
Anticipated Discharge Disposition: SNF poss bed availability.    Action: Spoke with daughter Tequila she had been sitting in sun for several hr. They did get her to eat. They have cameras in the home. Revisited attendant care. Discussed weakness and SNF short stay for strengthening. Consent for blanket referral to SNF obtained and will be sent per Protocol SW.  Discussed will need SCP approval . They have had  [facility] in past.    Barriers to Discharge: Approval SCP for SNF PT OT eval pending  SNF bed availability.    Plan: Will cont to follow

## 2025-07-13 NOTE — DISCHARGE PLANNING
"Anticipated Discharge Disposition: Unclear Work up in progress SNF vs HH with attendant care/Memory unit transition    Action: Pt is familiar to ER CM. Chart reviewed. Medical work up in progress. Family encouraged to hire attendant care 5.27.25 Notes from Melania at that time reflect awareness of cognition issues \"Tequila to discuss DC plan from SNF. Daughter reports she moved member into a small apartment near her home. Daughter aware member cognition is concerning for her to be living alone. Daughter to assist as able and agreeable to HH. Daughter states they can't afford placement and member does not want placement. Daughter will look into private care as able and needed. Daughter aware of tentative DC date. Daughter to call [facility] as needed with questions. \" Since she has had some falls, left upper arm wound poss bite, and choking issue on food. Increased confusion today.Will see what work up reveals She is  not on services with [facility] currently last appears to be March, [facility] liasons are following tho. Can rediscuss community resources of Memory units, Assisted Living Group homes and Attendant care which are all private pay services. Can review for SNF needs. TB quant ordered    Barriers to Discharge: Needs pending    Plan: Cont to follow  "

## 2025-07-13 NOTE — ED NOTES
Medication history reviewed with pts daughter. Med rec is complete.  Allergies reviewed, per pts daughter    Pt is not sure what medications she is taking, called pts daughter (Tequila) at [phone], per daughter was not sure the names and strengths of her medications. Per daughter reports that pt only fills her medications at [facility]    Pts daughter is not sure when pt took her medications last.  Per daughter reports that pt is not taking any vitamins or OTC's.     Called [facility] at [phone] to verify all medications.     Any outpatient anti-MICROBIAL in the last 30 days? NO, per [facility] pharmacy    Pt is not on any anticoagulants

## 2025-07-13 NOTE — ED TRIAGE NOTES
"Chief Complaint   Patient presents with    Other     BIBA for inability to care for self. Family concerned that patient unable to prepare meals and is confused (at baseline per EMS) \"and wanders outside when nobody is around. Her grandson found her outside on the porch this morning\" per EMS. EMS reports \"she was outside for a maximum of 4-5 hours between family visits\".      Physical Exam  Pulmonary:      Effort: Pulmonary effort is normal.   Skin:     General: Skin is warm and dry.   Neurological:      Mental Status: She is alert.       /58   Pulse 86   Temp 37.2 °C (98.9 °F) (Temporal)   Resp 20   Ht 1.702 m (5' 7\")   Wt 54 kg (119 lb)   SpO2 96%   BMI 18.64 kg/m²     " No

## 2025-07-13 NOTE — ED PROVIDER NOTES
"ED Provider Note    CHIEF COMPLAINT  Chief Complaint   Patient presents with    Other     BIBA for inability to care for self. Family concerned that patient unable to prepare meals and is confused (at baseline per EMS) \"and wanders outside when nobody is around. Her grandson found her outside on the porch this morning\" per EMS. EMS reports \"she was outside for a maximum of 4-5 hours between family visits\".        EXTERNAL RECORDS REVIEWED  Position for altered level of consciousness patient found to have urinary tract infection secondary to E. coli.  Patient discharged home with resources    HPI/ROS      Lindsay Vargas is a 84 y.o. female who presents for concern about not being able to take care of herself.  Patient has been wandering around and not preparing meals for herself or taking care of her apartment lately.   She apparently was found by her grandson earlier this morning and had soiled herself.  Family feels that patient is no longer able to care for herself and therefore sent her here to the emerged part.  Patient has no active complaints.  She denies any fevers chills weakness numbness.  She states that she was able to cook an egg this morning and has been able to feed herself without any issue.  She denies any associated pain.  She denies any dysuria urgency or frequency or any other complaints at this point.    PAST MEDICAL HISTORY   has a past medical history of Anesthesia, Arthritis, Cataract, Chronic pain, Hiatus hernia syndrome, Hypertension, Lymphedema (2014), Migraine headache, Pain, and Pneumonia.    SURGICAL HISTORY   has a past surgical history that includes hysterectomy laparoscopy; chely by laparoscopy; other abdominal surgery; other abdominal surgery; other; other orthopedic surgery; cataract phaco with iol (Left, 2/4/2016); cataract phaco with iol (Right, 2/18/2016); inj lumbar/sacral,w/wo cntrst (Right, 9/29/2016); fluorscopic guidance spinal injection (9/29/2016); inj " "lumbar/sacral,w/wo cntrst (N/A, 11/10/2016); and fluorscopic guidance spinal injection (11/10/2016).    FAMILY HISTORY  Family History   Problem Relation Age of Onset    Heart Disease Mother     Heart Disease Father        SOCIAL HISTORY  Social History     Tobacco Use    Smoking status: Never    Smokeless tobacco: Never   Vaping Use    Vaping status: Never Used   Substance and Sexual Activity    Alcohol use: No     Comment: rarely    Drug use: No    Sexual activity: Not on file       CURRENT MEDICATIONS  Home Medications       Reviewed by Roger Bentley (Pharmacy Tech) on 07/13/25 at 1139  Med List Status: <None>     Medication Last Dose Status   amLODIPine (NORVASC) 5 MG Tab Unknown Active   digoxin (LANOXIN) 125 MCG Tab Unknown Active   DULoxetine (CYMBALTA) 60 MG Cap DR Particles delayed-release capsule Unknown Active   Home Care Oxygen  Active   hydroCHLOROthiazide (HYDRODIURIL) 25 MG Tab Unknown Active   metoprolol SR (TOPROL XL) 25 MG TABLET SR 24 HR Unknown Active   omeprazole (PRILOSEC) 40 MG delayed-release capsule Unknown Active   potassium chloride SA (KDUR) 20 MEQ Tab CR Unknown Active                    ALLERGIES  Allergies[1]    PHYSICAL EXAM  VITAL SIGNS: /58   Pulse 86   Temp 37.2 °C (98.9 °F) (Temporal)   Resp 20   Ht 1.702 m (5' 7\")   Wt 54 kg (119 lb)   SpO2 96%   BMI 18.64 kg/m²    Physical Exam  Constitutional:       Appearance: She is well-developed.   HENT:      Head: Normocephalic and atraumatic.   Eyes:      Pupils: Pupils are equal, round, and reactive to light.   Cardiovascular:      Rate and Rhythm: Normal rate and regular rhythm.   Pulmonary:      Effort: Pulmonary effort is normal. No accessory muscle usage or respiratory distress.      Breath sounds: Normal breath sounds.   Abdominal:      General: Bowel sounds are normal.      Palpations: Abdomen is soft. There is no mass.      Tenderness: There is no abdominal tenderness.   Musculoskeletal:         General: " Normal range of motion.   Skin:     General: Skin is warm.      Capillary Refill: Capillary refill takes less than 2 seconds.   Neurological:      General: No focal deficit present.      Mental Status: She is alert.   Psychiatric:         Mood and Affect: Mood normal. Mood is not anxious.           EKG/LABS  Results for orders placed or performed during the hospital encounter of 07/13/25   CBC WITH DIFFERENTIAL    Collection Time: 07/13/25 11:27 AM   Result Value Ref Range    WBC 8.9 4.8 - 10.8 K/uL    RBC 4.63 4.20 - 5.40 M/uL    Hemoglobin 10.4 (L) 12.0 - 16.0 g/dL    Hematocrit 33.5 (L) 37.0 - 47.0 %    MCV 72.4 (L) 81.4 - 97.8 fL    MCH 22.5 (L) 27.0 - 33.0 pg    MCHC 31.0 (L) 32.2 - 35.5 g/dL    RDW 46.0 35.9 - 50.0 fL    Platelet Count 146 (L) 164 - 446 K/uL    MPV 10.7 9.0 - 12.9 fL    Neutrophils-Polys 84.60 (H) 44.00 - 72.00 %    Lymphocytes 7.80 (L) 22.00 - 41.00 %    Monocytes 6.70 0.00 - 13.40 %    Eosinophils 0.10 0.00 - 6.90 %    Basophils 0.20 0.00 - 1.80 %    Immature Granulocytes 0.60 0.00 - 0.90 %    Nucleated RBC 0.00 0.00 - 0.20 /100 WBC    Neutrophils (Absolute) 7.51 (H) 1.82 - 7.42 K/uL    Lymphs (Absolute) 0.69 (L) 1.00 - 4.80 K/uL    Monos (Absolute) 0.59 0.00 - 0.85 K/uL    Eos (Absolute) 0.01 0.00 - 0.51 K/uL    Baso (Absolute) 0.02 0.00 - 0.12 K/uL    Immature Granulocytes (abs) 0.05 0.00 - 0.11 K/uL    NRBC (Absolute) 0.00 K/uL   CMP    Collection Time: 07/13/25 11:27 AM   Result Value Ref Range    Sodium 136 135 - 145 mmol/L    Potassium 3.2 (L) 3.6 - 5.5 mmol/L    Chloride 97 96 - 112 mmol/L    Co2 25 20 - 33 mmol/L    Anion Gap 14.0 7.0 - 16.0    Glucose 116 (H) 65 - 99 mg/dL    Bun 20 8 - 22 mg/dL    Creatinine 0.86 0.50 - 1.40 mg/dL    Calcium 9.0 8.5 - 10.5 mg/dL    Correct Calcium 9.2 8.5 - 10.5 mg/dL    AST(SGOT) 19 12 - 45 U/L    ALT(SGPT) 9 2 - 50 U/L    Alkaline Phosphatase 91 30 - 99 U/L    Total Bilirubin 0.7 0.1 - 1.5 mg/dL    Albumin 3.8 3.2 - 4.9 g/dL    Total Protein 6.9  6.0 - 8.2 g/dL    Globulin 3.1 1.9 - 3.5 g/dL    A-G Ratio 1.2 g/dL   ESTIMATED GFR    Collection Time: 07/13/25 11:27 AM   Result Value Ref Range    GFR (CKD-EPI) 65 >60 mL/min/1.73 m 2   URINALYSIS    Collection Time: 07/13/25 11:28 AM    Specimen: Blood   Result Value Ref Range    Color Yellow     Character Cloudy (A)     Specific Gravity 1.012 <1.035    Ph 6.0 5.0 - 8.0    Glucose Negative Negative mg/dL    Ketones Negative Negative mg/dL    Protein 30 (A) Negative mg/dL    Bilirubin Negative Negative    Urobilinogen, Urine 1.0 <=1.0 EU/dL    Nitrite Negative Negative    Leukocyte Esterase Large (A) Negative    Occult Blood Trace (A) Negative    Micro Urine Req Microscopic    URINE MICROSCOPIC (W/UA)    Collection Time: 07/13/25 11:28 AM   Result Value Ref Range    WBC >100 (A) /hpf    RBC 0-2 /hpf    Bacteria Many (A) None /hpf    Epithelial Cells 0-2 0 - 5 /hpf    Urine Casts 0-2 0 - 2 /lpf           COURSE & MEDICAL DECISION MAKING    ASSESSMENT, COURSE AND PLAN  Care Narrative:   Elderly patient here with reassuring exam.  Concern from family that she is not taking optimal care of herself.  Case management consulted who will help create a plan between us and family  Patient basic labs are unrevealing, urinalysis consistent with infection.  Will give fosfomycin as I believe that complicated UTI is unlikely here, she is afebrile and otherwise looks good  Patient had PT OT orders placed.  Case management is working on appropriate placement and coordinating with family.              Discussion of management with other [facility] or appropriate source(s): Case management, Ms Cordero Dwyer    Escalation of care considered, and ultimately not performed: CT head deferred, patient without any evidence of trauma, longstanding history of dementia and prior relatively similar behavior    Barriers to care at this time, including but not limited to: Patient lacks transportation .         FINAL DIAGNOSIS  1. Acute UTI   Referral to Skilled Nursing Facility      2. Frequent falls  Referral to Skilled Nursing Facility          Electronically signed by: Rodrigo Tamez M.D., 7/13/2025 11:42 AM           [1] No Known Allergies

## 2025-07-14 VITALS
RESPIRATION RATE: 16 BRPM | OXYGEN SATURATION: 95 % | BODY MASS INDEX: 18.68 KG/M2 | DIASTOLIC BLOOD PRESSURE: 60 MMHG | SYSTOLIC BLOOD PRESSURE: 127 MMHG | HEIGHT: 67 IN | WEIGHT: 119 LBS | HEART RATE: 67 BPM | TEMPERATURE: 98.9 F

## 2025-07-14 LAB
FERRITIN SERPL-MCNC: 89.4 NG/ML (ref 10–291)
GAMMA INTERFERON BACKGROUND BLD IA-ACNC: 0.28 IU/ML
M TB IFN-G BLD-IMP: NEGATIVE
M TB IFN-G CD4+ BCKGRND COR BLD-ACNC: 0.03 IU/ML
MITOGEN IGNF BCKGRD COR BLD-ACNC: 1.46 IU/ML
QFT TB2 - NIL TBQ2: 0.01 IU/ML

## 2025-07-14 PROCEDURE — 97535 SELF CARE MNGMENT TRAINING: CPT

## 2025-07-14 PROCEDURE — 97166 OT EVAL MOD COMPLEX 45 MIN: CPT

## 2025-07-14 PROCEDURE — 700102 HCHG RX REV CODE 250 W/ 637 OVERRIDE(OP): Performed by: HOSPITALIST

## 2025-07-14 PROCEDURE — A9270 NON-COVERED ITEM OR SERVICE: HCPCS | Performed by: HOSPITALIST

## 2025-07-14 PROCEDURE — 97163 PT EVAL HIGH COMPLEX 45 MIN: CPT

## 2025-07-14 RX ORDER — SODIUM CHLORIDE, SODIUM LACTATE, POTASSIUM CHLORIDE, AND CALCIUM CHLORIDE .6; .31; .03; .02 G/100ML; G/100ML; G/100ML; G/100ML
500 INJECTION, SOLUTION INTRAVENOUS ONCE
Status: DISCONTINUED | OUTPATIENT
Start: 2025-07-14 | End: 2025-07-14 | Stop reason: HOSPADM

## 2025-07-14 RX ADMIN — OMEPRAZOLE 40 MG: 20 CAPSULE, DELAYED RELEASE ORAL at 05:10

## 2025-07-14 RX ADMIN — HYDROCHLOROTHIAZIDE 25 MG: 25 TABLET ORAL at 05:09

## 2025-07-14 RX ADMIN — DULOXETINE 60 MG: 30 CAPSULE, DELAYED RELEASE ORAL at 05:08

## 2025-07-14 RX ADMIN — METOPROLOL SUCCINATE 25 MG: 25 TABLET, EXTENDED RELEASE ORAL at 05:10

## 2025-07-14 RX ADMIN — AMLODIPINE BESYLATE 5 MG: 5 TABLET ORAL at 05:09

## 2025-07-14 RX ADMIN — DIGOXIN 125 MCG: 0.12 TABLET ORAL at 05:10

## 2025-07-14 RX ADMIN — POTASSIUM CHLORIDE 20 MEQ: 1500 TABLET, EXTENDED RELEASE ORAL at 05:09

## 2025-07-14 ASSESSMENT — GAIT ASSESSMENTS
GAIT LEVEL OF ASSIST: MINIMAL ASSIST
ASSISTIVE DEVICE: FRONT WHEEL WALKER
DISTANCE (FEET): 10

## 2025-07-14 ASSESSMENT — COGNITIVE AND FUNCTIONAL STATUS - GENERAL
MOVING TO AND FROM BED TO CHAIR: A LITTLE
WALKING IN HOSPITAL ROOM: A LITTLE
DAILY ACTIVITIY SCORE: 16
DRESSING REGULAR LOWER BODY CLOTHING: A LOT
TOILETING: A LOT
HELP NEEDED FOR BATHING: A LOT
STANDING UP FROM CHAIR USING ARMS: A LITTLE
SUGGESTED CMS G CODE MODIFIER MOBILITY: CK
PERSONAL GROOMING: A LITTLE
SUGGESTED CMS G CODE MODIFIER DAILY ACTIVITY: CK
MOBILITY SCORE: 17
TURNING FROM BACK TO SIDE WHILE IN FLAT BAD: A LITTLE
MOVING FROM LYING ON BACK TO SITTING ON SIDE OF FLAT BED: A LITTLE
CLIMB 3 TO 5 STEPS WITH RAILING: A LOT
DRESSING REGULAR UPPER BODY CLOTHING: A LITTLE

## 2025-07-14 NOTE — DISCHARGE PLANNING
Anticipated Discharge Disposition: [facility]    Action: Spoke with Daughter Tequila Reyes and pt. They are agreeable with transfer to [facility] for strengthening and rehab short term. Sindhu Cooper would like pt 1:30 or later. COBRA done. Will send by T wheelchair. ERP and Hospitalist updated. Receiving MD Dr Leung.Chart copy sent with pt.  No RX schedule meds noted, Reservation [id number]*1 Cost 147.55 ETA 3:00 to 3:00 Approval from [facility] Will Fax Med rec advance to  attn [facility].     Barriers to Discharge: Transport  ETA 3-3:30     Plan: Will cont to follow.

## 2025-07-14 NOTE — THERAPY
Physical Therapy   Initial Evaluation     Patient Name:  Lindsay Vargas  Age:  84 y.o., Sex:  female  Medical Record #:  [id number]  Today's Date: 7/14/2025     Precautions  Medical: (P) Fall Risk    Assessment  Patient is 84 y.o. female with a diagnosis of  failure to thrive.  Pt seen in setting of ED room 6.   Pt oriented to self, pleasantly confused but able to follow mobility commands.   Pt presenting with unsteady gait with use of FWW. Pt currently requires Min A to ambulate for 10 ft in ED.   Pt appears have potential to improve her functional mobility, recommend post acute placement upon discharge.           Plan    Physical Therapy Initial Treatment Plan   Treatment Plan : (P) Gait Training, Neuro Re-Education / Balance  Treatment Frequency: (P) 3 Times per Week  Duration: (P) Until Therapy Goals Met    DC Equipment Recommendations: (P) Unable to determine at this time  Discharge Recommendations: (P) Recommend post-acute placement for additional physical therapy services prior to discharge home         Initial Contact Note    Initial Contact Note Order Received and Verified, Physical Therapy Evaluation in Progress with Full Report to Follow.   Precautions   Medical Fall Risk   Vitals   Pulse 81   Blood Pressure  132/58   Pulse Oximetry 96 %   Pain   Intervention Declines   Prior Living Situation   Comments pt is a poor historian, unable to supply reliable living situation or level of function   Prior Level of Functional Mobility   Comments pt is a poor historian, unable to supply reliable living situation or level of function   Cognition    Cognition / Consciousness X   Orientation Level   (oriented to self f)   Safety Awareness Impaired   New Learning Impaired   Comments pleasant, able to follow commands   Active ROM Lower Body    Active ROM Lower Body  WDL   Strength Lower Body   Lower Body Strength  X   Gross Strength Generalized Weakness, Equal Bilaterally   Balance Assessment   Sitting Balance (Static)  Fair +   Sitting Balance (Dynamic) Fair   Standing Balance (Static) Fair   Standing Balance (Dynamic) Fair -   Weight Shift Sitting Fair   Weight Shift Standing Fair   Comments w/FWW   Bed Mobility    Supine to Sit Standby Assist   Sit to Supine Standby Assist   Scooting Standby Assist   Gait Analysis   Gait Level Of Assist Minimal Assist   Assistive Device Front Wheel Walker   Distance (Feet) 10   # of Times Distance was Traveled 1   Deviation Decreased Toe Off;Decreased Heel Strike;Shuffled Gait;Bradykinetic;Step To   Weight Bearing Status no restrictions.   Comments manual assistance for FWW guidance.   Functional Mobility   Sit to Stand Contact Guard Assist   Bed, Chair, Wheelchair Transfer Contact Guard Assist   6 Clicks Assessment - How much HELP from another person do you currently need... (If the patient hasn't done an activity recently, how much help from another person do you think he/she would need if he/she tried?)   Turning from your back to your side while in a flat bed without using bedrails? 3   Moving from lying on your back to sitting on the side of a flat bed without using bedrails? 3   Moving to and from a bed to a chair (including a wheelchair)? 3   Standing up from a chair using your arms (e.g., wheelchair, or bedside chair)? 3   Walking in hospital room? 3   Climbing 3-5 steps with a railing? 2   6 clicks Mobility Score 17   Patient / Family Goals    Patient / Family Goal #1 home with caregivers   Short Term Goals    Short Term Goal # 1 Pt will ambulate with FWW for 50 ft by tx 6   Short Term Goal # 2 Pt will transfer with FWW at S level by tx 6   Education Group   Education Provided Role of Physical Therapist   Role of Physical Therapist Patient Response Patient;Acceptance;Explanation;Reinforcement Needed   Physical Therapy Initial Treatment Plan    Treatment Plan  Gait Training;Neuro Re-Education / Balance   Treatment Frequency 3 Times per Week   Duration Until Therapy Goals Met   Problem  List    Problems Impaired Bed Mobility;Impaired Ambulation;Impaired Transfers;Functional Strength Deficit;Impaired Balance;Decreased Activity Tolerance   Anticipated Discharge Equipment and Recommendations   DC Equipment Recommendations Unable to determine at this time   Discharge Recommendations Recommend post-acute placement for additional physical therapy services prior to discharge home   Interdisciplinary Plan of Care Collaboration   IDT Collaboration with  Occupational Therapist   Patient Position at End of Therapy In Bed;Tray Table within Reach;Call Light within Reach;Bed Alarm On  (ED room 6)   Collaboration Comments staff updated.   Session Information   Date / Session Number  7/14- 1 ( 1/3,7/20)

## 2025-07-14 NOTE — ASSESSMENT & PLAN NOTE
The patient has a history of essential hypertension for which she is on metoprolol hydrochlorothiazide and amlodipine which I will continue.

## 2025-07-14 NOTE — DISCHARGE PLANNING
Anticipated Discharge Disposition: SNF [facility]    Action: Spoke with pt at bedside. She is agreeable with plan to go to [facility]. She is aware of goal of therapy. AOX2. Staff at bedside. No o2. Wheelchair transport    Barriers to Discharge: Transport at 3 pm NEED DC SUMMARY    Plan: cont to follow

## 2025-07-14 NOTE — DISCHARGE PLANNING
ER CM met with daughter Tequila at bedside. Provided [facility] Resources and reveiwed in detail. Discussed Memory units, Group homes,  , Attendant care list,  Medicaid applications for GH provided. She is given list of SNF that blanket referral was sent to and she is agreeable with this plan if eval confirms needs. If not she is agreeable with HH OT PT RN CHIARA CAI. Will follow in and post eval. Anticipate eval for PT OT SNF STR short term stay and then home with [facility]. She is followed by family support of good number of family. Dtr was out of town on vacation til yesterday.  She just got back from [place]. Grandson is helpful. She has a small home near them 1 story with step in and key pad on door.  [facility] in past.   Care Transition Team Assessment    Information Source  Orientation Level: Unable to assess (sleeping.)  Information Given By: Patient  Informant's Name: Tequila  Who is responsible for making decisions for patient? : Patient              Interdisciplinary Discharge Planning  Primary Care Physician: Lenny Darby  Lives with - Patient's Self Care Capacity: Adult Children  Support Systems: Children  Housing / Facility: 1 Story House  Do You Take your Prescribed Medications Regularly: Yes  Able to Return to Previous ADL's: Future Time w/Therapy  Mobility Issues: Yes  Prior Services: Skilled Home Health Services    Discharge Preparedness  What is your plan after discharge?: Skilled nursing facility  What are your discharge supports?: Child  Prior Functional Level: Ambulatory, Independent with Activities of Daily Living, Independent with Medication Management    Functional Assesment  Prior Functional Level: Ambulatory, Independent with Activities of Daily Living, Independent with Medication Management    Finances  Prescription Coverage: Yes                                  Anticipated Discharge Information  Discharge Disposition: D/T to SNF with Medicare cert in anticipation of skilled  care (03)

## 2025-07-14 NOTE — DISCHARGE PLANNING
Anticipated Discharge Disposition: SNf vs HH     Action: ER CM voalte with Therapy team aware of eval, anticipate later today. SNF review a few poss accepting. Will follow w SCP liasion after eval for approval at approp level and with family for needs and dc plans    Barriers to Discharge: Eval pending    Plan: Cont to follow

## 2025-07-14 NOTE — THERAPY
Occupational Therapy   Initial Evaluation     Patient Name:  Lindsay Vargas  Age:  84 y.o., Sex:  female  Medical Record #:  [id number]  Today's Date:  7/14/2025     Precautions  Medical: (P) Fall Risk    Assessment  Patient is 84 y.o. female who presented to ED w/ inability to care for self and failure to thrive.  Additional factors influencing patient status / progress: Impaired balance, limited activity tolerance, impaired safety awareness, generalized weakness.      Pt is a poor historian - Unable to determine PLS (Previous Living Situation)/PLOF (Previous Level of Function)    Pt demonstrated SBA bed mobility, CGA sit/stand, CGA FWW, Min assist toilet transfer, Max assist LBCM, Min assist UBC<.. therapist reviewed environmental/safety awareness, fall precautions AE/DME, ADL's and transfers.      Plan    Occupational Therapy Initial Treatment Plan   Treatment Interventions: (P) Self Care / Activities of Daily Living, Adaptive Equipment, Neuro Re-Education / Balance, Therapeutic Exercises, Therapeutic Activity, Family / Caregiver Training  Treatment Frequency: (P) 3 Times per Week  Duration: (P) Until Therapy Goals Met    DC Equipment Recommendations: (P) Unable to determine at this time  Discharge Recommendations: (P) Recommend post-acute placement for additional occupational therapy services prior to discharge home     Subjective    Pt was alert and cooperative w/ tx.       Objective       07/14/25 1015    Services   Is patient using  services for this encounter? No   Initial Contact Note    Initial Contact Note Order Received and Verified, Occupational Therapy Evaluation in Progress with Full Report to Follow.   Prior Living Situation   Comments Pt is a poor historian - Unable to determine PLS/PLOF.   Prior Level of ADL Function   Comments Pt is a poor historian - Unable to determine PLS/PLOF.   Prior Level of IADL Function   Comments Pt is a poor historian - Unable to determine PLS/PLOF.    Precautions   Medical Fall Risk   Pain   Intervention Declines   Cognition    Speech/ Communication Hard of Hearing   Safety Awareness Impaired   New Learning Impaired   Balance Assessment   Sitting Balance (Static) Fair +   Sitting Balance (Dynamic) Fair   Standing Balance (Static) Fair   Standing Balance (Dynamic) Fair -   Weight Shift Sitting Fair   Weight Shift Standing Fair   Comments OOB FWW   Bed Mobility    Supine to Sit Standby Assist   Sit to Supine Standby Assist   ADL Assessment   Upper Body Dressing Minimal Assist   Lower Body Dressing Maximal Assist   Toileting Moderate Assist   How much help from another person does the patient currently need...   Putting on and taking off regular lower body clothing? 2   Bathing (including washing, rinsing, and drying)? 2   Toileting, which includes using a toilet, bedpan, or urinal? 2   Putting on and taking off regular upper body clothing? 3   Taking care of personal grooming such as brushing teeth? 3   Eating meals? 4   6 Clicks Daily Activity Score 16   Functional Mobility   Sit to Stand Contact Guard Assist   Bed, Chair, Wheelchair Transfer Contact Guard Assist   Toilet Transfers Minimal Assist   Transfer Method Stand Step   Mobility CGA FWW   Edema / Skin Assessment   Edema / Skin  Not Assessed   Short Term Goals   Short Term Goal # 1 Mod I bed mobility (to/from supine/EOB sitting)   Short Term Goal # 2 Mod I UBCM   Short Term Goal # 3 Min assist LBCM   Education Group   Education Provided Role of Occupational Therapist;Activities of Daily Living;Use of Call Light;Transfers   Role of Occupational Therapist Patient Response Patient;Acceptance;Explanation;Verbal Demonstration   Transfers Patient Response Patient;Acceptance;Explanation;Demonstration;Teach Back;Verbal Demonstration;Action Demonstration;Reinforcement Needed   ADL Patient Response Patient;Acceptance;Explanation;Demonstration;Teach Back;Verbal Demonstration;Action Demonstration;Reinforcement Needed    Use of Call Light Patient Response Patient;Acceptance;Explanation;Demonstration;Verbal Demonstration   Occupational Therapy Initial Treatment Plan    Treatment Interventions Self Care / Activities of Daily Living;Adaptive Equipment;Neuro Re-Education / Balance;Therapeutic Exercises;Therapeutic Activity;Family / Caregiver Training   Treatment Frequency 3 Times per Week   Duration Until Therapy Goals Met   Problem List   Problem List Decreased Active Daily Living Skills;Decreased Functional Mobility;Decreased Activity Tolerance;Safety Awareness Deficits / Cognition;Impaired Postural Control / Balance   Anticipated Discharge Equipment and Recommendations   DC Equipment Recommendations Unable to determine at this time   Discharge Recommendations Recommend post-acute placement for additional occupational therapy services prior to discharge home

## 2025-07-14 NOTE — ASSESSMENT & PLAN NOTE
Significant mitral regurgitation that has intensified over time     PAST SURGICAL HISTORY:  H/O:      History of bariatric surgery Gastric sleeve    History of D&C

## 2025-07-14 NOTE — CONSULTS
Hospital Medicine Consultation    Date of Service  7/13/2025    Referring Physician  Rodrigo Tamez M.D.    Consulting Physician  Abner Bettencourt M.D.    Reason for Consultation  Medical evaluation for possible admission    History of Presenting Illness  84 y.o. female who presented 7/13/2025 with failure to thrive.  Apparently the patient over the past couple of weeks has significantly deteriorated to the point where she is not able to take care of herself so family brought her into the emergency room after they took a trip to [place].  Patient at this point is not feeding herself, not grooming herself not cleaning up after herself.  Patient at this point will need placement.  Currently patient does not have any medical issues that warrant admission.  She does have chronic issues which I will continue medical management for.    Review of Systems  Review of Systems   Unable to perform ROS: Mental acuity       Past Medical History   has a past medical history of Anesthesia, Arthritis, Cataract, Chronic pain, Hiatus hernia syndrome, Hypertension, Lymphedema (2014), Migraine headache, Pain, and Pneumonia.    Surgical History   has a past surgical history that includes hysterectomy laparoscopy; chely by laparoscopy; other abdominal surgery; other abdominal surgery; other; other orthopedic surgery; cataract phaco with iol (Left, 2/4/2016); cataract phaco with iol (Right, 2/18/2016); pr inj lumbar/sacral,w/wo cntrst (Right, 9/29/2016); pr fluorscopic guidance spinal injection (9/29/2016); pr inj lumbar/sacral,w/wo cntrst (N/A, 11/10/2016); and pr fluorscopic guidance spinal injection (11/10/2016).    Family History  Family History   Problem Relation Age of Onset    Heart Disease Mother     Heart Disease Father        Social History   reports that she has never smoked. She has never used smokeless tobacco. She reports that she does not drink alcohol and does not use drugs.    Medications  Prior to Admission Medications    Prescriptions Last Dose Informant Patient Reported? Taking?   DULoxetine (CYMBALTA) 60 MG Cap DR Particles delayed-release capsule Unknown Patient's Home Pharmacy No No   Sig: Take 1 Cap by mouth every day.   Home Care Oxygen  Patient's Home Pharmacy Yes No   Sig: Inhale 2.5 L/min as needed for Shortness of Breath (shortness of breath). Oxygen dose range: 2.5 L/min  Respiratory route via: Nasal Cannula   Oxygen supplier: [facility]      Indications: shortness of breath   amLODIPine (NORVASC) 5 MG Tab Unknown Patient's Home Pharmacy Yes No   Sig: Take 5 mg by mouth every day. Indications: High Blood Pressure Disorder   digoxin (LANOXIN) 125 MCG Tab Unknown Patient's Home Pharmacy Yes No   Sig: Take 125 mcg by mouth every day.   hydroCHLOROthiazide (HYDRODIURIL) 25 MG Tab Unknown Patient's Home Pharmacy Yes No   Sig: Take 25 mg by mouth 2 times a day. Indications: High Blood Pressure Disorder   metoprolol SR (TOPROL XL) 25 MG TABLET SR 24 HR Unknown Patient's Home Pharmacy Yes No   Sig: Take 25 mg by mouth every day. Indications: High Blood Pressure Disorder   omeprazole (PRILOSEC) 40 MG delayed-release capsule Unknown Patient's Home Pharmacy Yes No   Sig: Take 40 mg by mouth every day. Indications: Gastroesophageal Reflux Disease   potassium chloride SA (KDUR) 20 MEQ Tab CR Unknown Patient's Home Pharmacy Yes No   Sig: Take 20 mEq by mouth every day. Indications: High Blood Pressure Disorder      Facility-Administered Medications: None       Allergies  Allergies[1]    Physical Exam  Temp:  [37.2 °C (98.9 °F)] 37.2 °C (98.9 °F)  Pulse:  [84-95] 90  Resp:  [14-20] 14  BP: (127-157)/(58-69) 147/63  SpO2:  [94 %-100 %] 94 %    Physical Exam  Vitals and nursing note reviewed. Exam conducted with a chaperone present.   Constitutional:       General: She is awake.      Appearance: Normal appearance. She is well-developed and well-groomed. She is cachectic. She is ill-appearing.   HENT:      Head:  Normocephalic and atraumatic.      Jaw: There is normal jaw occlusion. No trismus.      Salivary Glands: Right salivary gland is not tender. Left salivary gland is not tender.      Right Ear: External ear normal.      Left Ear: External ear normal.      Nose: Nose normal.      Mouth/Throat:      Mouth: Mucous membranes are dry.      Pharynx: Oropharynx is clear.   Eyes:      General: Lids are normal. Vision grossly intact.      Extraocular Movements: Extraocular movements intact.      Conjunctiva/sclera: Conjunctivae normal.      Right eye: Right conjunctiva is not injected. No exudate.     Left eye: Left conjunctiva is not injected. No exudate.     Pupils: Pupils are equal, round, and reactive to light.   Neck:      Thyroid: No thyroid mass.      Vascular: No carotid bruit, hepatojugular reflux or JVD.      Trachea: No abnormal tracheal secretions or tracheal deviation.   Cardiovascular:      Rate and Rhythm: Normal rate and regular rhythm. Occasional Extrasystoles are present.     Pulses: Normal pulses.      Heart sounds: Murmur heard.      Systolic murmur is present with a grade of 4/6.      No friction rub.   Pulmonary:      Effort: Pulmonary effort is normal.      Breath sounds: Examination of the right-lower field reveals decreased breath sounds. Examination of the left-lower field reveals decreased breath sounds. Decreased breath sounds present. No wheezing or rhonchi.   Abdominal:      General: Abdomen is flat.      Palpations: Abdomen is soft.      Tenderness: There is no abdominal tenderness. There is no right CVA tenderness or left CVA tenderness.      Hernia: No hernia is present.   Musculoskeletal:         General: Normal range of motion.      Cervical back: Full passive range of motion without pain, normal range of motion and neck supple. No rigidity. No muscular tenderness.      Right lower leg: No edema.      Left lower leg: No edema.   Lymphadenopathy:      Head:      Right side of head: No  submental adenopathy.      Left side of head: No submental adenopathy.      Cervical:      Right cervical: No superficial cervical adenopathy.     Left cervical: No superficial cervical adenopathy.      Upper Body:      Right upper body: No supraclavicular adenopathy.      Left upper body: No supraclavicular adenopathy.   Skin:     General: Skin is warm and dry.      Capillary Refill: Capillary refill takes less than 2 seconds.      Coloration: Skin is not cyanotic or pale.      Findings: No abrasion or bruising.   Neurological:      General: No focal deficit present.      Mental Status: She is alert and oriented to person, place, and time. Mental status is at baseline.      GCS: GCS eye subscore is 4. GCS verbal subscore is 5. GCS motor subscore is 6.      Cranial Nerves: No cranial nerve deficit.      Sensory: No sensory deficit.      Motor: Motor function is intact.      Deep Tendon Reflexes:      Reflex Scores:       Tricep reflexes are 2+ on the right side and 2+ on the left side.       Bicep reflexes are 2+ on the right side and 2+ on the left side.       Brachioradialis reflexes are 2+ on the right side and 2+ on the left side.       Patellar reflexes are 2+ on the right side and 2+ on the left side.       Achilles reflexes are 2+ on the right side and 2+ on the left side.  Psychiatric:         Attention and Perception: Perception normal. She is inattentive.         Mood and Affect: Mood normal. Affect is flat.         Speech: Speech is delayed.         Behavior: Behavior normal. Behavior is cooperative.         Thought Content: Thought content normal.         Cognition and Memory: Cognition is impaired. Memory is impaired.         Judgment: Judgment normal.         Fluids      Laboratory  Recent Labs     07/13/25  1127   WBC 8.9   RBC 4.63   HEMOGLOBIN 10.4*   HEMATOCRIT 33.5*   MCV 72.4*   MCH 22.5*   MCHC 31.0*   RDW 46.0   PLATELETCT 146*   MPV 10.7     Recent Labs     07/13/25  1127   SODIUM 136    POTASSIUM 3.2*   CHLORIDE 97   CO2 25   GLUCOSE 116*   BUN 20   CREATININE 0.86   CALCIUM 9.0                     Imaging  No orders to display       Assessment/Plan  * Failure to thrive in adult- (present on admission)  Assessment & Plan  Daughter brought the patient in as they just returned from [place] from a cruise.  Patient was with caretakers when they were away.  They found that the patient yesterday was sitting outside in the 100 degree weather continuously.  Patient apparently has not been taking care of herself.  She has not been feeding herself and she has neglected her mildly maintenance.  Daughter tells me that she feels that the patient's mentation has declined.  Upon evaluating the patient she herself does not know why she is in the hospital or what is going on.  She was able to tell me her name and that she is in the hospital.  She is not able to tell me more than this but she was able to follow instructions but is not able to engage in higher level conversation.  Patient would benefit from psychiatric evaluation and medication management.  Patient will most likely need placement.  Currently patient does not have any medical issues that would warrant admission.    Thrombocytopenia (HCC)- (present on admission)  Assessment & Plan  Patient has mild thrombocytopenia at 146.  Currently no transfusion necessary and I will continue monitoring platelet counts as needed    Essential hypertension- (present on admission)  Assessment & Plan  The patient has a history of essential hypertension for which she is on metoprolol hydrochlorothiazide and amlodipine which I will continue.    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    Frequent falls- (present on admission)  Assessment & Plan  Patient apparently has frequent falls  She will need PT OT  evaluation  Patient would benefit from going to a place where she is supervised with her activities    Hypokalemia- (present on admission)  Assessment & Plan  Mild hypokalemia at 3.2  I will give her potassium supplementation 20 mEq 3 times daily    Microcytic anemia- (present on admission)  Assessment & Plan  Microcytic anemia that is chronic  Will check an iron panel    Mitral regurgitation- (present on admission)  Assessment & Plan  Significant mitral regurgitation that has intensified over time      Chronic respiratory failure (HCC)- (present on admission)  Assessment & Plan  Patient has a history of chronic respiratory failure currently does not use oxygen      Consultation lasted 60 minutes.  45 minutes was spent face-to-face with patient and daughter at bedside discussing plan of care and evaluating the patient.  10 minutes were spent on reviewing the chart.  5 minutes were spent discussing with case management about possible discharge options.         [1] No Known Allergies

## 2025-07-14 NOTE — DISCHARGE SUMMARY
"  ED Observation Discharge Summary    Patient:Lindsay Vargas  Patient : 1940  Patient MRN: [id number]  Patient PCP: Lenny RENTERIA M.D.    Admit Date: 2025  Discharge Date and Time: 25 1:51 PM  Discharge Diagnosis:   1. Acute UTI  Referral to Skilled Nursing Facility      2. Frequent falls  Referral to Skilled Nursing Facility          Discharge Attending: Rodrigo Tamez M.D.  Discharge Service: ED Observation    ED Course  Lindsay is a 84 y.o. female who was evaluated at [facility] as she was having difficulty taking care of herself.  Wandering around and not preparing meals for herself.  She had workup, was found to have a urinary tract infection, she was treated with fosfomycin for this.  Patient valuated by PT OT.  Patient was discharged to appropriate facility for her needs    Discharge Exam:  BP (!) 145/65   Pulse 73   Temp 37.2 °C (98.9 °F) (Temporal)   Resp 16   Ht 1.702 m (5' 7\")   Wt 54 kg (119 lb)   SpO2 95%   BMI 18.64 kg/m² .    Constitutional: Awake and alert. Nontoxic  HENT:  Grossly normal  Eyes: Grossly normal  Neck: Normal range of motion  Cardiovascular: Normal heart rate   Thorax & Lungs: No respiratory distress  Abdomen: Nontender  Skin:  No pathologic rash.   Extremities: Well perfused  Psychiatric: Affect normal    Labs  Results for orders placed or performed during the hospital encounter of 25   CBC WITH DIFFERENTIAL    Collection Time: 25 11:27 AM   Result Value Ref Range    WBC 8.9 4.8 - 10.8 K/uL    RBC 4.63 4.20 - 5.40 M/uL    Hemoglobin 10.4 (L) 12.0 - 16.0 g/dL    Hematocrit 33.5 (L) 37.0 - 47.0 %    MCV 72.4 (L) 81.4 - 97.8 fL    MCH 22.5 (L) 27.0 - 33.0 pg    MCHC 31.0 (L) 32.2 - 35.5 g/dL    RDW 46.0 35.9 - 50.0 fL    Platelet Count 146 (L) 164 - 446 K/uL    MPV 10.7 9.0 - 12.9 fL    Neutrophils-Polys 84.60 (H) 44.00 - 72.00 %    Lymphocytes 7.80 (L) 22.00 - 41.00 %    Monocytes 6.70 0.00 - 13.40 %    Eosinophils 0.10 0.00 - 6.90 %    " Basophils 0.20 0.00 - 1.80 %    Immature Granulocytes 0.60 0.00 - 0.90 %    Nucleated RBC 0.00 0.00 - 0.20 /100 WBC    Neutrophils (Absolute) 7.51 (H) 1.82 - 7.42 K/uL    Lymphs (Absolute) 0.69 (L) 1.00 - 4.80 K/uL    Monos (Absolute) 0.59 0.00 - 0.85 K/uL    Eos (Absolute) 0.01 0.00 - 0.51 K/uL    Baso (Absolute) 0.02 0.00 - 0.12 K/uL    Immature Granulocytes (abs) 0.05 0.00 - 0.11 K/uL    NRBC (Absolute) 0.00 K/uL   CMP    Collection Time: 07/13/25 11:27 AM   Result Value Ref Range    Sodium 136 135 - 145 mmol/L    Potassium 3.2 (L) 3.6 - 5.5 mmol/L    Chloride 97 96 - 112 mmol/L    Co2 25 20 - 33 mmol/L    Anion Gap 14.0 7.0 - 16.0    Glucose 116 (H) 65 - 99 mg/dL    Bun 20 8 - 22 mg/dL    Creatinine 0.86 0.50 - 1.40 mg/dL    Calcium 9.0 8.5 - 10.5 mg/dL    Correct Calcium 9.2 8.5 - 10.5 mg/dL    AST(SGOT) 19 12 - 45 U/L    ALT(SGPT) 9 2 - 50 U/L    Alkaline Phosphatase 91 30 - 99 U/L    Total Bilirubin 0.7 0.1 - 1.5 mg/dL    Albumin 3.8 3.2 - 4.9 g/dL    Total Protein 6.9 6.0 - 8.2 g/dL    Globulin 3.1 1.9 - 3.5 g/dL    A-G Ratio 1.2 g/dL   ESTIMATED GFR    Collection Time: 07/13/25 11:27 AM   Result Value Ref Range    GFR (CKD-EPI) 65 >60 mL/min/1.73 m 2   FERRITIN    Collection Time: 07/13/25 11:27 AM   Result Value Ref Range    Ferritin 89.4 10.0 - 291.0 ng/mL   IRON/TOTAL IRON BIND    Collection Time: 07/13/25 11:27 AM   Result Value Ref Range    Iron 14 (L) 40 - 170 ug/dL    Total Iron Binding 344 250 - 450 ug/dL    Unsat Iron Binding 330 110 - 370 ug/dL    % Saturation 4 (L) 15 - 55 %   RETICULOCYTES COUNT    Collection Time: 07/13/25 11:27 AM   Result Value Ref Range    Reticulocyte Count 1.4 0.8 - 2.6 %    Retic, Absolute 0.07 0.04 - 0.12 M/uL    Imm. Reticulocyte Fraction 8.6 2.6 - 16.1 %    Retic Hgb Equivalent 25.2 (L) 29.0 - 35.0 pg/cell   URINALYSIS    Collection Time: 07/13/25 11:28 AM    Specimen: Blood   Result Value Ref Range    Color Yellow     Character Cloudy (A)     Specific Gravity 1.012  <1.035    Ph 6.0 5.0 - 8.0    Glucose Negative Negative mg/dL    Ketones Negative Negative mg/dL    Protein 30 (A) Negative mg/dL    Bilirubin Negative Negative    Urobilinogen, Urine 1.0 <=1.0 EU/dL    Nitrite Negative Negative    Leukocyte Esterase Large (A) Negative    Occult Blood Trace (A) Negative    Micro Urine Req Microscopic    URINE MICROSCOPIC (W/UA)    Collection Time: 07/13/25 11:28 AM   Result Value Ref Range    WBC >100 (A) /hpf    RBC 0-2 /hpf    Bacteria Many (A) None /hpf    Epithelial Cells 0-2 0 - 5 /hpf    Urine Casts 0-2 0 - 2 /lpf       Radiology  No orders to display       Medications:   New Prescriptions    No medications on file       My final assessment includes   1. Acute UTI  Referral to Skilled Nursing Facility      2. Frequent falls  Referral to Skilled Nursing Facility          Upon Reevaluation, the patient's condition has: Improved; and will be discharged.    Patient discharged from ED Observation status at 1:52 PM (Time) 07/14/25 (Date).     Total time spent on this ED Observation discharge encounter is < 30 Minutes    Electronically signed by: Rodrigo Tamez M.D., 7/14/2025 1:51 PM

## 2025-07-14 NOTE — ED NOTES
Pt picked up by GMT to go to [facility].   Packet provided to T for facility.   Clean gown and depends put on.

## 2025-07-14 NOTE — ED NOTES
"Patient's home medications have been reviewed by the pharmacy team.     Past Medical History[1]    Patient's Medications   New Prescriptions    No medications on file   Previous Medications    AMLODIPINE (NORVASC) 5 MG TAB    Take 5 mg by mouth every day. Indications: High Blood Pressure Disorder    DIGOXIN (LANOXIN) 125 MCG TAB    Take 125 mcg by mouth every day.    DULOXETINE (CYMBALTA) 60 MG CAP DR PARTICLES DELAYED-RELEASE CAPSULE    Take 1 Cap by mouth every day.    HOME CARE OXYGEN    Inhale 2.5 L/min as needed for Shortness of Breath (shortness of breath). Oxygen dose range: 2.5 L/min  Respiratory route via: Nasal Cannula   Oxygen supplier: [facility]      Indications: shortness of breath    HYDROCHLOROTHIAZIDE (HYDRODIURIL) 25 MG TAB    Take 25 mg by mouth 2 times a day. Indications: High Blood Pressure Disorder    METOPROLOL SR (TOPROL XL) 25 MG TABLET SR 24 HR    Take 25 mg by mouth every day. Indications: High Blood Pressure Disorder    OMEPRAZOLE (PRILOSEC) 40 MG DELAYED-RELEASE CAPSULE    Take 40 mg by mouth every day. Indications: Gastroesophageal Reflux Disease    POTASSIUM CHLORIDE SA (KDUR) 20 MEQ TAB CR    Take 20 mEq by mouth every day. Indications: High Blood Pressure Disorder   Modified Medications    No medications on file   Discontinued Medications    No medications on file     A: Medications do not appear to be contributing to current complaints.     P: Home medications have been reordered as appropriate.    Rasheeda Lizama, PharmD, BCPS, BCEMP                 [1]   Past Medical History:  Diagnosis Date    Anesthesia     confused/hard to wake up-15 years ago    Arthritis     Cataract     Bilat IOL    Chronic pain     Hiatus hernia syndrome     surgically repaired    Hypertension     Lymphedema 2014    Migraine headache     Pain     back    Pneumonia     \"younger\"     "

## 2025-07-14 NOTE — ED NOTES
Pt cleaned of urine and stool. Sacrum noted to be red but blanching, wedge used to turn pt to the right side and one placed under knee to take weight off heels. ER tech to assist pt with lunch at this time.

## 2025-07-14 NOTE — ASSESSMENT & PLAN NOTE
Patient has significant cognitive deficits although she has never been diagnosed with dementia the patient does appear to have a declining dementia.

## 2025-07-14 NOTE — ASSESSMENT & PLAN NOTE
Patient has mild thrombocytopenia at 146.  Currently no transfusion necessary and I will continue monitoring platelet counts as needed

## 2025-07-14 NOTE — ED NOTES
Am labs drawn and sent. Pt drank 2 cups of water approx 400ml. Pt ate a chocalate pudding and an applesauce after am meds given. Warm blankets provided pt states she is comfortable at this time

## 2025-07-18 ENCOUNTER — TELEPHONE (OUTPATIENT)
Dept: HEALTH INFORMATION MANAGEMENT | Facility: OTHER | Age: 85
End: 2025-07-18
Payer: MEDICARE

## 2025-07-22 ENCOUNTER — HOSPITAL ENCOUNTER (OUTPATIENT)
Facility: MEDICAL CENTER | Age: 85
End: 2025-07-22
Attending: FAMILY MEDICINE
Payer: MEDICARE

## 2025-07-22 LAB — DIGOXIN SERPL-MCNC: 1.3 NG/ML (ref 0.8–2)

## 2025-07-22 PROCEDURE — 80162 ASSAY OF DIGOXIN TOTAL: CPT

## 2025-08-03 ENCOUNTER — TELEPHONE (OUTPATIENT)
Dept: HOME HEALTH SERVICES | Facility: HOME HEALTHCARE | Age: 85
End: 2025-08-03
Payer: MEDICARE

## 2025-08-05 ENCOUNTER — TELEPHONE (OUTPATIENT)
Dept: HOME HEALTH SERVICES | Facility: HOME HEALTHCARE | Age: 85
End: 2025-08-05
Payer: MEDICARE

## 2025-08-06 ENCOUNTER — TELEPHONE (OUTPATIENT)
Dept: HOME HEALTH SERVICES | Facility: HOME HEALTHCARE | Age: 85
End: 2025-08-06
Payer: MEDICARE

## 2025-08-07 ENCOUNTER — HOME CARE VISIT (OUTPATIENT)
Dept: HOME HEALTH SERVICES | Facility: HOME HEALTHCARE | Age: 85
End: 2025-08-07
Payer: MEDICARE

## 2025-08-07 ENCOUNTER — DOCUMENTATION (OUTPATIENT)
Dept: MEDICAL GROUP | Facility: PHYSICIAN GROUP | Age: 85
End: 2025-08-07
Payer: MEDICARE

## 2025-08-07 VITALS
TEMPERATURE: 97.4 F | OXYGEN SATURATION: 96 % | HEART RATE: 77 BPM | RESPIRATION RATE: 16 BRPM | SYSTOLIC BLOOD PRESSURE: 118 MMHG | DIASTOLIC BLOOD PRESSURE: 60 MMHG

## 2025-08-07 PROCEDURE — 665998 HH PPS REVENUE CREDIT

## 2025-08-07 PROCEDURE — 665001 SOC-HOME HEALTH

## 2025-08-07 PROCEDURE — G0299 HHS/HOSPICE OF RN EA 15 MIN: HCPCS

## 2025-08-07 PROCEDURE — 665999 HH PPS REVENUE DEBIT

## 2025-08-07 ASSESSMENT — ENCOUNTER SYMPTOMS
VOMITING: DENIES
SHORTNESS OF BREATH: 1
BOWEL PATTERN NORMAL: 1
FORGETFULNESS: 1
DYSPNEA ACTIVITY LEVEL: AFTER AMBULATING LESS THAN 10 FT
DYSPNEA ON EXERTION: 1
FATIGUES EASILY: 1
DIFFICULTY THINKING: 1
EYE PAIN: 1
DESCRIPTION OF MEMORY LOSS: SHORT TERM
POOR JUDGMENT: 1
LAST BOWEL MOVEMENT: 67423
NAUSEA: DENIES
DYSPNEA ACTIVITY LEVEL: WHILE SPEAKING
DENIES PAIN: 1

## 2025-08-07 ASSESSMENT — ACTIVITIES OF DAILY LIVING (ADL): OASIS_M1830: 06

## 2025-08-08 ENCOUNTER — HOME CARE VISIT (OUTPATIENT)
Dept: HOME HEALTH SERVICES | Facility: HOME HEALTHCARE | Age: 85
End: 2025-08-08
Payer: MEDICARE

## 2025-08-08 PROCEDURE — 665998 HH PPS REVENUE CREDIT

## 2025-08-08 PROCEDURE — 665999 HH PPS REVENUE DEBIT

## 2025-08-09 PROCEDURE — 665998 HH PPS REVENUE CREDIT

## 2025-08-09 PROCEDURE — 665999 HH PPS REVENUE DEBIT

## 2025-08-10 PROCEDURE — 665998 HH PPS REVENUE CREDIT

## 2025-08-10 PROCEDURE — 665999 HH PPS REVENUE DEBIT

## 2025-08-11 ENCOUNTER — HOME CARE VISIT (OUTPATIENT)
Dept: HOME HEALTH SERVICES | Facility: HOME HEALTHCARE | Age: 85
End: 2025-08-11
Payer: MEDICARE

## 2025-08-11 VITALS
TEMPERATURE: 98.6 F | SYSTOLIC BLOOD PRESSURE: 126 MMHG | OXYGEN SATURATION: 97 % | RESPIRATION RATE: 16 BRPM | HEART RATE: 84 BPM | DIASTOLIC BLOOD PRESSURE: 64 MMHG

## 2025-08-11 VITALS
SYSTOLIC BLOOD PRESSURE: 126 MMHG | DIASTOLIC BLOOD PRESSURE: 64 MMHG | OXYGEN SATURATION: 97 % | RESPIRATION RATE: 16 BRPM | TEMPERATURE: 98.6 F | HEART RATE: 84 BPM

## 2025-08-11 PROCEDURE — 665999 HH PPS REVENUE DEBIT

## 2025-08-11 PROCEDURE — G0152 HHCP-SERV OF OT,EA 15 MIN: HCPCS

## 2025-08-11 PROCEDURE — 665998 HH PPS REVENUE CREDIT

## 2025-08-11 PROCEDURE — G0495 RN CARE TRAIN/EDU IN HH: HCPCS

## 2025-08-11 ASSESSMENT — ACTIVITIES OF DAILY LIVING (ADL)
TOILETING: 1
AMBULATION ASSISTANCE: INDEPENDENT
FEEDING ASSESSED: 1
WASHING_UPB_CURRENT_FUNCTION: CONTACT GUARD ASSIST
DRESSING_LB_CURRENT_FUNCTION: CONTACT GUARD ASSIST
SHOPPING ASSESSED: 1
TELEPHONE USE ASSESSED: 1
AMBULATION ASSISTANCE: 1
GROOMING_WITHIN_DEFINED_LIMITS: 1
USING THE TELPHONE: NEEDS ASSISTANCE
TELEPHONE EQUIPMENT USED: VERY HOH
WASHING_LB_CURRENT_FUNCTION: MODERATE ASSIST
GROOMING ASSESSED: 1
TRANSPORTATION ASSESSED: 1
PHYSICAL_TRANSFERS_DEVICES: TO SHOWER
CURRENT_FUNCTION: CONTACT GUARD ASSIST
LAUNDRY: NEEDS ASSISTANCE
FEEDING: INDEPENDENT
TOILETING: INDEPENDENT
GROOMING_CURRENT_FUNCTION: INDEPENDENT
FEEDING_WITHIN_DEFINED_LIMITS: 1
PHYSICAL TRANSFERS ASSESSED: 1
TRANSPORTATION: DEPENDENT
BATHING ASSESSED: 1
HOUSEKEEPING ASSESSED: 1
DRESSING_UB_CURRENT_FUNCTION: SUPERVISION
LIGHT HOUSEKEEPING: NEEDS ASSISTANCE
LAUNDRY ASSESSED: 1
SHOPPING: DEPENDENT
PREPARING MEALS: NEEDS ASSISTANCE
BATHING_CURRENT_FUNCTION: MODERATE ASSIST

## 2025-08-11 ASSESSMENT — ENCOUNTER SYMPTOMS
LOWER EXTREMITY EDEMA: 1
BOWEL PATTERN NORMAL: 1
DENIES PAIN: 1
FORGETFULNESS: 1
STOOL FREQUENCY: LESS THAN DAILY
DRY SKIN: 1
LAST BOWEL MOVEMENT: 67427
MUSCLE WEAKNESS: 1
FATIGUE: 1
FATIGUES EASILY: 1
HYPERTENSION: 1
DIFFICULTY THINKING: 1
DENIES PAIN: 1
ARTHRALGIAS: 1
DESCRIPTION OF MEMORY LOSS: SHORT TERM
LIMITED RANGE OF MOTION: 1

## 2025-08-11 ASSESSMENT — PATIENT HEALTH QUESTIONNAIRE - PHQ9: CLINICAL INTERPRETATION OF PHQ2 SCORE: 0

## 2025-08-12 ENCOUNTER — HOME CARE VISIT (OUTPATIENT)
Dept: HOME HEALTH SERVICES | Facility: HOME HEALTHCARE | Age: 85
End: 2025-08-12
Payer: MEDICARE

## 2025-08-12 VITALS
OXYGEN SATURATION: 95 % | SYSTOLIC BLOOD PRESSURE: 122 MMHG | RESPIRATION RATE: 16 BRPM | HEART RATE: 73 BPM | DIASTOLIC BLOOD PRESSURE: 62 MMHG | TEMPERATURE: 98 F

## 2025-08-12 PROCEDURE — 665999 HH PPS REVENUE DEBIT

## 2025-08-12 PROCEDURE — 665998 HH PPS REVENUE CREDIT

## 2025-08-12 PROCEDURE — G0155 HHCP-SVS OF CSW,EA 15 MIN: HCPCS

## 2025-08-12 PROCEDURE — G0156 HHCP-SVS OF AIDE,EA 15 MIN: HCPCS

## 2025-08-12 ASSESSMENT — ENCOUNTER SYMPTOMS
PERSON REPORTING PAIN: PATIENT
DENIES PAIN: 1
DENIES PAIN: 1
PERSON REPORTING PAIN: PATIENT

## 2025-08-12 ASSESSMENT — ACTIVITIES OF DAILY LIVING (ADL)
SHOPPING_REQUIRES_ASSISTANCE: 1
BATHING_REQUIRES_ASSISTANCE: 1
LAUNDRY_REQUIRES_ASSISTANCE: 1

## 2025-08-12 ASSESSMENT — PATIENT HEALTH QUESTIONNAIRE - PHQ9: CLINICAL INTERPRETATION OF PHQ2 SCORE: 0

## 2025-08-13 ENCOUNTER — HOME CARE VISIT (OUTPATIENT)
Dept: HOME HEALTH SERVICES | Facility: HOME HEALTHCARE | Age: 85
End: 2025-08-13
Payer: MEDICARE

## 2025-08-13 VITALS
TEMPERATURE: 97.4 F | SYSTOLIC BLOOD PRESSURE: 120 MMHG | DIASTOLIC BLOOD PRESSURE: 60 MMHG | OXYGEN SATURATION: 96 % | RESPIRATION RATE: 16 BRPM | HEART RATE: 92 BPM

## 2025-08-13 PROCEDURE — 665998 HH PPS REVENUE CREDIT

## 2025-08-13 PROCEDURE — G0151 HHCP-SERV OF PT,EA 15 MIN: HCPCS

## 2025-08-13 PROCEDURE — G0153 HHCP-SVS OF S/L PATH,EA 15MN: HCPCS

## 2025-08-13 PROCEDURE — 665999 HH PPS REVENUE DEBIT

## 2025-08-14 ENCOUNTER — HOME CARE VISIT (OUTPATIENT)
Dept: HOME HEALTH SERVICES | Facility: HOME HEALTHCARE | Age: 85
End: 2025-08-14
Payer: MEDICARE

## 2025-08-14 VITALS
DIASTOLIC BLOOD PRESSURE: 68 MMHG | SYSTOLIC BLOOD PRESSURE: 126 MMHG | TEMPERATURE: 98.7 F | RESPIRATION RATE: 16 BRPM | OXYGEN SATURATION: 96 % | HEART RATE: 70 BPM

## 2025-08-14 PROCEDURE — 665999 HH PPS REVENUE DEBIT

## 2025-08-14 PROCEDURE — G0299 HHS/HOSPICE OF RN EA 15 MIN: HCPCS

## 2025-08-14 PROCEDURE — 665998 HH PPS REVENUE CREDIT

## 2025-08-14 ASSESSMENT — ENCOUNTER SYMPTOMS
LAST BOWEL MOVEMENT: 67431
BOWEL PATTERN NORMAL: 1
HYPERTENSION: 1
DESCRIPTION OF MEMORY LOSS: SHORT TERM
MUSCLE WEAKNESS: 1
FORGETFULNESS: 1
FATIGUES EASILY: 1
FATIGUE: 1
LIMITED RANGE OF MOTION: 1
POOR JUDGMENT: 1
STOOL FREQUENCY: LESS THAN DAILY
LOWER EXTREMITY EDEMA: 1
DRY SKIN: 1

## 2025-08-14 ASSESSMENT — PATIENT HEALTH QUESTIONNAIRE - PHQ9: CLINICAL INTERPRETATION OF PHQ2 SCORE: 0

## 2025-08-14 ASSESSMENT — PAIN SCALES - PAIN ASSESSMENT IN ADVANCED DEMENTIA (PAINAD)
NEGVOCALIZATION: 0 - NONE.
CONSOLABILITY: 0 - NO NEED TO CONSOLE.
BODYLANGUAGE: 0 - RELAXED.
FACIALEXPRESSION: 0 - SMILING OR INEXPRESSIVE.
TOTALSCORE: 0

## 2025-08-15 ENCOUNTER — HOME CARE VISIT (OUTPATIENT)
Dept: HOME HEALTH SERVICES | Facility: HOME HEALTHCARE | Age: 85
End: 2025-08-15
Payer: MEDICARE

## 2025-08-15 ENCOUNTER — APPOINTMENT (OUTPATIENT)
Dept: RADIOLOGY | Facility: MEDICAL CENTER | Age: 85
End: 2025-08-15
Attending: EMERGENCY MEDICINE
Payer: MEDICARE

## 2025-08-15 ENCOUNTER — HOSPITAL ENCOUNTER (EMERGENCY)
Facility: MEDICAL CENTER | Age: 85
End: 2025-08-15
Attending: EMERGENCY MEDICINE
Payer: MEDICARE

## 2025-08-15 VITALS
BODY MASS INDEX: 20.32 KG/M2 | RESPIRATION RATE: 16 BRPM | WEIGHT: 119 LBS | HEIGHT: 64 IN | TEMPERATURE: 97.7 F | HEART RATE: 69 BPM | OXYGEN SATURATION: 98 % | SYSTOLIC BLOOD PRESSURE: 162 MMHG | DIASTOLIC BLOOD PRESSURE: 70 MMHG

## 2025-08-15 VITALS
HEART RATE: 76 BPM | DIASTOLIC BLOOD PRESSURE: 64 MMHG | SYSTOLIC BLOOD PRESSURE: 128 MMHG | RESPIRATION RATE: 16 BRPM | OXYGEN SATURATION: 94 % | TEMPERATURE: 97.8 F

## 2025-08-15 VITALS
DIASTOLIC BLOOD PRESSURE: 60 MMHG | OXYGEN SATURATION: 96 % | HEART RATE: 92 BPM | TEMPERATURE: 97.4 F | RESPIRATION RATE: 16 BRPM | SYSTOLIC BLOOD PRESSURE: 120 MMHG

## 2025-08-15 DIAGNOSIS — S09.90XA CLOSED HEAD INJURY, INITIAL ENCOUNTER: Primary | ICD-10-CM

## 2025-08-15 PROCEDURE — 665998 HH PPS REVENUE CREDIT

## 2025-08-15 PROCEDURE — G0152 HHCP-SERV OF OT,EA 15 MIN: HCPCS

## 2025-08-15 PROCEDURE — 72125 CT NECK SPINE W/O DYE: CPT

## 2025-08-15 PROCEDURE — 70450 CT HEAD/BRAIN W/O DYE: CPT

## 2025-08-15 PROCEDURE — 665999 HH PPS REVENUE DEBIT

## 2025-08-15 PROCEDURE — 99285 EMERGENCY DEPT VISIT HI MDM: CPT | Mod: 25

## 2025-08-15 ASSESSMENT — ACTIVITIES OF DAILY LIVING (ADL)
PHYSICAL_TRANSFERS_DEVICES: WALKER
CURRENT_FUNCTION: CONTACT GUARD ASSIST
AMBULATION ASSISTANCE: 1
GROOMING_COMMENTS: SEE OT EVAL
FEEDING_WITHIN_DEFINED_LIMITS: 1
PHYSICAL TRANSFERS ASSESSED: 1
AMBULATION ASSISTANCE: CONTACT GUARD ASSIST
BATHING_COMMENTS: SEE OT EVAL

## 2025-08-15 ASSESSMENT — LIFESTYLE VARIABLES
EVER HAD A DRINK FIRST THING IN THE MORNING TO STEADY YOUR NERVES TO GET RID OF A HANGOVER: NO
TOTAL SCORE: 0
EVER FELT BAD OR GUILTY ABOUT YOUR DRINKING: NO
HAVE PEOPLE ANNOYED YOU BY CRITICIZING YOUR DRINKING: NO
TOTAL SCORE: 0
TOTAL SCORE: 0
CONSUMPTION TOTAL: INCOMPLETE
DO YOU DRINK ALCOHOL: NO
HAVE YOU EVER FELT YOU SHOULD CUT DOWN ON YOUR DRINKING: NO

## 2025-08-15 ASSESSMENT — ENCOUNTER SYMPTOMS
DENIES PAIN: 1
ARTHRALGIAS: 1
POOR JUDGMENT: 1
MUSCLE WEAKNESS: 1

## 2025-08-15 ASSESSMENT — FIBROSIS 4 INDEX: FIB4 SCORE: 3.73

## 2025-08-15 ASSESSMENT — PATIENT HEALTH QUESTIONNAIRE - PHQ9: CLINICAL INTERPRETATION OF PHQ2 SCORE: 0

## 2025-08-16 PROCEDURE — 665998 HH PPS REVENUE CREDIT

## 2025-08-16 PROCEDURE — 665999 HH PPS REVENUE DEBIT

## 2025-08-16 ASSESSMENT — ENCOUNTER SYMPTOMS
PAIN LOCATION - PAIN FREQUENCY: INTERMITTENT
PAIN LOCATION: R SHOULDER
PAIN SEVERITY GOAL: 0/10
HIGHEST PAIN SEVERITY IN PAST 24 HOURS: 3/10
PAIN LOCATION - PAIN SEVERITY: 3/10
PAIN LOCATION - PAIN QUALITY: ACHY
SUBJECTIVE PAIN PROGRESSION: UNCHANGED
LOWEST PAIN SEVERITY IN PAST 24 HOURS: 0/10
PAIN: 1
DIFFICULTY THINKING: 1

## 2025-08-17 PROCEDURE — 665999 HH PPS REVENUE DEBIT

## 2025-08-17 PROCEDURE — 665998 HH PPS REVENUE CREDIT

## 2025-08-17 ASSESSMENT — ENCOUNTER SYMPTOMS: DENIES PAIN: 1

## 2025-08-18 ENCOUNTER — HOME CARE VISIT (OUTPATIENT)
Dept: HOME HEALTH SERVICES | Facility: HOME HEALTHCARE | Age: 85
End: 2025-08-18
Payer: MEDICARE

## 2025-08-18 VITALS
RESPIRATION RATE: 18 BRPM | DIASTOLIC BLOOD PRESSURE: 50 MMHG | TEMPERATURE: 99 F | SYSTOLIC BLOOD PRESSURE: 122 MMHG | HEART RATE: 76 BPM | OXYGEN SATURATION: 99 %

## 2025-08-18 PROCEDURE — 665999 HH PPS REVENUE DEBIT

## 2025-08-18 PROCEDURE — G0299 HHS/HOSPICE OF RN EA 15 MIN: HCPCS

## 2025-08-18 PROCEDURE — 665998 HH PPS REVENUE CREDIT

## 2025-08-18 ASSESSMENT — ACTIVITIES OF DAILY LIVING (ADL)
CURRENT_FUNCTION: STAND BY ASSIST
TRANSPORTATION COMMENTS: PATIENT NEEDS ASSISTANCE TO LEAVE THE HOME
AMBULATION ASSISTANCE: STAND BY ASSIST

## 2025-08-18 ASSESSMENT — ENCOUNTER SYMPTOMS
LAST BOWEL MOVEMENT: 67435
DYSPNEA ACTIVITY LEVEL: AFTER AMBULATING 10 - 20 FT
DRY SKIN: 1
PAIN LOCATION - RELIEVING FACTORS: OTC MEDS
FATIGUE: 1
HYPERTENSION: 1
PAIN SEVERITY GOAL: 0/10
PAIN: 1
SUBJECTIVE PAIN PROGRESSION: UNCHANGED
PAIN LOCATION - PAIN SEVERITY: 0/10
POOR JUDGMENT: 1
PAIN LOCATION - PAIN FREQUENCY: INTERMITTENT
STOOL FREQUENCY: DAILY
MUSCLE WEAKNESS: 1
LOWEST PAIN SEVERITY IN PAST 24 HOURS: 0/10
NAUSEA: DENIES
BOWEL PATTERN NORMAL: 1
HIGHEST PAIN SEVERITY IN PAST 24 HOURS: 5/10
VOMITING: DENIES
SHORTNESS OF BREATH: 1
FORGETFULNESS: 1
PAIN LOCATION: RIGHT FOREHEAD

## 2025-08-19 ENCOUNTER — HOME CARE VISIT (OUTPATIENT)
Dept: HOME HEALTH SERVICES | Facility: HOME HEALTHCARE | Age: 85
End: 2025-08-19
Payer: MEDICARE

## 2025-08-19 VITALS
RESPIRATION RATE: 18 BRPM | HEART RATE: 78 BPM | OXYGEN SATURATION: 96 % | DIASTOLIC BLOOD PRESSURE: 60 MMHG | TEMPERATURE: 98.8 F | SYSTOLIC BLOOD PRESSURE: 128 MMHG

## 2025-08-19 PROCEDURE — 665998 HH PPS REVENUE CREDIT

## 2025-08-19 PROCEDURE — 665999 HH PPS REVENUE DEBIT

## 2025-08-19 PROCEDURE — G0152 HHCP-SERV OF OT,EA 15 MIN: HCPCS

## 2025-08-19 ASSESSMENT — ENCOUNTER SYMPTOMS
DENIES PAIN: 1
DIFFICULTY THINKING: 1

## 2025-08-20 ENCOUNTER — HOME CARE VISIT (OUTPATIENT)
Dept: HOME HEALTH SERVICES | Facility: HOME HEALTHCARE | Age: 85
End: 2025-08-20
Payer: MEDICARE

## 2025-08-20 PROCEDURE — 665998 HH PPS REVENUE CREDIT

## 2025-08-20 PROCEDURE — 665999 HH PPS REVENUE DEBIT

## 2025-08-21 ENCOUNTER — HOME CARE VISIT (OUTPATIENT)
Dept: HOME HEALTH SERVICES | Facility: HOME HEALTHCARE | Age: 85
End: 2025-08-21
Payer: MEDICARE

## 2025-08-21 VITALS
SYSTOLIC BLOOD PRESSURE: 140 MMHG | TEMPERATURE: 97.5 F | HEART RATE: 76 BPM | RESPIRATION RATE: 18 BRPM | OXYGEN SATURATION: 98 % | DIASTOLIC BLOOD PRESSURE: 60 MMHG

## 2025-08-21 PROCEDURE — G0151 HHCP-SERV OF PT,EA 15 MIN: HCPCS

## 2025-08-21 PROCEDURE — G0299 HHS/HOSPICE OF RN EA 15 MIN: HCPCS

## 2025-08-21 PROCEDURE — 665999 HH PPS REVENUE DEBIT

## 2025-08-21 PROCEDURE — 665998 HH PPS REVENUE CREDIT

## 2025-08-21 ASSESSMENT — ENCOUNTER SYMPTOMS
MUSCLE WEAKNESS: 1
CONTUSION: 1
VOMITING: NO
LAST BOWEL MOVEMENT: 67438
DESCRIPTION OF MEMORY LOSS: SHORT TERM
DIFFICULTY THINKING: 1
NAUSEA: NO
DENIES PAIN: 1

## 2025-08-22 ENCOUNTER — HOME CARE VISIT (OUTPATIENT)
Dept: HOME HEALTH SERVICES | Facility: HOME HEALTHCARE | Age: 85
End: 2025-08-22
Payer: MEDICARE

## 2025-08-22 VITALS
OXYGEN SATURATION: 98 % | RESPIRATION RATE: 18 BRPM | TEMPERATURE: 97.5 F | DIASTOLIC BLOOD PRESSURE: 60 MMHG | HEART RATE: 76 BPM | SYSTOLIC BLOOD PRESSURE: 140 MMHG

## 2025-08-22 VITALS
DIASTOLIC BLOOD PRESSURE: 68 MMHG | SYSTOLIC BLOOD PRESSURE: 134 MMHG | OXYGEN SATURATION: 99 % | RESPIRATION RATE: 18 BRPM | TEMPERATURE: 97.7 F | HEART RATE: 84 BPM

## 2025-08-22 PROCEDURE — 665998 HH PPS REVENUE CREDIT

## 2025-08-22 PROCEDURE — 665999 HH PPS REVENUE DEBIT

## 2025-08-22 PROCEDURE — G0156 HHCP-SVS OF AIDE,EA 15 MIN: HCPCS

## 2025-08-22 ASSESSMENT — ENCOUNTER SYMPTOMS
DIFFICULTY THINKING: 1
DENIES PAIN: 1
DENIES PAIN: 1
POOR JUDGMENT: 1
PERSON REPORTING PAIN: PATIENT

## 2025-08-23 PROCEDURE — 665998 HH PPS REVENUE CREDIT

## 2025-08-23 PROCEDURE — 665999 HH PPS REVENUE DEBIT

## 2025-08-24 PROCEDURE — 665998 HH PPS REVENUE CREDIT

## 2025-08-24 PROCEDURE — 665999 HH PPS REVENUE DEBIT

## 2025-08-25 ENCOUNTER — HOME CARE VISIT (OUTPATIENT)
Dept: HOME HEALTH SERVICES | Facility: HOME HEALTHCARE | Age: 85
End: 2025-08-25
Payer: MEDICARE

## 2025-08-25 VITALS
OXYGEN SATURATION: 97 % | TEMPERATURE: 97.8 F | SYSTOLIC BLOOD PRESSURE: 120 MMHG | RESPIRATION RATE: 18 BRPM | HEART RATE: 94 BPM | DIASTOLIC BLOOD PRESSURE: 62 MMHG

## 2025-08-25 PROCEDURE — 665998 HH PPS REVENUE CREDIT

## 2025-08-25 PROCEDURE — G0299 HHS/HOSPICE OF RN EA 15 MIN: HCPCS

## 2025-08-25 PROCEDURE — 665999 HH PPS REVENUE DEBIT

## 2025-08-25 PROCEDURE — G0151 HHCP-SERV OF PT,EA 15 MIN: HCPCS

## 2025-08-25 ASSESSMENT — ACTIVITIES OF DAILY LIVING (ADL)
AMBULATION ASSISTANCE: STAND BY ASSIST
CURRENT_FUNCTION: ONE PERSON

## 2025-08-25 ASSESSMENT — ENCOUNTER SYMPTOMS
POOR JUDGMENT: 1
VOMITING: DENIES
MENTAL STATUS CHANGE: 0
FATIGUES EASILY: 1
STOOL FREQUENCY: DAILY
FORGETFULNESS: 1
BOWEL PATTERN NORMAL: 1
MUSCLE WEAKNESS: 1
CONTUSION: 1
LAST BOWEL MOVEMENT: 67442
DRY SKIN: 1
HYPERTENSION: 1
FATIGUE: 1
SHORTNESS OF BREATH: 1
NAUSEA: DENIES
DENIES PAIN: 1
DYSPNEA ACTIVITY LEVEL: AFTER AMBULATING LESS THAN 10 FT

## 2025-08-26 ENCOUNTER — HOME CARE VISIT (OUTPATIENT)
Dept: HOME HEALTH SERVICES | Facility: HOME HEALTHCARE | Age: 85
End: 2025-08-26
Payer: MEDICARE

## 2025-08-26 VITALS
RESPIRATION RATE: 18 BRPM | TEMPERATURE: 97.8 F | HEART RATE: 94 BPM | OXYGEN SATURATION: 97 % | SYSTOLIC BLOOD PRESSURE: 120 MMHG | DIASTOLIC BLOOD PRESSURE: 62 MMHG

## 2025-08-26 VITALS
DIASTOLIC BLOOD PRESSURE: 58 MMHG | TEMPERATURE: 98.8 F | SYSTOLIC BLOOD PRESSURE: 118 MMHG | OXYGEN SATURATION: 99 % | HEART RATE: 77 BPM | RESPIRATION RATE: 18 BRPM

## 2025-08-26 PROCEDURE — 665999 HH PPS REVENUE DEBIT

## 2025-08-26 PROCEDURE — 665998 HH PPS REVENUE CREDIT

## 2025-08-26 PROCEDURE — G0152 HHCP-SERV OF OT,EA 15 MIN: HCPCS

## 2025-08-26 ASSESSMENT — ENCOUNTER SYMPTOMS
POOR JUDGMENT: 1
DENIES PAIN: 1
DENIES PAIN: 1
DIFFICULTY THINKING: 1

## 2025-08-27 PROCEDURE — 665999 HH PPS REVENUE DEBIT

## 2025-08-27 PROCEDURE — 665998 HH PPS REVENUE CREDIT

## 2025-08-28 ENCOUNTER — HOME CARE VISIT (OUTPATIENT)
Dept: HOME HEALTH SERVICES | Facility: HOME HEALTHCARE | Age: 85
End: 2025-08-28
Payer: MEDICARE

## 2025-08-28 VITALS
HEART RATE: 64 BPM | TEMPERATURE: 97.5 F | OXYGEN SATURATION: 96 % | SYSTOLIC BLOOD PRESSURE: 114 MMHG | DIASTOLIC BLOOD PRESSURE: 56 MMHG | RESPIRATION RATE: 16 BRPM

## 2025-08-28 VITALS
SYSTOLIC BLOOD PRESSURE: 114 MMHG | TEMPERATURE: 97.5 F | RESPIRATION RATE: 16 BRPM | DIASTOLIC BLOOD PRESSURE: 56 MMHG | HEART RATE: 64 BPM | OXYGEN SATURATION: 96 %

## 2025-08-28 PROCEDURE — G0299 HHS/HOSPICE OF RN EA 15 MIN: HCPCS

## 2025-08-28 PROCEDURE — 665999 HH PPS REVENUE DEBIT

## 2025-08-28 PROCEDURE — 665998 HH PPS REVENUE CREDIT

## 2025-08-28 PROCEDURE — G0152 HHCP-SERV OF OT,EA 15 MIN: HCPCS

## 2025-08-28 ASSESSMENT — ACTIVITIES OF DAILY LIVING (ADL)
GROOMING_CURRENT_FUNCTION: INDEPENDENT
TOILETING: SUPERVISION
AMBULATION ASSISTANCE: 1
TRANSPORTATION: DEPENDENT
CURRENT_FUNCTION: CONTACT GUARD ASSIST
LAUNDRY ASSESSED: 1
HOUSEKEEPING ASSESSED: 1
FEEDING ASSESSED: 1
AMBULATION ASSISTANCE: SUPERVISION
ORAL_CARE_ASSESSED: 1
LIGHT HOUSEKEEPING: NEEDS ASSISTANCE
GROOMING ASSESSED: 1
BATHING_CURRENT_FUNCTION: MINIMUM ASSIST
DRESSING_LB_CURRENT_FUNCTION: MINIMUM ASSIST
FEEDING: INDEPENDENT
BATHING ASSESSED: 1
ORAL_CARE_CURRENT_FUNCTION: INDEPENDENT
TELEPHONE USE ASSESSED: 1
DRESSING_UB_CURRENT_FUNCTION: INDEPENDENT
TRANSPORTATION ASSESSED: 1
TOILETING: 1
PHYSICAL TRANSFERS ASSESSED: 1
USING THE TELPHONE: NEEDS ASSISTANCE
SHOPPING: DEPENDENT
PREPARING MEALS: NEEDS ASSISTANCE
LAUNDRY: NEEDS ASSISTANCE
SHOPPING ASSESSED: 1
WASHING_UPB_CURRENT_FUNCTION: MINIMUM ASSIST

## 2025-08-28 ASSESSMENT — ENCOUNTER SYMPTOMS
VOMITING: DENIES
FATIGUES EASILY: 1
FATIGUE: 1
FORGETFULNESS: 1
LAST BOWEL MOVEMENT: 67444
DENIES PAIN: 1
NAUSEA: DENIES
DIFFICULTY THINKING: 1
LIMITED RANGE OF MOTION: 1
DENIES PAIN: 1
MUSCLE WEAKNESS: 1

## 2025-08-29 ENCOUNTER — HOME CARE VISIT (OUTPATIENT)
Dept: HOME HEALTH SERVICES | Facility: HOME HEALTHCARE | Age: 85
End: 2025-08-29
Payer: MEDICARE

## 2025-08-29 PROCEDURE — 665998 HH PPS REVENUE CREDIT

## 2025-08-29 PROCEDURE — 665999 HH PPS REVENUE DEBIT

## 2025-08-30 ENCOUNTER — HOME CARE VISIT (OUTPATIENT)
Dept: HOME HEALTH SERVICES | Facility: HOME HEALTHCARE | Age: 85
End: 2025-08-30
Payer: MEDICARE

## 2025-08-30 PROCEDURE — 665998 HH PPS REVENUE CREDIT

## 2025-08-30 PROCEDURE — G0299 HHS/HOSPICE OF RN EA 15 MIN: HCPCS

## 2025-08-30 PROCEDURE — 665999 HH PPS REVENUE DEBIT

## 2025-08-31 VITALS
HEART RATE: 78 BPM | RESPIRATION RATE: 16 BRPM | DIASTOLIC BLOOD PRESSURE: 60 MMHG | TEMPERATURE: 97.8 F | SYSTOLIC BLOOD PRESSURE: 120 MMHG | OXYGEN SATURATION: 97 %

## 2025-08-31 ASSESSMENT — ENCOUNTER SYMPTOMS
LIMITED RANGE OF MOTION: 1
BOWEL PATTERN NORMAL: 1
LAST BOWEL MOVEMENT: 67446
FORGETFULNESS: 1
HYPERTENSION: 1
FATIGUE: 1
STOOL FREQUENCY: LESS THAN DAILY
FATIGUES EASILY: 1
MUSCLE WEAKNESS: 1
DENIES PAIN: 1
DRY SKIN: 1

## 2025-08-31 ASSESSMENT — PATIENT HEALTH QUESTIONNAIRE - PHQ9: CLINICAL INTERPRETATION OF PHQ2 SCORE: 0
